# Patient Record
Sex: FEMALE | Race: WHITE | NOT HISPANIC OR LATINO | Employment: FULL TIME | URBAN - METROPOLITAN AREA
[De-identification: names, ages, dates, MRNs, and addresses within clinical notes are randomized per-mention and may not be internally consistent; named-entity substitution may affect disease eponyms.]

---

## 2017-01-12 ENCOUNTER — OFFICE VISIT (OUTPATIENT)
Dept: INTERNAL MEDICINE | Facility: CLINIC | Age: 52
End: 2017-01-12
Payer: COMMERCIAL

## 2017-01-12 VITALS
SYSTOLIC BLOOD PRESSURE: 120 MMHG | DIASTOLIC BLOOD PRESSURE: 96 MMHG | HEIGHT: 68 IN | RESPIRATION RATE: 18 BRPM | TEMPERATURE: 98 F | BODY MASS INDEX: 35.52 KG/M2 | OXYGEN SATURATION: 96 % | HEART RATE: 95 BPM | WEIGHT: 234.38 LBS

## 2017-01-12 DIAGNOSIS — E03.9 HYPOTHYROIDISM, UNSPECIFIED TYPE: ICD-10-CM

## 2017-01-12 DIAGNOSIS — I10 ESSENTIAL HYPERTENSION: ICD-10-CM

## 2017-01-12 DIAGNOSIS — F41.9 ANXIETY: ICD-10-CM

## 2017-01-12 DIAGNOSIS — D35.00 ADRENAL ADENOMA, UNSPECIFIED LATERALITY: ICD-10-CM

## 2017-01-12 DIAGNOSIS — G25.0 BENIGN ESSENTIAL TREMOR: ICD-10-CM

## 2017-01-12 DIAGNOSIS — G43.909 MIGRAINE WITHOUT STATUS MIGRAINOSUS, NOT INTRACTABLE, UNSPECIFIED MIGRAINE TYPE: ICD-10-CM

## 2017-01-12 DIAGNOSIS — N63.0 BREAST MASS: ICD-10-CM

## 2017-01-12 PROCEDURE — 3074F SYST BP LT 130 MM HG: CPT | Mod: S$GLB,,, | Performed by: INTERNAL MEDICINE

## 2017-01-12 PROCEDURE — 99999 PR PBB SHADOW E&M-EST. PATIENT-LVL IV: CPT | Mod: PBBFAC,,, | Performed by: INTERNAL MEDICINE

## 2017-01-12 PROCEDURE — 99215 OFFICE O/P EST HI 40 MIN: CPT | Mod: S$GLB,,, | Performed by: INTERNAL MEDICINE

## 2017-01-12 PROCEDURE — 3080F DIAST BP >= 90 MM HG: CPT | Mod: S$GLB,,, | Performed by: INTERNAL MEDICINE

## 2017-01-12 PROCEDURE — 1159F MED LIST DOCD IN RCRD: CPT | Mod: S$GLB,,, | Performed by: INTERNAL MEDICINE

## 2017-01-12 RX ORDER — DEXAMETHASONE 1 MG/1
1 TABLET ORAL ONCE
Qty: 1 TABLET | Refills: 0 | Status: SHIPPED | OUTPATIENT
Start: 2017-01-12 | End: 2017-01-12

## 2017-01-12 RX ORDER — ENALAPRIL MALEATE 20 MG/1
20 TABLET ORAL DAILY
Qty: 90 TABLET | Refills: 3 | Status: SHIPPED | OUTPATIENT
Start: 2017-01-12 | End: 2018-01-08 | Stop reason: SDUPTHER

## 2017-01-12 RX ORDER — HYDROCHLOROTHIAZIDE 50 MG/1
50 TABLET ORAL DAILY
Qty: 90 TABLET | Refills: 3 | Status: CANCELLED | OUTPATIENT
Start: 2017-01-12

## 2017-01-12 RX ORDER — SUMATRIPTAN SUCCINATE 100 MG/1
100 TABLET ORAL
Qty: 10 TABLET | Refills: 3 | Status: CANCELLED | OUTPATIENT
Start: 2017-01-12 | End: 2017-02-11

## 2017-01-12 RX ORDER — ENALAPRIL MALEATE 20 MG/1
20 TABLET ORAL DAILY
Qty: 90 TABLET | Refills: 3 | Status: CANCELLED | OUTPATIENT
Start: 2017-01-12

## 2017-01-12 RX ORDER — BUPROPION HYDROCHLORIDE 100 MG/1
100 TABLET ORAL 2 TIMES DAILY
Qty: 180 TABLET | Refills: 3 | Status: SHIPPED | OUTPATIENT
Start: 2017-01-12 | End: 2018-01-29 | Stop reason: SDUPTHER

## 2017-01-12 RX ORDER — PROMETHAZINE HYDROCHLORIDE 50 MG/1
50 SUPPOSITORY RECTAL EVERY 6 HOURS PRN
Qty: 12 SUPPOSITORY | Refills: 1 | Status: SHIPPED | OUTPATIENT
Start: 2017-01-12 | End: 2018-01-08 | Stop reason: SDUPTHER

## 2017-01-12 RX ORDER — BUTALBITAL, ACETAMINOPHEN AND CAFFEINE 50; 325; 40 MG/1; MG/1; MG/1
1 TABLET ORAL EVERY 4 HOURS PRN
Qty: 90 TABLET | Refills: 1 | Status: SHIPPED | OUTPATIENT
Start: 2017-01-12 | End: 2018-01-08 | Stop reason: SDUPTHER

## 2017-01-12 RX ORDER — LEVOTHYROXINE SODIUM 75 UG/1
75 TABLET ORAL DAILY
Qty: 90 TABLET | Refills: 3 | Status: SHIPPED | OUTPATIENT
Start: 2017-01-12 | End: 2017-01-31 | Stop reason: DRUGHIGH

## 2017-01-12 RX ORDER — PROPRANOLOL HYDROCHLORIDE 40 MG/1
40 TABLET ORAL 3 TIMES DAILY
Qty: 180 TABLET | Refills: 3 | Status: SHIPPED | OUTPATIENT
Start: 2017-01-12 | End: 2017-04-20 | Stop reason: SDUPTHER

## 2017-01-12 NOTE — PATIENT INSTRUCTIONS
TODAY:  - labs  - Endocrinology consult  - will call you about Neuro appt  - propranolol started  - HCTZ discontinued    NEXT:  - buproprion

## 2017-01-12 NOTE — ASSESSMENT & PLAN NOTE
Biopsy negative in 3/2011, last mammo in 8/2016 category 2 benign  · mammo due in 8/2017  · monitor

## 2017-01-12 NOTE — ASSESSMENT & PLAN NOTE
Levo 75mcg, no labs in system for 1 year  · Recheck thyroid studies  · Continue current medications

## 2017-01-12 NOTE — ASSESSMENT & PLAN NOTE
Patient with generalized anxiety, taking buproprion for unknown years  · Will address changing to other therapy with fewer side-effects of anxiety  · Start propranolol 40mg TID

## 2017-01-12 NOTE — ASSESSMENT & PLAN NOTE
Controlled with phenergran and fioricet abortive therapies, patient wanting preventive therapy, did not tolerate imitrex, topiramate   · Start propranolol 40mg TID for prophylaxis  · Referral for second opinion to Dr Smith  · Request earliest appointment in [place] or [facility]  · Refilled abortive therapy scripts

## 2017-01-12 NOTE — PROGRESS NOTES
"Primary Care Provider Appointment    Subjective:      Patient ID: Maria G Cameron is a 51 y.o. female.    Chief Complaint: Annual Exam; Medication Refill ( Pt also has questions about medications); and Migraine (prevenative care )  Dr Tinoco is PCP, patient here for annual physical. Moved here from [place] 1 1/2 years ago, has gained weight since living in [facility]. Patient continues to have migraines that she reports are triggered by "hormones", change of diet, sleep patterns, environmental change.     Patient has taken promethazine suppositories and butal/acaet/CF for migraines. She uses 24 and 180 tablets respectively. She is interested in pursuing a preventative therapy for migraines, but did not tolerate topiramate because she experienced muscle pain and anxiety. The imitrex made her short of breath.     She has history of adrenal adenoma with annual DHEA and aldosterone testing, which have been stable for 15 years. Her last MRI was performed in [place] with unknown results. She does not remember the facility name.    She has a breast mass in 2011, with negative biopsy. No complaints of breast tenderness or enlargement. She attends annual mammograms with no concerning findings.      Social History  Components    Tobacco (+) 5 pack years    Alcohol (-)     Ililcit drugs (-)    Sex (?) not asked    Employment (+) [employer]         Past Surgical History   Procedure Laterality Date    Breast core biopsy  3/7/11     fibrocystic changes    Colonoscopy N/A 4/15/2016     Procedure: COLONOSCOPY;  Surgeon: Coleman Moss MD;  Location: [address]);  Service: Endoscopy;  Laterality: N/A;       Past Medical History   Diagnosis Date    Adrenal adenoma     Allergic rhinitis     Allergy      Allergic to trees, weeds, mold and standardized mite    Anxiety     Eustachian tube dysfunction     Fibrocystic changes of left breast      12 o'clock L breast mass bx 3/7/11    Hypertension     Hypothyroidism     Migraine " "headache     Recurrent otitis media        Review of Systems   Constitutional: Positive for activity change, appetite change and unexpected weight change. Negative for fatigue.   HENT: Negative for ear discharge.    Eyes: Negative for photophobia and discharge.   Cardiovascular: Negative for chest pain, palpitations and leg swelling.   Gastrointestinal: Negative for constipation and diarrhea.   Endocrine: Negative for polydipsia and polyphagia.   Genitourinary: Negative for frequency and menstrual problem.   Musculoskeletal: Positive for neck pain and neck stiffness. Negative for back pain and gait problem.   Skin: Negative for color change.   Neurological: Positive for tremors and headaches. Negative for light-headedness.   Psychiatric/Behavioral: Negative for confusion.       Objective:     Visit Vitals    BP (!) 120/96 (BP Location: Right arm, Patient Position: Sitting, BP Method: Manual)    Pulse 95    Temp 98.1 °F (36.7 °C)    Resp 18    Ht 5' 8" (1.727 m)    Wt 106.3 kg (234 lb 5.6 oz)    SpO2 96%    BMI 35.63 kg/m2       Physical Exam   Constitutional: She is oriented to person, place, and time. She appears well-developed and well-nourished.   obese   HENT:   Head: Normocephalic and atraumatic.   Eyes: Pupils are equal, round, and reactive to light. Right eye exhibits no discharge. Left eye exhibits no discharge.   Neck: Normal range of motion.   Cardiovascular:   tachycardia   Pulmonary/Chest: Effort normal and breath sounds normal. No respiratory distress. She has no wheezes.   Abdominal: Soft. She exhibits no distension. There is no tenderness.   Musculoskeletal: Normal range of motion. She exhibits no edema or tenderness.   Lymphadenopathy:     She has no cervical adenopathy.   Neurological: She is alert and oriented to person, place, and time. No cranial nerve deficit.   hyperreflexive LE- 3/4 patellar   Skin: Skin is warm. No erythema.   Mild mottled appearance of UE  telangiestasias on chest "   Psychiatric: She has a normal mood and affect. Her behavior is normal.   Anxious demeanor   Vitals reviewed.      No results found for: WBC, HGB, HCT, PLT, CHOL, TRIG, HDL, LDLDIRECT, ALT, AST, NA, K, CL, CREATININE, BUN, CO2, TSH, PSA, INR, GLUF, HGBA1C, MICROALBUR      Assessment:   51 y.o. female with multiple co-morbid illnesses here to continue work-up of chronic issues notably migraines, anxiety, HTN.     Plan:     Problem List Items Addressed This Visit        Neuro    Anxiety     Patient with generalized anxiety, taking buproprion for unknown years  · Will address changing to other therapy with fewer side-effects of anxiety  · Start propranolol 40mg TID         Relevant Medications    buPROPion (WELLBUTRIN) 100 MG tablet    propranolol (INDERAL) 40 MG tablet    Benign essential tremor     Lifetime essential tremor, familial  · Start propranolol 40mg TID         Relevant Medications    propranolol (INDERAL) 40 MG tablet       Cardiac    Hypertension     BP controlled on ACE-HCTZ, patient wanting to start BB for migraine prevention  · Discontinue HCTZ  · Continue ACE 20mg  · Start propranolol 40mg TID  · Increase as tolerated         Relevant Medications    propranolol (INDERAL) 40 MG tablet    enalapril (VASOTEC) 20 MG tablet    Other Relevant Orders    Comprehensive metabolic panel    Hemoglobin A1c    CBC auto differential    Lipid panel    T4, free    TSH    Migraine headache     Controlled with phenergran and fioricet abortive therapies, patient wanting preventive therapy, did not tolerate imitrex, topiramate   · Start propranolol 40mg TID for prophylaxis  · Referral for second opinion to Dr Smith  · Request earliest appointment in [place] or [facility]  · Refilled abortive therapy scripts         Relevant Medications    butalbital-acetaminophen-caffeine -40 mg (FIORICET, ESGIC) -40 mg per tablet    promethazine (PHENERGAN) 50 MG suppository    propranolol (INDERAL) 40 MG tablet       Hem/Onc     Adrenal adenoma     Patient with adrenal adenoma diagnosed 15 Y ago, with MRI. Last labs 2016. Chronic complaints of headache, weight chages, anxiety.   · Endocrinology referral  · Check low-dose cortisol suppression test  · check DHEA-S, aldosterone/renin ratio         Relevant Medications    dexamethasone (DECADRON) 1 MG Tab    Other Relevant Orders    ALDOSTERONE    RENIN    DHEA-SULFATE    CORTISOL, 8AM    Breast mass     Biopsy negative in 3/2011, last mammo in 8/2016 category 2 benign  · mammo due in 8/2017  · monitor            Endocrine    Hypothyroidism     Levo 75mcg, no labs in system for 1 year  · Recheck thyroid studies  · Continue current medications         Relevant Medications    levothyroxine (SYNTHROID) 75 MCG tablet    propranolol (INDERAL) 40 MG tablet    Other Relevant Orders    Hepatitis C antibody          Health Maintenance       Date Due Completion Date    Hepatitis C Screening 1965 ---TODAY    Lipid Panel 1965 ---TODAY    TETANUS VACCINE 9/18/1981 ---    Pap Smear 5/24/2016 5/24/2013 (Done)    Override on 5/24/2013: Done    Influenza Vaccine 8/1/2016 11/5/2015 (Done)    Override on 11/5/2015: Done    Mammogram 8/4/2017 8/4/2016    Override on 12/8/2011: Done    Override on 12/8/2011: Done    Colonoscopy 4/15/2026 4/15/2016    Pneumococcal PPSV23 (Medium Risk) (2) 9/18/2028 1/1/2013          Return in about 2 weeks (around 1/26/2017) for review of labs, established patient follow-up.. One hour spent with this patient today, half of that in counseling.    Pearl Ellis MD/MPH  Internal Medicine  [facility]  [phone]

## 2017-01-12 NOTE — ASSESSMENT & PLAN NOTE
Patient with adrenal adenoma diagnosed 15 Y ago, with MRI. Last labs 2016. Chronic complaints of headache, weight chages, anxiety.   · Endocrinology referral  · Check low-dose cortisol suppression test  · check DHEA-S, aldosterone/renin ratio

## 2017-01-12 NOTE — ASSESSMENT & PLAN NOTE
BP controlled on ACE-HCTZ, patient wanting to start BB for migraine prevention  · Discontinue HCTZ  · Continue ACE 20mg  · Start propranolol 40mg TID  · Increase as tolerated

## 2017-01-12 NOTE — MR AVS SNAPSHOT
[facility] - Internal Medicine  [address]  [facility] [address]  Phone: [phone]  Fax: [phone]                  Maria G Cameron   2017 8:00 AM   Office Visit    Description:  Female : 1965   Provider:  Pearl Ellis MD   Department:  [facility] - [facility]           Reason for Visit     Annual Exam     Medication Refill     Migraine           Diagnoses this Visit        Comments    Anxiety         Migraine without status migrainosus, not intractable, unspecified migraine type         Essential hypertension         Adrenal adenoma, unspecified laterality         Hypothyroidism, unspecified type         Benign essential tremor                To Do List           Future Appointments        Provider Department Dept Phone    2017 8:00 AM LAB, APPOINTMENT [place] [facility] [phone]    2017 8:00 AM Pearl Ellis MD [facility] Internal Medicine [phone]      Goals (5 Years of Data)     None      Follow-Up and Disposition     Return in about 2 weeks (around 2017) for review of labs, established patient follow-up.       These Medications        Disp Refills Start End    buPROPion (WELLBUTRIN) 100 MG tablet 180 tablet 3 2017     Take 1 tablet (100 mg total) by mouth 2 (two) times daily. - Oral    Pharmacy: [facility] [address] [place] [address] [facility] Ph #: [phone]       Notes to Pharmacy: Bupropion 100mg tab Mylan. No substitution.    butalbital-acetaminophen-caffeine -40 mg (FIORICET, ESGIC) -40 mg per tablet 90 tablet 1 2017     Take 1 tablet by mouth every 4 (four) hours as needed for Pain. - Oral    Pharmacy: [facility] [address] [place] [address] [address] Ph #: [phone]       levothyroxine (SYNTHROID) 75 MCG tablet 90 tablet 3 2017     Take 1 tablet (75 mcg total) by mouth once daily. - Oral    Pharmacy: [facility] [address] [place] [address] [address]  [facility] #: [phone]       promethazine (PHENERGAN) 50 MG suppository 12 suppository 1 1/12/2017     Place 1 suppository (50 mg total) rectally every 6 (six) hours as needed for Nausea. - Rectal    Pharmacy: [address] Ph #: [phone]       propranolol (INDERAL) 40 MG tablet 180 tablet 3 1/12/2017 1/12/2018    Take 1 tablet (40 mg total) by mouth 3 (three) times daily. Start taking 2 times daily, increase to 3 times daily as tolerated - Oral    Pharmacy: [address] Ph #: [phone]       enalapril (VASOTEC) 20 MG tablet 90 tablet 3 1/12/2017     Take 1 tablet (20 mg total) by mouth once daily. - Oral    Pharmacy: [address] Ph #: [phone]         [facility]     [facility] - 24/7 Assistance  Registered nurses in the [facility] provide clinical advisement, health education, appointment booking, and other advisory services.  Call for this free service at [phone].             Medications           Message regarding Medications     Verify the changes and/or additions to your medication regime listed below are the same as discussed with your clinician today.  If any of these changes or additions are incorrect, please notify your healthcare provider.        START taking these NEW medications        Refills    propranolol (INDERAL) 40 MG tablet 3    Sig: Take 1 tablet (40 mg total) by mouth 3 (three) times daily. Start taking 2 times daily, increase to 3 times daily as tolerated    Class: Normal    Route: Oral      STOP taking these medications     hydrochlorothiazide (HYDRODIURIL) 50 MG tablet Take 1 tablet (50 mg total) by mouth once daily.    sumatriptan (IMITREX) 100 MG tablet Take 1 tablet (100 mg total) by mouth every 2 (two) hours as needed for Migraine.    topiramate (TROKENDI XR) 25 mg Cp24 Take 25 mg by mouth  "once daily.           Verify that the below list of medications is an accurate representation of the medications you are currently taking.  If none reported, the list may be blank. If incorrect, please contact your healthcare provider. Carry this list with you in case of emergency.           Current Medications     buPROPion (WELLBUTRIN) 100 MG tablet Take 1 tablet (100 mg total) by mouth 2 (two) times daily.    butalbital-acetaminophen-caffeine -40 mg (FIORICET, ESGIC) -40 mg per tablet Take 1 tablet by mouth every 4 (four) hours as needed for Pain.    enalapril (VASOTEC) 20 MG tablet Take 1 tablet (20 mg total) by mouth once daily.    levothyroxine (SYNTHROID) 75 MCG tablet Take 1 tablet (75 mcg total) by mouth once daily.    promethazine (PHENERGAN) 50 MG suppository Place 1 suppository (50 mg total) rectally every 6 (six) hours as needed for Nausea.    propranolol (INDERAL) 40 MG tablet Take 1 tablet (40 mg total) by mouth 3 (three) times daily. Start taking 2 times daily, increase to 3 times daily as tolerated           Clinical Reference Information           Vital Signs - Last Recorded  Most recent update: 1/12/2017  7:46 AM by Cydney Barbour MA    BP Pulse Temp Resp Ht Wt    (!) 120/96 (BP Location: Right arm, Patient Position: Sitting, BP Method: Manual) 95 98.1 °F (36.7 °C) 18 5' 8" (1.727 m) 106.3 kg (234 lb 5.6 oz)    SpO2 BMI             96% 35.63 kg/m2         Blood Pressure          Most Recent Value    BP  (!)  120/96      Allergies as of 1/12/2017     Codeine      Immunizations Administered on Date of Encounter - 1/12/2017     None      Orders Placed During Today's Visit     Future Labs/Procedures Expected by Expires    CBC auto differential  1/12/2017 3/13/2018    Comprehensive metabolic panel  1/12/2017 3/13/2018    Hemoglobin A1c  1/12/2017 3/13/2018    Lipid panel  1/12/2017 3/13/2018    T4, free  1/12/2017 3/13/2018    TSH  1/12/2017 3/13/2018      [facility] Sign-Up     " Activating your [facility] account is as easy as 1-2-3!     1) Visit [email or web address], select Sign Up Now, enter this activation code and your date of birth, then select Next.  [email or web address]  Expires: 2/26/2017  9:08 AM      2) Create a username and password to use when you visit [facility] in the future and select a security question in case you lose your password and select Next.    3) Enter your e-mail address and click Sign Up!    Additional Information  If you have questions, please e-mail [email or web address] or call [phone] to talk to our [facility] staff. Remember, [facility] is NOT to be used for urgent needs. For medical emergencies, dial 911.         Instructions    TODAY:  - labs  - Endocrinology consult  - will call you about Neuro appt  - propranolol started  - HCTZ discontinued    NEXT:  - buproprion       Smoking Cessation     If you would like to quit smoking:   You may be eligible for free services if you are a [place] resident and started smoking cigarettes before September 1, 1988.  Call the Smoking Cessation Trust (SCT) toll free at (340) 526-1762 or ([phone] 520-3656.   Call 7-236-QUIT-NOW if you do not meet the above criteria.

## 2017-01-13 ENCOUNTER — LAB VISIT (OUTPATIENT)
Dept: LAB | Facility: HOSPITAL | Age: 52
End: 2017-01-13
Attending: INTERNAL MEDICINE
Payer: COMMERCIAL

## 2017-01-13 DIAGNOSIS — E03.9 HYPOTHYROIDISM, UNSPECIFIED TYPE: ICD-10-CM

## 2017-01-13 DIAGNOSIS — I10 ESSENTIAL HYPERTENSION: ICD-10-CM

## 2017-01-13 DIAGNOSIS — D35.00 ADRENAL ADENOMA, UNSPECIFIED LATERALITY: ICD-10-CM

## 2017-01-13 LAB
ALBUMIN SERPL BCP-MCNC: 4 G/DL
ALP SERPL-CCNC: 56 U/L
ALT SERPL W/O P-5'-P-CCNC: 30 U/L
ANION GAP SERPL CALC-SCNC: 9 MMOL/L
AST SERPL-CCNC: 27 U/L
BASOPHILS # BLD AUTO: 0.03 K/UL
BASOPHILS NFR BLD: 0.6 %
BILIRUB SERPL-MCNC: 0.5 MG/DL
BUN SERPL-MCNC: 13 MG/DL
CALCIUM SERPL-MCNC: 9.4 MG/DL
CHLORIDE SERPL-SCNC: 101 MMOL/L
CHOLEST/HDLC SERPL: 4.3 {RATIO}
CO2 SERPL-SCNC: 29 MMOL/L
CORTIS SERPL-MCNC: 11.2 UG/DL
CREAT SERPL-MCNC: 0.9 MG/DL
DHEA-S SERPL-MCNC: 372.8 UG/DL
DIFFERENTIAL METHOD: ABNORMAL
EOSINOPHIL # BLD AUTO: 0.1 K/UL
EOSINOPHIL NFR BLD: 1.7 %
ERYTHROCYTE [DISTWIDTH] IN BLOOD BY AUTOMATED COUNT: 13.1 %
EST. GFR  (AFRICAN AMERICAN): >60 ML/MIN/1.73 M^2
EST. GFR  (NON AFRICAN AMERICAN): >60 ML/MIN/1.73 M^2
ESTIMATED AVG GLUCOSE: 117 MG/DL
GLUCOSE SERPL-MCNC: 109 MG/DL
HBA1C MFR BLD HPLC: 5.7 %
HCT VFR BLD AUTO: 43.1 %
HCV AB SERPL QL IA: NEGATIVE
HDL/CHOLESTEROL RATIO: 23.1 %
HDLC SERPL-MCNC: 264 MG/DL
HDLC SERPL-MCNC: 61 MG/DL
HGB BLD-MCNC: 14.6 G/DL
LDLC SERPL CALC-MCNC: 182.6 MG/DL
LYMPHOCYTES # BLD AUTO: 2.1 K/UL
LYMPHOCYTES NFR BLD: 43 %
MCH RBC QN AUTO: 32.2 PG
MCHC RBC AUTO-ENTMCNC: 33.9 %
MCV RBC AUTO: 95 FL
MONOCYTES # BLD AUTO: 0.4 K/UL
MONOCYTES NFR BLD: 8.3 %
NEUTROPHILS # BLD AUTO: 2.2 K/UL
NEUTROPHILS NFR BLD: 46.2 %
NONHDLC SERPL-MCNC: 203 MG/DL
PLATELET # BLD AUTO: 310 K/UL
PMV BLD AUTO: 9.3 FL
POTASSIUM SERPL-SCNC: 4.1 MMOL/L
PROT SERPL-MCNC: 7.4 G/DL
RBC # BLD AUTO: 4.54 M/UL
SODIUM SERPL-SCNC: 139 MMOL/L
T4 FREE SERPL-MCNC: 1 NG/DL
TRIGL SERPL-MCNC: 102 MG/DL
TSH SERPL DL<=0.005 MIU/L-ACNC: 5.19 UIU/ML
WBC # BLD AUTO: 4.81 K/UL

## 2017-01-13 PROCEDURE — 80053 COMPREHEN METABOLIC PANEL: CPT

## 2017-01-13 PROCEDURE — 82088 ASSAY OF ALDOSTERONE: CPT

## 2017-01-13 PROCEDURE — 86803 HEPATITIS C AB TEST: CPT

## 2017-01-13 PROCEDURE — 84244 ASSAY OF RENIN: CPT

## 2017-01-13 PROCEDURE — 83036 HEMOGLOBIN GLYCOSYLATED A1C: CPT

## 2017-01-13 PROCEDURE — 82533 TOTAL CORTISOL: CPT

## 2017-01-13 PROCEDURE — 82627 DEHYDROEPIANDROSTERONE: CPT

## 2017-01-13 PROCEDURE — 85025 COMPLETE CBC W/AUTO DIFF WBC: CPT

## 2017-01-13 PROCEDURE — 36415 COLL VENOUS BLD VENIPUNCTURE: CPT

## 2017-01-13 PROCEDURE — 84443 ASSAY THYROID STIM HORMONE: CPT

## 2017-01-13 PROCEDURE — 80061 LIPID PANEL: CPT

## 2017-01-13 PROCEDURE — 84439 ASSAY OF FREE THYROXINE: CPT

## 2017-01-16 LAB — ALDOST SERPL-MCNC: 14.8 NG/DL

## 2017-01-17 ENCOUNTER — LAB VISIT (OUTPATIENT)
Dept: LAB | Facility: HOSPITAL | Age: 52
End: 2017-01-17
Attending: INTERNAL MEDICINE
Payer: COMMERCIAL

## 2017-01-17 DIAGNOSIS — D49.7 ADRENAL TUMOR: ICD-10-CM

## 2017-01-17 LAB
CORTIS SERPL-MCNC: <1 UG/DL
RENIN PLAS-CCNC: 33 NG/ML/H

## 2017-01-17 PROCEDURE — 36415 COLL VENOUS BLD VENIPUNCTURE: CPT

## 2017-01-17 PROCEDURE — 82533 TOTAL CORTISOL: CPT

## 2017-01-26 ENCOUNTER — OFFICE VISIT (OUTPATIENT)
Dept: INTERNAL MEDICINE | Facility: CLINIC | Age: 52
End: 2017-01-26
Payer: COMMERCIAL

## 2017-01-26 VITALS
BODY MASS INDEX: 36.25 KG/M2 | SYSTOLIC BLOOD PRESSURE: 148 MMHG | HEART RATE: 71 BPM | DIASTOLIC BLOOD PRESSURE: 98 MMHG | HEIGHT: 68 IN | RESPIRATION RATE: 18 BRPM | WEIGHT: 239.19 LBS | OXYGEN SATURATION: 96 %

## 2017-01-26 DIAGNOSIS — D35.00 ADRENAL ADENOMA, UNSPECIFIED LATERALITY: Primary | ICD-10-CM

## 2017-01-26 DIAGNOSIS — G43.101 MIGRAINE WITH AURA AND WITH STATUS MIGRAINOSUS, NOT INTRACTABLE: ICD-10-CM

## 2017-01-26 DIAGNOSIS — R51.9 CHRONIC INTRACTABLE HEADACHE, UNSPECIFIED HEADACHE TYPE: ICD-10-CM

## 2017-01-26 DIAGNOSIS — I10 ESSENTIAL HYPERTENSION: ICD-10-CM

## 2017-01-26 DIAGNOSIS — E03.9 ACQUIRED HYPOTHYROIDISM: ICD-10-CM

## 2017-01-26 DIAGNOSIS — G25.0 BENIGN ESSENTIAL TREMOR: ICD-10-CM

## 2017-01-26 DIAGNOSIS — F41.9 ANXIETY: ICD-10-CM

## 2017-01-26 DIAGNOSIS — G89.29 CHRONIC INTRACTABLE HEADACHE, UNSPECIFIED HEADACHE TYPE: ICD-10-CM

## 2017-01-26 PROCEDURE — 99213 OFFICE O/P EST LOW 20 MIN: CPT | Mod: S$GLB,,, | Performed by: INTERNAL MEDICINE

## 2017-01-26 PROCEDURE — 99999 PR PBB SHADOW E&M-EST. PATIENT-LVL III: CPT | Mod: PBBFAC,,, | Performed by: INTERNAL MEDICINE

## 2017-01-26 RX ORDER — ACETAZOLAMIDE 500 MG/1
500 CAPSULE, EXTENDED RELEASE ORAL 2 TIMES DAILY
Qty: 60 CAPSULE | Refills: 11 | Status: SHIPPED | OUTPATIENT
Start: 2017-01-26 | End: 2017-07-06

## 2017-01-26 NOTE — ASSESSMENT & PLAN NOTE
Patient with generalized anxiety, taking buproprion for unknown years  · Self-titrated wellbutrin to 100mg  · In future will switch to SSRI  · Continue propranolol 40mg TID

## 2017-01-26 NOTE — MR AVS SNAPSHOT
[facility]  [address]  [facility] [address]  Phone: [phone]  Fax: [phone]                  Maria G Cameron   2017 8:00 AM   Office Visit    Description:  Female : 1965   Provider:  Pearl Ellis MD   Department:  [facility]           Reason for Visit     Follow-up     B/P Check     Results     Migraine           Diagnoses this Visit        Comments    Adrenal adenoma, unspecified laterality    -  Primary     Essential hypertension         Migraine with aura and with status migrainosus, not intractable         Acquired hypothyroidism         Anxiety         Benign essential tremor         Chronic intractable headache, unspecified headache type                To Do List           Future Appointments        Provider Department Dept Phone    2017 5:15 PM [facility] CT1 64- LIMIT [address] L[address] [facility] [phone]    2017 7:30 AM MD [facility] - Internal Medicine [phone]    2017 8:00 AM Jennifer Ledezma MD [facility] - Endo/Diab/Metab [phone]    3/23/2017 1:00 PM Matt Elizabeth MD [facility] - Neuro Stroke Center [phone]      Goals (5 Years of Data)     None      Follow-Up and Disposition     Return in about 1 week (around 2017) for established patient follow-up.       These Medications        Disp Refills Start End    acetaZOLAMIDE (DIAMOX) 500 mg CpSR 60 capsule 11 2017    Take 1 capsule (500 mg total) by mouth 2 (two) times daily. - Oral    [facility] [address] [place] [address] [address] Ph #: [phone]         [facility]     [facility] Care [facility] -  Assistance  Registered nurses in the [facility] provide clinical advisement, health education, appointment booking, and other advisory services.  Call for this free service at [phone].             Medications           Message regarding  "Medications     Verify the changes and/or additions to your medication regime listed below are the same as discussed with your clinician today.  If any of these changes or additions are incorrect, please notify your healthcare provider.        START taking these NEW medications        Refills    acetaZOLAMIDE (DIAMOX) 500 mg CpSR 11    Sig: Take 1 capsule (500 mg total) by mouth 2 (two) times daily.    Class: Normal    Route: Oral           Verify that the below list of medications is an accurate representation of the medications you are currently taking.  If none reported, the list may be blank. If incorrect, please contact your healthcare provider. Carry this list with you in case of emergency.           Current Medications     buPROPion (WELLBUTRIN) 100 MG tablet Take 1 tablet (100 mg total) by mouth 2 (two) times daily.    enalapril (VASOTEC) 20 MG tablet Take 1 tablet (20 mg total) by mouth once daily.    levothyroxine (SYNTHROID) 75 MCG tablet Take 1 tablet (75 mcg total) by mouth once daily.    promethazine (PHENERGAN) 50 MG suppository Place 1 suppository (50 mg total) rectally every 6 (six) hours as needed for Nausea.    propranolol (INDERAL) 40 MG tablet Take 1 tablet (40 mg total) by mouth 3 (three) times daily. Start taking 2 times daily, increase to 3 times daily as tolerated    acetaZOLAMIDE (DIAMOX) 500 mg CpSR Take 1 capsule (500 mg total) by mouth 2 (two) times daily.    butalbital-acetaminophen-caffeine -40 mg (FIORICET, ESGIC) -40 mg per tablet Take 1 tablet by mouth every 4 (four) hours as needed for Pain.           Clinical Reference Information           Vital Signs - Last Recorded  Most recent update: 1/26/2017  7:49 AM by Cydney Barbour MA    BP Pulse Resp Ht Wt SpO2    (!) 148/98 (BP Location: Right arm, Patient Position: Sitting, BP Method: Manual) 71 18 5' 8" (1.727 m) 108.5 kg (239 lb 3.2 oz) 96%    BMI                36.37 kg/m2          Blood Pressure          Most " Recent Value    BP  (!)  148/98      Allergies as of 1/26/2017     Codeine      Immunizations Administered on Date of Encounter - 1/26/2017     None      Orders Placed During Today's Visit     Future Labs/Procedures Expected by Expires    CT Abdomen Pelvis With Contrast  1/26/2017 1/26/2018    Polysomnogram (CPAP will be added if patient meets diagnostic criteria.)  As directed 1/26/2018      [facility] Sign-Up     Activating your [facility] account is as easy as 1-2-3!     1) Visit [email or web address], select Sign Up Now, enter this activation code and your date of birth, then select Next.  [email or web address]  Expires: 2/26/2017  9:08 AM      2) Create a username and password to use when you visit [facility] in the future and select a security question in case you lose your password and select Next.    3) Enter your e-mail address and click Sign Up!    Additional Information  If you have questions, please e-mail [email or web address] or call [phone] to talk to our [facility] staff. Remember, [facility] is NOT to be used for urgent needs. For medical emergencies, dial 911.         Instructions    TODAY:  - CT of abdomen  - Endocrinology appt  - start acetazolamide for pseudotumor cerebri  - continue wellbutrin 100mg  - continue propranolol 40mg three times a day  - sleep study ordered    NEXT:  - increase thyroid medication    FUTURE:  - switch to SSRI anti-anxiety med       Smoking Cessation     If you would like to quit smoking:   You may be eligible for free services if you are a [place] resident and started smoking cigarettes before September 1, 1988.  Call the Smoking Cessation Trust (SCT) toll free at (140) 103-3802 or (059) 224-0349.   Call 6-668-QUIT-NOW if you do not meet the above criteria.

## 2017-01-26 NOTE — PATIENT INSTRUCTIONS
TODAY:  - CT of abdomen  - Endocrinology appt  - start acetazolamide for pseudotumor cerebri  - continue wellbutrin 100mg  - continue propranolol 40mg three times a day  - sleep study ordered    NEXT:  - increase thyroid medication    FUTURE:  - switch to SSRI anti-anxiety med

## 2017-01-26 NOTE — ASSESSMENT & PLAN NOTE
Levo 75mcg, TSH elevated in 1/2017  · Needs increase in levo dosing  · Continue current medications until next appt  · BP meds changed today, concern for side-effects with multi med changes at one appt

## 2017-01-26 NOTE — ASSESSMENT & PLAN NOTE
Patient with adrenal adenoma diagnosed 15 Y ago, with MRI. Last labs 2016. Chronic complaints of headache, weight chages, anxiety.   · Endocrinology referral set-up today  · low-dose cortisol suppression test normal  · DHEA-S elevated  · Plasma aldosterone normal  · Renin activity normal  · CT abdomen/pelvis with contrast ordered

## 2017-01-26 NOTE — ASSESSMENT & PLAN NOTE
Differential includes pseudotumor cerebri, sleep apnea, migraine. Uncontrolled with phenergran and fioricet abortive therapies with propranolol preventive therapy. Did not tolerate imitrex, topiramate.   · Continue propranolol 40mg TID for migraine prophylaxis  · Referral for second opinion to Dr Elizabeth at [facility] (Neuro[facility])  · Continue abortive therapy scripts for migraine  · Start acetazolomide for possible pseudotumor cerebri  · Sleep study for sleep apnea eval (Columbia score 14)  · Advised weight loss  · Barak  at next appt

## 2017-01-26 NOTE — ASSESSMENT & PLAN NOTE
BP uncontrolled on ACE and propranolol (HCTZ discontinued at last appt)  · Start diuretic acetazolamide (also for psudotumor cerebri)  · 500mg BID  · Advised to start this weekend, and call office on monday  · Continue ACE 20mg  · Continue propranolol 40mg TID

## 2017-01-26 NOTE — ASSESSMENT & PLAN NOTE
Lifetime essential tremor, familial. Significant improvement with propranolol.  · Continue propranolol 40mg TID

## 2017-01-27 ENCOUNTER — HOSPITAL ENCOUNTER (OUTPATIENT)
Dept: RADIOLOGY | Facility: HOSPITAL | Age: 52
Discharge: HOME | End: 2017-01-27
Attending: INTERNAL MEDICINE
Payer: COMMERCIAL

## 2017-01-27 DIAGNOSIS — D35.00 ADRENAL ADENOMA, UNSPECIFIED LATERALITY: ICD-10-CM

## 2017-01-27 DIAGNOSIS — F41.9 ANXIETY: ICD-10-CM

## 2017-01-27 PROCEDURE — 25500020 PHARM REV CODE 255: Performed by: INTERNAL MEDICINE

## 2017-01-27 PROCEDURE — 74170 CT ABD WO CNTRST FLWD CNTRST: CPT | Mod: TC

## 2017-01-27 PROCEDURE — 74170 CT ABD WO CNTRST FLWD CNTRST: CPT | Mod: 26,,, | Performed by: RADIOLOGY

## 2017-01-27 RX ADMIN — IOHEXOL 100 ML: 350 INJECTION, SOLUTION INTRAVENOUS at 06:01

## 2017-01-30 ENCOUNTER — TELEPHONE (OUTPATIENT)
Dept: INTERNAL MEDICINE | Facility: CLINIC | Age: 52
End: 2017-01-30

## 2017-01-30 DIAGNOSIS — E03.9 HYPOTHYROIDISM, UNSPECIFIED TYPE: Primary | ICD-10-CM

## 2017-01-30 NOTE — TELEPHONE ENCOUNTER
Pt has been advised about results and stressed great understanding ,  Pt stated that the medication is working and she is feeling ok , medication can be increased at anytime . Please advise     Pt stated that she is happy and thanks Dr. Ellis she really appreciates all that you have done .

## 2017-01-31 RX ORDER — LEVOTHYROXINE SODIUM 88 UG/1
88 TABLET ORAL DAILY
Qty: 30 TABLET | Refills: 11 | Status: SHIPPED | OUTPATIENT
Start: 2017-01-31 | End: 2017-03-13 | Stop reason: DRUGHIGH

## 2017-01-31 NOTE — TELEPHONE ENCOUNTER
Please let her know that a higher dose thyroid medication (levothyroxine 88mcg) was sent to pharmacy.    Thanks,  KJ

## 2017-01-31 NOTE — TELEPHONE ENCOUNTER
Pt has been advised that medication has been called into local pharmacy , Pt stressed great understanding .    Janet    Thanks Dr. Ellis

## 2017-01-31 NOTE — TELEPHONE ENCOUNTER
Called Pt no answer, message has been left informing Pt to please contact us at our direct line , will try again .    VIJAY Barbour

## 2017-02-02 ENCOUNTER — OFFICE VISIT (OUTPATIENT)
Dept: ENDOCRINOLOGY | Facility: CLINIC | Age: 52
End: 2017-02-02
Payer: COMMERCIAL

## 2017-02-02 ENCOUNTER — OFFICE VISIT (OUTPATIENT)
Dept: INTERNAL MEDICINE | Facility: CLINIC | Age: 52
End: 2017-02-02
Payer: COMMERCIAL

## 2017-02-02 VITALS
OXYGEN SATURATION: 97 % | RESPIRATION RATE: 18 BRPM | SYSTOLIC BLOOD PRESSURE: 132 MMHG | HEART RATE: 75 BPM | BODY MASS INDEX: 36.02 KG/M2 | WEIGHT: 237.63 LBS | HEIGHT: 68 IN | DIASTOLIC BLOOD PRESSURE: 91 MMHG

## 2017-02-02 VITALS
WEIGHT: 236.13 LBS | BODY MASS INDEX: 35.79 KG/M2 | HEIGHT: 68 IN | HEART RATE: 66 BPM | DIASTOLIC BLOOD PRESSURE: 82 MMHG | SYSTOLIC BLOOD PRESSURE: 152 MMHG

## 2017-02-02 DIAGNOSIS — R68.89 ABNORMAL ENDOCRINE LABORATORY TEST FINDING: ICD-10-CM

## 2017-02-02 DIAGNOSIS — F41.9 ANXIETY: ICD-10-CM

## 2017-02-02 DIAGNOSIS — R51.9 CHRONIC INTRACTABLE HEADACHE, UNSPECIFIED HEADACHE TYPE: ICD-10-CM

## 2017-02-02 DIAGNOSIS — E03.9 ACQUIRED HYPOTHYROIDISM: Primary | ICD-10-CM

## 2017-02-02 DIAGNOSIS — D35.00 ADRENAL ADENOMA, UNSPECIFIED LATERALITY: ICD-10-CM

## 2017-02-02 DIAGNOSIS — I10 ESSENTIAL HYPERTENSION: ICD-10-CM

## 2017-02-02 DIAGNOSIS — G89.29 CHRONIC INTRACTABLE HEADACHE, UNSPECIFIED HEADACHE TYPE: ICD-10-CM

## 2017-02-02 DIAGNOSIS — E03.9 ACQUIRED HYPOTHYROIDISM: ICD-10-CM

## 2017-02-02 DIAGNOSIS — G25.0 BENIGN ESSENTIAL TREMOR: ICD-10-CM

## 2017-02-02 PROCEDURE — 99999 PR PBB SHADOW E&M-EST. PATIENT-LVL III: CPT | Mod: PBBFAC,,, | Performed by: INTERNAL MEDICINE

## 2017-02-02 PROCEDURE — 99214 OFFICE O/P EST MOD 30 MIN: CPT | Mod: S$GLB,,, | Performed by: INTERNAL MEDICINE

## 2017-02-02 PROCEDURE — 99203 OFFICE O/P NEW LOW 30 MIN: CPT | Mod: S$GLB,,, | Performed by: INTERNAL MEDICINE

## 2017-02-02 NOTE — ASSESSMENT & PLAN NOTE
Patient with generalized anxiety, taking buproprion for unknown years. Improved on low-dose wellbutrin  · Self-titrated wellbutrin to 100mg  · In future will switch to SSRI  · Continue propranolol 40mg TID

## 2017-02-02 NOTE — LETTER
February 2, 2017      Pearl Ellis MD  [address]  [facility] [address]           [facility]Di[facility]Metab  [address]  [facility] [address]  Phone: [phone]  Fax: [phone]          Patient: Maria G Cameron   MR Number: [id number]   YOB: 1965   Date of Visit: 2/2/2017       Dear Dr. Pearl Ellis:    Thank you for referring Maria G Cameron to me for evaluation. Attached you will find relevant portions of my assessment and plan of care.    If you have questions, please do not hesitate to call me. I look forward to following Maria G Cameron along with you.    Sincerely,    Susan Solano MD    Enclosure  CC:  No Recipients    If you would like to receive this communication electronically, please contact [email or web address] or [phone] to request more information on [employer] Link access.    For providers and/or their staff who would like to refer a patient to [facility], please contact us through our one-stop-shop provider referral line, [facility], at [phone].    If you feel you have received this communication in error or would no longer like to receive these types of communications, please e-mail [email or web address]

## 2017-02-02 NOTE — PROGRESS NOTES
"Subjective:     Patient ID: Massiel Muñoz is a 51 y.o. female.    Chief Complaint: Consult    HPI:   Ms. Muñoz is a 51 y.o. female who is here for a consult visit for evaluation hypothyroidism and a small adenoma. Fifteen years ago, was diagnosed with a small adrenal adenoma. Consultation with endocrinologist and surgeon at the time recommended monitoring only.    She was started on levothyroxine 75 mcg fifteen years ago without a dosage change, until recent labs were mildly abnormal.   Reports feeling fatigued prior to recent blood work. Has a hypertension that is well controlled on medications. Denies cold intolerance or constipation.   Currently taking in the morning with other medications.     Occasionally takes a MVI.     Menarche 13 years  Menopause 5 years ago  Menstrual cycles were regular during reproductive years and associated with migraines.   Terminal hair over upper lip that without any change in the past fifteen years. No acne, oily skin during adolescence.     Denies HRT. Denies hot flushing or vaginal dryness.     Mother and sister with thyroid disease.     Review of Systems   Constitutional: Negative for chills and fever.   HENT: Negative for congestion and sinus pressure.    Eyes: Negative for visual disturbance.   Respiratory: Negative for chest tightness and shortness of breath.    Cardiovascular: Negative for chest pain, palpitations and leg swelling.   Gastrointestinal: Negative for abdominal pain and vomiting.   Genitourinary: Negative for dysuria.   Musculoskeletal: Negative for arthralgias.   Skin: Negative for rash.   Neurological: Negative for weakness.   Hematological: Does not bruise/bleed easily.   Psychiatric/Behavioral: Negative for sleep disturbance.        Objective:     Physical Exam    Vitals:    02/02/17 0853   BP: (!) 152/82   Pulse: 66   Weight: 107.1 kg (236 lb 1.8 oz)   Height: 5' 8" (1.727 m)     Results for MASSIEL MUÑOZ (MRN [id number]) as of 2/2/2017 09:07   Ref. Range " 1/13/2017 07:45 1/17/2017 07:35   Aldosterone Latest Units: ng/dL 14.8    Cortisol -8 AM Latest Ref Range: 4.30 - 22.40 ug/dL 11.2 <1.0 (L)   Hemoglobin A1C Latest Ref Range: 4.5 - 6.2 % 5.7    Estimated Avg Glucose Latest Ref Range: 68 - 131 mg/dL 117    TSH Latest Ref Range: 0.400 - 4.000 uIU/mL 5.190 (H)    Free T4 Latest Ref Range: 0.71 - 1.51 ng/dL 1.00    DHEA-SO4 Latest Ref Range: 56.2 - 282.9 ug/dL 372.8 (H)      CT Scan of abdomen 1/2017:   10 minute delayed images were also obtained per adrenal mass protocol.   The adrenal glands are within normal limits.  No masses, nodules, or abnormal enhancement is identified.    Assessment/Plan:     1. Acquired hypothyroidism  - not taking correctly, however will space out LT4 from the remainder of pills  - has repeat TSH in two months with PCP, avoid iatrogenic thyrotoxicosis as this can increase bone loss and increase risk for arrhythmias    2. Abnormal endocrine laboratory test finding  - DHEAS is mildly abnormal  - No signs of virilization to suggest anything more than mild hyperandrogenism

## 2017-02-02 NOTE — MR AVS SNAPSHOT
[facility]  [address]  [facility] [address]  Phone: [phone]  Fax: [phone]                  Maria G Cameron   2017 7:30 AM   Office Visit    Description:  Female : 1965   Provider:  Pearl Ellis MD   Department:  [facility]           Reason for Visit     Follow-up     Results           Diagnoses this Visit        Comments    Chronic intractable headache, unspecified headache type         Anxiety         Benign essential tremor         Essential hypertension         Adrenal adenoma, unspecified laterality         Acquired hypothyroidism                To Do List           Future Appointments        Provider Department Dept Phone    2017 9:00 AM Susan Solano MD [facility] - Endo/Diab/Metab [phone]    3/13/2017 7:20 AM LAB, APPOINTMENT [place] [facility] [phone]    3/23/2017 1:00 PM Matt Elizabeth MD [facility] - Neuro Stroke Center [phone]    4/3/2017 7:30 AM Pearl Ellis MD [facility] - Internal Medicine [phone]      Goals (5 Years of Data)     None      Follow-Up and Disposition     Return in about 2 months (around 2017) for established patient follow-up.      [facility]     [facility] -  Assistance  Registered nurses in the [facility] provide clinical advisement, health education, appointment booking, and other advisory services.  Call for this free service at [phone].             Medications           Message regarding Medications     Verify the changes and/or additions to your medication regime listed below are the same as discussed with your clinician today.  If any of these changes or additions are incorrect, please notify your healthcare provider.             Verify that the below list of medications is an accurate representation of the medications you are currently taking.  If none reported, the list may be blank. If  "incorrect, please contact your healthcare provider. Carry this list with you in case of emergency.           Current Medications     acetaZOLAMIDE (DIAMOX) 500 mg CpSR Take 1 capsule (500 mg total) by mouth 2 (two) times daily.    buPROPion (WELLBUTRIN) 100 MG tablet Take 1 tablet (100 mg total) by mouth 2 (two) times daily.    butalbital-acetaminophen-caffeine -40 mg (FIORICET, ESGIC) -40 mg per tablet Take 1 tablet by mouth every 4 (four) hours as needed for Pain.    enalapril (VASOTEC) 20 MG tablet Take 1 tablet (20 mg total) by mouth once daily.    levothyroxine (SYNTHROID) 88 MCG tablet Take 1 tablet (88 mcg total) by mouth once daily.    promethazine (PHENERGAN) 50 MG suppository Place 1 suppository (50 mg total) rectally every 6 (six) hours as needed for Nausea.    propranolol (INDERAL) 40 MG tablet Take 1 tablet (40 mg total) by mouth 3 (three) times daily. Start taking 2 times daily, increase to 3 times daily as tolerated           Clinical Reference Information           Vital Signs - Last Recorded  Most recent update: 2/2/2017  7:38 AM by Cydney Barbour MA    BP Pulse Resp Ht Wt SpO2    (!) 132/91 (BP Location: Right arm, Patient Position: Sitting, BP Method: Manual) 75 18 5' 8" (1.727 m) 107.8 kg (237 lb 10.5 oz) 97%    BMI                36.14 kg/m2          Blood Pressure          Most Recent Value    BP  (!)  132/91      Allergies as of 2/2/2017     Codeine      Immunizations Administered on Date of Encounter - 2/2/2017     None      Orders Placed During Today's Visit     Future Labs/Procedures Expected by Expires    Comprehensive metabolic panel  2/2/2017 4/3/2018    T4, free  2/2/2017 4/3/2018    TSH  2/2/2017 4/3/2018      [facility] Sign-Up     Activating your [facility] account is as easy as 1-2-3!     1) Visit [email or web address], select Sign Up Now, enter this activation code and your date of birth, then select Next.  [email or web address]  Expires: 2/26/2017  9:08 AM      2) Create a " "username and password to use when you visit [facility] in the future and select a security question in case you lose your password and select Next.    3) Enter your e-mail address and click Sign Up!    Additional Information  If you have questions, please e-mail [email or web address] or call [phone] to talk to our [facility] staff. Remember, [facility] is NOT to be used for urgent needs. For medical emergencies, dial 911.         Instructions    What is idiopathic intracranial hypertension? -- Idiopathic intracranial hypertension is a condition that causes pressure inside the skull. It is also called "pseudotumor cerebri."    Idiopathic intracranial hypertension causes headaches and vision loss. These symptoms are similar to symptoms caused by a brain tumor.    What causes idiopathic intracranial hypertension? -- Doctors do not know the cause. But idiopathic intracranial hypertension is more common in women and obese people.    Certain medicines seem to make some people more likely to get idiopathic intracranial hypertension. These medicines include tetracycline, high doses of vitamin A, and growth hormone.    What are the symptoms of idiopathic intracranial hypertension? -- The symptoms include:    ?Bad headaches - Some people say the worst pain is right behind the eyes.  ?Short periods of vision loss - This can happen in 1 or both eyes. It usually lasts a few seconds and might happen once in a while or several times a day.  ?Dimming of vision  ?Trouble seeing things at the edge of your line of sight  ?Double vision  ?Seeing flashing lights  ?Noises inside your head - The noise might sound like rushing water or wind. It often pulses in time with your heartbeat and can come and go.  In rare cases, people with idiopathic intracranial hypertension lose their vision forever.    Will I need tests? -- Yes. Tests can include:    ?Eye exam - An eye doctor will use special tools to look for swelling at the back of your " "eye, near the optic nerve (figure 1). Most patients with idiopathic intracranial hypertension have swelling of the optic nerve. The optic nerve connects the eye to the brain.  ?Visual field test - This test checks how well you can see things that are at the edges of your line of sight. The test will be repeated from time to time to check your optic nerves.  ?MRI or CT scan - These are imaging tests that take pictures of the inside of your brain. Your doctor can use them to check if a tumor or other problem is causing your symptoms.  ?Lumbar puncture (sometimes called a "spinal tap") - During this procedure, a doctor puts a needle into your lower back to measure the fluid pressure inside your skull.  How is idiopathic intracranial hypertension treated? -- Treatments include:    ?Weight loss - If you are overweight, your doctor will recommend a diet or weight loss program. If you are very overweight and cannot lose weight by dieting, your doctor might recommend medicines or weight-loss surgery.  ?Medicines - Your doctor might prescribe medicines that help lower the amount of spinal fluid your body makes. Spinal fluid is the fluid that surrounds the brain and spinal cord. He or she might also recommend medicines used to prevent and treat headaches.  ?Surgery - Doctors only do surgery if losing weight and taking medicines don't help enough. The kinds of surgery include:  Shunting - In this surgery, a doctor puts a device called a "shunt" into a fluid-filled space inside your brain. The shunt is connected to a tube that is placed under your skin and that empties into your belly. The shunt helps drain the extra spinal fluid from your brain and can relieve the pressure.  Optical nerve sheath fenestration - In this surgery, a doctor cuts a tiny, window-like hole in the tissue that covers the optic nerve. This helps lower pressure on the nerve to help save your vision.       Smoking Cessation     If you would like to quit " smoking:   You may be eligible for free services if you are a [place] resident and started smoking cigarettes before September 1, 1988.  Call the [employer]oking [employer]essation [employer] ([employer]) toll free at (154) 668-6034 or (216) 437-4804.   Call 8-825-QUIT-NOW if you do not meet the above criteria.

## 2017-02-02 NOTE — ASSESSMENT & PLAN NOTE
Diagnosed 15 Y ago, on MRI (Endo in [place], Dr Bobo Xie [phone]). Chronic complaints of headache, weight ages, anxiety.   · Endocrinology today  · low-dose cortisol suppression test normal  · DHEA-S elevated  · Plasma aldosterone normal  · Renin activity normal  · CT abdomen/pelvis with contrast did not show adrenal adenoma

## 2017-02-02 NOTE — PROGRESS NOTES
"Primary Care Provider Appointment    Subjective:      Patient ID: Maria G Cameron is a 51 y.o. female.    Chief Complaint: Follow-up; Results (on labs and studys); and Headache  Patient states today "I feel a-okay", with significant reduction of headaches after the initiation of acetazolamide 500mg BID for past 2 weeks. No "bad" headaches for the past week.      Has been on wellbutrin for 5 years, with significant anxiety. Anxiety has improved with a decrease in dose of wellbutrin.    Her recent CT abdomen adrenal protocol did NOT show an adenoma, she follows-up with Endo today. She saw Dr Bobo Xie in [place] for work-up (15 years ago), but could not locate medical records from that office.         Past Surgical History   Procedure Laterality Date    Breast core biopsy  3/7/11     fibrocystic changes    Colonoscopy N/A 4/15/2016     Procedure: COLONOSCOPY;  Surgeon: Coleman Moss MD;  Location: [facility] ([address] [place] [address]);  Service: Endoscopy;  Laterality: N/A;       Past Medical History   Diagnosis Date    Adrenal adenoma     Allergic rhinitis     Allergy      Allergic to trees, weeds, mold and standardized mite    Anxiety     Eustachian tube dysfunction     Fibrocystic changes of left breast      12 o'clock L breast mass bx 3/7/11    Hypertension     Hypothyroidism     Migraine headache     Recurrent otitis media        Review of Systems   Constitutional: Positive for activity change.   HENT:        Eye swelling improved   Cardiovascular: Negative for leg swelling.   Neurological: Negative for tremors, weakness and light-headedness.   Psychiatric/Behavioral: Positive for sleep disturbance. The patient is not nervous/anxious.        Objective:     Visit Vitals    BP (!) 132/91 (BP Location: Right arm, Patient Position: Sitting, BP Method: Manual)    Pulse 75    Resp 18    Ht 5' 8" (1.727 m)    Wt 107.8 kg (237 lb 10.5 oz)    SpO2 97%    BMI 36.14 kg/m2       Physical Exam   Constitutional: She is oriented to " person, place, and time. She appears well-developed and well-nourished.   obese   HENT:   Head: Normocephalic and atraumatic.   Musculoskeletal: Normal range of motion. She exhibits no edema.   Neurological: She is alert and oriented to person, place, and time. Coordination normal.   NO tremor bilaterally UE   Skin: Skin is warm and dry.   Psychiatric: She has a normal mood and affect. Her behavior is normal. Judgment and thought content normal.   Some crying during exam   Vitals reviewed.      Lab Results   Component Value Date    WBC 4.81 01/13/2017    HGB 14.6 01/13/2017    HCT 43.1 01/13/2017     01/13/2017    CHOL 264 (H) 01/13/2017    TRIG 102 01/13/2017    HDL 61 01/13/2017    ALT 30 01/13/2017    AST 27 01/13/2017     01/13/2017    K 4.1 01/13/2017     01/13/2017    CREATININE 0.9 01/13/2017    BUN 13 01/13/2017    CO2 29 01/13/2017    TSH 5.190 (H) 01/13/2017    HGBA1C 5.7 01/13/2017         Assessment:   51 y.o. female with multiple co-morbid illnesses here to continue work-up of chronic issues notably adrenal adenoma, chronic headaches, hypothyroidism.     Plan:     Problem List Items Addressed This Visit        Neuro    Anxiety     Patient with generalized anxiety, taking buproprion for unknown years. Improved on low-dose wellbutrin  · Self-titrated wellbutrin to 100mg  · In future will switch to SSRI  · Continue propranolol 40mg TID         Chronic intractable headache     Controlled with phenergran and fioricet abortive therapies, patient wanting preventive therapy, did not tolerate imitrex, topiramate   · Continue propranolol 40mg TID for prophylaxis  · Referral for second opinion to Dr Ma  · Cotnilanie abortive therapy scripts  · Patient with significant response to acetazolamide  · Continue at 500mg BID         Relevant Orders    Comprehensive metabolic panel    Benign essential tremor     Lifetime essential tremor, familial. Significant improvement with  propranolol.  · Continue propranolol 40mg TID            Cardiac    Hypertension     BP controlled on ACE and propranolol  · Continue propranolol 40mg TID, ACE 20mg            Hem/Onc    Adrenal adenoma     Diagnosed 15 Y ago, on MRI (Endo in [place], Dr Bobo Xie [phone]). Chronic complaints of headache, weight ages, anxiety.   · Endocrinology today  · low-dose cortisol suppression test normal  · DHEA-S elevated  · Plasma aldosterone normal  · Renin activity normal  · CT abdomen/pelvis with contrast did not show adrenal adenoma            Endocrine    Hypothyroidism     Levo increased to 88mcg in 1/2017 when TSH elevated   · Check TSH, free T4 in 6 weeks         Relevant Orders    Comprehensive metabolic panel    TSH    T4, free          Health Maintenance       Date Due Completion Date    TETANUS VACCINE 1/2/2012 1/2/2002    Mammogram 1/12/2018 1/12/2017 (Not Clinical)    Override on 1/12/2017: Not Clinically Appropriate (no longer recommended every 10 years)    Override on 12/8/2011: Done    Override on 12/8/2011: Done    Lipid Panel 1/13/2018 1/13/2017    Pap Smear 1/12/2020 1/12/2017 (Not Clinical)    Override on 1/12/2017: Not Clinically Appropriate (last 3 PAPs normal)    Override on 5/24/2013: Done    Colonoscopy 4/15/2026 4/15/2016    Pneumococcal PPSV23 (Medium Risk) (2) 9/18/2028 1/1/2013          Return in about 2 months (around 4/2/2017) for established patient follow-up. . One hour spent with this patient today, half of that in counseling.    Pearl Ellis MD/MPH  Internal Medicine  [facility]  [phone]

## 2017-02-02 NOTE — ASSESSMENT & PLAN NOTE
Controlled with phenergran and fioricet abortive therapies, patient wanting preventive therapy, did not tolerate imitrex, topiramate   · Continue propranolol 40mg TID for prophylaxis  · Referral for second opinion to Dr Ma  · Cotninue abortive therapy scripts  · Patient with significant response to acetazolamide  · Continue at 500mg BID

## 2017-02-02 NOTE — PATIENT INSTRUCTIONS
"What is idiopathic intracranial hypertension? -- Idiopathic intracranial hypertension is a condition that causes pressure inside the skull. It is also called "pseudotumor cerebri."    Idiopathic intracranial hypertension causes headaches and vision loss. These symptoms are similar to symptoms caused by a brain tumor.    What causes idiopathic intracranial hypertension? -- Doctors do not know the cause. But idiopathic intracranial hypertension is more common in women and obese people.    Certain medicines seem to make some people more likely to get idiopathic intracranial hypertension. These medicines include tetracycline, high doses of vitamin A, and growth hormone.    What are the symptoms of idiopathic intracranial hypertension? -- The symptoms include:    ?Bad headaches - Some people say the worst pain is right behind the eyes.  ?Short periods of vision loss - This can happen in 1 or both eyes. It usually lasts a few seconds and might happen once in a while or several times a day.  ?Dimming of vision  ?Trouble seeing things at the edge of your line of sight  ?Double vision  ?Seeing flashing lights  ?Noises inside your head - The noise might sound like rushing water or wind. It often pulses in time with your heartbeat and can come and go.  In rare cases, people with idiopathic intracranial hypertension lose their vision forever.    Will I need tests? -- Yes. Tests can include:    ?Eye exam - An eye doctor will use special tools to look for swelling at the back of your eye, near the optic nerve (figure 1). Most patients with idiopathic intracranial hypertension have swelling of the optic nerve. The optic nerve connects the eye to the brain.  ?Visual field test - This test checks how well you can see things that are at the edges of your line of sight. The test will be repeated from time to time to check your optic nerves.  ?MRI or CT scan - These are imaging tests that take pictures of the inside of your brain. Your " "doctor can use them to check if a tumor or other problem is causing your symptoms.  ?Lumbar puncture (sometimes called a "spinal tap") - During this procedure, a doctor puts a needle into your lower back to measure the fluid pressure inside your skull.  How is idiopathic intracranial hypertension treated? -- Treatments include:    ?Weight loss - If you are overweight, your doctor will recommend a diet or weight loss program. If you are very overweight and cannot lose weight by dieting, your doctor might recommend medicines or weight-loss surgery.  ?Medicines - Your doctor might prescribe medicines that help lower the amount of spinal fluid your body makes. Spinal fluid is the fluid that surrounds the brain and spinal cord. He or she might also recommend medicines used to prevent and treat headaches.  ?Surgery - Doctors only do surgery if losing weight and taking medicines don't help enough. The kinds of surgery include:  Shunting - In this surgery, a doctor puts a device called a "shunt" into a fluid-filled space inside your brain. The shunt is connected to a tube that is placed under your skin and that empties into your belly. The shunt helps drain the extra spinal fluid from your brain and can relieve the pressure.  Optical nerve sheath fenestration - In this surgery, a doctor cuts a tiny, window-like hole in the tissue that covers the optic nerve. This helps lower pressure on the nerve to help save your vision.  "

## 2017-02-10 ENCOUNTER — TELEPHONE (OUTPATIENT)
Dept: SLEEP MEDICINE | Facility: OTHER | Age: 52
End: 2017-02-10

## 2017-03-13 ENCOUNTER — TELEPHONE (OUTPATIENT)
Dept: INTERNAL MEDICINE | Facility: CLINIC | Age: 52
End: 2017-03-13

## 2017-03-13 ENCOUNTER — LAB VISIT (OUTPATIENT)
Dept: LAB | Facility: HOSPITAL | Age: 52
End: 2017-03-13
Attending: INTERNAL MEDICINE
Payer: COMMERCIAL

## 2017-03-13 DIAGNOSIS — E03.9 HYPOTHYROIDISM, UNSPECIFIED TYPE: Primary | ICD-10-CM

## 2017-03-13 DIAGNOSIS — G89.29 CHRONIC INTRACTABLE HEADACHE, UNSPECIFIED HEADACHE TYPE: ICD-10-CM

## 2017-03-13 DIAGNOSIS — E03.9 ACQUIRED HYPOTHYROIDISM: ICD-10-CM

## 2017-03-13 DIAGNOSIS — R51.9 CHRONIC INTRACTABLE HEADACHE, UNSPECIFIED HEADACHE TYPE: ICD-10-CM

## 2017-03-13 LAB
ALBUMIN SERPL BCP-MCNC: 3.7 G/DL
ALP SERPL-CCNC: 62 U/L
ALT SERPL W/O P-5'-P-CCNC: 17 U/L
ANION GAP SERPL CALC-SCNC: 7 MMOL/L
AST SERPL-CCNC: 15 U/L
BILIRUB SERPL-MCNC: 0.3 MG/DL
BUN SERPL-MCNC: 19 MG/DL
CALCIUM SERPL-MCNC: 9.2 MG/DL
CHLORIDE SERPL-SCNC: 111 MMOL/L
CO2 SERPL-SCNC: 25 MMOL/L
CREAT SERPL-MCNC: 1 MG/DL
EST. GFR  (AFRICAN AMERICAN): >60 ML/MIN/1.73 M^2
EST. GFR  (NON AFRICAN AMERICAN): >60 ML/MIN/1.73 M^2
GLUCOSE SERPL-MCNC: 96 MG/DL
POTASSIUM SERPL-SCNC: 3.6 MMOL/L
PROT SERPL-MCNC: 7.1 G/DL
SODIUM SERPL-SCNC: 143 MMOL/L
T4 FREE SERPL-MCNC: 1.04 NG/DL
TSH SERPL DL<=0.005 MIU/L-ACNC: 4.31 UIU/ML

## 2017-03-13 PROCEDURE — 84443 ASSAY THYROID STIM HORMONE: CPT

## 2017-03-13 PROCEDURE — 36415 COLL VENOUS BLD VENIPUNCTURE: CPT

## 2017-03-13 PROCEDURE — 80053 COMPREHEN METABOLIC PANEL: CPT

## 2017-03-13 PROCEDURE — 84439 ASSAY OF FREE THYROXINE: CPT

## 2017-03-13 RX ORDER — LEVOTHYROXINE SODIUM 100 UG/1
100 TABLET ORAL DAILY
Qty: 90 TABLET | Refills: 0 | Status: SHIPPED | OUTPATIENT
Start: 2017-03-13 | End: 2017-05-25 | Stop reason: DRUGHIGH

## 2017-03-14 NOTE — TELEPHONE ENCOUNTER
Pt has been advised about her thyroid medication being increased also Pt has been informed that she should take the medication on a empty stomach with a glass of water 30 mins  Before she eats or eat any food or medication and PT stressed great understanding. And has been advised that in 2 months labs will be drawn .      Thanks Dr. Ellis

## 2017-03-14 NOTE — TELEPHONE ENCOUNTER
Please advise patient that her labs indicate that her thyroid medication should be increased. I sent the new prescription of levothyroxine 100mcg to her pharmacy.    Please confirm with her that it should be taken on an empty stomach with a glass of water 30 minutes beofre any other food or medications.    We will check labs again in about 2 months.    Thanks,  KEIRA

## 2017-03-16 ENCOUNTER — TELEPHONE (OUTPATIENT)
Dept: SLEEP MEDICINE | Facility: OTHER | Age: 52
End: 2017-03-16

## 2017-03-23 ENCOUNTER — OFFICE VISIT (OUTPATIENT)
Dept: NEUROLOGY | Facility: CLINIC | Age: 52
End: 2017-03-23
Payer: COMMERCIAL

## 2017-03-23 VITALS
SYSTOLIC BLOOD PRESSURE: 175 MMHG | WEIGHT: 233 LBS | DIASTOLIC BLOOD PRESSURE: 95 MMHG | BODY MASS INDEX: 35.31 KG/M2 | HEIGHT: 68 IN | HEART RATE: 60 BPM

## 2017-03-23 DIAGNOSIS — G25.0 FAMILIAL TREMOR: ICD-10-CM

## 2017-03-23 DIAGNOSIS — F41.9 ANXIETY: ICD-10-CM

## 2017-03-23 DIAGNOSIS — I10 ESSENTIAL HYPERTENSION: ICD-10-CM

## 2017-03-23 DIAGNOSIS — G43.009 MIGRAINE WITHOUT AURA AND WITHOUT STATUS MIGRAINOSUS, NOT INTRACTABLE: Primary | ICD-10-CM

## 2017-03-23 PROCEDURE — 99215 OFFICE O/P EST HI 40 MIN: CPT | Mod: S$GLB,,, | Performed by: PSYCHIATRY & NEUROLOGY

## 2017-03-23 PROCEDURE — 99999 PR PBB SHADOW E&M-EST. PATIENT-LVL III: CPT | Mod: PBBFAC,,, | Performed by: PSYCHIATRY & NEUROLOGY

## 2017-03-23 NOTE — PROGRESS NOTES
Outpatient Neurology Consultation    Requesting physician: Dr. Ellis    Impression:  1. Episodic migraine without aura  2. Familial tremor  3. Tobacco abuse  4. HTN: uncontrolled  5. Anxiety    Plan:  1. Increase propranolol to 80mg tid (for migraine, BP, tremor, anxiety)  2. Call in 1-2 weeks for progress on tolerability of higher dosage of propranolol; will prescribe higher strength, if tolerating  3. Continue NSAIDs or Fioricet prn  4. Another call in 4-6 weeks re progress if tolerating  5. Consider GONB  6. Discussed smoking cessation, exercise and weight loss  7. F/U with Dr. Ellis for BP  8. RTC 3 months      CC:  Migraine    HPI:  52 y/o WF with a long-standing history of episodic migraine since 13.   Pain is typically L frontal/merced-orbital or bi-occipital.  +pounding, +phonophobia, + nausea.  Duration up to 3 days.   Triggers include BP elevations, MSG, change in sleep.  + daytime fatigue. No snoring nor nocturnal choking that she knows of. No neck pain.  No aura.     Freq: 7/30 and 3/30    Prophylactics   Effective: Diamox   Ineffective/not tolerated:  topiramate (SOB), propranolol 40mg tid    Abortives   Effective: Fioricet, naproxen, ibuprofen, Tylenol, phenergan supp   Ineffective/not tolerated: Imitrex (chest tightness)   Never: tramadol, Botox, GONB    Past Medical History:   Diagnosis Date    Adrenal adenoma     Allergic rhinitis     Allergy     Allergic to trees, weeds, mold and standardized mite    Anxiety     Eustachian tube dysfunction     Fibrocystic changes of left breast     12 o'clock L breast mass bx 3/7/11    Hypertension     Hypothyroidism     Migraine headache     Recurrent otitis media       Past Surgical History:   Procedure Laterality Date    breast core biopsy  3/7/11    fibrocystic changes    COLONOSCOPY N/A 4/15/2016    Procedure: COLONOSCOPY;  Surgeon: Coleman Moss MD;  Location: [address];  Service: Endoscopy;  Laterality: N/A;      Outpatient  Prescriptions Marked as Taking for the 3/23/17 encounter (Office Visit) with Matt Elizabeth MD   Medication Sig Dispense Refill    acetaZOLAMIDE (DIAMOX) 500 mg CpSR Take 1 capsule (500 mg total) by mouth 2 (two) times daily. 60 capsule 11    buPROPion (WELLBUTRIN) 100 MG tablet Take 1 tablet (100 mg total) by mouth 2 (two) times daily. 180 tablet 3    butalbital-acetaminophen-caffeine -40 mg (FIORICET, ESGIC) -40 mg per tablet Take 1 tablet by mouth every 4 (four) hours as needed for Pain. 90 tablet 1    enalapril (VASOTEC) 20 MG tablet Take 1 tablet (20 mg total) by mouth once daily. 90 tablet 3    levothyroxine (SYNTHROID) 100 MCG tablet Take 1 tablet (100 mcg total) by mouth once daily. 90 tablet 0    promethazine (PHENERGAN) 50 MG suppository Place 1 suppository (50 mg total) rectally every 6 (six) hours as needed for Nausea. 12 suppository 1    propranolol (INDERAL) 40 MG tablet Take 1 tablet (40 mg total) by mouth 3 (three) times daily. Start taking 2 times daily, increase to 3 times daily as tolerated 180 tablet 3      Review of patient's allergies indicates:   Allergen Reactions    Imitrex [sumatriptan] Shortness Of Breath    Codeine Nausea And Vomiting      Family History   Problem Relation Age of Onset    Heart disease Mother 79    Thyroid disease Mother     Hemochromatosis Father     Heart disease Maternal Grandmother     Lupus Sister     Thyroid disease Sister       Social History     Social History    Marital status: Single     Spouse name: N/A    Number of children: N/A    Years of education: N/A     Occupational History          clinical      Social History Main Topics    Smoking status: Current Every Day Smoker     Packs/day: 0.25     Years: 20.00     Types: Cigarettes    Smokeless tobacco: Not on file    Alcohol use No    Drug use: No    Sexual activity: Not Currently      Comment: single with no children     Other Topics Concern    Not  "on file     Social History Narrative    Lives alone and no assistance with ADLs.     Single with no children     Review Of Systems:  General: Negative for fever   HENT: Negative for tinnitus, nose bleeds, neck stiffness   Cardiac Negative for palpitations   Vascular: Negative for easy bruising   Pulmonary: Negative for cough   Gastrointestinal: Negative for constipation   Urinary: Negative for incomplete bladder emptying   Musculoskeletal: Negative for muscle aches   Neurological: As above. Otherwise negative for abnormal movements   Psychiatric:  + anxiety       BP (!) 175/95 (BP Location: Left arm, Patient Position: Sitting, BP Method: Automatic)  Pulse 60  Ht 5' 8" (1.727 m)  Wt 105.7 kg (233 lb)  BMI 35.43 kg/m2   Overweight female   Neck: nt  Extremities: no edema    Mental status:   Awake, alert and appropriately oriented   Normal recent and remote memory   Normal attention and concentration   Normal speech and language   Normal fund of knowledge   No extinction  Cranial nerves:   Normal funduscopic - discs sharp   PERRLA   EOMF without nystagmus   VFF   Normal facial sensation   Normal facial movements   Intact hearing bilaterally   Palate elevates symmetrically   Normal SCM and trapezius strength   Tongue midline  Motor:   No pronator drift   Normal FF movements bilaterally   Normal muscle tone, bulk and power   Mild postural tremor BUEs  Sensory   Intact to LT,  temperature, position  DTRs   2+ and symmetric   Plantar responses are flexor bilaterally  Coordination   Intact to FNF, RAH, and HTS  Gait   Normal base and gait   Normal toe, heel and tandem   No Romberg    Data Reviewed:  Dr. Stiles's note  Dr. Ellis's notes    Matt Elizabeth MD    "

## 2017-03-23 NOTE — LETTER
March 23, 2017      Pearl Ellis MD  [address]  [place] [address]           [facility]  [address]  [facility] [address]  Phone: [phone]          Patient: Maria G Cameron   MR Number: [id number]   YOB: 1965   Date of Visit: 3/23/2017       Dear Dr. Pearl Ellis:    Thank you for referring Maria G Cameron to me for evaluation. Attached you will find relevant portions of my assessment and plan of care.    If you have questions, please do not hesitate to call me. I look forward to following Maria G Cameron along with you.    Sincerely,    Matt Elizabeth MD    Enclosure  CC:  No Recipients    If you would like to receive this communication electronically, please contact [email or web address] or [phone] to request more information on [facility] Link access.    For providers and/or their staff who would like to refer a patient to [facility], please contact us through our one-stop-shop provider referral line, [facility], at [phone].    If you feel you have received this communication in error or would no longer like to receive these types of communications, please e-mail [email or web address]

## 2017-03-27 ENCOUNTER — TELEPHONE (OUTPATIENT)
Dept: SLEEP MEDICINE | Facility: OTHER | Age: 52
End: 2017-03-27

## 2017-04-03 ENCOUNTER — OFFICE VISIT (OUTPATIENT)
Dept: INTERNAL MEDICINE | Facility: CLINIC | Age: 52
End: 2017-04-03
Payer: COMMERCIAL

## 2017-04-03 VITALS
HEIGHT: 68 IN | SYSTOLIC BLOOD PRESSURE: 138 MMHG | OXYGEN SATURATION: 97 % | WEIGHT: 236 LBS | DIASTOLIC BLOOD PRESSURE: 84 MMHG | BODY MASS INDEX: 35.77 KG/M2 | HEART RATE: 63 BPM

## 2017-04-03 DIAGNOSIS — R51.9 CHRONIC INTRACTABLE HEADACHE, UNSPECIFIED HEADACHE TYPE: ICD-10-CM

## 2017-04-03 DIAGNOSIS — E03.9 ACQUIRED HYPOTHYROIDISM: ICD-10-CM

## 2017-04-03 DIAGNOSIS — F41.9 ANXIETY: ICD-10-CM

## 2017-04-03 DIAGNOSIS — G89.29 CHRONIC INTRACTABLE HEADACHE, UNSPECIFIED HEADACHE TYPE: ICD-10-CM

## 2017-04-03 DIAGNOSIS — I10 ESSENTIAL HYPERTENSION: ICD-10-CM

## 2017-04-03 DIAGNOSIS — G25.0 BENIGN ESSENTIAL TREMOR: ICD-10-CM

## 2017-04-03 PROCEDURE — 99215 OFFICE O/P EST HI 40 MIN: CPT | Mod: S$GLB,,, | Performed by: INTERNAL MEDICINE

## 2017-04-03 PROCEDURE — 99999 PR PBB SHADOW E&M-EST. PATIENT-LVL III: CPT | Mod: PBBFAC,,, | Performed by: INTERNAL MEDICINE

## 2017-04-03 NOTE — ASSESSMENT & PLAN NOTE
BP previously uncontrolled on ACE and propranolol, with acetazolamide.  · Continue propranolol 80mg BID  · ACE 20mg  · Acetazolamide 500mg BID

## 2017-04-03 NOTE — PROGRESS NOTES
"Primary Care Provider Appointment    Subjective:      Patient ID: Maria G Cameron is a 51 y.o. female.    Chief Complaint: Headache and Weight Loss  Patient with chronic headaches. Last headache was this weekend, but "not bad, it didn't progress." Her propranolol was increased to 80mg TID by Neuro (patient titrated up to 80mg BID) with significant improvement. She is continuing the acetazolamide 500mg BID with no side-effects, although mild weekly breakthrough headaches were occurring before the propranolol increase. She has not needed fioricet and promethazine on the new regimen, they are needed 1X monthly. She did not attend sleep study.     Levothyroxine was increased on 3/13/17 when TSH was elevated. She has more energy. Her hair is no longer falling out. Sleeping is improved. Due for next check 4/24 week.    Tremor is improved with propranolol increase. She is tolerating the buproprion 100mg BID while smoking 3 cig/d but is pre-contemplative about quitting.    She is interested in a diet plan. A friend is doing HCG supplement 500-calorie restriction X 8 weeks with 20# weight loss. Patient willing to try a sustainable diet change        Past Surgical History:   Procedure Laterality Date    breast core biopsy  3/7/11    fibrocystic changes    COLONOSCOPY N/A 4/15/2016    Procedure: COLONOSCOPY;  Surgeon: Coleman Moss MD;  Location: [address];  Service: Endoscopy;  Laterality: N/A;       Past Medical History:   Diagnosis Date    Adrenal adenoma     Allergic rhinitis     Allergy     Allergic to trees, weeds, mold and standardized mite    Anxiety     Eustachian tube dysfunction     Fibrocystic changes of left breast     12 o'clock L breast mass bx 3/7/11    Hypertension     Hypothyroidism     Migraine headache     Recurrent otitis media        Review of Systems   Constitutional: Positive for activity change. Negative for appetite change, fatigue and unexpected weight change.   Respiratory: " "Negative for cough and choking.    Cardiovascular: Negative for chest pain and leg swelling.   Genitourinary: Negative for menstrual problem and pelvic pain.   Musculoskeletal: Negative for back pain, gait problem and joint swelling.   Neurological: Positive for tremors. Negative for syncope and weakness.   Hematological: Does not bruise/bleed easily.   Psychiatric/Behavioral: Negative for agitation, behavioral problems, confusion, decreased concentration and dysphoric mood.       Objective:   /84 (BP Location: Right arm, Patient Position: Sitting, BP Method: Manual)  Pulse 63  Ht 5' 8" (1.727 m)  Wt 107 kg (236 lb)  SpO2 97%  BMI 35.88 kg/m2    Physical Exam   Constitutional: She is oriented to person, place, and time. She appears well-developed and well-nourished.   obese   HENT:   Head: Normocephalic and atraumatic.   Musculoskeletal: Normal range of motion. She exhibits no edema.   Neurological: She is alert and oriented to person, place, and time. Coordination normal.   Mild intermittent resting tremor bilaterally UE   Skin: Skin is warm and dry.   Psychiatric: She has a normal mood and affect. Her behavior is normal. Judgment and thought content normal.   NO crying during exam   Vitals reviewed.      Lab Results   Component Value Date    WBC 4.81 01/13/2017    HGB 14.6 01/13/2017    HCT 43.1 01/13/2017     01/13/2017    CHOL 264 (H) 01/13/2017    TRIG 102 01/13/2017    HDL 61 01/13/2017    ALT 17 03/13/2017    AST 15 03/13/2017     03/13/2017    K 3.6 03/13/2017     (H) 03/13/2017    CREATININE 1.0 03/13/2017    BUN 19 03/13/2017    CO2 25 03/13/2017    TSH 4.307 (H) 03/13/2017    HGBA1C 5.7 01/13/2017         Assessment:   51 y.o. female with multiple co-morbid illnesses here to continue work-up of chronic issues notably hypothyroidism, headaches, adrenal hormone abnormalities.     Plan:     Problem List Items Addressed This Visit        Other    Hypertension     BP previously " uncontrolled on ACE and propranolol, with acetazolamide.  · Continue propranolol 80mg BID  · ACE 20mg  · Acetazolamide 500mg BID         Relevant Orders    TSH    T4, free    Hypothyroidism     Levo increased 100mcg on 3/13/17, TSH elevated 4.3. Symptoms of hypO improved  · Continue levo 100mcg  · Monitor clinically         Relevant Orders    TSH    T4, free    Anxiety     Patient with generalized anxiety, taking buproprion for unknown years. Improved on low-dose wellbutrin  · Self-titrated wellbutrin to 100mg BID  · Continue propranolol 80mg BID         Relevant Orders    TSH    T4, free    Chronic intractable headache     Controlled with phenergran and fioricet abortive therapies, patient wanting preventive therapy, did not tolerate imitrex, topiramate   · Continue propranolol 80mg BID for prophylaxis  · Continue acetazolamide 500mg BID  · Wean off once weight loss goals acheived   · Cotninue abortive therapy scripts of phenergan and fioricet  · Start IER diet (fasting/low-katelynn diet 2 days/week, normal diet 5 days/wk)  · Health  consult         Benign essential tremor     Lifetime essential tremor, familial. Significant improvement with propranolol.  · Neuro increased propranolol to 80mg BID               Health Maintenance       Date Due Completion Date    TETANUS VACCINE 1/2/2012 1/2/2002    Mammogram 8/4/2017 8/4/2016    Override on 12/8/2011: Done    Override on 12/8/2011: Done    Lipid Panel 1/13/2018 1/13/2017    Pap Smear 1/12/2020 1/12/2017 (Not Clinical)    Override on 1/12/2017: Not Clinically Appropriate (last 3 PAPs normal)    Override on 5/24/2013: Done    Colonoscopy 4/15/2026 4/15/2016    Pneumococcal PPSV23 (Medium Risk) (2) 9/18/2028 1/1/2013          Return in about 3 months (around 7/3/2017) for established patient follow-up. . One hour spent with this patient today, half of that in counseling.    Pearl Ellis MD/MPH  Internal Medicine  [facility]  [facility]  [phone]

## 2017-04-03 NOTE — MR AVS SNAPSHOT
[facility]  [address]  [facility] [address]  Phone: [phone]  Fax: [phone]                  Maria G Cameron   4/3/2017 7:30 AM   Office Visit    Description:  Female : 1965   Provider:  Pearl Ellis MD   Department:  [facility] - Internal Medicine           Reason for Visit     Headache     Weight Loss           Diagnoses this Visit        Comments    Chronic intractable headache, unspecified headache type         Benign essential tremor         Acquired hypothyroidism         Essential hypertension         Anxiety                To Do List           Future Appointments        Provider Department Dept Phone    2017 3:00 PM Matt Elizabeth MD [facility] [phone]      Goals (5 Years of Data)     None      Follow-Up and Disposition     Return in about 3 months (around 7/3/2017) for established patient follow-up.      [facility]     [facility] On Call Nurse Care Line -  Assistance  Unless otherwise directed by your provider, please contact [facility], our nurse care line that is available for  assistance.     Registered nurses in the [facility] On Call Center provide: appointment scheduling, clinical advisement, health education, and other advisory services.  Call: [phone] (toll free)               Medications           Message regarding Medications     Verify the changes and/or additions to your medication regime listed below are the same as discussed with your clinician today.  If any of these changes or additions are incorrect, please notify your healthcare provider.             Verify that the below list of medications is an accurate representation of the medications you are currently taking.  If none reported, the list may be blank. If incorrect, please contact your healthcare provider. Carry this list with you in case of emergency.           Current Medications     acetaZOLAMIDE (DIAMOX) 500 mg CpSR  "Take 1 capsule (500 mg total) by mouth 2 (two) times daily.    buPROPion (WELLBUTRIN) 100 MG tablet Take 1 tablet (100 mg total) by mouth 2 (two) times daily.    butalbital-acetaminophen-caffeine -40 mg (FIORICET, ESGIC) -40 mg per tablet Take 1 tablet by mouth every 4 (four) hours as needed for Pain.    enalapril (VASOTEC) 20 MG tablet Take 1 tablet (20 mg total) by mouth once daily.    levothyroxine (SYNTHROID) 100 MCG tablet Take 1 tablet (100 mcg total) by mouth once daily.    promethazine (PHENERGAN) 50 MG suppository Place 1 suppository (50 mg total) rectally every 6 (six) hours as needed for Nausea.    propranolol (INDERAL) 40 MG tablet Take 1 tablet (40 mg total) by mouth 3 (three) times daily. Start taking 2 times daily, increase to 3 times daily as tolerated           Clinical Reference Information           Your Vitals Were     BP Pulse Height Weight SpO2 BMI    138/84 (BP Location: Right arm, Patient Position: Sitting, BP Method: Manual) 63 5' 8" (1.727 m) 107 kg (236 lb) 97% 35.88 kg/m2      Blood Pressure          Most Recent Value    BP  138/84      Allergies as of 4/3/2017     Imitrex [Sumatriptan]    Codeine      Immunizations Administered on Date of Encounter - 4/3/2017     None      Orders Placed During Today's Visit     Future Labs/Procedures Expected by Expires    T4, free  4/3/2017 6/2/2018    TSH  4/3/2017 6/2/2018      Instructions    TODAY:  - increase propranolol to 80mg three times daily  - start intermittent energy restriction diet   + low calorie 2 days per week (500 calories)   + health diet 5 days per week  - blood pressure cuff through [employer]  - labs week of 4/24    NEXT:  - blood pressure check       Smoking Cessation     If you would like to quit smoking:   You may be eligible for free services if you are a [place] resident and started smoking cigarettes before September 1, 1988.  Call the Smoking Cessation Trust (SCT) toll free at (101) 352-3003 or (187) " 334-7475.   Call 1-800-QUIT-NOW if you do not meet the above criteria.   Contact us via email: [email or web address]   View our website for more information: [email or web address]        Language Assistance Services     ATTENTION: Language assistance services are available, free of charge. Please call [phone].      ATENCIÓN: Si habla español, tiene a hook disposición servicios gratuitos de asistencia lingüística. Llame al [phone].     CHÚ Ý: N?u b?n nói Ti?ng Vi?t, có các d?ch v? h? tr? ngôn ng? mi?n phí dành cho b?n. G?i s? [phone].         [facility] complies with applicable Federal civil rights laws and does not discriminate on the basis of race, color, national origin, age, disability, or sex.

## 2017-04-03 NOTE — ASSESSMENT & PLAN NOTE
Lifetime essential tremor, familial. Significant improvement with propranolol.  · Neuro increased propranolol to 80mg BID

## 2017-04-03 NOTE — ASSESSMENT & PLAN NOTE
Controlled with phenergran and fioricet abortive therapies, patient wanting preventive therapy, did not tolerate imitrex, topiramate   · Continue propranolol 80mg BID for prophylaxis  · Continue acetazolamide 500mg BID  · Wean off once weight loss goals acheived   · Cotninue abortive therapy scripts of phenergan and fioricet  · Start IER diet (fasting/low-katelynn diet 2 days/week, normal diet 5 days/wk)  · Health  consult

## 2017-04-03 NOTE — ASSESSMENT & PLAN NOTE
Patient with generalized anxiety, taking buproprion for unknown years. Improved on low-dose wellbutrin  · Self-titrated wellbutrin to 100mg BID  · Continue propranolol 80mg BID

## 2017-04-03 NOTE — PATIENT INSTRUCTIONS
TODAY:  - increase propranolol to 80mg three times daily  - start intermittent energy restriction diet   + low calorie 2 days per week (500 calories)   + health diet 5 days per week  - blood pressure cuff through [employer]  - labs week of 4/24    NEXT:  - blood pressure check

## 2017-04-03 NOTE — ASSESSMENT & PLAN NOTE
Levo increased 100mcg on 3/13/17, TSH elevated 4.3. Symptoms of hypO improved  · Continue levo 100mcg  · Monitor clinically

## 2017-04-19 DIAGNOSIS — E03.9 HYPOTHYROIDISM, UNSPECIFIED TYPE: ICD-10-CM

## 2017-04-19 DIAGNOSIS — G25.0 BENIGN ESSENTIAL TREMOR: ICD-10-CM

## 2017-04-19 DIAGNOSIS — F41.9 ANXIETY: ICD-10-CM

## 2017-04-19 DIAGNOSIS — I10 ESSENTIAL HYPERTENSION: ICD-10-CM

## 2017-04-19 DIAGNOSIS — G43.909 MIGRAINE WITHOUT STATUS MIGRAINOSUS, NOT INTRACTABLE, UNSPECIFIED MIGRAINE TYPE: ICD-10-CM

## 2017-04-19 RX ORDER — PROPRANOLOL HYDROCHLORIDE 40 MG/1
40 TABLET ORAL 3 TIMES DAILY
Qty: 180 TABLET | Refills: 3 | Status: CANCELLED | OUTPATIENT
Start: 2017-04-19 | End: 2018-04-19

## 2017-04-19 NOTE — TELEPHONE ENCOUNTER
----- Message from Elodia Barbour sent at 4/19/2017  2:41 PM CDT -----  Contact: Self  Pt would like a call back in regards to a refill on her rx propranolol (INDERAL) 40 MG tablet pt would like a new rx for two tabs three times a day.     Pt can be contacted at [phone]

## 2017-04-20 DIAGNOSIS — G25.0 BENIGN ESSENTIAL TREMOR: ICD-10-CM

## 2017-04-20 DIAGNOSIS — E03.9 HYPOTHYROIDISM, UNSPECIFIED TYPE: ICD-10-CM

## 2017-04-20 DIAGNOSIS — I10 ESSENTIAL HYPERTENSION: ICD-10-CM

## 2017-04-20 DIAGNOSIS — G43.909 MIGRAINE WITHOUT STATUS MIGRAINOSUS, NOT INTRACTABLE, UNSPECIFIED MIGRAINE TYPE: ICD-10-CM

## 2017-04-20 DIAGNOSIS — F41.9 ANXIETY: ICD-10-CM

## 2017-04-21 RX ORDER — PROPRANOLOL HYDROCHLORIDE 40 MG/1
TABLET ORAL
Qty: 180 TABLET | Refills: 3 | Status: SHIPPED | OUTPATIENT
Start: 2017-04-21 | End: 2017-04-25 | Stop reason: DRUGHIGH

## 2017-04-21 NOTE — TELEPHONE ENCOUNTER
spoke with pt and pt stated she's not having any side effects and yes the medication is working. Pt states kalpesh discussed this at her last appt. Pt stated she should be taking the medication 2 tabs 3 times daily and would like you to send a new rx to [facility] over at main [facility].

## 2017-04-25 ENCOUNTER — TELEPHONE (OUTPATIENT)
Dept: INTERNAL MEDICINE | Facility: CLINIC | Age: 52
End: 2017-04-25

## 2017-04-25 DIAGNOSIS — E03.9 HYPOTHYROIDISM, UNSPECIFIED TYPE: ICD-10-CM

## 2017-04-25 DIAGNOSIS — G25.0 BENIGN ESSENTIAL TREMOR: ICD-10-CM

## 2017-04-25 DIAGNOSIS — G43.909 MIGRAINE WITHOUT STATUS MIGRAINOSUS, NOT INTRACTABLE, UNSPECIFIED MIGRAINE TYPE: ICD-10-CM

## 2017-04-25 DIAGNOSIS — I10 ESSENTIAL HYPERTENSION: ICD-10-CM

## 2017-04-25 DIAGNOSIS — F41.9 ANXIETY: ICD-10-CM

## 2017-04-25 RX ORDER — PROPRANOLOL HYDROCHLORIDE 80 MG/1
80 TABLET ORAL 3 TIMES DAILY
Qty: 270 TABLET | Refills: 3 | Status: SHIPPED | OUTPATIENT
Start: 2017-04-25 | End: 2018-01-29 | Stop reason: DRUGHIGH

## 2017-04-25 NOTE — TELEPHONE ENCOUNTER
Please let patient know that i sent the 80mg tablet 3 times daily to her pharmacy at [facility] the [facility]. She no longer has to take 2 tablets since the dose is higher.    Thanks,  KEIRA

## 2017-04-26 NOTE — TELEPHONE ENCOUNTER
Pt has been advised that medication has been called into pharmacy and Pt stressed great understanding .    Thanks Dr. Ellis

## 2017-05-09 ENCOUNTER — TELEPHONE (OUTPATIENT)
Dept: INTERNAL MEDICINE | Facility: CLINIC | Age: 52
End: 2017-05-09

## 2017-05-23 ENCOUNTER — LAB VISIT (OUTPATIENT)
Dept: LAB | Facility: HOSPITAL | Age: 52
End: 2017-05-23
Attending: INTERNAL MEDICINE
Payer: COMMERCIAL

## 2017-05-23 DIAGNOSIS — E03.9 ACQUIRED HYPOTHYROIDISM: ICD-10-CM

## 2017-05-23 DIAGNOSIS — F41.9 ANXIETY: ICD-10-CM

## 2017-05-23 DIAGNOSIS — I10 ESSENTIAL HYPERTENSION: ICD-10-CM

## 2017-05-23 LAB
T4 FREE SERPL-MCNC: 1.01 NG/DL
TSH SERPL DL<=0.005 MIU/L-ACNC: 4.14 UIU/ML

## 2017-05-23 PROCEDURE — 36415 COLL VENOUS BLD VENIPUNCTURE: CPT

## 2017-05-23 PROCEDURE — 84443 ASSAY THYROID STIM HORMONE: CPT

## 2017-05-23 PROCEDURE — 84439 ASSAY OF FREE THYROXINE: CPT

## 2017-05-24 ENCOUNTER — TELEPHONE (OUTPATIENT)
Dept: INTERNAL MEDICINE | Facility: CLINIC | Age: 52
End: 2017-05-24

## 2017-05-24 NOTE — TELEPHONE ENCOUNTER
Please let Ms Cameron know that her thyroid studies show that she continues to be under-treated on her current dose of thyroid medication.     Has she been taking the 100mcg since 3/13/17? Please confirm with her that she is taking it on an empty stomach, with a full glass of water, 30 min before food or other medications. Also, is she taking any fiber supplements or estrogen of any type?    We can consider increasing to 112mcg, pending answers to those questions. I discussed the dose change with Dr Solano (endocrinology).     Thanks,  KJ

## 2017-05-25 RX ORDER — LEVOTHYROXINE SODIUM 112 UG/1
112 TABLET ORAL DAILY
Qty: 90 TABLET | Refills: 3 | Status: SHIPPED | OUTPATIENT
Start: 2017-05-25 | End: 2017-10-06 | Stop reason: DRUGHIGH

## 2017-05-25 NOTE — TELEPHONE ENCOUNTER
Pt has been advised about thyroid medication being sent to her pharmacy .    Pt has also been advised about us mailing out a headache form to track her migraines .      Thanks Dr. Ellis

## 2017-05-25 NOTE — TELEPHONE ENCOUNTER
Pt has been taking the 100 mcg since 3/13/17 , Pt stated that she has been taking the medication on a empty stomach , with a full glass of water , 30 mins before eating any food or taking other medications , Pt has also denied taking any fiber supplements or estrogen of any type.      *Pt stated that in May she had really bad migraines , Pt is unsure if its hormones , or what .    Please advise

## 2017-05-25 NOTE — TELEPHONE ENCOUNTER
The higher dose levothyroxine was sent to her pharmacy.     Please advise her to keep a headache diary, here is the link to a nice one:  [email or web address]    She could pick it up from you, or (maybe) we can send through office mail? That would be helpful in figuring things out.    If she sets up her [employer] portal, we can send her links like that directly.    Thanks,  KJ

## 2017-07-06 ENCOUNTER — OFFICE VISIT (OUTPATIENT)
Dept: INTERNAL MEDICINE | Facility: CLINIC | Age: 52
End: 2017-07-06
Payer: COMMERCIAL

## 2017-07-06 ENCOUNTER — TELEPHONE (OUTPATIENT)
Dept: INTERNAL MEDICINE | Facility: CLINIC | Age: 52
End: 2017-07-06

## 2017-07-06 VITALS
BODY MASS INDEX: 35.65 KG/M2 | HEIGHT: 68 IN | DIASTOLIC BLOOD PRESSURE: 96 MMHG | WEIGHT: 235.25 LBS | HEART RATE: 74 BPM | SYSTOLIC BLOOD PRESSURE: 130 MMHG | OXYGEN SATURATION: 97 %

## 2017-07-06 DIAGNOSIS — G89.29 CHRONIC INTRACTABLE HEADACHE, UNSPECIFIED HEADACHE TYPE: ICD-10-CM

## 2017-07-06 DIAGNOSIS — F41.9 ANXIETY: ICD-10-CM

## 2017-07-06 DIAGNOSIS — N63.0 BREAST MASS: ICD-10-CM

## 2017-07-06 DIAGNOSIS — E66.01 MORBID OBESITY DUE TO EXCESS CALORIES: ICD-10-CM

## 2017-07-06 DIAGNOSIS — E03.9 ACQUIRED HYPOTHYROIDISM: ICD-10-CM

## 2017-07-06 DIAGNOSIS — R51.9 CHRONIC INTRACTABLE HEADACHE, UNSPECIFIED HEADACHE TYPE: ICD-10-CM

## 2017-07-06 DIAGNOSIS — Z72.0 TOBACCO ABUSE: ICD-10-CM

## 2017-07-06 DIAGNOSIS — E78.2 MIXED HYPERLIPIDEMIA: ICD-10-CM

## 2017-07-06 DIAGNOSIS — I10 ESSENTIAL HYPERTENSION: ICD-10-CM

## 2017-07-06 PROCEDURE — 99999 PR PBB SHADOW E&M-EST. PATIENT-LVL III: CPT | Mod: PBBFAC,,, | Performed by: INTERNAL MEDICINE

## 2017-07-06 PROCEDURE — 99215 OFFICE O/P EST HI 40 MIN: CPT | Mod: S$GLB,,, | Performed by: INTERNAL MEDICINE

## 2017-07-06 RX ORDER — AMLODIPINE BESYLATE 5 MG/1
5 TABLET ORAL DAILY
Qty: 90 TABLET | Refills: 3 | Status: SHIPPED | OUTPATIENT
Start: 2017-07-06 | End: 2017-10-06 | Stop reason: DRUGHIGH

## 2017-07-06 RX ORDER — NAPROXEN SODIUM 220 MG/1
81 TABLET, FILM COATED ORAL DAILY
Refills: 0 | COMMUNITY
Start: 2017-07-06 | End: 2019-12-16

## 2017-07-06 NOTE — ASSESSMENT & PLAN NOTE
Biopsy negative in 3/2011, last mammo in 8/2016 category 2 benign  · mammo due in 8/2017  · Order today  · monitor

## 2017-07-06 NOTE — ASSESSMENT & PLAN NOTE
BMI >35, HTN, HLD  · Advised the 5:2 IED diet   · Energy restriction 2 days per week  · Health  consult

## 2017-07-06 NOTE — TELEPHONE ENCOUNTER
Please inform Ms Cameron that Dr Elizabeth is in agreement that acetazolamide can be discontiued. He will exam her eyes at her next appointment to see if he can appreciate any swelling caused by elevated brain fluids.    Also, he plans to keep her propranolol dose the same, so I will add amlodipine as another blood pressure agent. Could you set her up with a nurse BP appointment in 1-2 weeks after she starts the amlodipine?    Here are changes:  - stop the acetazolamide  - start amlodipine 5mg (sent to [facility] pharmacy in [place])  - get eyes examined by Dr Elizabeth on 7/13    Thanks,  KEIRA

## 2017-07-06 NOTE — ASSESSMENT & PLAN NOTE
Levo increased 100mcg on 3/13/17, TSH elevated 4.1 in 5/2017. Symptoms of hypO improved  · Continue levo 100mcg  · Check levels today

## 2017-07-06 NOTE — PATIENT INSTRUCTIONS
TODAY:  - labs tomorrow  - mammogram  - health  consult  - 5:2 intermittent energy restriction diet  - cut back on smoking

## 2017-07-06 NOTE — PROGRESS NOTES
Primary Care Provider Appointment    Subjective:      Patient ID: Maria G Cameron is a 51 y.o. female with chronic headaches, HTN, hypothyroidism    Chief Complaint: Follow-up (Pt is here for a follow up appt.) and Headache  Patient continues to complain of monthly headaches with small weekly headaches. All controlled with abortive therapy (fiorecet with phenergan). She is currently taking propranol 80mg TID and acetazolamide 500mg BID.     She is taking buproprion 100mg BID with improvement in mood and anxiety. Her thyroid medication was increased at last appointment. She reports less fatigue and reduced hair loss.    She compliant with BP regimen of ACE and BB. Her home BPs are elevated. She continues to smoke 3 cig/d. Has had a 10# weight gain in last year.       Past Surgical History:   Procedure Laterality Date    breast core biopsy  3/7/11    fibrocystic changes    COLONOSCOPY N/A 4/15/2016    Procedure: COLONOSCOPY;  Surgeon: Coleman Moss MD;  Location: [facility] ([address] [place] [address]);  Service: Endoscopy;  Laterality: N/A;       Past Medical History:   Diagnosis Date    Adrenal adenoma     Allergic rhinitis     Allergy     Allergic to trees, weeds, mold and standardized mite    Anxiety     Eustachian tube dysfunction     Fibrocystic changes of left breast     12 o'clock L breast mass bx 3/7/11    Hypertension     Hypothyroidism     Migraine headache     Recurrent otitis media        Review of Systems   Constitutional: Negative for activity change, appetite change, fatigue and unexpected weight change.   Respiratory: Negative for cough and choking.    Cardiovascular: Negative for chest pain and leg swelling.   Genitourinary: Negative for menstrual problem and pelvic pain.   Musculoskeletal: Negative for back pain, gait problem and joint swelling.   Neurological: Positive for headaches. Negative for tremors, syncope and weakness.        Mild resting tremor   Hematological: Does not bruise/bleed easily.  "  Psychiatric/Behavioral: Negative for agitation, behavioral problems, confusion, decreased concentration and dysphoric mood.       Objective:   BP (!) 130/96 (BP Location: Right arm, Patient Position: Sitting, BP Method: Manual)   Pulse 74   Ht 5' 8" (1.727 m)   Wt 106.7 kg (235 lb 3.7 oz)   SpO2 97%   BMI 35.77 kg/m²     Physical Exam   Constitutional: She is oriented to person, place, and time. She appears well-developed and well-nourished.   obese   HENT:   Head: Normocephalic and atraumatic.   Musculoskeletal: Normal range of motion. She exhibits no edema.   Neurological: She is alert and oriented to person, place, and time. Coordination normal.   Mild intermittent resting tremor bilaterally UE   Skin: Skin is warm and dry.   Psychiatric: She has a normal mood and affect. Her behavior is normal. Judgment and thought content normal.   NO crying during exam   Vitals reviewed.      Lab Results   Component Value Date    WBC 4.81 01/13/2017    HGB 14.6 01/13/2017    HCT 43.1 01/13/2017     01/13/2017    CHOL 264 (H) 01/13/2017    TRIG 102 01/13/2017    HDL 61 01/13/2017    ALT 17 03/13/2017    AST 15 03/13/2017     03/13/2017    K 3.6 03/13/2017     (H) 03/13/2017    CREATININE 1.0 03/13/2017    BUN 19 03/13/2017    CO2 25 03/13/2017    TSH 4.137 (H) 05/23/2017    HGBA1C 5.7 01/13/2017         Assessment:   51 y.o. female with multiple co-morbid illnesses here to continue work-up of chronic issues notably hypothyroidism, headaches, breast mass.    Plan:     Problem List Items Addressed This Visit        Neuro    Anxiety     Patient with generalized anxiety, taking buproprion for unknown years. Improved on low-dose wellbutrin  · Self-titrated wellbutrin to 100mg BID  · Continue propranolol 80mg BID         Chronic intractable headache     Controlled with phenergran and fioricet abortive therapies, patient wanting preventive therapy, did not tolerate imitrex, topiramate   · Continue propranolol " 80mg BID for prophylaxis  · Continue acetazolamide 500mg BID  · Wean off once weight loss goals acheived   · Cotninue abortive therapy scripts of phenergan and fioricet  · Start IER diet (fasting/low-katelynn diet 2 days/week, normal diet 5 days/wk)  · Health  consult         Relevant Orders    Basic metabolic panel       Cardiac    Hypertension     BP previously uncontrolled on ACE and propranolol, with acetazolamide.  · Continue propranolol 80mg BID  · Message Dr Mcfarland (Neuro) about possible increase propranolol  · ACE 20mg  · Acetazolamide 500mg BID  · Message Dr Mcfarland (Neuro) about possible discontinuation if not improving headaches         Relevant Medications    aspirin 81 MG Chew    Other Relevant Orders    Basic metabolic panel       Hem/Onc    Breast mass     Biopsy negative in 3/2011, last mammo in 8/2016 category 2 benign  · mammo due in 8/2017  · Order today  · monitor         Relevant Orders    Mammo Digital Screening Bilateral With CAD       Endocrine    Hypothyroidism     Levo increased 100mcg on 3/13/17, TSH elevated 4.1 in 5/2017. Symptoms of hypO improved  · Continue levo 100mcg  · Check levels today         Relevant Orders    TSH       Fluids/Electrolytes/Nutrition/GI    Mixed hyperlipidemia     ASCVD risk 10-year 7.2%, 50% lifetime. Mod-intensity statin recommended.  · Patient prefers lifestyle changes  · Start ASA  · Start IER diet (fasting/low-katelynn diet 2 days/week, normal diet 5 days/wk)  · Health  consult         Relevant Medications    aspirin 81 MG Chew    Morbid obesity due to excess calories     BMI >35, HTN, HLD  · Advised the 5:2 IED diet   · Energy restriction 2 days per week  · Health  consult            Other    Tobacco abuse     3 cig/d, 5 pack year history. Contemplative for quitting  · Advise to quit at each appointment           Other Visit Diagnoses    None.         Health Maintenance       Date Due Completion Date    Pap Smear with HPV Cotest 09/18/1984 ---     TETANUS VACCINE 01/02/2012 1/2/2002    Mammogram 08/04/2017 8/4/2016    Override on 12/8/2011: Done    Override on 12/8/2011: Done    Influenza Vaccine 08/01/2017 10/11/2016    Override on 11/5/2015: Done    Lipid Panel 01/13/2018 1/13/2017    Colonoscopy 04/15/2026 4/15/2016    Pneumococcal PPSV23 (Medium Risk) (2) 09/18/2028 1/1/2013          Return in about 3 months (around 10/6/2017). . One hour spent with this patient today, half of that in counseling.    Pearl Ellis MD/MPH  Internal Medicine  [facility]  [phone]

## 2017-07-06 NOTE — ASSESSMENT & PLAN NOTE
BP previously uncontrolled on ACE and propranolol, with acetazolamide.  · Continue propranolol 80mg BID  · Message Dr Mcfarland (Neuro) about possible increase propranolol  · ACE 20mg  · Acetazolamide 500mg BID  · Message Dr Mcfarland (Neuro) about possible discontinuation if not improving headaches

## 2017-07-07 NOTE — TELEPHONE ENCOUNTER
Pt has been advised about Dr. Ellis & Dr. Elizabeth discussion and about medication changes/adjustment , patient has also been scheduled for a nurse visit on 07/017/2017 first thing in the am , Pt verbalized great understanding .

## 2017-07-11 ENCOUNTER — TELEPHONE (OUTPATIENT)
Dept: INTERNAL MEDICINE | Facility: CLINIC | Age: 52
End: 2017-07-11

## 2017-07-11 NOTE — TELEPHONE ENCOUNTER
..Patient referred by Dr. Ellis for Health coaching (weight loss, lifestyle changes).  Called patient and gave an explanation about Health Coaching program and invited participation.   Patient states she would like to call back tomorrow.   Gave direct contact number to call [phone].   Isabell Farrell RN  Health

## 2017-07-11 NOTE — TELEPHONE ENCOUNTER
----- Message from Pearl Ellis MD sent at 7/6/2017  8:27 AM CDT -----  Dear Ms Farrell,  I am PCP for Ms Cameron. She is interested in weight loss with lifestyle changes. She has morbid obesity, HTN, HLD. Is very self-motivated. I advised the 5:2 intermittent energy restriction diet due to her busy work schedule.    Thank you,  KJ    Pearl Ellis MD/MPH  Internal Medicine  [facility]  [phone]

## 2017-07-13 ENCOUNTER — OFFICE VISIT (OUTPATIENT)
Dept: NEUROLOGY | Facility: CLINIC | Age: 52
End: 2017-07-13
Payer: COMMERCIAL

## 2017-07-13 VITALS
WEIGHT: 239 LBS | DIASTOLIC BLOOD PRESSURE: 93 MMHG | HEART RATE: 75 BPM | SYSTOLIC BLOOD PRESSURE: 151 MMHG | BODY MASS INDEX: 36.22 KG/M2 | HEIGHT: 68 IN

## 2017-07-13 DIAGNOSIS — Z72.0 TOBACCO ABUSE: ICD-10-CM

## 2017-07-13 DIAGNOSIS — G25.0 BENIGN ESSENTIAL TREMOR: ICD-10-CM

## 2017-07-13 DIAGNOSIS — I10 ESSENTIAL HYPERTENSION: Primary | ICD-10-CM

## 2017-07-13 DIAGNOSIS — E66.01 MORBID OBESITY DUE TO EXCESS CALORIES: ICD-10-CM

## 2017-07-13 DIAGNOSIS — G43.001 MIGRAINE WITHOUT AURA AND WITH STATUS MIGRAINOSUS, NOT INTRACTABLE: ICD-10-CM

## 2017-07-13 PROCEDURE — 96372 THER/PROPH/DIAG INJ SC/IM: CPT | Mod: 59,S$GLB,, | Performed by: PSYCHIATRY & NEUROLOGY

## 2017-07-13 PROCEDURE — 99999 PR PBB SHADOW E&M-EST. PATIENT-LVL III: CPT | Mod: PBBFAC,,, | Performed by: PSYCHIATRY & NEUROLOGY

## 2017-07-13 PROCEDURE — 64405 NJX AA&/STRD GR OCPL NRV: CPT | Mod: 50,S$GLB,, | Performed by: PSYCHIATRY & NEUROLOGY

## 2017-07-13 PROCEDURE — 99214 OFFICE O/P EST MOD 30 MIN: CPT | Mod: 25,S$GLB,, | Performed by: PSYCHIATRY & NEUROLOGY

## 2017-07-13 RX ORDER — LIDOCAINE HYDROCHLORIDE 20 MG/ML
4 INJECTION, SOLUTION INFILTRATION; PERINEURAL
Status: COMPLETED | OUTPATIENT
Start: 2017-07-13 | End: 2017-07-14

## 2017-07-13 RX ORDER — METHYLPREDNISOLONE ACETATE 40 MG/ML
40 INJECTION, SUSPENSION INTRA-ARTICULAR; INTRALESIONAL; INTRAMUSCULAR; SOFT TISSUE
Status: COMPLETED | OUTPATIENT
Start: 2017-07-13 | End: 2017-07-14

## 2017-07-13 NOTE — PROGRESS NOTES
Neurology Clinic Follow-Up Note    Impression:  1. Frequent episodic migraine without aura  2. Familial tremor: mild improvement on higher dosage of propranolol  3. Tobacco abuse  4. HTN: uncontrolled  5. Anxiety    Plan:  1. Continue propranolol 80mg tid (for migraine, BP, tremor, anxiety)  2. Continue amlodipine  3. Continue NSAIDs or Fioricet prn  4. B GONB today  5. Discussed smoking cessation again, exercise and weight loss  6. F/U with Dr. Ellis for BP management, as planned  7. RTC 3 months or sooner, prn        F/U for above.  She has had a HA x 5d.  HA intensity better but no significant change in frequency.  Diamox recently D/C'd.  Tremor present but improved on higher dosage of propranolol  Still smoking; 3/day    Freq:  7/30 and 1/30 (prior 7/30 and 3/30)    Prophylactics   Effective: Diamox, propranolol 80mg tid (partially)   Ineffective/not tolerated:  topiramate (SOB), propranolol 40mg tid    Abortives   Effective: Fioricet, naproxen, ibuprofen, Tylenol, phenergan supp   Ineffective/not tolerated: Imitrex (chest tightness)   Never: tramadol, Botox, GONB    52 y/o WF with a long-standing history of episodic migraine since 13.   Pain is typically L frontal/merced-orbital or bi-occipital.  +pounding, +phonophobia, + nausea.  Duration up to 3 days.   Triggers include BP elevations, MSG, change in sleep.  + daytime fatigue. No snoring nor nocturnal choking that she knows of. No neck pain.  No aura.     Problem List Items Addressed This Visit        1 - High    Migraine without aura and with status migrainosus, not intractable       2     Benign essential tremor    Overview     Lifetime essential tremor, familial            3     Hypertension - Primary    Overview     BP previously uncontrolled on ACE and propranolol, with acetazolamide.         Morbid obesity due to excess calories    Overview     BMI >35, HTN, HLD         Tobacco abuse    Overview     3 cig/d, 5 pack year history           Other Visit  "Diagnoses    None.           Outpatient Prescriptions Marked as Taking for the 7/13/17 encounter (Office Visit) with Matt Elizabeth MD   Medication Sig Dispense Refill    amlodipine (NORVASC) 5 MG tablet Take 1 tablet (5 mg total) by mouth once daily. 90 tablet 3    aspirin 81 MG Chew Take 1 tablet (81 mg total) by mouth once daily.  0    buPROPion (WELLBUTRIN) 100 MG tablet Take 1 tablet (100 mg total) by mouth 2 (two) times daily. 180 tablet 3    butalbital-acetaminophen-caffeine -40 mg (FIORICET, ESGIC) -40 mg per tablet Take 1 tablet by mouth every 4 (four) hours as needed for Pain. 90 tablet 1    enalapril (VASOTEC) 20 MG tablet Take 1 tablet (20 mg total) by mouth once daily. 90 tablet 3    levothyroxine (SYNTHROID) 112 MCG tablet Take 1 tablet (112 mcg total) by mouth once daily. 90 tablet 3    promethazine (PHENERGAN) 50 MG suppository Place 1 suppository (50 mg total) rectally every 6 (six) hours as needed for Nausea. 12 suppository 1    propranolol (INDERAL) 80 MG tablet Take 1 tablet (80 mg total) by mouth 3 (three) times daily. 270 tablet 3     Current Facility-Administered Medications for the 7/13/17 encounter (Office Visit) with Matt Elizabeth MD   Medication Dose Route Frequency Provider Last Rate Last Dose    lidocaine HCL 20 mg/ml (2%) injection 4 mL  4 mL Other 1 time in Clinic/HOD Matt Elizabeth MD        methylPREDNISolone acetate injection 40 mg  40 mg Intramuscular 1 time in Clinic/HOD Matt Elizabeth MD        methylPREDNISolone acetate injection 40 mg  40 mg Intramuscular 1 time in Clinic/HOD Matt Elizabeth MD           BP (!) 151/93 (BP Location: Left arm, Patient Position: Sitting, BP Method: Automatic)   Pulse 75   Ht 5' 8" (1.727 m)   Wt 108.4 kg (238 lb 15.7 oz)   BMI 36.34 kg/m²   Overweight.  Awake, alert and oriented.  Language is normal.  PERRL, optic disc margins are sharp, EOMF without nystagmus, VFF, facial movements intact.  " No UE drift.  No extinction to double simultaneous tactile stimulation.  Muscle power is normal.  Gait is steady.  Postural tremor including head    Matt Elizabeth MD

## 2017-07-13 NOTE — PROGRESS NOTES
Procedure Note - Bilateral Greater Occipital Nerve Block    Indication: Chronic headache    Description:  The risks and benefits of bilateral Greater Occipital Nerve Block (GONB) were discussed and written informed consent was obtained.  Following a time out, 2cc xylocaine 2% and 1cc Depo-Medrol 40mg/mL were injected over each greater occipital nerve without complication.    Matt Elizabeth MD

## 2017-07-14 RX ADMIN — LIDOCAINE HYDROCHLORIDE 4 ML: 20 INJECTION, SOLUTION INFILTRATION; PERINEURAL at 10:07

## 2017-07-14 RX ADMIN — METHYLPREDNISOLONE ACETATE 40 MG: 40 INJECTION, SUSPENSION INTRA-ARTICULAR; INTRALESIONAL; INTRAMUSCULAR; SOFT TISSUE at 10:07

## 2017-10-05 ENCOUNTER — LAB VISIT (OUTPATIENT)
Dept: LAB | Facility: HOSPITAL | Age: 52
End: 2017-10-05
Attending: INTERNAL MEDICINE
Payer: COMMERCIAL

## 2017-10-05 DIAGNOSIS — G89.29 CHRONIC INTRACTABLE HEADACHE, UNSPECIFIED HEADACHE TYPE: ICD-10-CM

## 2017-10-05 DIAGNOSIS — R51.9 CHRONIC INTRACTABLE HEADACHE, UNSPECIFIED HEADACHE TYPE: ICD-10-CM

## 2017-10-05 DIAGNOSIS — I10 ESSENTIAL HYPERTENSION: ICD-10-CM

## 2017-10-05 DIAGNOSIS — E03.9 ACQUIRED HYPOTHYROIDISM: ICD-10-CM

## 2017-10-05 LAB
ANION GAP SERPL CALC-SCNC: 7 MMOL/L
BUN SERPL-MCNC: 15 MG/DL
CALCIUM SERPL-MCNC: 9.2 MG/DL
CHLORIDE SERPL-SCNC: 105 MMOL/L
CO2 SERPL-SCNC: 30 MMOL/L
CREAT SERPL-MCNC: 0.9 MG/DL
EST. GFR  (AFRICAN AMERICAN): >60 ML/MIN/1.73 M^2
EST. GFR  (NON AFRICAN AMERICAN): >60 ML/MIN/1.73 M^2
GLUCOSE SERPL-MCNC: 105 MG/DL
POTASSIUM SERPL-SCNC: 4.4 MMOL/L
SODIUM SERPL-SCNC: 142 MMOL/L
T4 FREE SERPL-MCNC: 1.3 NG/DL
TSH SERPL DL<=0.005 MIU/L-ACNC: 4.02 UIU/ML

## 2017-10-05 PROCEDURE — 84439 ASSAY OF FREE THYROXINE: CPT

## 2017-10-05 PROCEDURE — 36415 COLL VENOUS BLD VENIPUNCTURE: CPT

## 2017-10-05 PROCEDURE — 80048 BASIC METABOLIC PNL TOTAL CA: CPT

## 2017-10-05 PROCEDURE — 84443 ASSAY THYROID STIM HORMONE: CPT

## 2017-10-06 ENCOUNTER — OFFICE VISIT (OUTPATIENT)
Dept: INTERNAL MEDICINE | Facility: CLINIC | Age: 52
End: 2017-10-06
Payer: COMMERCIAL

## 2017-10-06 ENCOUNTER — IMMUNIZATION (OUTPATIENT)
Dept: INTERNAL MEDICINE | Facility: CLINIC | Age: 52
End: 2017-10-06
Payer: COMMERCIAL

## 2017-10-06 VITALS — DIASTOLIC BLOOD PRESSURE: 90 MMHG | BODY MASS INDEX: 36 KG/M2 | SYSTOLIC BLOOD PRESSURE: 150 MMHG | WEIGHT: 236.75 LBS

## 2017-10-06 DIAGNOSIS — N63.0 BREAST MASS: ICD-10-CM

## 2017-10-06 DIAGNOSIS — R51.9 CHRONIC INTRACTABLE HEADACHE, UNSPECIFIED HEADACHE TYPE: ICD-10-CM

## 2017-10-06 DIAGNOSIS — I10 ESSENTIAL HYPERTENSION: ICD-10-CM

## 2017-10-06 DIAGNOSIS — E66.01 MORBID OBESITY DUE TO EXCESS CALORIES: ICD-10-CM

## 2017-10-06 DIAGNOSIS — Z72.0 TOBACCO ABUSE: ICD-10-CM

## 2017-10-06 DIAGNOSIS — E78.2 MIXED HYPERLIPIDEMIA: ICD-10-CM

## 2017-10-06 DIAGNOSIS — E03.9 ACQUIRED HYPOTHYROIDISM: ICD-10-CM

## 2017-10-06 DIAGNOSIS — G25.0 BENIGN ESSENTIAL TREMOR: ICD-10-CM

## 2017-10-06 DIAGNOSIS — G89.29 CHRONIC INTRACTABLE HEADACHE, UNSPECIFIED HEADACHE TYPE: ICD-10-CM

## 2017-10-06 PROCEDURE — 90686 IIV4 VACC NO PRSV 0.5 ML IM: CPT | Mod: S$GLB,,, | Performed by: INTERNAL MEDICINE

## 2017-10-06 PROCEDURE — 99215 OFFICE O/P EST HI 40 MIN: CPT | Mod: 25,S$GLB,, | Performed by: INTERNAL MEDICINE

## 2017-10-06 PROCEDURE — 99999 PR PBB SHADOW E&M-EST. PATIENT-LVL III: CPT | Mod: PBBFAC,,, | Performed by: INTERNAL MEDICINE

## 2017-10-06 PROCEDURE — 90471 IMMUNIZATION ADMIN: CPT | Mod: S$GLB,,, | Performed by: INTERNAL MEDICINE

## 2017-10-06 RX ORDER — AMLODIPINE BESYLATE 10 MG/1
10 TABLET ORAL DAILY
Qty: 90 TABLET | Refills: 3 | Status: SHIPPED | OUTPATIENT
Start: 2017-10-06 | End: 2017-10-17 | Stop reason: DRUGHIGH

## 2017-10-06 RX ORDER — LEVOTHYROXINE SODIUM 125 UG/1
125 TABLET ORAL DAILY
Qty: 90 TABLET | Refills: 3 | Status: SHIPPED | OUTPATIENT
Start: 2017-10-06 | End: 2017-10-17 | Stop reason: SDUPTHER

## 2017-10-06 NOTE — ASSESSMENT & PLAN NOTE
"Controlled with phenergran and fioricet abortive therapies, patient wanting preventive therapy, did not tolerate imitrex, topiramate   · Continue propranolol 80mg TID for prophylaxis  · Restart acetazolamide 500mg BID at next appt  · Cotninue abortive therapy scripts of phenergan and fioricet  · Health  consult  · Advised "me time" daily  "

## 2017-10-06 NOTE — ASSESSMENT & PLAN NOTE
Levo increased 100mcg on 3/13/17, 7/2017. TSH elevated 4. Symptoms of hypO improved  · Increased levo 125mcg  · Check levels at next appt

## 2017-10-06 NOTE — ASSESSMENT & PLAN NOTE
BP previously uncontrolled on ACE and propranolol, with acetazolamide.  · Continue propranolol 80mg TID  · ACE 20mg  · Acetazolamide 500mg BID  · Restart at next appt  · Increase amlodipine 10mg  · Continue ASA

## 2017-10-06 NOTE — ASSESSMENT & PLAN NOTE
ASCVD risk 10-year 7.2%, 50% lifetime. Mod-intensity statin recommended.  · Patient prefers lifestyle changes  · Start ASA  · Start IER diet (fasting/low-katelynn diet 2 days/week, normal diet 5 days/wk)  · Health  consult

## 2017-10-06 NOTE — PROGRESS NOTES
Primary Care Provider Appointment    Subjective:      Patient ID: Maria G Cameron is a 52 y.o. female with migraines, anxiety, hypothyroid    Chief Complaint: Hypertension and Migraine    She was seen by Neuro (Dr Elizabeth) on 7/13/17, she complained of a headache for 5 days at that appointment. Her eye exam showed sharp optic disc margins.She was given a bilateral GONB (methylprednisolone/lidocaine) with improvement in pain. Her acetazolamide was discontinued at her last PCP appointment, she notes that headaches have not worsened since then. Her headaches improved significantly since Neuro increased her propranolol 80mg TID.     She continues to smoke, but has cut back to one cig/day. She has quit before and knows that she quit, but is not ready to give it up completely. She did not see the health , but wants to follow-up with her. [place]    She is due for flu vaccine and mammo.    BP remains uncontrolled. She is on CCB, BB, ACE. She was previously on acetazolamide, but was discontinued at last appt.       Past Surgical History:   Procedure Laterality Date    breast core biopsy  3/7/11    fibrocystic changes    COLONOSCOPY N/A 4/15/2016    Procedure: COLONOSCOPY;  Surgeon: Coleman Moss MD;  Location: [facility] ([address] [place] [address]);  Service: Endoscopy;  Laterality: N/A;       Past Medical History:   Diagnosis Date    Adrenal adenoma     Allergic rhinitis     Allergy     Allergic to trees, weeds, mold and standardized mite    Anxiety     Eustachian tube dysfunction     Fibrocystic changes of left breast     12 o'clock L breast mass bx 3/7/11    Hypertension     Hypothyroidism     Migraine headache     Recurrent otitis media        Review of Systems   Constitutional: Negative for activity change, appetite change, fatigue and unexpected weight change.   Respiratory: Negative for cough and choking.    Cardiovascular: Negative for chest pain and leg swelling.   Genitourinary:  Negative for menstrual problem and pelvic pain.   Musculoskeletal: Negative for back pain, gait problem and joint swelling.   Neurological: Positive for headaches. Negative for tremors, syncope and weakness.        Mild resting tremor   Hematological: Does not bruise/bleed easily.   Psychiatric/Behavioral: Negative for agitation, behavioral problems, confusion, decreased concentration and dysphoric mood.       Objective:   BP (!) 150/90   Wt 107.4 kg (236 lb 12.4 oz)   BMI 36.00 kg/m²     Physical Exam   Constitutional: She is oriented to person, place, and time. She appears well-developed and well-nourished.   obese   HENT:   Head: Normocephalic and atraumatic.   Musculoskeletal: Normal range of motion. She exhibits no edema.   Neurological: She is alert and oriented to person, place, and time. Coordination normal.   Mild intermittent resting tremor bilaterally UE   Skin: Skin is warm and dry.   Psychiatric: She has a normal mood and affect. Her behavior is normal. Judgment and thought content normal.   NO crying during exam   Vitals reviewed.      Lab Results   Component Value Date    WBC 4.81 01/13/2017    HGB 14.6 01/13/2017    HCT 43.1 01/13/2017     01/13/2017    CHOL 264 (H) 01/13/2017    TRIG 102 01/13/2017    HDL 61 01/13/2017    ALT 17 03/13/2017    AST 15 03/13/2017     10/05/2017    K 4.4 10/05/2017     10/05/2017    CREATININE 0.9 10/05/2017    BUN 15 10/05/2017    CO2 30 (H) 10/05/2017    TSH 4.021 (H) 10/05/2017    HGBA1C 5.7 01/13/2017         Assessment:   52 y.o. female with multiple co-morbid illnesses here to continue work-up of chronic issues notably migraines, anxiety, hypothyroid.     Plan:     Problem List Items Addressed This Visit        Neuro    Chronic intractable headache     Controlled with phenergran and fioricet abortive therapies, patient wanting preventive therapy, did not tolerate imitrex, topiramate   · Continue propranolol 80mg TID for prophylaxis  · Restart  "acetazolamide 500mg BID at next appt  · Cotninue abortive therapy scripts of phenergan and fioricet  · Health  consult  · Advised "me time" daily         Benign essential tremor     Lifetime essential tremor, familial. Significant improvement with propranolol.  · Neuro increased propranolol to 80mg TID            Cardiac/Vascular    Hypertension     BP previously uncontrolled on ACE and propranolol, with acetazolamide.  · Continue propranolol 80mg TID  · ACE 20mg  · Acetazolamide 500mg BID  · Restart at next appt  · Increase amlodipine 10mg  · Continue ASA         Relevant Medications    amlodipine (NORVASC) 10 MG tablet    Other Relevant Orders    Basic metabolic panel    Mixed hyperlipidemia     ASCVD risk 10-year 7.2%, 50% lifetime. Mod-intensity statin recommended.  · Patient prefers lifestyle changes  · Start ASA  · Start IER diet (fasting/low-katelynn diet 2 days/week, normal diet 5 days/wk)  · Health  consult         Relevant Orders    Lipid panel       Renal/    Breast mass     Biopsy negative in 3/2011, last mammo in 8/2016 category 2 benign  · mammo due in 8/2017  · Order today  · monitor         Relevant Orders    Mammo Digital Screening Bilateral With CAD       Endocrine    Hypothyroidism     Levo increased 100mcg on 3/13/17, 7/2017. TSH elevated 4. Symptoms of hypO improved  · Increased levo 125mcg  · Check levels at next appt         Relevant Medications    levothyroxine (SYNTHROID) 125 MCG tablet    Other Relevant Orders    TSH    T4, free    Morbid obesity due to excess calories     BMI >35, HTN, HLD  · Advised the 5:2 IED diet   · Energy restriction 2 days per week  · Health  consult            Other    Tobacco abuse     3 cig/d decreased to 1cig/d, 5 pack year history  · Advise to quit at each appointment         Relevant Orders    Vitamin D      Other Visit Diagnoses    None.         Health Maintenance       Date Due Completion Date    TETANUS VACCINE 01/02/2012 1/2/2002    Influenza " Vaccine 08/01/2017 10/11/2016- TODAY    Override on 11/5/2015: Done    Mammogram 08/04/2017 8/4/2016- TODAY    Override on 12/8/2011: Done    Override on 12/8/2011: Done    Lipid Panel 01/13/2018 1/13/2017    Pap Smear with HPV Cotest 01/12/2020 1/12/2017 (Not Clinical)    Override on 1/12/2017: Not Clinically Appropriate (last 3 PAPs normal)    Override on 5/24/2013: Done    Colonoscopy 04/15/2026 4/15/2016    Pneumococcal PPSV23 (Medium Risk) (2) 09/18/2028 1/1/2013          Return in about 3 months (around 1/6/2018). . One hour spent with this patient today, half of that in counseling.    Pearl Ellis MD/MPH  Internal Medicine  [facility]  [phone]

## 2017-10-06 NOTE — PATIENT INSTRUCTIONS
"TODAY:  - flu vaccine  - mammogram  - increase amlodipine to 10mg (take 2 of the 5mg tablets, script for 10mg sent to [facility])  - increase levothyroxine to 125mcg daily (take on empty stomach, 30 min before food/meds)  - health  (Isabell Farrell [phone])  - "me time" daily- gratitude list, stretches, mindfulness meditation (calm john), walking outside  - labs one day/week before next appt  - nurse's blood pressure check in 1 week    NEXT:  - restart acetazolamide 500mg twice daily  "

## 2017-10-06 NOTE — ASSESSMENT & PLAN NOTE
Lifetime essential tremor, familial. Significant improvement with propranolol.  · Neuro increased propranolol to 80mg TID

## 2017-10-13 ENCOUNTER — CLINICAL SUPPORT (OUTPATIENT)
Dept: INTERNAL MEDICINE | Facility: CLINIC | Age: 52
End: 2017-10-13
Payer: COMMERCIAL

## 2017-10-13 VITALS — DIASTOLIC BLOOD PRESSURE: 92 MMHG | SYSTOLIC BLOOD PRESSURE: 136 MMHG | HEART RATE: 70 BPM

## 2017-10-13 PROCEDURE — 99999 PR PBB SHADOW E&M-EST. PATIENT-LVL I: CPT | Mod: PBBFAC,,,

## 2017-10-13 NOTE — PROGRESS NOTES
"7:03 am-pt here to get B/P check. Told to come "anytime today". B/P 136/92. P. 70. Pt. States that blood pressure has improved since recent increase on her b/p medications. Denies problems with increase. She will continue on current medication as ordered by Dr Ellis. Asked to be called if any questions or changes ([phone]).   "

## 2017-10-17 ENCOUNTER — OFFICE VISIT (OUTPATIENT)
Dept: INTERNAL MEDICINE | Facility: CLINIC | Age: 52
End: 2017-10-17
Payer: COMMERCIAL

## 2017-10-17 VITALS
DIASTOLIC BLOOD PRESSURE: 78 MMHG | OXYGEN SATURATION: 95 % | SYSTOLIC BLOOD PRESSURE: 120 MMHG | BODY MASS INDEX: 35.92 KG/M2 | HEIGHT: 68 IN | HEART RATE: 78 BPM | WEIGHT: 237 LBS

## 2017-10-17 DIAGNOSIS — I10 ESSENTIAL HYPERTENSION: ICD-10-CM

## 2017-10-17 DIAGNOSIS — E03.9 ACQUIRED HYPOTHYROIDISM: ICD-10-CM

## 2017-10-17 DIAGNOSIS — R51.9 CHRONIC INTRACTABLE HEADACHE, UNSPECIFIED HEADACHE TYPE: ICD-10-CM

## 2017-10-17 DIAGNOSIS — G89.29 CHRONIC INTRACTABLE HEADACHE, UNSPECIFIED HEADACHE TYPE: ICD-10-CM

## 2017-10-17 PROCEDURE — 99215 OFFICE O/P EST HI 40 MIN: CPT | Mod: S$GLB,,, | Performed by: INTERNAL MEDICINE

## 2017-10-17 PROCEDURE — 99999 PR PBB SHADOW E&M-EST. PATIENT-LVL III: CPT | Mod: PBBFAC,,, | Performed by: INTERNAL MEDICINE

## 2017-10-17 RX ORDER — HYDROCHLOROTHIAZIDE 12.5 MG/1
12.5 TABLET ORAL DAILY
Qty: 90 TABLET | Refills: 3 | Status: SHIPPED | OUTPATIENT
Start: 2017-10-17 | End: 2018-09-24 | Stop reason: DRUGHIGH

## 2017-10-17 RX ORDER — LEVOTHYROXINE SODIUM 125 UG/1
125 TABLET ORAL DAILY
Qty: 90 TABLET | Refills: 3 | Status: SHIPPED | OUTPATIENT
Start: 2017-10-17 | End: 2018-10-09 | Stop reason: SDUPTHER

## 2017-10-17 RX ORDER — AMLODIPINE BESYLATE 5 MG/1
5 TABLET ORAL DAILY
Qty: 90 TABLET | Refills: 3 | Status: SHIPPED | OUTPATIENT
Start: 2017-10-17 | End: 2018-01-08 | Stop reason: DRUGHIGH

## 2017-10-17 NOTE — ASSESSMENT & PLAN NOTE
"Controlled with phenergran and fioricet abortive therapies, patient wanting preventive therapy, did not tolerate imitrex, topiramate   · Continue propranolol 80mg TID for prophylaxis  · Will NOT restart acetazolamide   · HCTZ 12.5mg started for BP control  · Cotninue abortive therapy scripts of phenergan and fioricet  · Health  consult  · Advised "me time" daily  "

## 2017-10-17 NOTE — ASSESSMENT & PLAN NOTE
Levo increased on 3/13/17, 7/2017. TSH elevated 4. Symptoms of hypO improved  · Increased levo 125mcg on 10/6  · Patient requesting script to be sent again  · Check levels at next appt in 1/2017

## 2017-10-17 NOTE — PROGRESS NOTES
Primary Care Provider Appointment    Subjective:      Patient ID: Maria G Cameron is a 52 y.o. female with HLD, obesity, hypothyroidism, acute ankle swelling    Chief Complaint: Leg Swelling and Hypertension    Patient with acute complaint of bilateral ankle swelling for past 5 days. The swelling is worse at the end of the day, improved with leg elevation. She did have a high-salt snack Sat night. Previously had an exacerbation in 9/2017, when she was walking extensively daily. In the evenings her ankles were swollen. She was on amlodipine 5mg at that time. Her acetazolamide was discontinued in 7/2017, amlodipine was doubled at 10/6/17 appt.    Her headaches have improved with propranolol, she has not noticed an increase in headaches with the discontinuation of acetazolamide.    She is due for mammogram, but wants to schedule this for 1/2018 when she has a day off. Her thyroid meds were increased to 125mcg of synthroid at last appt.    Past Surgical History:   Procedure Laterality Date    breast core biopsy  3/7/11    fibrocystic changes    COLONOSCOPY N/A 4/15/2016    Procedure: COLONOSCOPY;  Surgeon: Coelman Moss MD;  Location: [address]);  Service: Endoscopy;  Laterality: N/A;       Past Medical History:   Diagnosis Date    Adrenal adenoma     Allergic rhinitis     Allergy     Allergic to trees, weeds, mold and standardized mite    Anxiety     Eustachian tube dysfunction     Fibrocystic changes of left breast     12 o'clock L breast mass bx 3/7/11    Hypertension     Hypothyroidism     Migraine headache     Recurrent otitis media        Review of Systems   Constitutional: Negative for activity change, appetite change, fatigue and unexpected weight change.   Respiratory: Negative for cough and choking.    Cardiovascular: Positive for leg swelling. Negative for chest pain.   Genitourinary: Negative for menstrual problem and pelvic pain.   Musculoskeletal: Negative for back pain, gait problem  "and joint swelling.   Neurological: Positive for headaches. Negative for tremors, syncope and weakness.        Mild resting tremor   Hematological: Does not bruise/bleed easily.   Psychiatric/Behavioral: Negative for agitation, behavioral problems, confusion, decreased concentration and dysphoric mood.       Objective:   /78 (BP Location: Right arm, Patient Position: Sitting, BP Method: Medium (Manual))   Pulse 78   Ht 5' 8" (1.727 m)   Wt 107.5 kg (236 lb 15.9 oz)   SpO2 95%   BMI 36.03 kg/m²     Physical Exam   Constitutional: She is oriented to person, place, and time. She appears well-developed and well-nourished.   obese   HENT:   Head: Normocephalic and atraumatic.   Musculoskeletal: Normal range of motion. She exhibits edema.   Bilateral trace pitting edema  Prominent varicosities in the feet bilaterally   Neurological: She is alert and oriented to person, place, and time. Coordination normal.   Mild intermittent resting tremor bilaterally UE   Skin: Skin is warm and dry.   Psychiatric: She has a normal mood and affect. Her behavior is normal. Judgment and thought content normal.   NO crying during exam   Vitals reviewed.      Lab Results   Component Value Date    WBC 4.81 01/13/2017    HGB 14.6 01/13/2017    HCT 43.1 01/13/2017     01/13/2017    CHOL 264 (H) 01/13/2017    TRIG 102 01/13/2017    HDL 61 01/13/2017    ALT 17 03/13/2017    AST 15 03/13/2017     10/05/2017    K 4.4 10/05/2017     10/05/2017    CREATININE 0.9 10/05/2017    BUN 15 10/05/2017    CO2 30 (H) 10/05/2017    TSH 4.021 (H) 10/05/2017    HGBA1C 5.7 01/13/2017         Assessment:   52 y.o. female with multiple co-morbid illnesses here to continue work-up of chronic issues notably HLD, obesity, hypothyroidism, acute ankle swelling.     Plan:     Problem List Items Addressed This Visit        Neuro    Chronic intractable headache     Controlled with phenergran and fioricet abortive therapies, patient wanting " "preventive therapy, did not tolerate imitrex, topiramate   · Continue propranolol 80mg TID for prophylaxis  · Will NOT restart acetazolamide   · HCTZ 12.5mg started for BP control  · Cotninue abortive therapy scripts of phenergan and fioricet  · Health  consult  · Advised "me time" daily            Cardiac/Vascular    Hypertension     BP previously uncontrolled on ACE and propranolol, with acetazolamide.  · Continue propranolol 80mg TID  · ACE 20mg  · Start HCTZ 12.5mg  · Double dose in 3-5 days  · Acetazolamide 500mg BID previously discontinued  · Decrease amlodipine to 5mg  · Continue ASA         Relevant Medications    amlodipine (NORVASC) 5 MG tablet    hydrochlorothiazide (HYDRODIURIL) 12.5 MG Tab       Endocrine    Hypothyroidism     Levo increased on 3/13/17, 7/2017. TSH elevated 4. Symptoms of hypO improved  · Increased levo 125mcg on 10/6  · Patient requesting script to be sent again  · Check levels at next appt in 1/2017         Relevant Medications    levothyroxine (SYNTHROID) 125 MCG tablet      Other Visit Diagnoses    None.         Health Maintenance       Date Due Completion Date    TETANUS VACCINE 01/02/2012 1/2/2002    Mammogram 08/04/2017 8/4/2016- NEXT    Override on 12/8/2011: Done    Override on 12/8/2011: Done    Lipid Panel 01/13/2018 1/13/2017    Pap Smear with HPV Cotest 01/12/2020 1/12/2017 (Not Clinical)    Override on 1/12/2017: Not Clinically Appropriate (last 3 PAPs normal)    Override on 5/24/2013: Done    Colonoscopy 04/15/2026 4/15/2016    Pneumococcal PPSV23 (Medium Risk) (2) 09/18/2028 1/1/2013          Return if symptoms worsen or fail to improve. . One hour spent with this patient today, half of that in counseling.    Pearl Ellis MD/MPH  Internal Medicine  [facility]  [phone]  "

## 2017-10-17 NOTE — ASSESSMENT & PLAN NOTE
BP previously uncontrolled on ACE and propranolol, with acetazolamide.  · Continue propranolol 80mg TID  · ACE 20mg  · Start HCTZ 12.5mg  · Double dose in 3-5 days  · Acetazolamide 500mg BID previously discontinued  · Decrease amlodipine to 5mg  · Continue ASA

## 2017-10-17 NOTE — PATIENT INSTRUCTIONS
"TODAY:  - increase levothyroxine to 125mcg  - decrease amlodipine to 5mg  - start HCTZ 12.5mg   - will NOT restart acetazolamide  - "me time" daily- positive affirmations, walking outside for 10 min daily  "

## 2017-12-27 ENCOUNTER — LAB VISIT (OUTPATIENT)
Dept: LAB | Facility: HOSPITAL | Age: 52
End: 2017-12-27
Attending: INTERNAL MEDICINE
Payer: COMMERCIAL

## 2017-12-27 DIAGNOSIS — I10 ESSENTIAL HYPERTENSION: ICD-10-CM

## 2017-12-27 DIAGNOSIS — E03.9 ACQUIRED HYPOTHYROIDISM: ICD-10-CM

## 2017-12-27 DIAGNOSIS — Z72.0 TOBACCO ABUSE: ICD-10-CM

## 2017-12-27 DIAGNOSIS — E78.2 MIXED HYPERLIPIDEMIA: ICD-10-CM

## 2017-12-27 LAB
25(OH)D3+25(OH)D2 SERPL-MCNC: 23 NG/ML
ANION GAP SERPL CALC-SCNC: 5 MMOL/L
BUN SERPL-MCNC: 17 MG/DL
CALCIUM SERPL-MCNC: 10 MG/DL
CHLORIDE SERPL-SCNC: 104 MMOL/L
CHOLEST SERPL-MCNC: 255 MG/DL
CHOLEST/HDLC SERPL: 4.3 {RATIO}
CO2 SERPL-SCNC: 31 MMOL/L
CREAT SERPL-MCNC: 0.9 MG/DL
EST. GFR  (AFRICAN AMERICAN): >60 ML/MIN/1.73 M^2
EST. GFR  (NON AFRICAN AMERICAN): >60 ML/MIN/1.73 M^2
GLUCOSE SERPL-MCNC: 99 MG/DL
HDLC SERPL-MCNC: 59 MG/DL
HDLC SERPL: 23.1 %
LDLC SERPL CALC-MCNC: 167.4 MG/DL
NONHDLC SERPL-MCNC: 196 MG/DL
POTASSIUM SERPL-SCNC: 4.6 MMOL/L
SODIUM SERPL-SCNC: 140 MMOL/L
T4 FREE SERPL-MCNC: 1.38 NG/DL
TRIGL SERPL-MCNC: 143 MG/DL
TSH SERPL DL<=0.005 MIU/L-ACNC: 3.12 UIU/ML

## 2017-12-27 PROCEDURE — 84443 ASSAY THYROID STIM HORMONE: CPT

## 2017-12-27 PROCEDURE — 80061 LIPID PANEL: CPT

## 2017-12-27 PROCEDURE — 80048 BASIC METABOLIC PNL TOTAL CA: CPT

## 2017-12-27 PROCEDURE — 82306 VITAMIN D 25 HYDROXY: CPT

## 2017-12-27 PROCEDURE — 36415 COLL VENOUS BLD VENIPUNCTURE: CPT

## 2017-12-27 PROCEDURE — 84439 ASSAY OF FREE THYROXINE: CPT

## 2018-01-08 ENCOUNTER — OFFICE VISIT (OUTPATIENT)
Dept: INTERNAL MEDICINE | Facility: CLINIC | Age: 53
End: 2018-01-08
Payer: COMMERCIAL

## 2018-01-08 VITALS
SYSTOLIC BLOOD PRESSURE: 124 MMHG | BODY MASS INDEX: 36.19 KG/M2 | HEART RATE: 70 BPM | WEIGHT: 238.75 LBS | DIASTOLIC BLOOD PRESSURE: 82 MMHG | HEIGHT: 68 IN | OXYGEN SATURATION: 96 %

## 2018-01-08 DIAGNOSIS — F41.9 ANXIETY: ICD-10-CM

## 2018-01-08 DIAGNOSIS — I10 ESSENTIAL HYPERTENSION: ICD-10-CM

## 2018-01-08 DIAGNOSIS — E03.9 ACQUIRED HYPOTHYROIDISM: ICD-10-CM

## 2018-01-08 DIAGNOSIS — E78.2 MIXED HYPERLIPIDEMIA: ICD-10-CM

## 2018-01-08 DIAGNOSIS — G89.29 CHRONIC INTRACTABLE HEADACHE, UNSPECIFIED HEADACHE TYPE: ICD-10-CM

## 2018-01-08 DIAGNOSIS — Z72.0 TOBACCO ABUSE: ICD-10-CM

## 2018-01-08 DIAGNOSIS — E55.9 VITAMIN D DEFICIENCY: ICD-10-CM

## 2018-01-08 DIAGNOSIS — R51.9 CHRONIC INTRACTABLE HEADACHE, UNSPECIFIED HEADACHE TYPE: ICD-10-CM

## 2018-01-08 DIAGNOSIS — G43.909 MIGRAINE WITHOUT STATUS MIGRAINOSUS, NOT INTRACTABLE, UNSPECIFIED MIGRAINE TYPE: ICD-10-CM

## 2018-01-08 PROCEDURE — 99215 OFFICE O/P EST HI 40 MIN: CPT | Mod: S$GLB,,, | Performed by: INTERNAL MEDICINE

## 2018-01-08 PROCEDURE — 99999 PR PBB SHADOW E&M-EST. PATIENT-LVL III: CPT | Mod: PBBFAC,,, | Performed by: INTERNAL MEDICINE

## 2018-01-08 RX ORDER — ENALAPRIL MALEATE 20 MG/1
20 TABLET ORAL DAILY
Qty: 90 TABLET | Refills: 3 | Status: SHIPPED | OUTPATIENT
Start: 2018-01-08 | End: 2019-01-30 | Stop reason: SDUPTHER

## 2018-01-08 RX ORDER — BUTALBITAL, ACETAMINOPHEN AND CAFFEINE 50; 325; 40 MG/1; MG/1; MG/1
1 TABLET ORAL EVERY 4 HOURS PRN
Qty: 90 TABLET | Refills: 1 | Status: SHIPPED | OUTPATIENT
Start: 2018-01-08 | End: 2019-01-30 | Stop reason: SDUPTHER

## 2018-01-08 RX ORDER — VERAPAMIL HYDROCHLORIDE 40 MG/1
40 TABLET ORAL 3 TIMES DAILY
Qty: 90 TABLET | Refills: 11 | Status: SHIPPED | OUTPATIENT
Start: 2018-01-08 | End: 2018-01-29 | Stop reason: DRUGHIGH

## 2018-01-08 RX ORDER — PROMETHAZINE HYDROCHLORIDE 50 MG/1
50 SUPPOSITORY RECTAL EVERY 6 HOURS PRN
Qty: 12 SUPPOSITORY | Refills: 1 | Status: SHIPPED | OUTPATIENT
Start: 2018-01-08 | End: 2019-01-30 | Stop reason: SDUPTHER

## 2018-01-08 RX ORDER — ACETAMINOPHEN 500 MG
5000 TABLET ORAL DAILY
Qty: 90 TABLET | Refills: 3 | Status: SHIPPED | OUTPATIENT
Start: 2018-01-08 | End: 2018-01-08 | Stop reason: SDUPTHER

## 2018-01-08 RX ORDER — ACETAMINOPHEN 500 MG
5000 TABLET ORAL DAILY
Qty: 90 TABLET | Refills: 3 | Status: SHIPPED | OUTPATIENT
Start: 2018-01-08 | End: 2019-12-16 | Stop reason: SDUPTHER

## 2018-01-08 NOTE — ASSESSMENT & PLAN NOTE
BP previously uncontrolled on ACE and propranolol, with acetazolamide.  · Continue propranolol 80mg TID  · ACE 20mg  · Start HCTZ 12.5mg  · Double dose in 3-5 days  · Acetazolamide 500mg BID previously discontinued  · Transition from amlodipine to verapamil 40mg TID  · Continue ASA

## 2018-01-08 NOTE — ASSESSMENT & PLAN NOTE
"Differential includes pseudotumor cerebri, sleep apnea, migraine.  Previously controlled on propranolol 80mg TID. Prior was uncontrolled with phenergran and fioricet abortive therapies, did not tolerate imitrex, topiramate. Acetazolamide 500mg BID stopped previously.  · Continue propranolol 80mg TID for prophylaxis  · Will NOT restart acetazolamide   · HCTZ 12.5mg started for BP control  · Transition from amlodipine 5mg to verapamil 40mg TID  · Next appt transition from buproprion to amitriptyline  · Cotninue abortive therapy scripts of phenergan and fioricet  · Health  consult  · Advised "me time" daily  "

## 2018-01-08 NOTE — ASSESSMENT & PLAN NOTE
Levo increased on 3/13/17, 7/2017. TSH elevated 4. Symptoms of hypO improved  · Increased levo 125mcg on 10/6  · Check levels at next appt in 1/2017

## 2018-01-08 NOTE — PATIENT INSTRUCTIONS
TODAY:  - stop amlodipine  - start verapamil 40mg three times daily  - meditation 15 min daily in the morning   + CALM john  - mammo, neuro appt on same day   + PCP to message Neuro about fioricet  - follow-up with Dr Ellis in 2-4 weeks  - refill enalapril, promethazine and fioricet    NEXT:  - changing buproprion to either amitriptyline or venlafaxine  - speak with Neuro about other options

## 2018-01-08 NOTE — ASSESSMENT & PLAN NOTE
Patient with generalized anxiety, taking buproprion for unknown years. Improved on low-dose wellbutrin  · Self-titrated wellbutrin to 100mg BID  · Will transition to amitriptyline or velnafaxine at next appt  · Continue propranolol 80mg BID  · Discuss with Neuro increasing this dose

## 2018-01-13 ENCOUNTER — HOSPITAL ENCOUNTER (OUTPATIENT)
Dept: RADIOLOGY | Facility: HOSPITAL | Age: 53
Discharge: HOME | End: 2018-01-13
Attending: INTERNAL MEDICINE
Payer: COMMERCIAL

## 2018-01-13 DIAGNOSIS — Z12.31 VISIT FOR SCREENING MAMMOGRAM: ICD-10-CM

## 2018-01-13 DIAGNOSIS — N63.0 BREAST MASS: ICD-10-CM

## 2018-01-13 PROCEDURE — 77063 BREAST TOMOSYNTHESIS BI: CPT | Mod: 26,,, | Performed by: RADIOLOGY

## 2018-01-13 PROCEDURE — 77067 SCR MAMMO BI INCL CAD: CPT | Mod: 26,,, | Performed by: RADIOLOGY

## 2018-01-13 PROCEDURE — 77067 SCR MAMMO BI INCL CAD: CPT | Mod: TC

## 2018-01-23 ENCOUNTER — TELEPHONE (OUTPATIENT)
Dept: INTERNAL MEDICINE | Facility: CLINIC | Age: 53
End: 2018-01-23

## 2018-01-29 ENCOUNTER — OFFICE VISIT (OUTPATIENT)
Dept: INTERNAL MEDICINE | Facility: CLINIC | Age: 53
End: 2018-01-29
Payer: COMMERCIAL

## 2018-01-29 VITALS — WEIGHT: 240 LBS | DIASTOLIC BLOOD PRESSURE: 91 MMHG | SYSTOLIC BLOOD PRESSURE: 162 MMHG | BODY MASS INDEX: 36.49 KG/M2

## 2018-01-29 DIAGNOSIS — F41.9 ANXIETY: ICD-10-CM

## 2018-01-29 DIAGNOSIS — N63.0 BREAST MASS: ICD-10-CM

## 2018-01-29 DIAGNOSIS — Z72.0 TOBACCO ABUSE: ICD-10-CM

## 2018-01-29 DIAGNOSIS — E03.9 ACQUIRED HYPOTHYROIDISM: ICD-10-CM

## 2018-01-29 DIAGNOSIS — I10 ESSENTIAL HYPERTENSION: ICD-10-CM

## 2018-01-29 DIAGNOSIS — G89.29 CHRONIC INTRACTABLE HEADACHE, UNSPECIFIED HEADACHE TYPE: ICD-10-CM

## 2018-01-29 DIAGNOSIS — R51.9 CHRONIC INTRACTABLE HEADACHE, UNSPECIFIED HEADACHE TYPE: ICD-10-CM

## 2018-01-29 PROCEDURE — 99999 PR PBB SHADOW E&M-EST. PATIENT-LVL II: CPT | Mod: PBBFAC,,, | Performed by: INTERNAL MEDICINE

## 2018-01-29 PROCEDURE — 99215 OFFICE O/P EST HI 40 MIN: CPT | Mod: S$GLB,,, | Performed by: INTERNAL MEDICINE

## 2018-01-29 RX ORDER — PROPRANOLOL HYDROCHLORIDE 120 MG/1
240 CAPSULE, EXTENDED RELEASE ORAL DAILY
Qty: 180 CAPSULE | Refills: 3 | Status: SHIPPED | OUTPATIENT
Start: 2018-01-29 | End: 2019-01-30

## 2018-01-29 RX ORDER — BUPROPION HYDROCHLORIDE 100 MG/1
100 TABLET ORAL 2 TIMES DAILY
Qty: 180 TABLET | Refills: 3 | Status: SHIPPED | OUTPATIENT
Start: 2018-01-29 | End: 2019-01-30 | Stop reason: SDUPTHER

## 2018-01-29 RX ORDER — VERAPAMIL HYDROCHLORIDE 80 MG/1
80 TABLET ORAL 3 TIMES DAILY
Qty: 90 TABLET | Refills: 11 | Status: SHIPPED | OUTPATIENT
Start: 2018-01-29 | End: 2019-01-30 | Stop reason: DRUGHIGH

## 2018-01-29 NOTE — Clinical Note
Dear Dr Elizabeth, Ms Cameron's headaches are still uncontrolled. I switched her CCB to verapamil and am increasing the dose today.  Do you see any usefulness in discontinuing the fioricet, if patient agrees?  Thanks, Tuan Ellis MD/MPH Internal Medicine [facility] [phone]

## 2018-01-29 NOTE — ASSESSMENT & PLAN NOTE
"Differential includes pseudotumor cerebri, sleep apnea, migraine.  Previously controlled on propranolol 80mg TID. Prior was uncontrolled with phenergran and fioricet abortive therapies, did not tolerate imitrex, topiramate. Acetazolamide 500mg BID stopped previously.  · Continue propranolol 80mg TID for prophylaxis  · Will NOT restart acetazolamide   · HCTZ 12.5mg started for BP control  · Transition from amlodipine 5mg to verapamil   · Increase to verapamil 80mg TID  · Continue buproprion   · Cotninue abortive therapy scripts of phenergan and fioricet  · Health  consult  · Advised "me time" daily  "

## 2018-01-29 NOTE — PATIENT INSTRUCTIONS
TODAY:  - change propranolol to extended release- take 2 capsule of 120mg    + sent to [facility]  - increase verapamil to 80mg three times daily   + sent to [facility]  - continue buproprion   + consider increasing in the future  - consider discontinuing fioricet   + PCP will message Dr Elizabeth

## 2018-01-29 NOTE — ASSESSMENT & PLAN NOTE
Biopsy negative in 3/2011, last mammo in 8/2016 category 2 benign. Mammo normal in 1/2018  · monitor

## 2018-01-29 NOTE — PROGRESS NOTES
"Primary Care Provider Appointment    Subjective:      Patient ID: Maria G Cameron is a 52 y.o. female with HTN, migraines, obesity, MDD.    Chief Complaint: Hypertension and Headache    At last appt she stopped amlodipine, and verapamil 40mg TID. Also taking ACE 20mg, HCTZ 12.5mg. She is on max dose propranolol 80mg TID. Her BP in clinic was 162/91. She states she is nervous. Her home readings are 132/96.    Patient states that she does not want to stop her buproprion because "it works."    She is still having headaches, with "a bad one this weekend." Her next appt with Dr Zweilfler in 5/2018. She is on max dose propranolol 80mg TID for migraines.    Past Surgical History:   Procedure Laterality Date    BREAST BIOPSY Left 2010    breast core biopsy  3/7/11    fibrocystic changes    COLONOSCOPY N/A 4/15/2016    Procedure: COLONOSCOPY;  Surgeon: Coleman Moss MD;  Location: [address]);  Service: Endoscopy;  Laterality: N/A;       Past Medical History:   Diagnosis Date    Adrenal adenoma     Allergic rhinitis     Allergy     Allergic to trees, weeds, mold and standardized mite    Anxiety     Eustachian tube dysfunction     Fibrocystic changes of left breast     12 o'clock L breast mass bx 3/7/11    Hypertension     Hypothyroidism     Migraine headache     Recurrent otitis media        Review of Systems   Constitutional: Negative for activity change, appetite change, fatigue and unexpected weight change.   Respiratory: Negative for cough and choking.    Cardiovascular: Positive for leg swelling. Negative for chest pain.   Genitourinary: Negative for menstrual problem and pelvic pain.   Musculoskeletal: Negative for back pain, gait problem and joint swelling.   Neurological: Positive for headaches. Negative for tremors, syncope and weakness.        Mild resting tremor   Hematological: Does not bruise/bleed easily.   Psychiatric/Behavioral: Negative for agitation, behavioral problems, confusion, " decreased concentration and dysphoric mood.       Objective:   BP (!) 162/91   Wt 108.9 kg (240 lb)   BMI 36.49 kg/m²     Physical Exam   Constitutional: She is oriented to person, place, and time. She appears well-developed and well-nourished.   obese   HENT:   Head: Normocephalic and atraumatic.   Musculoskeletal: Normal range of motion. She exhibits no edema.   Neurological: She is alert and oriented to person, place, and time. Coordination normal.   Mild intermittent resting tremor bilaterally UE   Skin: Skin is warm and dry.   Psychiatric: She has a normal mood and affect. Her behavior is normal. Judgment and thought content normal.   NO crying during exam   Vitals reviewed.      Lab Results   Component Value Date    WBC 4.81 01/13/2017    HGB 14.6 01/13/2017    HCT 43.1 01/13/2017     01/13/2017    CHOL 255 (H) 12/27/2017    TRIG 143 12/27/2017    HDL 59 12/27/2017    ALT 17 03/13/2017    AST 15 03/13/2017     12/27/2017    K 4.6 12/27/2017     12/27/2017    CREATININE 0.9 12/27/2017    BUN 17 12/27/2017    CO2 31 (H) 12/27/2017    TSH 3.115 12/27/2017    HGBA1C 5.7 01/13/2017         Assessment:   52 y.o. female with multiple co-morbid illnesses here to continue work-up of chronic issues notably  HTN, migraines, obesity, MDD.    Plan:     Problem List Items Addressed This Visit        Neuro    Chronic intractable headache     Differential includes pseudotumor cerebri, sleep apnea, migraine.  Previously controlled on propranolol 80mg TID. Prior was uncontrolled with phenergran and fioricet abortive therapies, did not tolerate imitrex, topiramate. Acetazolamide 500mg BID stopped previously.  · Continue propranolol 80mg TID for prophylaxis  · Will NOT restart acetazolamide   · HCTZ 12.5mg started for BP control  · Transition from amlodipine 5mg to verapamil   · Increase to verapamil 80mg TID  · Continue buproprion   · Cotninue abortive therapy scripts of phenergan and fioricet  · Health  " consult  · Advised "me time" daily         Relevant Medications    propranolol (INDERAL LA) 120 MG 24 hr capsule    verapamil (CALAN) 80 MG tablet       Psychiatric    Anxiety     Patient with generalized anxiety, taking buproprion for unknown years. Improved on low-dose wellbutrin  · Self-titrated wellbutrin to 100mg BID  · Consider increasing to 300mg once BP/migraine med changes are complete  · Continue propranolol 80mg TID  · Change to extended release formulation         Relevant Medications    propranolol (INDERAL LA) 120 MG 24 hr capsule    buPROPion (WELLBUTRIN) 100 MG tablet       Cardiac/Vascular    Hypertension     BP previously uncontrolled on ACE and propranolol  · Continue propranolol 80mg TID  · ACE 20mg  · Continue HCTZ 12.5mg  · Double dose in future  · Acetazolamide 500mg BID previously discontinued  · Transition from amlodipine to verapamil 40mg TID  · Increase verapamil to 80mg TID  · Continue ASA            Renal/    Breast mass     Biopsy negative in 3/2011, last mammo in 8/2016 category 2 benign. Mammo normal in 1/2018  · monitor            Endocrine    Hypothyroidism     Levo increased on 3/13/17, 7/2017. TSH elevated 4. Symptoms of hypO improved  · Increased levo 125mcg on 10/6  · Levels normal in 1/2017            Other    Tobacco abuse     3 cig/d decreased to 1cig/d, 5 pack year history  · Advise to quit at each appointment               Health Maintenance       Date Due Completion Date    TETANUS VACCINE 01/02/2012 1/2/2002    Lipid Panel 12/27/2018 12/27/2017    Mammogram 01/13/2019 1/13/2018    Override on 12/8/2011: Done    Override on 12/8/2011: Done    Pap Smear with HPV Cotest 01/12/2020 1/12/2017 (Not Clinical)    Override on 1/12/2017: Not Clinically Appropriate (last 3 PAPs normal)    Override on 5/24/2013: Done    Colonoscopy 04/15/2026 4/15/2016    Pneumococcal PPSV23 (Medium Risk) (2) 09/18/2028 1/1/2013          Follow-up in about 2 weeks (around 2/12/2018). . One " hour spent with this patient today, half of that in counseling.    Pearl Ellis MD/MPH  Internal Medicine  [facility]  [phone]

## 2018-01-29 NOTE — ASSESSMENT & PLAN NOTE
BP previously uncontrolled on ACE and propranolol  · Continue propranolol 80mg TID  · ACE 20mg  · Continue HCTZ 12.5mg  · Double dose in future  · Acetazolamide 500mg BID previously discontinued  · Transition from amlodipine to verapamil 40mg TID  · Increase verapamil to 80mg TID  · Continue ASA

## 2018-01-29 NOTE — ASSESSMENT & PLAN NOTE
Patient with generalized anxiety, taking buproprion for unknown years. Improved on low-dose wellbutrin  · Self-titrated wellbutrin to 100mg BID  · Consider increasing to 300mg once BP/migraine med changes are complete  · Continue propranolol 80mg TID  · Change to extended release formulation

## 2018-01-29 NOTE — ASSESSMENT & PLAN NOTE
Levo increased on 3/13/17, 7/2017. TSH elevated 4. Symptoms of hypO improved  · Increased levo 125mcg on 10/6  · Levels normal in 1/2017

## 2018-01-30 ENCOUNTER — TELEPHONE (OUTPATIENT)
Dept: INTERNAL MEDICINE | Facility: CLINIC | Age: 53
End: 2018-01-30

## 2018-01-30 NOTE — TELEPHONE ENCOUNTER
Please let patient know that Dr Elizabeth agreed that fioricet should be discontinued, or at least limited to 3 times weekly.    He also suggested the greater occipital nerve block if she found relief from that previously and is willing to try it again.    Thanks,  KJ

## 2018-01-31 ENCOUNTER — TELEPHONE (OUTPATIENT)
Dept: INTERNAL MEDICINE | Facility: CLINIC | Age: 53
End: 2018-01-31

## 2018-01-31 NOTE — TELEPHONE ENCOUNTER
----- Message from Jenny Greene sent at 1/31/2018  2:04 PM CST -----  Contact: Patient [phone]  Patient stated that they missed a call from you.    Please call and advise.    Thank You

## 2018-01-31 NOTE — TELEPHONE ENCOUNTER
----- Message from Grace Chen sent at 1/31/2018 10:23 AM CST -----  Contact: self/[phone]  Pt called in regards to a missed call from the office        Please advise

## 2018-01-31 NOTE — TELEPHONE ENCOUNTER
Spoke with pt and she understood to discontinue the Fioricet and or 3 time weekly. Pt also stated that she does not want to try the injections again pt stated she tried it once it didn't work

## 2018-02-01 NOTE — TELEPHONE ENCOUNTER
Pt advised, she states that she doesn't want to get the nerve block. She states that she will speak with neurology about this going forward.

## 2018-02-25 NOTE — PROGRESS NOTES
Continue Allopurinol   Primary Care Provider Appointment    Subjective:      Patient ID: Maria G Cameron is a 52 y.o. female with HTN, migraines, hypothyroidism, tremors    Chief Complaint: No chief complaint on file.    Patient with slight weight gain of 1.5# since 3 mos.     Her migraines are well-controlled. She has been on propranolol TID for past year, continues to have headaches but not as severe as previously. She wants to get off of propranolol. She is open to transitioning from amlodipine to verapamil as a CCB.     She has never been on a tricylcic antidepressant. She is currently on buproprion 100mg for depression and smoking cessation treatment. She continues to smoke 3 cig/day.     Her thyroid hormones were normal today with increase in levo to 125mcg. Will continue this dose.    Her Vit D was low, she has low calcium-Vit D intake and does not spend adequate time outside.    Her cholesterol was elevated on last lab draw, LDL > 150.     Past Surgical History:   Procedure Laterality Date    breast core biopsy  3/7/11    fibrocystic changes    COLONOSCOPY N/A 4/15/2016    Procedure: COLONOSCOPY;  Surgeon: Coleman Moss MD;  Location: [address]);  Service: Endoscopy;  Laterality: N/A;       Past Medical History:   Diagnosis Date    Adrenal adenoma     Allergic rhinitis     Allergy     Allergic to trees, weeds, mold and standardized mite    Anxiety     Eustachian tube dysfunction     Fibrocystic changes of left breast     12 o'clock L breast mass bx 3/7/11    Hypertension     Hypothyroidism     Migraine headache     Recurrent otitis media        Review of Systems   Constitutional: Negative for activity change, appetite change, fatigue and unexpected weight change.   Respiratory: Negative for cough and choking.    Cardiovascular: Positive for leg swelling. Negative for chest pain.   Genitourinary: Negative for menstrual problem and pelvic pain.   Musculoskeletal: Negative for back pain, gait problem and joint  swelling.   Neurological: Positive for headaches. Negative for tremors, syncope and weakness.        Mild resting tremor   Hematological: Does not bruise/bleed easily.   Psychiatric/Behavioral: Negative for agitation, behavioral problems, confusion, decreased concentration and dysphoric mood.       Objective:   Wt 108.3 kg (238 lb 12.1 oz)   BMI 36.30 kg/m²     Physical Exam   Constitutional: She is oriented to person, place, and time. She appears well-developed and well-nourished.   obese   HENT:   Head: Normocephalic and atraumatic.   Musculoskeletal: Normal range of motion. She exhibits no edema.   Neurological: She is alert and oriented to person, place, and time. Coordination normal.   Mild intermittent resting tremor bilaterally UE   Skin: Skin is warm and dry.   Psychiatric: She has a normal mood and affect. Her behavior is normal. Judgment and thought content normal.   NO crying during exam   Vitals reviewed.      Lab Results   Component Value Date    WBC 4.81 01/13/2017    HGB 14.6 01/13/2017    HCT 43.1 01/13/2017     01/13/2017    CHOL 255 (H) 12/27/2017    TRIG 143 12/27/2017    HDL 59 12/27/2017    ALT 17 03/13/2017    AST 15 03/13/2017     12/27/2017    K 4.6 12/27/2017     12/27/2017    CREATININE 0.9 12/27/2017    BUN 17 12/27/2017    CO2 31 (H) 12/27/2017    TSH 3.115 12/27/2017    HGBA1C 5.7 01/13/2017         Assessment:   52 y.o. female with multiple co-morbid illnesses here to continue work-up of chronic issues notably HTN, migraines, hypothyroidism, tremors    Plan:     Problem List Items Addressed This Visit        Neuro    Chronic intractable headache     Differential includes pseudotumor cerebri, sleep apnea, migraine.  Previously controlled on propranolol 80mg TID. Prior was uncontrolled with phenergran and fioricet abortive therapies, did not tolerate imitrex, topiramate. Acetazolamide 500mg BID stopped previously.  · Continue propranolol 80mg TID for  "prophylaxis  · Will NOT restart acetazolamide   · HCTZ 12.5mg started for BP control  · Transition from amlodipine 5mg to verapamil 40mg TID  · Next appt transition from buproprion to amitriptyline  · Cotninue abortive therapy scripts of phenergan and fioricet  · Health  consult  · Advised "me time" daily         Relevant Medications    verapamil (CALAN) 40 MG Tab    butalbital-acetaminophen-caffeine -40 mg (FIORICET, ESGIC) -40 mg per tablet    promethazine (PHENERGAN) 50 MG suppository       Psychiatric    Anxiety     Patient with generalized anxiety, taking buproprion for unknown years. Improved on low-dose wellbutrin  · Self-titrated wellbutrin to 100mg BID  · Will transition to amitriptyline or velnafaxine at next appt  · Continue propranolol 80mg BID  · Discuss with Neuro increasing this dose            Cardiac/Vascular    Hypertension     BP previously uncontrolled on ACE and propranolol, with acetazolamide.  · Continue propranolol 80mg TID  · ACE 20mg  · Start HCTZ 12.5mg  · Double dose in 3-5 days  · Acetazolamide 500mg BID previously discontinued  · Transition from amlodipine to verapamil 40mg TID  · Continue ASA         Relevant Medications    verapamil (CALAN) 40 MG Tab    enalapril (VASOTEC) 20 MG tablet    Mixed hyperlipidemia     ASCVD risk 10-year 7.2%, 50% lifetime. Mod-intensity statin recommended.  · Patient prefers lifestyle changes  · Start ASA  · Start IER diet (fasting/low-katelynn diet 2 days/week, normal diet 5 days/wk)  · Health  consult            Endocrine    Hypothyroidism     Levo increased on 3/13/17, 7/2017. TSH elevated 4. Symptoms of hypO improved  · Increased levo 125mcg on 10/6  · Check levels at next appt in 1/2017         Vitamin D deficiency     Level 23 in 12/2017  · Start Vit D3 5000U daily OTC         Relevant Medications    cholecalciferol, vitamin D3, (VITAMIN D3) 5,000 unit Tab       Other    Tobacco abuse     3 cig/d decreased to 1cig/d, 5 pack year " history  · Advise to quit at each appointment           Other Visit Diagnoses     Migraine without status migrainosus, not intractable, unspecified migraine type        Relevant Medications    butalbital-acetaminophen-caffeine -40 mg (FIORICET, ESGIC) -40 mg per tablet    promethazine (PHENERGAN) 50 MG suppository          Health Maintenance       Date Due Completion Date    TETANUS VACCINE 01/02/2012 1/2/2002    Mammogram 08/04/2017 8/4/2016- TODAY    Override on 12/8/2011: Done    Override on 12/8/2011: Done    Lipid Panel 12/27/2018 12/27/2017    Pap Smear with HPV Cotest 01/12/2020 1/12/2017 (Not Clinical)    Override on 1/12/2017: Not Clinically Appropriate (last 3 PAPs normal)    Override on 5/24/2013: Done    Colonoscopy 04/15/2026 4/15/2016    Pneumococcal PPSV23 (Medium Risk) (2) 09/18/2028 1/1/2013          Return in about 2 weeks (around 1/22/2018). . One hour spent with this patient today, half of that in counseling.    Pearl Ellis MD/MPH  Internal Medicine  [facility]  [phone]

## 2018-04-10 ENCOUNTER — OFFICE VISIT (OUTPATIENT)
Dept: ENDOCRINOLOGY | Facility: CLINIC | Age: 53
End: 2018-04-10
Payer: COMMERCIAL

## 2018-04-10 VITALS
HEIGHT: 68 IN | BODY MASS INDEX: 36.02 KG/M2 | SYSTOLIC BLOOD PRESSURE: 116 MMHG | DIASTOLIC BLOOD PRESSURE: 84 MMHG | HEART RATE: 80 BPM | WEIGHT: 237.63 LBS

## 2018-04-10 DIAGNOSIS — E03.9 ACQUIRED HYPOTHYROIDISM: Primary | ICD-10-CM

## 2018-04-10 DIAGNOSIS — E28.8 HYPERANDROGENISM: ICD-10-CM

## 2018-04-10 DIAGNOSIS — E66.01 MORBID OBESITY DUE TO EXCESS CALORIES: ICD-10-CM

## 2018-04-10 DIAGNOSIS — E55.9 VITAMIN D DEFICIENCY: ICD-10-CM

## 2018-04-10 PROCEDURE — 3074F SYST BP LT 130 MM HG: CPT | Mod: CPTII,S$GLB,, | Performed by: INTERNAL MEDICINE

## 2018-04-10 PROCEDURE — 3079F DIAST BP 80-89 MM HG: CPT | Mod: CPTII,S$GLB,, | Performed by: INTERNAL MEDICINE

## 2018-04-10 PROCEDURE — 99214 OFFICE O/P EST MOD 30 MIN: CPT | Mod: S$GLB,,, | Performed by: INTERNAL MEDICINE

## 2018-04-10 PROCEDURE — 99999 PR PBB SHADOW E&M-EST. PATIENT-LVL III: CPT | Mod: PBBFAC,,, | Performed by: INTERNAL MEDICINE

## 2018-04-10 NOTE — PROGRESS NOTES
Subjective:      Chief Complaint: Hyperandrogenism and Hypothyroidism    HPI: Maria G Cameron is a 52 y.o. female who is here for a follow-up evaluation for Hyperandrogenism and Hypothyroidism    With regards to her hypothyroidism:    Currentmedication:  Generic levothyroxine 125 mcg    Takes thyroid medication properly without food first thing in the morning. Dose has been increased several times in the past year, and she wants to make sure the dose is correct.    Current symptoms:   weight gain - gained ~20 pounds in past 2 years. She admits she has not been exercising nearly as much as she did previously.  Hair loss - has had progressive thinning of her hair in male hairloss pattern, starting about a year ago. She said the hairloss has decreased, but she's had trouble growing back hair in those areas.    Denies:  Constipation   Brittle nails  Mental fog     With regards to hyperandrogenism:  She reports hirsutism of the moustache and chin area. She plucks or waxes daily.  Besides the hairloss, she has no other symptoms of excess androgens, including no deepening of the voice, clitoromegaly, worsening acne, increased muscle mass, or android fat distribution.  Has hirsutism prior to menopause (age 47), but reported normal menstrual cycles and no diagnosis of PCOS. Has never attempted pregnancy.    DHEA-S was mildly elevated in the past. She also reports having an elevated testosterone, but I don't see it in our system.    With regards to obesity:  Tries to limit fatty foods in diet. Drinking diet soft-drinks. Admits her diet could improve.  Previously walked several times per week when she lived on the [facility]. She moved and has not been exercising much anymore. Still walks on weekends, but not during the week.    Reviewed past medical, family, social history and updated as appropriate.    Review of Systems   Respiratory: Negative for shortness of breath.    Cardiovascular: Negative for chest pain.    Gastrointestinal: Negative for abdominal pain, constipation and diarrhea.   Endocrine: Negative for cold intolerance and heat intolerance.   Skin:        hairloss     Objective:     Vitals:    04/10/18 0852   BP: 116/84   Pulse: 80       Physical Exam   Constitutional: She is oriented to person, place, and time. She appears well-developed and well-nourished.   HENT:   Head: Normocephalic and atraumatic.   Eyes: Conjunctivae are normal. Right eye exhibits no discharge. Left eye exhibits no discharge.   Neck: No tracheal deviation present. No thyromegaly present.   Cardiovascular: Normal rate.    No murmur heard.  Pulmonary/Chest: Effort normal and breath sounds normal.   Abdominal: Soft. There is no tenderness.   Musculoskeletal: She exhibits no edema.   No digital clubbing or extremity cyanosis   Neurological: She is alert and oriented to person, place, and time. Coordination normal.   Voice is normal in pitch.   Skin: Skin is warm and dry.   No facial hirsutism noted currently (patient waxed this AM).   Psychiatric: She has a normal mood and affect. Her behavior is normal.   Nursing note and vitals reviewed.      Wt Readings from Last 10 Encounters:   04/10/18 0852 107.8 kg (237 lb 9.6 oz)   01/29/18 0812 108.9 kg (240 lb)   01/08/18 0751 108.3 kg (238 lb 12.1 oz)   10/17/17 0806 107.5 kg (236 lb 15.9 oz)   10/06/17 0758 107.4 kg (236 lb 12.4 oz)   07/13/17 1458 108.4 kg (238 lb 15.7 oz)   07/06/17 0732 106.7 kg (235 lb 3.7 oz)   04/03/17 0850 107 kg (236 lb)   03/23/17 1300 105.7 kg (233 lb)   02/02/17 0853 107.1 kg (236 lb 1.8 oz)       Lab Results   Component Value Date    HGBA1C 5.7 01/13/2017     Lab Results   Component Value Date    CHOL 255 (H) 12/27/2017    HDL 59 12/27/2017    LDLCALC 167.4 (H) 12/27/2017    TRIG 143 12/27/2017    CHOLHDL 23.1 12/27/2017     Lab Results   Component Value Date     12/27/2017    K 4.6 12/27/2017     12/27/2017    CO2 31 (H) 12/27/2017    GLU 99 12/27/2017     BUN 17 12/27/2017    CREATININE 0.9 12/27/2017    CALCIUM 10.0 12/27/2017    PROT 7.1 03/13/2017    ALBUMIN 3.7 03/13/2017    BILITOT 0.3 03/13/2017    ALKPHOS 62 03/13/2017    AST 15 03/13/2017    ALT 17 03/13/2017    ANIONGAP 5 (L) 12/27/2017    ESTGFRAFRICA >60.0 12/27/2017    EGFRNONAA >60.0 12/27/2017    TSH 3.115 12/27/2017        Assessment/Plan:     Hypothyroidism  Continue current dose of levothyroxine.    Recheck TSH tomorrow AM.    Avoid exogenous hyperthyroidism as this can accelerate bone loss and increase risk of CV complications.      Hyperandrogenism  Recheck DHEA-S and testosterone.    Mild androgen excess noted previously without worsening symptoms of androgen excess. Low suspicion for any serious pathology.    If DHEA-S is mildly elevated, will consider dex suppression test.     Morbid obesity due to excess calories  Discussed diet and exercise.  Could benefit from increasing her physical activity.    Screen for diabetes with A1c.    Vitamin D deficiency  On vitamin D replacement.      RTC in 1 year    I discussed the case with Dr. Palacios and she is in agreement with the plan.    I, Libby Palacios MD,  have personally taken the history and examined the patient and agree with the resident's note as stated above.

## 2018-04-10 NOTE — ASSESSMENT & PLAN NOTE
Discussed diet and exercise.  Could benefit from increasing her physical activity.    Screen for diabetes with A1c.

## 2018-04-10 NOTE — ASSESSMENT & PLAN NOTE
Continue current dose of levothyroxine.    Recheck TSH tomorrow AM.    Avoid exogenous hyperthyroidism as this can accelerate bone loss and increase risk of CV complications.

## 2018-04-10 NOTE — ASSESSMENT & PLAN NOTE
Recheck DHEA-S and testosterone.    Mild androgen excess noted previously without worsening symptoms of androgen excess. Low suspicion for any serious pathology.    If DHEA-S is mildly elevated, will consider dex suppression test.

## 2018-04-12 ENCOUNTER — LAB VISIT (OUTPATIENT)
Dept: LAB | Facility: HOSPITAL | Age: 53
End: 2018-04-12
Payer: COMMERCIAL

## 2018-04-12 DIAGNOSIS — E03.9 ACQUIRED HYPOTHYROIDISM: ICD-10-CM

## 2018-04-12 DIAGNOSIS — E28.8 HYPERANDROGENISM: ICD-10-CM

## 2018-04-12 DIAGNOSIS — E66.01 MORBID OBESITY DUE TO EXCESS CALORIES: ICD-10-CM

## 2018-04-12 LAB
DHEA-S SERPL-MCNC: 263.5 UG/DL
ESTIMATED AVG GLUCOSE: 111 MG/DL
HBA1C MFR BLD HPLC: 5.5 %
T4 FREE SERPL-MCNC: 1.38 NG/DL
TESTOST SERPL-MCNC: 48 NG/DL
TSH SERPL DL<=0.005 MIU/L-ACNC: 4.82 UIU/ML

## 2018-04-12 PROCEDURE — 84403 ASSAY OF TOTAL TESTOSTERONE: CPT

## 2018-04-12 PROCEDURE — 84439 ASSAY OF FREE THYROXINE: CPT

## 2018-04-12 PROCEDURE — 36415 COLL VENOUS BLD VENIPUNCTURE: CPT

## 2018-04-12 PROCEDURE — 82627 DEHYDROEPIANDROSTERONE: CPT

## 2018-04-12 PROCEDURE — 84443 ASSAY THYROID STIM HORMONE: CPT

## 2018-04-12 PROCEDURE — 83036 HEMOGLOBIN GLYCOSYLATED A1C: CPT

## 2018-04-13 ENCOUNTER — TELEPHONE (OUTPATIENT)
Dept: ENDOCRINOLOGY | Facility: HOSPITAL | Age: 53
End: 2018-04-13

## 2018-04-13 DIAGNOSIS — E03.9 ACQUIRED HYPOTHYROIDISM: Primary | ICD-10-CM

## 2018-04-13 NOTE — TELEPHONE ENCOUNTER
I spoke with her regarding her lab results.    Androgens are now in the normal range. We will not plan on following those unless she develops any new symptoms suggestive of hyperandrogenism.    TSH was just above the upper end of normal, and FT4 is in the upper end of normal. I will not adjust the dose now but will repeat a TSH in 3 months to reassess.    She voiced understanding and agreed with the plan.

## 2018-06-05 ENCOUNTER — OFFICE VISIT (OUTPATIENT)
Dept: NEUROLOGY | Facility: CLINIC | Age: 53
End: 2018-06-05
Payer: COMMERCIAL

## 2018-06-05 VITALS
WEIGHT: 238.56 LBS | BODY MASS INDEX: 36.16 KG/M2 | DIASTOLIC BLOOD PRESSURE: 93 MMHG | SYSTOLIC BLOOD PRESSURE: 142 MMHG | HEART RATE: 68 BPM | HEIGHT: 68 IN

## 2018-06-05 DIAGNOSIS — G25.0 BENIGN ESSENTIAL TREMOR: ICD-10-CM

## 2018-06-05 DIAGNOSIS — E66.01 MORBID OBESITY DUE TO EXCESS CALORIES: ICD-10-CM

## 2018-06-05 DIAGNOSIS — Z72.0 TOBACCO ABUSE: ICD-10-CM

## 2018-06-05 DIAGNOSIS — G43.001 MIGRAINE WITHOUT AURA AND WITH STATUS MIGRAINOSUS, NOT INTRACTABLE: Primary | ICD-10-CM

## 2018-06-05 DIAGNOSIS — R06.83 SNORING: ICD-10-CM

## 2018-06-05 DIAGNOSIS — I10 ESSENTIAL HYPERTENSION: ICD-10-CM

## 2018-06-05 PROCEDURE — 3080F DIAST BP >= 90 MM HG: CPT | Mod: CPTII,S$GLB,, | Performed by: PSYCHIATRY & NEUROLOGY

## 2018-06-05 PROCEDURE — 3077F SYST BP >= 140 MM HG: CPT | Mod: CPTII,S$GLB,, | Performed by: PSYCHIATRY & NEUROLOGY

## 2018-06-05 PROCEDURE — 64405 NJX AA&/STRD GR OCPL NRV: CPT | Mod: 50,S$GLB,, | Performed by: PSYCHIATRY & NEUROLOGY

## 2018-06-05 PROCEDURE — 99999 PR PBB SHADOW E&M-EST. PATIENT-LVL III: CPT | Mod: PBBFAC,,, | Performed by: PSYCHIATRY & NEUROLOGY

## 2018-06-05 PROCEDURE — 99214 OFFICE O/P EST MOD 30 MIN: CPT | Mod: 25,S$GLB,, | Performed by: PSYCHIATRY & NEUROLOGY

## 2018-06-05 PROCEDURE — 3008F BODY MASS INDEX DOCD: CPT | Mod: CPTII,S$GLB,, | Performed by: PSYCHIATRY & NEUROLOGY

## 2018-06-05 RX ORDER — METHYLPREDNISOLONE ACETATE 40 MG/ML
40 INJECTION, SUSPENSION INTRA-ARTICULAR; INTRALESIONAL; INTRAMUSCULAR; SOFT TISSUE
Status: COMPLETED | OUTPATIENT
Start: 2018-06-05 | End: 2018-06-05

## 2018-06-05 RX ORDER — LIDOCAINE HYDROCHLORIDE 20 MG/ML
4 INJECTION, SOLUTION INFILTRATION; PERINEURAL
Status: COMPLETED | OUTPATIENT
Start: 2018-06-05 | End: 2018-06-05

## 2018-06-05 RX ADMIN — METHYLPREDNISOLONE ACETATE 40 MG: 40 INJECTION, SUSPENSION INTRA-ARTICULAR; INTRALESIONAL; INTRAMUSCULAR; SOFT TISSUE at 02:06

## 2018-06-05 RX ADMIN — LIDOCAINE HYDROCHLORIDE 4 ML: 20 INJECTION, SOLUTION INFILTRATION; PERINEURAL at 02:06

## 2018-06-05 NOTE — PROGRESS NOTES
Neurology Clinic Follow-Up Note    Impression:  1. Frequent episodic migraine without aura  2. Familial tremor: mild improvement on higher dosage of propranolol  3. Tobacco abuse  4. HTN: uncontrolled  5. Anxiety  6. Snoring, r/o RHEA  7. Obesity     Plan:  1. Continue propranolol 80mg tid (for migraine, BP, tremor, anxiety)  2. B GONB today  3. Continue NSAIDs or Fioricet prn; discussed limiting to < 3 days/week  4. Discussed smoking cessation again, exercise and weight loss  5. F/U with Dr. Ellis for BP management, as planned  6. Sleep eval to r/o RHEA  7. RTC 3 months or sooner, prn       Problem List Items Addressed This Visit        1 - High    Migraine without aura and with status migrainosus, not intractable - Primary       2     Benign essential tremor    Overview     Lifetime essential tremor, familial            3     Tobacco abuse    Overview     3 cig/d decreased to 1cig/d, 5 pack year history         Morbid obesity due to excess calories    Overview     BMI >35, HTN, HLD         Hypertension    Overview     BP previously uncontrolled on ACE and propranolol           Other Visit Diagnoses     Snoring        Relevant Orders    Ambulatory consult to Sleep Disorders          Patient returns for follow-up of  above.  She was having approximately 7 HA days/ month.  A trial of propranolol holiday was associated with severe HA and exacerbation of tremor.  She has restarted with improvement in both.  She doesn't recall if GONB was helpful.  She endorses snoring/nocturnal choking and daytime somnolence.  Still smoking    Freq:  7/30 and 3/30 (7/30 and 1/30 (prior 7/30 and 3/30)     Prophylactics              Effective: Diamox, propranolol 80mg tid (partially), verapamil (takes for BP)              Ineffective/not tolerated:  topiramate (SOB), propranolol 40mg tid   Unsure: GONB     Abortives              Effective: Fioricet, naproxen, ibuprofen, Tylenol, phenergan supp              Ineffective/not tolerated:  "Imitrex (chest tightness)              Never: tramadol, Botox   Unsure: GONB      Outpatient Prescriptions Marked as Taking for the 6/5/18 encounter (Office Visit) with Matt Elizabeth MD   Medication Sig Dispense Refill    aspirin 81 MG Chew Take 1 tablet (81 mg total) by mouth once daily.  0    buPROPion (WELLBUTRIN) 100 MG tablet Take 1 tablet (100 mg total) by mouth 2 (two) times daily. 180 tablet 3    butalbital-acetaminophen-caffeine -40 mg (FIORICET, ESGIC) -40 mg per tablet Take 1 tablet by mouth every 4 (four) hours as needed for Pain. 90 tablet 1    cholecalciferol, vitamin D3, (VITAMIN D3) 5,000 unit Tab Take 5,000 Units by mouth once daily. 90 tablet 3    enalapril (VASOTEC) 20 MG tablet Take 1 tablet (20 mg total) by mouth once daily. 90 tablet 3    hydrochlorothiazide (HYDRODIURIL) 12.5 MG Tab Take 1 tablet (12.5 mg total) by mouth once daily. 90 tablet 3    levothyroxine (SYNTHROID) 125 MCG tablet Take 1 tablet (125 mcg total) by mouth once daily. 90 tablet 3    promethazine (PHENERGAN) 50 MG suppository Place 1 suppository (50 mg total) rectally every 6 (six) hours as needed for Nausea. 12 suppository 1    propranolol (INDERAL LA) 120 MG 24 hr capsule Take 2 capsules (240 mg total) by mouth once daily. 180 capsule 3    verapamil (CALAN) 80 MG tablet Take 1 tablet (80 mg total) by mouth 3 (three) times daily. 90 tablet 11       BP (!) 142/93   Pulse 68   Ht 5' 8" (1.727 m)   Wt 108.2 kg (238 lb 8.6 oz)   BMI 36.27 kg/m²   Overweight.  Awake, alert and oriented.  Language is normal.  PERRL, optic disc margins are sharp, EOMF without nystagmus, VFF, facial movements intact.  No UE drift.  No extinction to double simultaneous tactile stimulation.  Muscle power is normal.  Mild postural tremor, L > R. Gait is steady.    Matt Elizabeth MD  "

## 2018-06-05 NOTE — PROGRESS NOTES
Procedure Note - Bilateral Greater Occipital Nerve Block    Indication: Frequent episodic migraine    Description:  The risks and benefits of bilateral Greater Occipital Nerve Block (GONB) were discussed and written informed consent was obtained.  Following a TO, 2cc lidocaine 1% and 1cc Depo-Medrol 40mg/mL were injected over each greater occipital nerve without complication.    Matt Elizabeth MD

## 2018-06-05 NOTE — LETTER
June 5, 2018      Pearl Ellis MD  [address]  [place] [address]           [facility]  [address]  [facility] [address]  Phone: [phone]          Patient: Maria G Cameron   MR Number: [id number]   YOB: 1965   Date of Visit: 6/5/2018       Dear Dr. Pearl Ellis:    Thank you for referring Maria G Cameron to me for evaluation. Attached you will find relevant portions of my assessment and plan of care.    If you have questions, please do not hesitate to call me. I look forward to following Maria G Cameron along with you.    Sincerely,    Matt Elizabeth MD    Enclosure  CC:  No Recipients    If you would like to receive this communication electronically, please contact [email or web address] or [phone] to request more information on [employer] access.    For providers and/or their staff who would like to refer a patient to [facility], please contact us through our one-stop-shop provider referral line, [facility], at [phone].    If you feel you have received this communication in error or would no longer like to receive these types of communications, please e-mail [email or web address]

## 2018-06-06 ENCOUNTER — TELEPHONE (OUTPATIENT)
Dept: ORTHOPEDICS | Facility: CLINIC | Age: 53
End: 2018-06-06

## 2018-06-06 ENCOUNTER — HOSPITAL ENCOUNTER (OUTPATIENT)
Dept: RADIOLOGY | Facility: HOSPITAL | Age: 53
Discharge: HOME | End: 2018-06-06
Attending: NURSE PRACTITIONER
Payer: COMMERCIAL

## 2018-06-06 ENCOUNTER — OFFICE VISIT (OUTPATIENT)
Dept: INTERNAL MEDICINE | Facility: CLINIC | Age: 53
End: 2018-06-06
Payer: COMMERCIAL

## 2018-06-06 VITALS
SYSTOLIC BLOOD PRESSURE: 132 MMHG | OXYGEN SATURATION: 97 % | HEIGHT: 68 IN | TEMPERATURE: 98 F | HEART RATE: 68 BPM | BODY MASS INDEX: 27.67 KG/M2 | WEIGHT: 182.56 LBS | DIASTOLIC BLOOD PRESSURE: 84 MMHG

## 2018-06-06 DIAGNOSIS — M25.571 ACUTE RIGHT ANKLE PAIN: Primary | ICD-10-CM

## 2018-06-06 DIAGNOSIS — M79.671 RIGHT FOOT PAIN: ICD-10-CM

## 2018-06-06 DIAGNOSIS — M25.571 ACUTE RIGHT ANKLE PAIN: ICD-10-CM

## 2018-06-06 PROCEDURE — 73630 X-RAY EXAM OF FOOT: CPT | Mod: 26,RT,, | Performed by: RADIOLOGY

## 2018-06-06 PROCEDURE — 73610 X-RAY EXAM OF ANKLE: CPT | Mod: TC,RT

## 2018-06-06 PROCEDURE — 3075F SYST BP GE 130 - 139MM HG: CPT | Mod: CPTII,S$GLB,, | Performed by: NURSE PRACTITIONER

## 2018-06-06 PROCEDURE — 73610 X-RAY EXAM OF ANKLE: CPT | Mod: 26,RT,, | Performed by: RADIOLOGY

## 2018-06-06 PROCEDURE — 99213 OFFICE O/P EST LOW 20 MIN: CPT | Mod: S$GLB,,, | Performed by: NURSE PRACTITIONER

## 2018-06-06 PROCEDURE — 73630 X-RAY EXAM OF FOOT: CPT | Mod: TC,RT

## 2018-06-06 PROCEDURE — 3008F BODY MASS INDEX DOCD: CPT | Mod: CPTII,S$GLB,, | Performed by: NURSE PRACTITIONER

## 2018-06-06 PROCEDURE — 99999 PR PBB SHADOW E&M-EST. PATIENT-LVL IV: CPT | Mod: PBBFAC,,, | Performed by: NURSE PRACTITIONER

## 2018-06-06 PROCEDURE — 3079F DIAST BP 80-89 MM HG: CPT | Mod: CPTII,S$GLB,, | Performed by: NURSE PRACTITIONER

## 2018-06-06 NOTE — PROGRESS NOTES
"Subjective:       Patient ID: Maria G Cameron is a 52 y.o. female.    Chief Complaint: Ankle Injury    HPI:  53 yo female that presents to clinic today with complaint of right ankle pain.    States that she was walking at work today when she "tripped on the curb and rolled her right ankle."  States that she has problems putting weight on right ankle and foot.  States pain is located on both sides of her right ankle and on the top of her right foot.    States that she has noticed swelling on both sides of the ankle and her foot.  Denies any numbness or tingling in the foot.    Review of Systems   Constitutional: Positive for activity change. Negative for appetite change, fatigue and fever.   Respiratory: Negative for apnea, cough, shortness of breath and wheezing.    Cardiovascular: Negative for chest pain, palpitations and leg swelling.   Musculoskeletal: Positive for arthralgias (right foot and ankle) and gait problem.   Skin: Negative for color change, rash and wound.   Neurological: Negative for dizziness, light-headedness, numbness and headaches.   Psychiatric/Behavioral: Negative for behavioral problems.       Objective:      Physical Exam   Constitutional: She appears well-developed and well-nourished. She appears distressed.   Musculoskeletal:        Right ankle: She exhibits decreased range of motion and swelling. She exhibits no deformity and normal pulse. Tenderness. Lateral malleolus and medial malleolus tenderness found.        Feet:    Patient has decreased range of motion in right foot.  There is moderate pain with plantar and dorsiflexion.  There is mild pain with inversion and eversion.  Patient is point tender over areas indicated in picture.       Assessment:       1. Acute right ankle pain    2. Right foot pain        Plan:     1.  Acute right ankle pain  -Will obtain xrays of right ankle  to evaluate for fracture or dislocation.  -Encouraged to rest, ice and elevate ankle to help with " swelling.  -Recommended use of advil or ibuprofen to help with swelling and pain.  -Patient has appointment with orthopedics tomorrow.  Encouraged to keep appointment.    2.  Right foot pain  -Will obtain xrays of right foot to evaluate for fracture or dislocation.  -Encouraged to rest, ice and elevate foot to help with swelling.  -Recommended use of advil or ibuproffen to help with swelling and pain.  -Patient has appointment with orthopedics tomorrow.  Encouraged to keep appointment.

## 2018-06-06 NOTE — TELEPHONE ENCOUNTER
----- Message from Gwen Shermanry sent at 6/6/2018 11:49 AM CDT -----  Contact: Self/ [phone] EXT [phone]  The patient had a fall walking to lunch today and she would like to come in today. The patient is having pain in right left ankle from the fall. The patient will be a new patient and I did look for a appt for today but the next appt is for 6/18/18. Also the patient does work at [employer].

## 2018-06-06 NOTE — TELEPHONE ENCOUNTER
Ortho Telephone Triage Message 1300  Patient C/O: R ankle pain s/p rolling R foot and falling today while walking to lunch. Pt states painful to bear weight on R foot. Pt requests Ortho appt. Pt is an [employer] employee and present on campus.  Triage Advice: Advised first available Ortho appt is tomorrow and scheduled with AMIRA Grant PA-C at 10:00am. Advised that pt consider scheduling appt today with [facility] for exam/xray which will be available to Orthopedics via EMR.   Resolution: Pt states understanding and will do so.

## 2018-06-07 ENCOUNTER — OFFICE VISIT (OUTPATIENT)
Dept: ORTHOPEDICS | Facility: CLINIC | Age: 53
End: 2018-06-07
Payer: COMMERCIAL

## 2018-06-07 VITALS
WEIGHT: 235 LBS | DIASTOLIC BLOOD PRESSURE: 94 MMHG | BODY MASS INDEX: 35.61 KG/M2 | SYSTOLIC BLOOD PRESSURE: 157 MMHG | HEART RATE: 72 BPM | HEIGHT: 68 IN

## 2018-06-07 DIAGNOSIS — S93.491A SPRAIN OF ANTERIOR TALOFIBULAR LIGAMENT OF RIGHT ANKLE, INITIAL ENCOUNTER: ICD-10-CM

## 2018-06-07 DIAGNOSIS — S82.64XA CLOSED NONDISPLACED FRACTURE OF LATERAL MALLEOLUS OF RIGHT FIBULA, INITIAL ENCOUNTER: Primary | ICD-10-CM

## 2018-06-07 PROCEDURE — 3077F SYST BP >= 140 MM HG: CPT | Mod: CPTII,S$GLB,, | Performed by: PHYSICIAN ASSISTANT

## 2018-06-07 PROCEDURE — 3080F DIAST BP >= 90 MM HG: CPT | Mod: CPTII,S$GLB,, | Performed by: PHYSICIAN ASSISTANT

## 2018-06-07 PROCEDURE — 99203 OFFICE O/P NEW LOW 30 MIN: CPT | Mod: S$GLB,,, | Performed by: PHYSICIAN ASSISTANT

## 2018-06-07 PROCEDURE — 99999 PR PBB SHADOW E&M-EST. PATIENT-LVL III: CPT | Mod: PBBFAC,,, | Performed by: PHYSICIAN ASSISTANT

## 2018-06-07 RX ORDER — MELOXICAM 15 MG/1
15 TABLET ORAL DAILY
Qty: 30 TABLET | Refills: 1 | Status: SHIPPED | OUTPATIENT
Start: 2018-06-07 | End: 2018-07-07

## 2018-06-07 NOTE — PROGRESS NOTES
SUBJECTIVE:     Chief Complaint & History of Present Illness:  Maria G Cameron is a  New  patient 52 y.o. female who is seen here today with a complaint of    Chief Complaint   Patient presents with    Right Foot - Pain    Right Ankle - Pain    .  Here today for evaluation treatment of sudden onset right ankle pain following an inversion injury yesterday she missed stepped off of a curve suffered immediate pain and tenderness in the lateral aspect of her right ankle in the dorsal aspect of the foot near the base of the toes.  She has been able to stand and bear weight since the time of injury but does have significant pain with weightbearing and any lateral pressures to the ankle  On a scale of 1-10, with 10 being worst pain imaginable, he rates this pain as 4 on good days and 8 on bad days.  she describes the pain as tender and sore.    Review of patient's allergies indicates:   Allergen Reactions    Imitrex [sumatriptan] Shortness Of Breath    Codeine Nausea And Vomiting         Current Outpatient Prescriptions   Medication Sig Dispense Refill    aspirin 81 MG Chew Take 1 tablet (81 mg total) by mouth once daily.  0    buPROPion (WELLBUTRIN) 100 MG tablet Take 1 tablet (100 mg total) by mouth 2 (two) times daily. 180 tablet 3    butalbital-acetaminophen-caffeine -40 mg (FIORICET, ESGIC) -40 mg per tablet Take 1 tablet by mouth every 4 (four) hours as needed for Pain. 90 tablet 1    cholecalciferol, vitamin D3, (VITAMIN D3) 5,000 unit Tab Take 5,000 Units by mouth once daily. 90 tablet 3    enalapril (VASOTEC) 20 MG tablet Take 1 tablet (20 mg total) by mouth once daily. 90 tablet 3    hydrochlorothiazide (HYDRODIURIL) 12.5 MG Tab Take 1 tablet (12.5 mg total) by mouth once daily. 90 tablet 3    levothyroxine (SYNTHROID) 125 MCG tablet Take 1 tablet (125 mcg total) by mouth once daily. 90 tablet 3    promethazine (PHENERGAN) 50 MG suppository Place 1 suppository (50 mg total) rectally  "every 6 (six) hours as needed for Nausea. 12 suppository 1    propranolol (INDERAL LA) 120 MG 24 hr capsule Take 2 capsules (240 mg total) by mouth once daily. 180 capsule 3    verapamil (CALAN) 80 MG tablet Take 1 tablet (80 mg total) by mouth 3 (three) times daily. 90 tablet 11     No current facility-administered medications for this visit.        Past Medical History:   Diagnosis Date    Adrenal adenoma     Allergic rhinitis     Allergy     Allergic to trees, weeds, mold and standardized mite    Anxiety     Eustachian tube dysfunction     Fibrocystic changes of left breast     12 o'clock L breast mass bx 3/7/11    Hypertension     Hypothyroidism     Migraine headache     Recurrent otitis media        Past Surgical History:   Procedure Laterality Date    BREAST BIOPSY Left 2010    breast core biopsy  3/7/11    fibrocystic changes    COLONOSCOPY N/A 4/15/2016    Procedure: COLONOSCOPY;  Surgeon: Coleman Moss MD;  Location: [address];  Service: Endoscopy;  Laterality: N/A;       Vital Signs (Most Recent)  Vitals:    06/07/18 0958   BP: (!) 157/94   Pulse: 72       Review of Systems:  ROS:  Constitutional: no fever or chills  Eyes: no visual changes  ENT: no nasal congestion or sore throat  Respiratory: no cough or shortness of breath  Cardiovascular: no chest pain or palpitations  Gastrointestinal: no nausea or vomiting, tolerating diet  Genitourinary: no hematuria or dysuria  Integument/Breast: no rash or pruritis  Hematologic/Lymphatic: no easy bruising or lymphadenopathy  Musculoskeletal: no arthralgias or myalgias  Neurological: no seizures or tremors, Positive for chronic migraines  Behavioral/Psych: no auditory or visual hallucinations, Positive for anxiety  Endocrine: no heat or cold intolerance, Positive for adrenal adenoma, vitamin D deficiency, hypothyroidism      OBJECTIVE:     PHYSICAL EXAM:  Height: 5' 8" (172.7 cm) Weight: 106.6 kg (235 lb 0.2 oz), General Appearance: Well " nourished, well developed, in no acute distress.  Neurological: Mood & affect are normal.  right  Foot/Ankle    Skin: normal  Swelling: mild  Warmth: no warmth  Tenderness: moderate and severe  ROM: 90 degrees dorsiflexion, 180 degrees plantarflexion, 50 degrees inversion and 35 degrees eversion  Strength: normal, 5 on 5 tibialis anterior strength with pain, 5 of 5 EDL strength, 5 of 5 peroneus longus and 5 of 5 tibialis posterior  Gait: antalgic  Stability: anterior drawer: negative, exterior rotation test: negative, Lachman: negative and Cotton test: negative    tenderness between the 3rd and 4th and 3rd and 4th metatarsal head          RADIOGRAPHS:  X-rays from previous visit reviewed by me today in straight small avulsion fracture at the distal tip of the fibula mortise is well-maintained no other evidence of fracture dislocation or bony abnormalities    ASSESSMENT/PLAN:     Plan:  Sprain of the tibialis anterior, avulsion fracture the distal fibula  Meloxicam 15 mg daily with food ×2 weeks  Fracture boot for support and protection  Ice rest and elevation  Follow-up in 2 weeks with new x-rays sooner symptoms dictate

## 2018-06-22 ENCOUNTER — HOSPITAL ENCOUNTER (OUTPATIENT)
Dept: RADIOLOGY | Facility: HOSPITAL | Age: 53
Discharge: HOME | End: 2018-06-22
Attending: PHYSICIAN ASSISTANT
Payer: COMMERCIAL

## 2018-06-22 ENCOUNTER — OFFICE VISIT (OUTPATIENT)
Dept: ORTHOPEDICS | Facility: CLINIC | Age: 53
End: 2018-06-22
Payer: COMMERCIAL

## 2018-06-22 VITALS — SYSTOLIC BLOOD PRESSURE: 145 MMHG | HEIGHT: 68 IN | HEART RATE: 66 BPM | DIASTOLIC BLOOD PRESSURE: 88 MMHG

## 2018-06-22 DIAGNOSIS — M79.671 RIGHT FOOT PAIN: ICD-10-CM

## 2018-06-22 DIAGNOSIS — M25.571 RIGHT ANKLE PAIN, UNSPECIFIED CHRONICITY: Primary | ICD-10-CM

## 2018-06-22 DIAGNOSIS — S82.64XD CLOSED NONDISPLACED FRACTURE OF LATERAL MALLEOLUS OF RIGHT FIBULA WITH ROUTINE HEALING, SUBSEQUENT ENCOUNTER: ICD-10-CM

## 2018-06-22 DIAGNOSIS — M25.571 RIGHT ANKLE PAIN, UNSPECIFIED CHRONICITY: ICD-10-CM

## 2018-06-22 PROCEDURE — 73630 X-RAY EXAM OF FOOT: CPT | Mod: TC,RT

## 2018-06-22 PROCEDURE — 73610 X-RAY EXAM OF ANKLE: CPT | Mod: 26,RT,, | Performed by: RADIOLOGY

## 2018-06-22 PROCEDURE — 3079F DIAST BP 80-89 MM HG: CPT | Mod: CPTII,S$GLB,, | Performed by: PHYSICIAN ASSISTANT

## 2018-06-22 PROCEDURE — 99213 OFFICE O/P EST LOW 20 MIN: CPT | Mod: S$GLB,,, | Performed by: PHYSICIAN ASSISTANT

## 2018-06-22 PROCEDURE — 73630 X-RAY EXAM OF FOOT: CPT | Mod: 26,RT,, | Performed by: RADIOLOGY

## 2018-06-22 PROCEDURE — 99999 PR PBB SHADOW E&M-EST. PATIENT-LVL III: CPT | Mod: PBBFAC,,, | Performed by: PHYSICIAN ASSISTANT

## 2018-06-22 PROCEDURE — 73610 X-RAY EXAM OF ANKLE: CPT | Mod: TC,RT

## 2018-06-22 PROCEDURE — 3077F SYST BP >= 140 MM HG: CPT | Mod: CPTII,S$GLB,, | Performed by: PHYSICIAN ASSISTANT

## 2018-06-22 NOTE — PROGRESS NOTES
SUBJECTIVE:     Chief Complaint & History of Present Illness:  Maria G Cameron is a  Established  patient 52 y.o. female who is seen here today with a complaint of    Chief Complaint   Patient presents with    Right Foot - Pain    Right Ankle - Pain    .  She's here today for continuing care for her right distal fibular fracture last seen and treated by me for this condition O6/07/2018.  He's tolerated the boot well her pain has a subsided substantially as has the swelling  On a scale of 1-10, with 10 being worst pain imaginable, he rates this pain as 1 on good days and 3 on bad days.  she describes the pain as tender and sore.    Review of patient's allergies indicates:   Allergen Reactions    Imitrex [sumatriptan] Shortness Of Breath    Codeine Nausea And Vomiting         Current Outpatient Prescriptions   Medication Sig Dispense Refill    aspirin 81 MG Chew Take 1 tablet (81 mg total) by mouth once daily.  0    buPROPion (WELLBUTRIN) 100 MG tablet Take 1 tablet (100 mg total) by mouth 2 (two) times daily. 180 tablet 3    butalbital-acetaminophen-caffeine -40 mg (FIORICET, ESGIC) -40 mg per tablet Take 1 tablet by mouth every 4 (four) hours as needed for Pain. 90 tablet 1    cholecalciferol, vitamin D3, (VITAMIN D3) 5,000 unit Tab Take 5,000 Units by mouth once daily. 90 tablet 3    enalapril (VASOTEC) 20 MG tablet Take 1 tablet (20 mg total) by mouth once daily. 90 tablet 3    hydrochlorothiazide (HYDRODIURIL) 12.5 MG Tab Take 1 tablet (12.5 mg total) by mouth once daily. 90 tablet 3    levothyroxine (SYNTHROID) 125 MCG tablet Take 1 tablet (125 mcg total) by mouth once daily. 90 tablet 3    meloxicam (MOBIC) 15 MG tablet Take 1 tablet (15 mg total) by mouth once daily. 30 tablet 1    promethazine (PHENERGAN) 50 MG suppository Place 1 suppository (50 mg total) rectally every 6 (six) hours as needed for Nausea. 12 suppository 1    propranolol (INDERAL LA) 120 MG 24 hr capsule Take 2  "capsules (240 mg total) by mouth once daily. 180 capsule 3    verapamil (CALAN) 80 MG tablet Take 1 tablet (80 mg total) by mouth 3 (three) times daily. 90 tablet 11     No current facility-administered medications for this visit.        Past Medical History:   Diagnosis Date    Adrenal adenoma     Allergic rhinitis     Allergy     Allergic to trees, weeds, mold and standardized mite    Anxiety     Eustachian tube dysfunction     Fibrocystic changes of left breast     12 o'clock L breast mass bx 3/7/11    Hypertension     Hypothyroidism     Migraine headache     Recurrent otitis media        Past Surgical History:   Procedure Laterality Date    BREAST BIOPSY Left 2010    breast core biopsy  3/7/11    fibrocystic changes    COLONOSCOPY N/A 4/15/2016    Procedure: COLONOSCOPY;  Surgeon: Coleman Moss MD;  Location: [facility] ([address] [place] [address]);  Service: Endoscopy;  Laterality: N/A;       Vital Signs (Most Recent)  Vitals:    06/22/18 0924   BP: (!) 145/88   Pulse: 66       Review of Systems:  ROS:  Constitutional: no fever or chills  Eyes: no visual changes  ENT: no nasal congestion or sore throat  Respiratory: no cough or shortness of breath  Cardiovascular: no chest pain or palpitations  Gastrointestinal: no nausea or vomiting, tolerating diet  Genitourinary: no hematuria or dysuria  Integument/Breast: no rash or pruritis  Hematologic/Lymphatic: no easy bruising or lymphadenopathy  Musculoskeletal: no arthralgias or myalgias  Neurological: no seizures or tremors, Positive for chronic migraines  Behavioral/Psych: no auditory or visual hallucinations, Positive for anxiety  Endocrine: no heat or cold intolerance, Positive for adrenal adenoma, vitamin D deficiency, hypothyroidism        OBJECTIVE:     PHYSICAL EXAM:  Height: 5' 8" (172.7 cm)  , General Appearance: Well nourished, well developed, in no acute distress.  Neurological: Mood & affect are normal.  right  Foot/Ankle     Skin: normal  Swelling: " mild  Warmth: no warmth  Tenderness: mild  ROM: 90 degrees dorsiflexion, 180 degrees plantarflexion, 50 degrees inversion and 40 degrees eversion  Strength: normal, 5 on 5 tibialis anterior strength with pain, 5 of 5 EDL strength, 5 of 5 peroneus longus and 5 of 5 tibialis posterior  Gait: antalgic  Stability: anterior drawer: negative, exterior rotation test: negative, Lachman: negative and Cotton test: negative     tenderness between the 3rd and 4th and 3rd and 4th metatarsal head          RADIOGRAPHS:  X-rays taken today films viewed by me demonstrate fracture remains in good alignment unchanged from previous x-rays no signs of callus formation can yet be seen    ASSESSMENT/PLAN:     Plan:  Patient continue in fracture boot for additional 2 weeks weightbearing as tolerated continue with ice and elevation.  Follow-up in 2 weeks with new x-rays sooner symptoms dictate

## 2018-07-09 ENCOUNTER — HOSPITAL ENCOUNTER (OUTPATIENT)
Dept: RADIOLOGY | Facility: HOSPITAL | Age: 53
Discharge: HOME | End: 2018-07-09
Attending: PHYSICIAN ASSISTANT
Payer: COMMERCIAL

## 2018-07-09 ENCOUNTER — OFFICE VISIT (OUTPATIENT)
Dept: ORTHOPEDICS | Facility: CLINIC | Age: 53
End: 2018-07-09
Payer: COMMERCIAL

## 2018-07-09 VITALS
DIASTOLIC BLOOD PRESSURE: 94 MMHG | HEART RATE: 63 BPM | HEIGHT: 68 IN | SYSTOLIC BLOOD PRESSURE: 150 MMHG | BODY MASS INDEX: 35.99 KG/M2 | WEIGHT: 237.44 LBS

## 2018-07-09 DIAGNOSIS — M25.571 ACUTE RIGHT ANKLE PAIN: Primary | ICD-10-CM

## 2018-07-09 DIAGNOSIS — M25.571 ACUTE RIGHT ANKLE PAIN: ICD-10-CM

## 2018-07-09 DIAGNOSIS — S82.64XD CLOSED NONDISPLACED FRACTURE OF LATERAL MALLEOLUS OF RIGHT FIBULA WITH ROUTINE HEALING, SUBSEQUENT ENCOUNTER: ICD-10-CM

## 2018-07-09 PROCEDURE — 3077F SYST BP >= 140 MM HG: CPT | Mod: CPTII,S$GLB,, | Performed by: PHYSICIAN ASSISTANT

## 2018-07-09 PROCEDURE — 99999 PR PBB SHADOW E&M-EST. PATIENT-LVL III: CPT | Mod: PBBFAC,,, | Performed by: PHYSICIAN ASSISTANT

## 2018-07-09 PROCEDURE — 73610 X-RAY EXAM OF ANKLE: CPT | Mod: 26,RT,, | Performed by: RADIOLOGY

## 2018-07-09 PROCEDURE — 3008F BODY MASS INDEX DOCD: CPT | Mod: CPTII,S$GLB,, | Performed by: PHYSICIAN ASSISTANT

## 2018-07-09 PROCEDURE — 99213 OFFICE O/P EST LOW 20 MIN: CPT | Mod: S$GLB,,, | Performed by: PHYSICIAN ASSISTANT

## 2018-07-09 PROCEDURE — 73610 X-RAY EXAM OF ANKLE: CPT | Mod: TC,RT

## 2018-07-09 PROCEDURE — 3080F DIAST BP >= 90 MM HG: CPT | Mod: CPTII,S$GLB,, | Performed by: PHYSICIAN ASSISTANT

## 2018-07-09 NOTE — PROGRESS NOTES
SUBJECTIVE:     Chief Complaint & History of Present Illness:  Maria G Cameron is a  Established  patient 52 y.o. female who is seen here today with a complaint of    Chief Complaint   Patient presents with    Right Ankle - Pain    .  She is patient well known to me was last seen treated clinic for this condition O6/22/2018.  She's made very good progress since her last visit and pain has essentially returned to baseline she discontinue any bracing and support has returned to most of her normal daily activities  On a scale of 1-10, with 10 being worst pain imaginable, he rates this pain as 0 on good days and 1 on bad days.  she describes the pain as sore.    Review of patient's allergies indicates:   Allergen Reactions    Imitrex [sumatriptan] Shortness Of Breath    Codeine Nausea And Vomiting         Current Outpatient Prescriptions   Medication Sig Dispense Refill    buPROPion (WELLBUTRIN) 100 MG tablet Take 1 tablet (100 mg total) by mouth 2 (two) times daily. 180 tablet 3    butalbital-acetaminophen-caffeine -40 mg (FIORICET, ESGIC) -40 mg per tablet Take 1 tablet by mouth every 4 (four) hours as needed for Pain. 90 tablet 1    cholecalciferol, vitamin D3, (VITAMIN D3) 5,000 unit Tab Take 5,000 Units by mouth once daily. 90 tablet 3    enalapril (VASOTEC) 20 MG tablet Take 1 tablet (20 mg total) by mouth once daily. 90 tablet 3    hydrochlorothiazide (HYDRODIURIL) 12.5 MG Tab Take 1 tablet (12.5 mg total) by mouth once daily. 90 tablet 3    levothyroxine (SYNTHROID) 125 MCG tablet Take 1 tablet (125 mcg total) by mouth once daily. 90 tablet 3    promethazine (PHENERGAN) 50 MG suppository Place 1 suppository (50 mg total) rectally every 6 (six) hours as needed for Nausea. 12 suppository 1    propranolol (INDERAL LA) 120 MG 24 hr capsule Take 2 capsules (240 mg total) by mouth once daily. 180 capsule 3    verapamil (CALAN) 80 MG tablet Take 1 tablet (80 mg total) by mouth 3 (three) times  "daily. 90 tablet 11    aspirin 81 MG Chew Take 1 tablet (81 mg total) by mouth once daily.  0     No current facility-administered medications for this visit.        Past Medical History:   Diagnosis Date    Adrenal adenoma     Allergic rhinitis     Allergy     Allergic to trees, weeds, mold and standardized mite    Anxiety     Eustachian tube dysfunction     Fibrocystic changes of left breast     12 o'clock L breast mass bx 3/7/11    Hypertension     Hypothyroidism     Migraine headache     Recurrent otitis media        Past Surgical History:   Procedure Laterality Date    BREAST BIOPSY Left 2010    breast core biopsy  3/7/11    fibrocystic changes    COLONOSCOPY N/A 4/15/2016    Procedure: COLONOSCOPY;  Surgeon: Coleman Moss MD;  Location: [facility] ([address] [place] [address]);  Service: Endoscopy;  Laterality: N/A;       Vital Signs (Most Recent)  Vitals:    07/09/18 1016   BP: (!) 150/94   Pulse: 63       Review of Systems:  ROS:  Constitutional: no fever or chills  Eyes: no visual changes  ENT: no nasal congestion or sore throat  Respiratory: no cough or shortness of breath  Cardiovascular: no chest pain or palpitations  Gastrointestinal: no nausea or vomiting, tolerating diet  Genitourinary: no hematuria or dysuria  Integument/Breast: no rash or pruritis  Hematologic/Lymphatic: no easy bruising or lymphadenopathy  Musculoskeletal: no arthralgias or myalgias  Neurological: no seizures or tremors, Positive for chronic migraines  Behavioral/Psych: no auditory or visual hallucinations, Positive for anxiety  Endocrine: no heat or cold intolerance, Positive for adrenal adenoma, vitamin D deficiency, hypothyroidism        OBJECTIVE:     PHYSICAL EXAM:  Height: 5' 8" (172.7 cm) Weight: 107.7 kg (237 lb 7 oz), General Appearance: Well nourished, well developed, in no acute distress.  Neurological: Mood & affect are normal.  right  Foot/Ankle    Skin: normal  Swelling: none  Warmth: no warmth  Tenderness: none  ROM: 90 " degrees dorsiflexion, 180 degrees plantarflexion, 45 degrees inversion and 40 degrees eversion  Strength: normal, 5 on 5 tibialis anterior strength, 5 of 5 EHL strength and 5 of 5 peroneus longus  Gait: normal  Stability: anterior drawer: negative, exterior rotation test: negative, Lachman: negative and Cotton test: negative    normal exam, no swelling, tenderness, instability; ligaments intact, full range of motion of all ankle/foot joints          RADIOGRAPHS:  X-rays taken today films viewed by me demonstrate hip fracture remains in good alignment unchanged from previous x-rays    ASSESSMENT/PLAN:     Plan:  We will discontinue the patient from immediate care today she may return to her normal activities without restriction follow-up.

## 2018-07-13 ENCOUNTER — TELEPHONE (OUTPATIENT)
Dept: ENDOCRINOLOGY | Facility: HOSPITAL | Age: 53
End: 2018-07-13

## 2018-07-13 ENCOUNTER — TELEPHONE (OUTPATIENT)
Dept: ENDOCRINOLOGY | Facility: CLINIC | Age: 53
End: 2018-07-13

## 2018-07-13 ENCOUNTER — LAB VISIT (OUTPATIENT)
Dept: LAB | Facility: HOSPITAL | Age: 53
End: 2018-07-13
Payer: COMMERCIAL

## 2018-07-13 DIAGNOSIS — E03.9 ACQUIRED HYPOTHYROIDISM: ICD-10-CM

## 2018-07-13 LAB — TSH SERPL DL<=0.005 MIU/L-ACNC: 1.63 UIU/ML

## 2018-07-13 PROCEDURE — 36415 COLL VENOUS BLD VENIPUNCTURE: CPT

## 2018-07-13 PROCEDURE — 84443 ASSAY THYROID STIM HORMONE: CPT

## 2018-07-13 NOTE — TELEPHONE ENCOUNTER
Called and spoke with patient.  Given specific message as per dr. Wilder.  Patient verbalizes understanding.

## 2018-07-13 NOTE — TELEPHONE ENCOUNTER
Please let the patient know her TSH looks perfect, so we don't need to adjust the thyroid medication. We will recheck at follow-up in 1 year. Thanks!

## 2018-09-06 ENCOUNTER — OFFICE VISIT (OUTPATIENT)
Dept: NEUROLOGY | Facility: CLINIC | Age: 53
End: 2018-09-06
Payer: COMMERCIAL

## 2018-09-06 VITALS
BODY MASS INDEX: 35.92 KG/M2 | DIASTOLIC BLOOD PRESSURE: 100 MMHG | SYSTOLIC BLOOD PRESSURE: 141 MMHG | HEART RATE: 72 BPM | HEIGHT: 68 IN | WEIGHT: 237 LBS

## 2018-09-06 DIAGNOSIS — G25.0 BENIGN ESSENTIAL TREMOR: ICD-10-CM

## 2018-09-06 DIAGNOSIS — Z72.0 TOBACCO ABUSE: ICD-10-CM

## 2018-09-06 DIAGNOSIS — G43.001 MIGRAINE WITHOUT AURA AND WITH STATUS MIGRAINOSUS, NOT INTRACTABLE: Primary | ICD-10-CM

## 2018-09-06 PROCEDURE — 3080F DIAST BP >= 90 MM HG: CPT | Mod: CPTII,S$GLB,, | Performed by: PSYCHIATRY & NEUROLOGY

## 2018-09-06 PROCEDURE — 3008F BODY MASS INDEX DOCD: CPT | Mod: CPTII,S$GLB,, | Performed by: PSYCHIATRY & NEUROLOGY

## 2018-09-06 PROCEDURE — 3077F SYST BP >= 140 MM HG: CPT | Mod: CPTII,S$GLB,, | Performed by: PSYCHIATRY & NEUROLOGY

## 2018-09-06 PROCEDURE — 99214 OFFICE O/P EST MOD 30 MIN: CPT | Mod: S$GLB,,, | Performed by: PSYCHIATRY & NEUROLOGY

## 2018-09-06 PROCEDURE — 99999 PR PBB SHADOW E&M-EST. PATIENT-LVL III: CPT | Mod: PBBFAC,,, | Performed by: PSYCHIATRY & NEUROLOGY

## 2018-09-06 NOTE — PROGRESS NOTES
Neurology Clinic Follow-Up Note    Impression:  1. Frequent episodic migraine without aura, intensity responsive to GONB  2. Familial tremor: stable  3. Tobacco abuse  4. HTN: still uncontrolled  5. Anxiety  6. Prob RHEA, refuses sleep study  7. Obesity     Plan:  1. Continue propranolol 80mg tid (for migraine, BP, tremor, anxiety)  2. B GONB prn  3. Continue NSAIDs or Fioricet prn; discussed limiting to < 3 days/week  4. Smoking cessation again, exercise and weight loss  5. F/U with Dr. Ellis for BP management  6. Declines sleep evaluation  7. RTC 6 months or sooner, prn        Problem List Items Addressed This Visit        1 - High    Migraine without aura and with status migrainosus, not intractable - Primary       2     Benign essential tremor    Overview     Lifetime essential tremor, familial            3     Tobacco abuse    Overview     3 cig/d decreased to 1cig/d, 5 pack year history               Patient returns for follow-up of above.   B GONB 6/5/18 was helpful, particularly with HA intensity. She did not see sleep.  No change in tremor. Still smoking    Freq:  6/30 and 1/30      Prophylactics              Effective: Diamox, propranolol 80mg tid (partially), verapamil (takes for BP), B GONB              Ineffective/not tolerated:  topiramate (SOB), propranolol 40mg tid       Abortives              Effective: Fioricet, naproxen, ibuprofen, Tylenol, phenergan supp, B GONB              Ineffective/not tolerated: Imitrex (chest tightness)              Never: tramadol, Botox    Outpatient Medications Marked as Taking for the 9/6/18 encounter (Office Visit) with Matt Elizabeth MD   Medication Sig Dispense Refill    aspirin 81 MG Chew Take 1 tablet (81 mg total) by mouth once daily.  0    buPROPion (WELLBUTRIN) 100 MG tablet Take 1 tablet (100 mg total) by mouth 2 (two) times daily. 180 tablet 3    butalbital-acetaminophen-caffeine -40 mg (FIORICET, ESGIC) -40 mg per tablet Take 1  "tablet by mouth every 4 (four) hours as needed for Pain. 90 tablet 1    cholecalciferol, vitamin D3, (VITAMIN D3) 5,000 unit Tab Take 5,000 Units by mouth once daily. 90 tablet 3    enalapril (VASOTEC) 20 MG tablet Take 1 tablet (20 mg total) by mouth once daily. 90 tablet 3    hydrochlorothiazide (HYDRODIURIL) 12.5 MG Tab Take 1 tablet (12.5 mg total) by mouth once daily. 90 tablet 3    levothyroxine (SYNTHROID) 125 MCG tablet Take 1 tablet (125 mcg total) by mouth once daily. 90 tablet 3    promethazine (PHENERGAN) 50 MG suppository Place 1 suppository (50 mg total) rectally every 6 (six) hours as needed for Nausea. 12 suppository 1    propranolol (INDERAL LA) 120 MG 24 hr capsule Take 2 capsules (240 mg total) by mouth once daily. 180 capsule 3    verapamil (CALAN) 80 MG tablet Take 1 tablet (80 mg total) by mouth 3 (three) times daily. 90 tablet 11       BP (!) 141/100   Pulse 72   Ht 5' 8" (1.727 m)   Wt 107.5 kg (237 lb)   BMI 36.04 kg/m²   Overweigh.  Awake, alert and oriented.  Language is normal.  PERRL, optic disc margins are sharp, EOMF without nystagmus, VFF, facial movements intact. Mild postural tremor. Gait is steady.    Matt Elizabeth MD  "

## 2018-09-06 NOTE — LETTER
September 6, 2018      Pearl Ellis MD  [address]  [place] [address]           [facility]  [address]  [facility] [address]  Phone: [phone]          Patient: Maria G Cameron   MR Number: [id number]   YOB: 1965   Date of Visit: 9/6/2018       Dear Dr. Pearl Ellis:    Thank you for referring Maria G Cameron to me for evaluation. Attached you will find relevant portions of my assessment and plan of care.    If you have questions, please do not hesitate to call me. I look forward to following Maria G Cameron along with you.    Sincerely,    Matt Elizabeth MD    Enclosure  CC:  No Recipients    If you would like to receive this communication electronically, please contact [email or web address] or [phone] to request more information on [employer] access.    For providers and/or their staff who would like to refer a patient to [facility], please contact us through our one-stop-shop provider referral line, [facility], at [phone].    If you feel you have received this communication in error or would no longer like to receive these types of communications, please e-mail [email or web address]

## 2018-09-11 ENCOUNTER — TELEPHONE (OUTPATIENT)
Dept: NEUROLOGY | Facility: CLINIC | Age: 53
End: 2018-09-11

## 2018-09-11 NOTE — TELEPHONE ENCOUNTER
----- Message from Rosa Elena Carballo RN sent at 9/11/2018  4:51 PM CDT -----      ----- Message -----  From: Marshall Conrad  Sent: 9/11/2018   8:42 AM  To: Glenn Falk    Same Day Appointment Request    Was an appointment with another provider offered?  No  Reason for FST appt.: botox injection for headache. Pt said Dr. Elizabeth asked her to call when she needs to get the botox injection and he would fit her in.  Communication Preference: [phone]  Additional Information:

## 2018-09-13 ENCOUNTER — TELEPHONE (OUTPATIENT)
Dept: NEUROLOGY | Facility: CLINIC | Age: 53
End: 2018-09-13

## 2018-09-13 NOTE — TELEPHONE ENCOUNTER
Spoke with Mrs. Irenexiomara-    She requesting a Nerve block anytime tomorrow, I explained that I would need to get with Dr. Elizabeth to see if he's available tomorrow.

## 2018-09-13 NOTE — TELEPHONE ENCOUNTER
----- Message from Silverio Yang sent at 9/13/2018  1:47 PM CDT -----  Contact: Patient @ [phone]  Patient is calling a f/u appt, pt would like to come in tomorrow for a Botox injection, pls call

## 2018-09-14 ENCOUNTER — TELEPHONE (OUTPATIENT)
Dept: PRIMARY CARE CLINIC | Facility: CLINIC | Age: 53
End: 2018-09-14

## 2018-09-14 ENCOUNTER — OFFICE VISIT (OUTPATIENT)
Dept: NEUROLOGY | Facility: CLINIC | Age: 53
End: 2018-09-14
Payer: COMMERCIAL

## 2018-09-14 ENCOUNTER — NURSE TRIAGE (OUTPATIENT)
Dept: ADMINISTRATIVE | Facility: CLINIC | Age: 53
End: 2018-09-14

## 2018-09-14 VITALS
HEART RATE: 71 BPM | WEIGHT: 240.31 LBS | DIASTOLIC BLOOD PRESSURE: 117 MMHG | SYSTOLIC BLOOD PRESSURE: 184 MMHG | BODY MASS INDEX: 36.42 KG/M2 | HEIGHT: 68 IN

## 2018-09-14 DIAGNOSIS — Z72.0 TOBACCO ABUSE: ICD-10-CM

## 2018-09-14 DIAGNOSIS — I10 ESSENTIAL HYPERTENSION: ICD-10-CM

## 2018-09-14 DIAGNOSIS — G43.001 MIGRAINE WITHOUT AURA AND WITH STATUS MIGRAINOSUS, NOT INTRACTABLE: Primary | ICD-10-CM

## 2018-09-14 DIAGNOSIS — G25.0 BENIGN ESSENTIAL TREMOR: ICD-10-CM

## 2018-09-14 PROCEDURE — 3080F DIAST BP >= 90 MM HG: CPT | Mod: CPTII,S$GLB,, | Performed by: PSYCHIATRY & NEUROLOGY

## 2018-09-14 PROCEDURE — 99999 PR PBB SHADOW E&M-EST. PATIENT-LVL III: CPT | Mod: PBBFAC,,, | Performed by: PSYCHIATRY & NEUROLOGY

## 2018-09-14 PROCEDURE — 3077F SYST BP >= 140 MM HG: CPT | Mod: CPTII,S$GLB,, | Performed by: PSYCHIATRY & NEUROLOGY

## 2018-09-14 PROCEDURE — 99214 OFFICE O/P EST MOD 30 MIN: CPT | Mod: S$GLB,,, | Performed by: PSYCHIATRY & NEUROLOGY

## 2018-09-14 PROCEDURE — 3008F BODY MASS INDEX DOCD: CPT | Mod: CPTII,S$GLB,, | Performed by: PSYCHIATRY & NEUROLOGY

## 2018-09-14 RX ORDER — METHYLPREDNISOLONE ACETATE 40 MG/ML
40 INJECTION, SUSPENSION INTRA-ARTICULAR; INTRALESIONAL; INTRAMUSCULAR; SOFT TISSUE
Status: COMPLETED | OUTPATIENT
Start: 2018-09-14 | End: 2018-09-17

## 2018-09-14 RX ORDER — LIDOCAINE HYDROCHLORIDE 20 MG/ML
4 INJECTION, SOLUTION INFILTRATION; PERINEURAL
Status: COMPLETED | OUTPATIENT
Start: 2018-09-14 | End: 2018-09-17

## 2018-09-14 NOTE — TELEPHONE ENCOUNTER
184/ 114 seen at neuro today   migraine since Sunday. rec OV as pt denies sx. Pt states that she is on site at [facility]. Pt transferred via  to PCP office. Call back with questions      Reason for Disposition   BP > 180/110    Protocols used: ST HIGH BLOOD PRESSURE-A-OH

## 2018-09-14 NOTE — PROGRESS NOTES
Neurology Clinic Follow-Up Note    Impression:  1. Frequent episodic migraine without aura, intensity responsive to GONB  2. Familial tremor: stable  3. Tobacco abuse  4. HTN: still uncontrolled  5. Anxiety  6. Prob RHEA, refuses sleep study  7. Obesity     Plan:  1. B GONB today  2. Continue propranolol 80mg tid (for migraine, BP, tremor, anxiety)  3. Continue NSAIDs or Fioricet prn; discussed limiting to < 3 days/week  4. Smoking cessation again, exercise and weight loss  5. I instructed her to call Dr. Ellis's office today regarding BP management  6. Still declines sleep evaluation  7. RTC 6 months or sooner, prn          Problem List Items Addressed This Visit        1 - High    Migraine without aura and with status migrainosus, not intractable - Primary       2     Benign essential tremor    Overview     Lifetime essential tremor, familial            3     Tobacco abuse    Overview     3 cig/d decreased to 1cig/d, 5 pack year history         Hypertension    Overview     BP previously uncontrolled on ACE and propranolol             Patient returns for follow-up of above and for a repeat GONB.   HA frequency has been increasing. No change in tremor. Still smoking    Freq:  15/30      Prophylactics              Effective: Diamox, propranolol 80mg tid (partially), verapamil (takes for BP), B GONB              Ineffective/not tolerated:  topiramate (SOB), propranolol 40mg tid       Abortives              Effective: Fioricet, naproxen, ibuprofen, Tylenol, phenergan supp, B GONB              Ineffective/not tolerated: Imitrex (chest tightness)              Never: tramadol, Botox      Outpatient Medications Marked as Taking for the 9/14/18 encounter (Office Visit) with Matt Elizabeth MD   Medication Sig Dispense Refill    aspirin 81 MG Chew Take 1 tablet (81 mg total) by mouth once daily.  0    buPROPion (WELLBUTRIN) 100 MG tablet Take 1 tablet (100 mg total) by mouth 2 (two) times daily. 180 tablet 3     "butalbital-acetaminophen-caffeine -40 mg (FIORICET, ESGIC) -40 mg per tablet Take 1 tablet by mouth every 4 (four) hours as needed for Pain. 90 tablet 1    cholecalciferol, vitamin D3, (VITAMIN D3) 5,000 unit Tab Take 5,000 Units by mouth once daily. 90 tablet 3    enalapril (VASOTEC) 20 MG tablet Take 1 tablet (20 mg total) by mouth once daily. 90 tablet 3    hydrochlorothiazide (HYDRODIURIL) 12.5 MG Tab Take 1 tablet (12.5 mg total) by mouth once daily. 90 tablet 3    levothyroxine (SYNTHROID) 125 MCG tablet Take 1 tablet (125 mcg total) by mouth once daily. 90 tablet 3    promethazine (PHENERGAN) 50 MG suppository Place 1 suppository (50 mg total) rectally every 6 (six) hours as needed for Nausea. 12 suppository 1    propranolol (INDERAL LA) 120 MG 24 hr capsule Take 2 capsules (240 mg total) by mouth once daily. 180 capsule 3    verapamil (CALAN) 80 MG tablet Take 1 tablet (80 mg total) by mouth 3 (three) times daily. 90 tablet 11     Current Facility-Administered Medications for the 9/14/18 encounter (Office Visit) with Matt Elizabeth MD   Medication Dose Route Frequency Provider Last Rate Last Dose    lidocaine HCL 20 mg/ml (2%) injection 4 mL  4 mL Other 1 time in Clinic/HOD Matt Elizabeth MD        methylPREDNISolone acetate injection 40 mg  40 mg Peripheral nerve block 1 time in Clinic/HOD Matt Elizabeth MD        methylPREDNISolone acetate injection 40 mg  40 mg Peripheral nerve block 1 time in Clinic/HOD Matt Elizabeth MD           BP (!) 184/117   Pulse 71   Ht 5' 8" (1.727 m)   Wt 109 kg (240 lb 4.8 oz)   BMI 36.54 kg/m²    Overweight.  Awake, alert and oriented.  Language is normal.  PERRL, optic disc margins are sharp, EOMF without nystagmus, VFF, facial movements intact.  No UE drift.  No extinction to double simultaneous tactile stimulation.  Muscle power is normal.  Gait is steady. Mild post tremor    Matt Elizabeth MD  "

## 2018-09-14 NOTE — PROGRESS NOTES
Procedure Note - Bilateral Greater Occipital Nerve Block    Indication: Frequent episodic migraine    Description:  The risks and benefits of bilateral Greater Occipital Nerve Block (GONB) were discussed and written informed consent was obtained (6/5/18).  Following a TO, 2cc lidocaine 1% and 1cc Depo-Medrol 40mg/mL were injected over each greater occipital nerve without complication.    Matt Elizabeth MD

## 2018-09-14 NOTE — TELEPHONE ENCOUNTER
Patient called nurse on call regarding HTN at Neuro appt, she was connected to my spectralink.    Patient with acute migraine and HTN on presentation to Neuro today. She underwent nerve block then was advised by Dr Elizabeth to call PCP to address BP. I advised her to recheck her pressure in about 4 hours (after the migraine has resolved). If it remains elevated she can take an additional dose of HCTZ.    Tomorrow she can again double her HCTZ if her pressure remains elevated. She should call me and leave a message through the operators if her pressure remains high through the weekend. I will call her back at that time.    Otherwise we will talk on Monday by phone to reassess and make other medication changes if needed.    Thanks,  KJ    RAND Medrano  Staff 3 minutes ago (3:09 PM)      Pt transferred with /114. thank you  (Routing comment)         [facility] 5 minutes ago (3:07 PM)      Apoorva Wood RN 7 minutes ago (3:05 PM)         184/ 114 seen at neuro today   migraine since Sunday. rec OV as pt denies sx. Pt states that she is on site at [facility]. Pt transferred via  to PCP office. Call back with questions       Reason for Disposition   BP > 180/110    Protocols used: ST HIGH BLOOD PRESSURE-A-OH

## 2018-09-17 ENCOUNTER — TELEPHONE (OUTPATIENT)
Dept: PRIMARY CARE CLINIC | Facility: CLINIC | Age: 53
End: 2018-09-17

## 2018-09-17 PROCEDURE — 64405 NJX AA&/STRD GR OCPL NRV: CPT | Mod: 50,S$GLB,, | Performed by: PSYCHIATRY & NEUROLOGY

## 2018-09-17 RX ADMIN — METHYLPREDNISOLONE ACETATE 40 MG: 40 INJECTION, SUSPENSION INTRA-ARTICULAR; INTRALESIONAL; INTRAMUSCULAR; SOFT TISSUE at 08:09

## 2018-09-17 RX ADMIN — LIDOCAINE HYDROCHLORIDE 4 ML: 20 INJECTION, SOLUTION INFILTRATION; PERINEURAL at 08:09

## 2018-09-17 NOTE — TELEPHONE ENCOUNTER
If you speak with her again, please advise her to continue the double dose of HCTZ (total of 25mg), and she can double her vasotec too (total of 40mg).    She should continue checking her pressure while on vacation.    Thanks,  KEIRA

## 2018-09-17 NOTE — TELEPHONE ENCOUNTER
These BP readings are well-controlled. She should continue her current regimen of BP meds (what she's taken all weekend).    Please get her current BP regimen from her.    Thanks,  KJ

## 2018-09-17 NOTE — TELEPHONE ENCOUNTER
----- Message from Sharita Hoyos MA sent at 9/17/2018 12:37 PM CDT -----  Contact: Patient [phone]  Pt called to give you BP readings   ----- Message -----  From: Jenny Greene  Sent: 9/17/2018  12:32 PM  To: Caleb Patel Staff    Called in the following BP readings:     Friday 09/14/2018 2:54pm 192/100 5:20pm 166/103  Saturday 09/15/2018 5:20am 144/98 8am 138/91 10am 147/98 4pm 141/85   Monday 09/17/2018 7:30am 145/91 1pm 132/85    Thank You

## 2018-09-20 ENCOUNTER — TELEPHONE (OUTPATIENT)
Dept: PRIMARY CARE CLINIC | Facility: CLINIC | Age: 53
End: 2018-09-20

## 2018-09-20 NOTE — TELEPHONE ENCOUNTER
----- Message from Sharita Hoyos MA sent at 9/20/2018  8:10 AM CDT -----  Contact: self/[phone]  [facility]   ----- Message -----  From: Grace Chen  Sent: 9/20/2018   7:53 AM  To: Caleb Patel Staff    Pt called in regards to giving the dr mehdi blood pressure reading for sept 17 morning 145/91 after noon 132/85 on sept 18 morning 161/103 after noon 157/104 sept 19 morning 161/111/ sept 20 morning 164/109/ pt said she is still on vacation so if she does not answerer just leave a message.    Please advise

## 2018-09-20 NOTE — TELEPHONE ENCOUNTER
Please tell her to increase HCTZ to 2 tablets. How many is she currently taking?    Thanks,  KEIRA

## 2018-09-24 DIAGNOSIS — I10 ESSENTIAL HYPERTENSION: ICD-10-CM

## 2018-09-24 RX ORDER — HYDROCHLOROTHIAZIDE 12.5 MG/1
TABLET ORAL
Qty: 90 TABLET | Refills: 3 | OUTPATIENT
Start: 2018-09-24

## 2018-09-24 RX ORDER — HYDROCHLOROTHIAZIDE 25 MG/1
25 TABLET ORAL DAILY
Qty: 90 TABLET | Refills: 3 | Status: SHIPPED | OUTPATIENT
Start: 2018-09-24 | End: 2019-09-13 | Stop reason: SDUPTHER

## 2018-09-26 DIAGNOSIS — I10 ESSENTIAL HYPERTENSION: ICD-10-CM

## 2018-09-26 RX ORDER — HYDROCHLOROTHIAZIDE 12.5 MG/1
TABLET ORAL
Qty: 90 TABLET | Refills: 3 | OUTPATIENT
Start: 2018-09-26

## 2018-10-09 DIAGNOSIS — E03.9 ACQUIRED HYPOTHYROIDISM: ICD-10-CM

## 2018-10-09 RX ORDER — LEVOTHYROXINE SODIUM 125 UG/1
TABLET ORAL
Qty: 90 TABLET | Refills: 3 | Status: SHIPPED | OUTPATIENT
Start: 2018-10-09 | End: 2019-09-13 | Stop reason: SDUPTHER

## 2019-01-01 NOTE — ASSESSMENT & PLAN NOTE
ASCVD risk 10-year 7.2%, 50% lifetime. Mod-intensity statin recommended.  · Patient prefers lifestyle changes  · Start ASA  · Start IER diet (fasting/low-katelynn diet 2 days/week, normal diet 5 days/wk)  · Health  consult   Social Work referral/Lactation Consult

## 2019-01-24 ENCOUNTER — TELEPHONE (OUTPATIENT)
Dept: INTERNAL MEDICINE | Facility: CLINIC | Age: 54
End: 2019-01-24

## 2019-01-24 NOTE — TELEPHONE ENCOUNTER
----- Message from Jessie Wright sent at 1/24/2019  7:15 AM CST -----  Contact: self  Pt is calling in regards to scheduling an annual appt. Books aren't currently open to schedule this appt. Pt would like a call back to do so.    She can be reached at [phone].    Thank you

## 2019-01-30 ENCOUNTER — OFFICE VISIT (OUTPATIENT)
Dept: PRIMARY CARE CLINIC | Facility: CLINIC | Age: 54
End: 2019-01-30
Payer: COMMERCIAL

## 2019-01-30 VITALS
HEART RATE: 71 BPM | DIASTOLIC BLOOD PRESSURE: 100 MMHG | BODY MASS INDEX: 36.62 KG/M2 | HEIGHT: 68 IN | OXYGEN SATURATION: 97 % | SYSTOLIC BLOOD PRESSURE: 140 MMHG | WEIGHT: 241.63 LBS

## 2019-01-30 DIAGNOSIS — Z72.0 TOBACCO ABUSE: ICD-10-CM

## 2019-01-30 DIAGNOSIS — E66.01 MORBID OBESITY DUE TO EXCESS CALORIES: ICD-10-CM

## 2019-01-30 DIAGNOSIS — E78.2 MIXED HYPERLIPIDEMIA: ICD-10-CM

## 2019-01-30 DIAGNOSIS — R51.9 CHRONIC INTRACTABLE HEADACHE, UNSPECIFIED HEADACHE TYPE: ICD-10-CM

## 2019-01-30 DIAGNOSIS — I10 ESSENTIAL HYPERTENSION: ICD-10-CM

## 2019-01-30 DIAGNOSIS — Z12.39 BREAST CANCER SCREENING: ICD-10-CM

## 2019-01-30 DIAGNOSIS — G43.909 MIGRAINE WITHOUT STATUS MIGRAINOSUS, NOT INTRACTABLE, UNSPECIFIED MIGRAINE TYPE: ICD-10-CM

## 2019-01-30 DIAGNOSIS — N63.0 BREAST MASS: ICD-10-CM

## 2019-01-30 DIAGNOSIS — E03.9 ACQUIRED HYPOTHYROIDISM: ICD-10-CM

## 2019-01-30 DIAGNOSIS — G89.29 CHRONIC INTRACTABLE HEADACHE, UNSPECIFIED HEADACHE TYPE: ICD-10-CM

## 2019-01-30 DIAGNOSIS — F41.9 ANXIETY: ICD-10-CM

## 2019-01-30 PROCEDURE — 3080F DIAST BP >= 90 MM HG: CPT | Mod: CPTII,S$GLB,, | Performed by: INTERNAL MEDICINE

## 2019-01-30 PROCEDURE — 3077F PR MOST RECENT SYSTOLIC BLOOD PRESSURE >= 140 MM HG: ICD-10-PCS | Mod: CPTII,S$GLB,, | Performed by: INTERNAL MEDICINE

## 2019-01-30 PROCEDURE — 3077F SYST BP >= 140 MM HG: CPT | Mod: CPTII,S$GLB,, | Performed by: INTERNAL MEDICINE

## 2019-01-30 PROCEDURE — 3008F PR BODY MASS INDEX (BMI) DOCUMENTED: ICD-10-PCS | Mod: CPTII,S$GLB,, | Performed by: INTERNAL MEDICINE

## 2019-01-30 PROCEDURE — 3080F PR MOST RECENT DIASTOLIC BLOOD PRESSURE >= 90 MM HG: ICD-10-PCS | Mod: CPTII,S$GLB,, | Performed by: INTERNAL MEDICINE

## 2019-01-30 PROCEDURE — 3008F BODY MASS INDEX DOCD: CPT | Mod: CPTII,S$GLB,, | Performed by: INTERNAL MEDICINE

## 2019-01-30 PROCEDURE — 99215 OFFICE O/P EST HI 40 MIN: CPT | Mod: S$GLB,,, | Performed by: INTERNAL MEDICINE

## 2019-01-30 PROCEDURE — 99215 PR OFFICE/OUTPT VISIT, EST, LEVL V, 40-54 MIN: ICD-10-PCS | Mod: S$GLB,,, | Performed by: INTERNAL MEDICINE

## 2019-01-30 RX ORDER — ENALAPRIL MALEATE 20 MG/1
20 TABLET ORAL DAILY
Qty: 90 TABLET | Refills: 3 | Status: SHIPPED | OUTPATIENT
Start: 2019-01-30 | End: 2019-12-16 | Stop reason: SDUPTHER

## 2019-01-30 RX ORDER — BUTALBITAL, ACETAMINOPHEN AND CAFFEINE 50; 325; 40 MG/1; MG/1; MG/1
1 TABLET ORAL EVERY 4 HOURS PRN
Qty: 90 TABLET | Refills: 1 | Status: SHIPPED | OUTPATIENT
Start: 2019-01-30 | End: 2019-12-16 | Stop reason: SDUPTHER

## 2019-01-30 RX ORDER — BUPROPION HYDROCHLORIDE 100 MG/1
100 TABLET ORAL 2 TIMES DAILY
Qty: 180 TABLET | Refills: 3 | Status: SHIPPED | OUTPATIENT
Start: 2019-01-30 | End: 2019-12-16 | Stop reason: SDUPTHER

## 2019-01-30 RX ORDER — PROMETHAZINE HYDROCHLORIDE 50 MG/1
50 SUPPOSITORY RECTAL EVERY 6 HOURS PRN
Qty: 12 SUPPOSITORY | Refills: 1 | Status: SHIPPED | OUTPATIENT
Start: 2019-01-30 | End: 2019-12-16 | Stop reason: SDUPTHER

## 2019-01-30 RX ORDER — VERAPAMIL HYDROCHLORIDE 120 MG/1
120 TABLET, FILM COATED ORAL 3 TIMES DAILY
Qty: 90 TABLET | Refills: 11 | Status: SHIPPED | OUTPATIENT
Start: 2019-01-30 | End: 2019-06-04

## 2019-01-30 NOTE — PATIENT INSTRUCTIONS
TODAY:  - labs Friday (fasting)  - mammogram ordered    + you will need to schedule  - meds refilled per pharmacy preference  - Cardiology referral

## 2019-01-30 NOTE — MEDICAL/APP STUDENT
"Subjective:       Patient ID: Maria G Cameron is a 53 y.o. female.    Chief Complaint: Hypertension; Medication Refill; and Follow-up    Patient would like referral to cardiology for uncontrolled HTN, all of her medications refilled, and referal for mammography.     Patient would like to stop propranolol because it does not help with migraines, blood pressure, or anxiety.. Used to take 2 a day and now takes 1. Takes all of her medications as directed. Stopped nerve blocks. They have been ineffective last couple of times. Nerve block blocked pain but not the other symptoms (patient felt like her head was swelling, nausea, sensitive to light). Fioricet takes intensity down to 5 from a 10.     Still smokes 1 cigarette per day.     Patient experiences occasional chest pain. Pain described as "jabbing." Pain present over left side of patient's chest. Has been present for 6 to 12 months. 3-4 intensity. Patient does not think that the pain is related to her mood or anxiety. Patient is concerned about chest pain.           Review of Systems   Constitutional: Negative for appetite change, chills, diaphoresis, fatigue and fever.   Cardiovascular: Positive for chest pain. Negative for palpitations and leg swelling.   Gastrointestinal: Negative for abdominal pain, constipation and diarrhea.   Musculoskeletal: Negative for back pain.   Skin: Negative for color change.   Neurological: Negative for dizziness, speech difficulty, weakness and numbness.   Psychiatric/Behavioral: Negative.        Objective:      Physical Exam   Constitutional: She is oriented to person, place, and time. She appears well-developed and well-nourished.   Cardiovascular: Normal rate, regular rhythm, normal heart sounds and intact distal pulses. Exam reveals no gallop and no friction rub.   No murmur heard.  Pulmonary/Chest: Effort normal and breath sounds normal.   Abdominal: Soft. Bowel sounds are normal.   Neurological: She is alert and oriented to " person, place, and time.   Skin: Skin is warm and dry. Capillary refill takes less than 2 seconds.   Psychiatric: She has a normal mood and affect. Her behavior is normal.       Assessment:       53 y.o. F with a PMHx of migraine, anxiety, HTN, HLD, and breast mass presents to clinic for annual f/u.   Plan:       Migraines  -discontinue propranolol   -continue fioricet PRN    Anxiety  -continue wellbutrin    HTN  -/100  -continue monitoring BP at home and keep BP log to bring in  -continue enalapril; patient on maximum dose of enalapril  -discontinue beta blocker and add calcium channel blocker    Hypothyroidism  -continue synthroid     Tobacco user  -patient currently smokes 1 cigarette/day; patient encouraged to quit smoking     Hypovitaminosis D  -continue vit D3

## 2019-01-30 NOTE — ASSESSMENT & PLAN NOTE
ASCVD risk 10-year 7.2%, 50% lifetime. Mod-intensity statin recommended.  · Patient prefers lifestyle changes  · Continue ASA  · Start Weight Watchers

## 2019-01-30 NOTE — PROGRESS NOTES
"Primary Care Provider Appointment    Subjective:      Patient ID: Maria G Cameron is a 53 y.o. female with HLD, HTN, migraine, hypothyroidism    Chief Complaint: Hypertension; Medication Refill; and Follow-up    Patient HTN today. She has cut back on propranolol because it "isn't helping with my headaches or blood pressure." She is now taking it once daily. Her HCTZ was increased in 9/2018 to 25mg. She is also taking verapamil 80mg TID, and enalapril 20mg daily.     She also states that the nerve blocks "didn't work" with Neuro.  These interventions make the migraines "more bearable but it doesn't eliminate them" so she continues to have them.    She cancelled her Derm appt because the sore resolved on her arm. Has not recurred.    She complains of chest pain in 30 min intervals at rest and with exertion.     Past Surgical History:   Procedure Laterality Date    BREAST BIOPSY Left 2010    breast core biopsy  3/7/11    fibrocystic changes    COLONOSCOPY N/A 4/15/2016    Performed by Coleman Moss MD at [facility] ([address] [place] [address])       Past Medical History:   Diagnosis Date    Adrenal adenoma     Allergic rhinitis     Allergy     Allergic to trees, weeds, mold and standardized mite    Anxiety     Eustachian tube dysfunction     Fibrocystic changes of left breast     12 o'clock L breast mass bx 3/7/11    Hypertension     Hypothyroidism     Migraine headache     Recurrent otitis media        Review of Systems   Constitutional: Negative for activity change, appetite change, fatigue and unexpected weight change.   Respiratory: Negative for cough and choking.    Cardiovascular: Positive for leg swelling. Negative for chest pain.   Genitourinary: Negative for menstrual problem and pelvic pain.   Musculoskeletal: Negative for back pain, gait problem and joint swelling.   Neurological: Positive for headaches. Negative for tremors, syncope and weakness.        Mild resting tremor   Hematological: Does not bruise/bleed " "easily.   Psychiatric/Behavioral: Negative for agitation, behavioral problems, confusion, decreased concentration and dysphoric mood.       Objective:   BP (!) 140/100 (BP Location: Left arm, Patient Position: Sitting, BP Method: Large (Manual))   Pulse 71   Ht 5' 8" (1.727 m)   Wt 109.6 kg (241 lb 10 oz)   SpO2 97%   BMI 36.74 kg/m²     Physical Exam   Constitutional: She is oriented to person, place, and time. She appears well-developed and well-nourished.   obese   HENT:   Head: Normocephalic and atraumatic.   Musculoskeletal: Normal range of motion. She exhibits no edema.   Neurological: She is alert and oriented to person, place, and time. Coordination normal.   Mild intermittent resting tremor bilaterally UE   Skin: Skin is warm and dry.   Psychiatric: She has a normal mood and affect. Her behavior is normal. Judgment and thought content normal.   NO crying during exam   Vitals reviewed.      Lab Results   Component Value Date    WBC 4.81 01/13/2017    HGB 14.6 01/13/2017    HCT 43.1 01/13/2017     01/13/2017    CHOL 255 (H) 12/27/2017    TRIG 143 12/27/2017    HDL 59 12/27/2017    ALT 17 03/13/2017    AST 15 03/13/2017     12/27/2017    K 4.6 12/27/2017     12/27/2017    CREATININE 0.9 12/27/2017    BUN 17 12/27/2017    CO2 31 (H) 12/27/2017    TSH 1.630 07/13/2018    HGBA1C 5.5 04/12/2018         Assessment:   53 y.o. female with multiple co-morbid illnesses here to continue work-up of chronic issues notably with HLD, HTN, migraine, hypothyroidism     Plan:     Problem List Items Addressed This Visit        Neuro    Chronic intractable headache    Relevant Medications    butalbital-acetaminophen-caffeine -40 mg (FIORICET, ESGIC) -40 mg per tablet    promethazine (PHENERGAN) 50 MG suppository       Psychiatric    Anxiety    Relevant Medications    buPROPion (WELLBUTRIN) 100 MG tablet       Cardiac/Vascular    Hypertension     BP previously uncontrolled on ACE and " "propranolol  · Refer to Cardiology  CONTINUE  · ACE 20mg  · Increased HCTZ to 25mg in 9/2018  · ASA 81mg  INCREASE  · Increase verapamil to 120mg TID  DISCONTINUE TODAY  · Propranolol   · Patient reports that it isn't helping  TRIED BUT "DIDN'T WORK"  · propranolol (previously on 80mg TID)  · Acetazolamide 500mg BID  · To treat elevated intracranial pressure  · Amlodipine was transitioned to verapamil (due to prophylactic effect on migraines)         Relevant Medications    verapamil (CALAN) 120 MG tablet    enalapril (VASOTEC) 20 MG tablet    Other Relevant Orders    Comprehensive metabolic panel    Magnesium    Ambulatory consult to Cardiology    Mixed hyperlipidemia     ASCVD risk 10-year 7.2%, 50% lifetime. Mod-intensity statin recommended.  · Patient prefers lifestyle changes  · Continue ASA  · Start Weight Watchers         Relevant Orders    Comprehensive metabolic panel    Lipid panel    Ambulatory consult to Cardiology       Renal/    Breast mass     Biopsy negative in 3/2011, last mammo in 8/2016 category 2 benign. Mammo normal in 1/2018  · Monitor  · mammo annually         Relevant Orders    Mammo Digital Screening Bilat    Breast cancer screening     Abnormal mammo history, last normal in 1/2018  · mammogram         Relevant Orders    Mammo Digital Screening Bilat       Endocrine    Hypothyroidism     Levo increased on 3/13/17, 7/2017. TSH elevated 4. Symptoms of hypO improved  · Increased levo 125mcg on 10/17  · Levels normal in 7/2018         Relevant Orders    TSH    Morbid obesity due to excess calories     BMI >35, HTN, HLD  · Will enroll in Weight Watchers         Relevant Orders    Hemoglobin A1c       Other    Tobacco abuse     3 cig/d decreased to 1cig/d, 5 pack year history  · Advise to quit at each appointment           Other Visit Diagnoses     Migraine without status migrainosus, not intractable, unspecified migraine type        Relevant Medications    butalbital-acetaminophen-caffeine " -40 mg (FIORICET, ESGIC) -40 mg per tablet    promethazine (PHENERGAN) 50 MG suppository          Health Maintenance       Date Due Completion Date    TETANUS VACCINE 01/02/2012 1/2/2002    Influenza Vaccine 08/01/2018 10/6/2017- ALREADY DONE    Override on 11/5/2015: Done    Lipid Panel 12/27/2018 12/27/2017- TODAY    Mammogram 01/13/2019 1/13/2018- TODAY    Override on 12/8/2011: Done    Override on 12/8/2011: Done    Pap Smear with HPV Cotest 01/12/2020 1/12/2017 (ClinicallyNA)    Override on 1/12/2017: Not Clinically Appropriate (last 3 PAPs normal)    Override on 5/24/2013: Done    Colonoscopy 04/26/2026 4/26/2016          Follow-up in about 6 months (around 7/30/2019). One hour spent with this patient today, half of that in counseling.    Pearl Ellis MD/MPH  Internal Medicine  [facility]  [phone]

## 2019-01-30 NOTE — ASSESSMENT & PLAN NOTE
Levo increased on 3/13/17, 7/2017. TSH elevated 4. Symptoms of hypO improved  · Increased levo 125mcg on 10/17  · Levels normal in 7/2018

## 2019-01-30 NOTE — ASSESSMENT & PLAN NOTE
Biopsy negative in 3/2011, last mammo in 8/2016 category 2 benign. Mammo normal in 1/2018  · Monitor  · mammo annually

## 2019-01-30 NOTE — ASSESSMENT & PLAN NOTE
"BP previously uncontrolled on ACE and propranolol  · Refer to Cardiology  CONTINUE  · ACE 20mg  · Increased HCTZ to 25mg in 9/2018  · ASA 81mg  INCREASE  · Increase verapamil to 120mg TID  DISCONTINUE TODAY  · Propranolol   · Patient reports that it isn't helping  TRIED BUT "DIDN'T WORK"  · propranolol (previously on 80mg TID)  · Acetazolamide 500mg BID  · To treat elevated intracranial pressure  · Amlodipine was transitioned to verapamil (due to prophylactic effect on migraines)  "

## 2019-01-31 ENCOUNTER — OFFICE VISIT (OUTPATIENT)
Dept: CARDIOLOGY | Facility: CLINIC | Age: 54
End: 2019-01-31
Payer: COMMERCIAL

## 2019-01-31 VITALS
SYSTOLIC BLOOD PRESSURE: 124 MMHG | HEART RATE: 82 BPM | BODY MASS INDEX: 36.55 KG/M2 | HEIGHT: 68 IN | WEIGHT: 241.19 LBS | OXYGEN SATURATION: 96 % | DIASTOLIC BLOOD PRESSURE: 70 MMHG

## 2019-01-31 DIAGNOSIS — R07.89 CHEST DISCOMFORT: Primary | ICD-10-CM

## 2019-01-31 DIAGNOSIS — I10 ESSENTIAL HYPERTENSION: ICD-10-CM

## 2019-01-31 DIAGNOSIS — G47.33 OBSTRUCTIVE SLEEP APNEA: ICD-10-CM

## 2019-01-31 PROCEDURE — 99204 PR OFFICE/OUTPT VISIT, NEW, LEVL IV, 45-59 MIN: ICD-10-PCS | Mod: S$GLB,,, | Performed by: INTERNAL MEDICINE

## 2019-01-31 PROCEDURE — 3008F PR BODY MASS INDEX (BMI) DOCUMENTED: ICD-10-PCS | Mod: CPTII,S$GLB,, | Performed by: INTERNAL MEDICINE

## 2019-01-31 PROCEDURE — 99204 OFFICE O/P NEW MOD 45 MIN: CPT | Mod: S$GLB,,, | Performed by: INTERNAL MEDICINE

## 2019-01-31 PROCEDURE — 3074F PR MOST RECENT SYSTOLIC BLOOD PRESSURE < 130 MM HG: ICD-10-PCS | Mod: CPTII,S$GLB,, | Performed by: INTERNAL MEDICINE

## 2019-01-31 PROCEDURE — 3074F SYST BP LT 130 MM HG: CPT | Mod: CPTII,S$GLB,, | Performed by: INTERNAL MEDICINE

## 2019-01-31 PROCEDURE — 3078F DIAST BP <80 MM HG: CPT | Mod: CPTII,S$GLB,, | Performed by: INTERNAL MEDICINE

## 2019-01-31 PROCEDURE — 99999 PR PBB SHADOW E&M-EST. PATIENT-LVL IV: CPT | Mod: PBBFAC,,, | Performed by: INTERNAL MEDICINE

## 2019-01-31 PROCEDURE — 3008F BODY MASS INDEX DOCD: CPT | Mod: CPTII,S$GLB,, | Performed by: INTERNAL MEDICINE

## 2019-01-31 PROCEDURE — 99999 PR PBB SHADOW E&M-EST. PATIENT-LVL IV: ICD-10-PCS | Mod: PBBFAC,,, | Performed by: INTERNAL MEDICINE

## 2019-01-31 PROCEDURE — 3078F PR MOST RECENT DIASTOLIC BLOOD PRESSURE < 80 MM HG: ICD-10-PCS | Mod: CPTII,S$GLB,, | Performed by: INTERNAL MEDICINE

## 2019-01-31 RX ORDER — VERAPAMIL HYDROCHLORIDE 360 MG/1
360 CAPSULE, DELAYED RELEASE PELLETS ORAL DAILY
Qty: 30 CAPSULE | Refills: 11 | Status: SHIPPED | OUTPATIENT
Start: 2019-01-31 | End: 2019-12-16 | Stop reason: SDUPTHER

## 2019-01-31 NOTE — PROGRESS NOTES
Subjective:   Chief Complaint: Hypertension    Last Clinic Visit:  New patient    History of Present Illness: Maria G Cameron is a 53 y.o. lady with a family history of cardiovascular disease, hypertension, and morbid obesity who presents to discuss hypertension, and chest discomfort.  She reports that for the past 6 months she has had a substernal chest discomfort.  She reports the chest discomfort is typically always at rest, lasting for up to 30 min, and resolving spontaneously.  She denies any radiation of the chest discomfort.  Denies any palpitations, no shortness of breath, and no temporal relationship.  Chest discomfort happens all throughout the day.  She reports she is active walking every day, and denies any chest discomfort when she walks.  She does report that over the past several months she has had increasing frequency of reflux.  She has been taking Tums and has times at her office counter as well as her home, but does not take it daily acid medication.  She denies any exertional symptoms specifically at all, symptoms are not positional, and also not related to meals.    She also reports hypertension, has been ongoing issue over the past year, fluctuating on multiple medications    She also endorses intermittent snoring, has never been worked up for sleep apnea, does have some daytime fatigue      PMHx:  Hypertension   Chronic migraines  Morbid obesity  Gastroesophageal reflux disease    Review of Systems   Constitution: Negative.   HENT: Negative.    Eyes: Negative.    Cardiovascular: Positive for chest pain.   Respiratory: Positive for snoring.    Hematologic/Lymphatic: Negative.    Skin: Negative.    Musculoskeletal: Negative.    Gastrointestinal: Negative.    Genitourinary: Negative.        Medications:  Current Outpatient Medications on File Prior to Visit   Medication Sig    buPROPion (WELLBUTRIN) 100 MG tablet Take 1 tablet (100 mg total) by mouth 2 (two) times daily.     "butalbital-acetaminophen-caffeine -40 mg (FIORICET, ESGIC) -40 mg per tablet Take 1 tablet by mouth every 4 (four) hours as needed for Pain.    cholecalciferol, vitamin D3, (VITAMIN D3) 5,000 unit Tab Take 5,000 Units by mouth once daily.    enalapril (VASOTEC) 20 MG tablet Take 1 tablet (20 mg total) by mouth once daily.    hydroCHLOROthiazide (HYDRODIURIL) 25 MG tablet Take 1 tablet (25 mg total) by mouth once daily.    levothyroxine (SYNTHROID) 125 MCG tablet TAKE ONE TABLET BY MOUTH ONCE DAILY    promethazine (PHENERGAN) 50 MG suppository Place 1 suppository (50 mg total) rectally every 6 (six) hours as needed for Nausea.    verapamil (CALAN) 120 MG tablet Take 1 tablet (120 mg total) by mouth 3 (three) times daily.    aspirin 81 MG Chew Take 1 tablet (81 mg total) by mouth once daily.     No current facility-administered medications on file prior to visit.      Family History:  Maria G's family history includes Heart disease in her maternal grandmother; Heart disease (age of onset: 79) in her mother; Hemochromatosis in her father; Lupus in her sister; Thyroid disease in her mother and sister.    Social History:  Maria G reports that she has been smoking cigarettes.  She has a 5.00 pack-year smoking history. She uses smokeless tobacco. She reports that she does not drink alcohol or use drugs.    Objective:   /70   Pulse 82   Ht 5' 8" (1.727 m)   Wt 109.4 kg (241 lb 2.9 oz)   SpO2 96%   BMI 36.67 kg/m²     Physical Exam   Constitutional: She is oriented to person, place, and time and well-developed, well-nourished, and in no distress. No distress.   HENT:   Head: Normocephalic and atraumatic.   Mouth/Throat: No oropharyngeal exudate.   Eyes: EOM are normal. No scleral icterus.   Neck: No JVD present. No tracheal deviation present. No thyromegaly present.   Cardiovascular: Normal rate and regular rhythm. Exam reveals no gallop and no friction rub.   No murmur heard.  Pulmonary/Chest: " Effort normal and breath sounds normal. No respiratory distress. She has no wheezes. She has no rales. She exhibits no tenderness.   Abdominal: Soft. She exhibits no distension. There is no tenderness. There is no rebound and no guarding.   Musculoskeletal: Normal range of motion. She exhibits no edema.   Neurological: She is alert and oriented to person, place, and time.   Skin: Skin is warm and dry. She is not diaphoretic. No erythema.   Psychiatric: Affect normal.     Chest x-ray:  06/01/2016  Independent visualization of chest x-ray reveals normal cardiac silhouette, no evidence of pulmonary edema, and no evidence of aortic atherosclerosis    Lipids:  Recent Labs   Lab 12/27/17  0702   LDL CHOLESTEROL 167.4 H   HDL 59   CHOLESTEROL 255 H            Assessment:     1. Chest discomfort    2. Essential hypertension    3. Obstructive sleep apnea        Plan:   1. Chest discomfort  Chest discomfort sounds noncardiac in that it is at rest, not related to exertion, and she also endorses increasing frequency of GERD.  Offered her empiric trial of PPI or H2 antagonist, and she declined.  Instructed her to let us know if chest pain becomes exertional in nature, worsens, not responding to medication, or any other change in symptomatology    2. Essential hypertension  Blood pressure well controlled in our office today however she brings in log showing multiple elevated readings throughout the past year.  Will continue current medical therapy and adjust in the future as needed, also deferring to .  Offered her long-acting version of Verapamil, which make stabilize Bp somewhat.  From a secondary causes standpoint, will refer for sleep apnea    - verapamil (VERELAN) 360 MG C24P; Take 1 capsule (360 mg total) by mouth once daily.  Dispense: 30 capsule; Refill: 11    3. Obstructive sleep apnea  Stores at night, body habitus to support, and uncontrolled high blood pressure, will refer to Sleep apnea Clinic  -  Ambulatory consult to Sleep Disorders      Follow-up in about 1 year (around 1/31/2020).

## 2019-02-01 ENCOUNTER — LAB VISIT (OUTPATIENT)
Dept: LAB | Facility: HOSPITAL | Age: 54
End: 2019-02-01
Attending: INTERNAL MEDICINE
Payer: COMMERCIAL

## 2019-02-01 DIAGNOSIS — E78.2 MIXED HYPERLIPIDEMIA: ICD-10-CM

## 2019-02-01 DIAGNOSIS — E03.9 ACQUIRED HYPOTHYROIDISM: ICD-10-CM

## 2019-02-01 DIAGNOSIS — E66.01 MORBID OBESITY DUE TO EXCESS CALORIES: ICD-10-CM

## 2019-02-01 DIAGNOSIS — I10 ESSENTIAL HYPERTENSION: ICD-10-CM

## 2019-02-01 LAB
ALBUMIN SERPL BCP-MCNC: 3.8 G/DL
ALP SERPL-CCNC: 55 U/L
ALT SERPL W/O P-5'-P-CCNC: 21 U/L
ANION GAP SERPL CALC-SCNC: 6 MMOL/L
AST SERPL-CCNC: 22 U/L
BILIRUB SERPL-MCNC: 0.2 MG/DL
BUN SERPL-MCNC: 15 MG/DL
CALCIUM SERPL-MCNC: 9.5 MG/DL
CHLORIDE SERPL-SCNC: 106 MMOL/L
CHOLEST SERPL-MCNC: 202 MG/DL
CHOLEST/HDLC SERPL: 4 {RATIO}
CO2 SERPL-SCNC: 27 MMOL/L
CREAT SERPL-MCNC: 0.9 MG/DL
EST. GFR  (AFRICAN AMERICAN): >60 ML/MIN/1.73 M^2
EST. GFR  (NON AFRICAN AMERICAN): >60 ML/MIN/1.73 M^2
ESTIMATED AVG GLUCOSE: 111 MG/DL
GLUCOSE SERPL-MCNC: 106 MG/DL
HBA1C MFR BLD HPLC: 5.5 %
HDLC SERPL-MCNC: 51 MG/DL
HDLC SERPL: 25.2 %
LDLC SERPL CALC-MCNC: 130.8 MG/DL
MAGNESIUM SERPL-MCNC: 2 MG/DL
NONHDLC SERPL-MCNC: 151 MG/DL
POTASSIUM SERPL-SCNC: 3.9 MMOL/L
PROT SERPL-MCNC: 6.9 G/DL
SODIUM SERPL-SCNC: 139 MMOL/L
TRIGL SERPL-MCNC: 101 MG/DL
TSH SERPL DL<=0.005 MIU/L-ACNC: 1.07 UIU/ML

## 2019-02-01 PROCEDURE — 84443 ASSAY THYROID STIM HORMONE: CPT

## 2019-02-01 PROCEDURE — 83036 HEMOGLOBIN GLYCOSYLATED A1C: CPT

## 2019-02-01 PROCEDURE — 83735 ASSAY OF MAGNESIUM: CPT

## 2019-02-01 PROCEDURE — 80061 LIPID PANEL: CPT

## 2019-02-01 PROCEDURE — 80053 COMPREHEN METABOLIC PANEL: CPT

## 2019-02-01 PROCEDURE — 36415 COLL VENOUS BLD VENIPUNCTURE: CPT

## 2019-02-03 ENCOUNTER — TELEPHONE (OUTPATIENT)
Dept: INTERNAL MEDICINE | Facility: CLINIC | Age: 54
End: 2019-02-03

## 2019-02-04 NOTE — TELEPHONE ENCOUNTER
Please let patient know that all of her labs were normal, except for elevated cholesterol. We can continue discussing the need for a statin, if she wants to do that.    Is she planning to see the sleep medicine clinic, as advised by Dr Amezcua (her cardiologist)?     How are the headaches?    Thanks,  KJ

## 2019-02-04 NOTE — TELEPHONE ENCOUNTER
"Pt stated startign the statin it can be discuss at the next visit. Attempted to schedule appt, pt stated she will have to call back when she check her work schedule.    Stated she won't do the sleep study. Stated the headaches are the same.     Was trying to inform pt she would be due for a 2 week f/u on February 12th, stated "please let her know everything is fine" then she hung up phone.  "

## 2019-02-09 ENCOUNTER — HOSPITAL ENCOUNTER (OUTPATIENT)
Dept: RADIOLOGY | Facility: HOSPITAL | Age: 54
Discharge: HOME | End: 2019-02-09
Attending: INTERNAL MEDICINE
Payer: COMMERCIAL

## 2019-02-09 DIAGNOSIS — N63.0 BREAST MASS: ICD-10-CM

## 2019-02-09 DIAGNOSIS — Z12.39 BREAST CANCER SCREENING: ICD-10-CM

## 2019-02-09 PROCEDURE — 77067 MAMMO DIGITAL SCREENING BILAT WITH TOMOSYNTHESIS_CAD: ICD-10-PCS | Mod: 26,,, | Performed by: RADIOLOGY

## 2019-02-09 PROCEDURE — 77063 MAMMO DIGITAL SCREENING BILAT WITH TOMOSYNTHESIS_CAD: ICD-10-PCS | Mod: 26,,, | Performed by: RADIOLOGY

## 2019-02-09 PROCEDURE — 77063 BREAST TOMOSYNTHESIS BI: CPT | Mod: 26,,, | Performed by: RADIOLOGY

## 2019-02-09 PROCEDURE — 77067 SCR MAMMO BI INCL CAD: CPT | Mod: 26,,, | Performed by: RADIOLOGY

## 2019-02-09 PROCEDURE — 77067 SCR MAMMO BI INCL CAD: CPT | Mod: TC

## 2019-02-13 ENCOUNTER — TELEPHONE (OUTPATIENT)
Dept: INTERNAL MEDICINE | Facility: CLINIC | Age: 54
End: 2019-02-13

## 2019-02-13 NOTE — TELEPHONE ENCOUNTER
Dear Dr Paulino and staff,  Ms Rakesh is interested in the monoclonal antibody migraine treatment you presented to primary care yesterday.    Could you get her on your schedule? Otherwise I will attempt the telemedicine encounter that you presented.    Pearl Arnett MD/MPH  Internal Medicine  [facility]

## 2019-04-03 ENCOUNTER — TELEPHONE (OUTPATIENT)
Dept: OPHTHALMOLOGY | Facility: CLINIC | Age: 54
End: 2019-04-03

## 2019-09-11 ENCOUNTER — OFFICE VISIT (OUTPATIENT)
Dept: ORTHOPEDICS | Facility: CLINIC | Age: 54
End: 2019-09-11
Payer: COMMERCIAL

## 2019-09-11 ENCOUNTER — HOSPITAL ENCOUNTER (OUTPATIENT)
Dept: RADIOLOGY | Facility: HOSPITAL | Age: 54
Discharge: HOME | End: 2019-09-11
Attending: PHYSICIAN ASSISTANT
Payer: COMMERCIAL

## 2019-09-11 VITALS
HEART RATE: 93 BPM | DIASTOLIC BLOOD PRESSURE: 91 MMHG | HEIGHT: 68 IN | BODY MASS INDEX: 36.87 KG/M2 | SYSTOLIC BLOOD PRESSURE: 140 MMHG | WEIGHT: 243.25 LBS

## 2019-09-11 DIAGNOSIS — M25.572 ACUTE LEFT ANKLE PAIN: ICD-10-CM

## 2019-09-11 DIAGNOSIS — M72.2 PLANTAR FASCIITIS OF LEFT FOOT: ICD-10-CM

## 2019-09-11 DIAGNOSIS — M25.572 ACUTE LEFT ANKLE PAIN: Primary | ICD-10-CM

## 2019-09-11 PROCEDURE — 99214 PR OFFICE/OUTPT VISIT, EST, LEVL IV, 30-39 MIN: ICD-10-PCS | Mod: S$GLB,,, | Performed by: PHYSICIAN ASSISTANT

## 2019-09-11 PROCEDURE — 73610 X-RAY EXAM OF ANKLE: CPT | Mod: 26,LT,, | Performed by: RADIOLOGY

## 2019-09-11 PROCEDURE — 3080F PR MOST RECENT DIASTOLIC BLOOD PRESSURE >= 90 MM HG: ICD-10-PCS | Mod: CPTII,S$GLB,, | Performed by: PHYSICIAN ASSISTANT

## 2019-09-11 PROCEDURE — 3077F SYST BP >= 140 MM HG: CPT | Mod: CPTII,S$GLB,, | Performed by: PHYSICIAN ASSISTANT

## 2019-09-11 PROCEDURE — 3008F BODY MASS INDEX DOCD: CPT | Mod: CPTII,S$GLB,, | Performed by: PHYSICIAN ASSISTANT

## 2019-09-11 PROCEDURE — 73610 XR ANKLE COMPLETE 3 VIEW LEFT: ICD-10-PCS | Mod: 26,LT,, | Performed by: RADIOLOGY

## 2019-09-11 PROCEDURE — 3008F PR BODY MASS INDEX (BMI) DOCUMENTED: ICD-10-PCS | Mod: CPTII,S$GLB,, | Performed by: PHYSICIAN ASSISTANT

## 2019-09-11 PROCEDURE — 99999 PR PBB SHADOW E&M-EST. PATIENT-LVL IV: CPT | Mod: PBBFAC,,, | Performed by: PHYSICIAN ASSISTANT

## 2019-09-11 PROCEDURE — 99999 PR PBB SHADOW E&M-EST. PATIENT-LVL IV: ICD-10-PCS | Mod: PBBFAC,,, | Performed by: PHYSICIAN ASSISTANT

## 2019-09-11 PROCEDURE — 3077F PR MOST RECENT SYSTOLIC BLOOD PRESSURE >= 140 MM HG: ICD-10-PCS | Mod: CPTII,S$GLB,, | Performed by: PHYSICIAN ASSISTANT

## 2019-09-11 PROCEDURE — 73610 X-RAY EXAM OF ANKLE: CPT | Mod: TC,LT

## 2019-09-11 PROCEDURE — 99214 OFFICE O/P EST MOD 30 MIN: CPT | Mod: S$GLB,,, | Performed by: PHYSICIAN ASSISTANT

## 2019-09-11 PROCEDURE — 3080F DIAST BP >= 90 MM HG: CPT | Mod: CPTII,S$GLB,, | Performed by: PHYSICIAN ASSISTANT

## 2019-09-11 RX ORDER — MELOXICAM 15 MG/1
15 TABLET ORAL DAILY
Qty: 30 TABLET | Refills: 1 | Status: SHIPPED | OUTPATIENT
Start: 2019-09-11 | End: 2019-10-11

## 2019-09-11 NOTE — PROGRESS NOTES
SUBJECTIVE:     Chief Complaint & History of Present Illness:  Maria G Cameron is a  Established  patient 53 y.o. female who is seen here today with a complaint of    Chief Complaint   Patient presents with    Left Ankle - Pain    .  Patient is here today for evaluation treatment of sudden onset pain soreness in the plantar surface of her left foot over the past week.  She does not remember a specific trauma or injury to the foot but states that she was wearing poorly supportive shoes.  In spent an extended period standing and ambulating tenderness is isolated to the he will region at the distal heel pad near the arch.  It is exacerbated by standing and ambulation relieved with rest and elevation.  She did take 1 or 2 doses of over-the-counter NSAIDs with short-term relief  On a scale of 1-10, with 10 being worst pain imaginable, he rates this pain as 2 on good days and 5 on bad days.  she describes the pain as tender.    Review of patient's allergies indicates:   Allergen Reactions    Imitrex [sumatriptan] Shortness Of Breath    Codeine Nausea And Vomiting         Current Outpatient Medications   Medication Sig Dispense Refill    buPROPion (WELLBUTRIN) 100 MG tablet Take 1 tablet (100 mg total) by mouth 2 (two) times daily. 180 tablet 3    butalbital-acetaminophen-caffeine -40 mg (FIORICET, ESGIC) -40 mg per tablet Take 1 tablet by mouth every 4 (four) hours as needed for Pain. 90 tablet 1    cholecalciferol, vitamin D3, (VITAMIN D3) 5,000 unit Tab Take 5,000 Units by mouth once daily. 90 tablet 3    enalapril (VASOTEC) 20 MG tablet Take 1 tablet (20 mg total) by mouth once daily. 90 tablet 3    hydroCHLOROthiazide (HYDRODIURIL) 25 MG tablet Take 1 tablet (25 mg total) by mouth once daily. 90 tablet 3    levothyroxine (SYNTHROID) 125 MCG tablet TAKE ONE TABLET BY MOUTH ONCE DAILY 90 tablet 3    OPW TEST CLAIM - DO NOT FILL OPW test claim. Do not fill. 30 tablet 0    promethazine  (PHENERGAN) 50 MG suppository Place 1 suppository (50 mg total) rectally every 6 (six) hours as needed for Nausea. 12 suppository 1    verapamil (VERELAN) 360 MG C24P Take 1 capsule (360 mg total) by mouth once daily. 30 capsule 11    aspirin 81 MG Chew Take 1 tablet (81 mg total) by mouth once daily.  0    meloxicam (MOBIC) 15 MG tablet Take 1 tablet (15 mg total) by mouth once daily. 30 tablet 1     No current facility-administered medications for this visit.        Past Medical History:   Diagnosis Date    Adrenal adenoma     Allergic rhinitis     Allergy     Allergic to trees, weeds, mold and standardized mite    Anxiety     Eustachian tube dysfunction     Fibrocystic changes of left breast     12 o'clock L breast mass bx 3/7/11    Hypertension     Hypothyroidism     Migraine headache     Recurrent otitis media        Past Surgical History:   Procedure Laterality Date    BREAST BIOPSY Left 2010    breast core biopsy  3/7/11    fibrocystic changes    COLONOSCOPY N/A 4/15/2016    Performed by Coleman Moss MD at [facility] ([address] [place] [address])       Vital Signs (Most Recent)  Vitals:    09/11/19 1345   BP: (!) 140/91   Pulse: 93       Review of Systems:  ROS:  Constitutional: no fever or chills  Eyes: no visual changes  ENT: no nasal congestion or sore throat, Positive station tube dysfunction  Respiratory: no cough or shortness of breath  Cardiovascular: no chest pain or palpitations  Gastrointestinal: no nausea or vomiting, tolerating diet  Genitourinary: no hematuria or dysuria  Integument/Breast: no rash or pruritis  Hematologic/Lymphatic: no easy bruising or lymphadenopathy, Positive history of adrenal adenoma  Musculoskeletal: no arthralgias or myalgias  Neurological: no seizures or tremors, Positive for migraine, benign essential tremor  Behavioral/Psych: no auditory or visual hallucinations, Positive for anxiety  Endocrine: no heat or cold intolerance, Positive vitamin-D deficiency,  "hypothyroidism,      OBJECTIVE:     PHYSICAL EXAM:  Height: 5' 8" (172.7 cm) Weight: 110.3 kg (243 lb 4.4 oz), General Appearance: Well nourished, well developed, in no acute distress.  Neurological: Mood & affect are normal.  left  Foot/Ankle    Skin: normal  Swelling: minimal  Warmth: no warmth  Tenderness: moderate  ROM: 90 degrees dorsiflexion, 180 degrees plantarflexion, 45 degrees inversion and 40 degrees eversion  Strength: normal, 5 on 5 tibialis anterior strength, 5 of 5 EHL strength, 5 of 5 EDL strength, 5 of 5 FHL and 5 of 5 FDL  Gait: normal  Stability: anterior drawer: negative, exterior rotation test: negative, Lachman: negative and Cotton test: negative    point tenderness over the heel pad and point tenderness of the mid plantar fascia          RADIOGRAPHS:  X-rays taken today films reviewed by me demonstrate no evidence of fracture dislocation or about the foot joint spaces well maintained    ASSESSMENT/PLAN:       ICD-10-CM ICD-9-CM   1. Acute left ankle pain M25.572 719.47   2. Plantar fasciitis of left foot M72.2 728.71       Plan:  Plantar fasciitis  Patient like to begin with conservative treatment. She is instructed and ice massage to apply to the feet b.i.d.  Stretching exercises prior to rising  Meloxicam 15 mg q.d. with food times 10 days followed by p.r.n.  Follow-up in 2 weeks if symptoms not resolved consider injection therapy    "

## 2019-09-13 DIAGNOSIS — I10 ESSENTIAL HYPERTENSION: ICD-10-CM

## 2019-09-13 DIAGNOSIS — E03.9 ACQUIRED HYPOTHYROIDISM: ICD-10-CM

## 2019-09-17 RX ORDER — LEVOTHYROXINE SODIUM 125 UG/1
TABLET ORAL
Qty: 90 TABLET | Refills: 3 | Status: SHIPPED | OUTPATIENT
Start: 2019-09-17 | End: 2019-12-16 | Stop reason: SDUPTHER

## 2019-09-17 RX ORDER — HYDROCHLOROTHIAZIDE 25 MG/1
TABLET ORAL
Qty: 90 TABLET | Refills: 3 | Status: SHIPPED | OUTPATIENT
Start: 2019-09-17 | End: 2019-12-16 | Stop reason: SDUPTHER

## 2019-09-30 ENCOUNTER — TELEPHONE (OUTPATIENT)
Dept: PRIMARY CARE CLINIC | Facility: CLINIC | Age: 54
End: 2019-09-30

## 2019-10-02 ENCOUNTER — PATIENT OUTREACH (OUTPATIENT)
Dept: ADMINISTRATIVE | Facility: OTHER | Age: 54
End: 2019-10-02

## 2019-10-02 ENCOUNTER — OFFICE VISIT (OUTPATIENT)
Dept: ORTHOPEDICS | Facility: CLINIC | Age: 54
End: 2019-10-02
Payer: COMMERCIAL

## 2019-10-02 VITALS
SYSTOLIC BLOOD PRESSURE: 130 MMHG | DIASTOLIC BLOOD PRESSURE: 82 MMHG | WEIGHT: 242.5 LBS | HEART RATE: 91 BPM | BODY MASS INDEX: 36.75 KG/M2 | HEIGHT: 68 IN

## 2019-10-02 DIAGNOSIS — M72.2 PLANTAR FASCIITIS OF LEFT FOOT: Primary | ICD-10-CM

## 2019-10-02 PROCEDURE — 99999 PR PBB SHADOW E&M-EST. PATIENT-LVL III: ICD-10-PCS | Mod: PBBFAC,,, | Performed by: PHYSICIAN ASSISTANT

## 2019-10-02 PROCEDURE — 99213 OFFICE O/P EST LOW 20 MIN: CPT | Mod: 25,S$GLB,, | Performed by: PHYSICIAN ASSISTANT

## 2019-10-02 PROCEDURE — 20550 PR INJECT TENDON SHEATH/LIGAMENT: ICD-10-PCS | Mod: LT,S$GLB,, | Performed by: PHYSICIAN ASSISTANT

## 2019-10-02 PROCEDURE — 3008F BODY MASS INDEX DOCD: CPT | Mod: CPTII,S$GLB,, | Performed by: PHYSICIAN ASSISTANT

## 2019-10-02 PROCEDURE — 3075F PR MOST RECENT SYSTOLIC BLOOD PRESS GE 130-139MM HG: ICD-10-PCS | Mod: CPTII,S$GLB,, | Performed by: PHYSICIAN ASSISTANT

## 2019-10-02 PROCEDURE — 20550 NJX 1 TENDON SHEATH/LIGAMENT: CPT | Mod: LT,S$GLB,, | Performed by: PHYSICIAN ASSISTANT

## 2019-10-02 PROCEDURE — 99999 PR PBB SHADOW E&M-EST. PATIENT-LVL III: CPT | Mod: PBBFAC,,, | Performed by: PHYSICIAN ASSISTANT

## 2019-10-02 PROCEDURE — 3079F PR MOST RECENT DIASTOLIC BLOOD PRESSURE 80-89 MM HG: ICD-10-PCS | Mod: CPTII,S$GLB,, | Performed by: PHYSICIAN ASSISTANT

## 2019-10-02 PROCEDURE — 3075F SYST BP GE 130 - 139MM HG: CPT | Mod: CPTII,S$GLB,, | Performed by: PHYSICIAN ASSISTANT

## 2019-10-02 PROCEDURE — 99213 PR OFFICE/OUTPT VISIT, EST, LEVL III, 20-29 MIN: ICD-10-PCS | Mod: 25,S$GLB,, | Performed by: PHYSICIAN ASSISTANT

## 2019-10-02 PROCEDURE — 3008F PR BODY MASS INDEX (BMI) DOCUMENTED: ICD-10-PCS | Mod: CPTII,S$GLB,, | Performed by: PHYSICIAN ASSISTANT

## 2019-10-02 PROCEDURE — 3079F DIAST BP 80-89 MM HG: CPT | Mod: CPTII,S$GLB,, | Performed by: PHYSICIAN ASSISTANT

## 2019-10-02 RX ORDER — BETAMETHASONE SODIUM PHOSPHATE AND BETAMETHASONE ACETATE 3; 3 MG/ML; MG/ML
6 INJECTION, SUSPENSION INTRA-ARTICULAR; INTRALESIONAL; INTRAMUSCULAR; SOFT TISSUE
Status: COMPLETED | OUTPATIENT
Start: 2019-10-02 | End: 2019-10-02

## 2019-10-02 RX ADMIN — BETAMETHASONE SODIUM PHOSPHATE AND BETAMETHASONE ACETATE 6 MG: 3; 3 INJECTION, SUSPENSION INTRA-ARTICULAR; INTRALESIONAL; INTRAMUSCULAR; SOFT TISSUE at 11:10

## 2019-10-02 NOTE — PROGRESS NOTES
SUBJECTIVE:     Chief Complaint & History of Present Illness:  Maria G Cameron is a  Established  patient 54 y.o. female who is seen here today with a complaint of  left foot pain .  She is a patient well-known to me was last seen treated the clinic for this condition 09/11/2019.  At that time we had opted to try conservative treatment being meloxicam 15 mg q.d. with food.  She continues to struggle with pain soreness in the plantar surface of the left foot and would like to move forward with injection therapy today  On a scale of 1-10, with 10 being worst pain imaginable, he rates this pain as 5 on good days and 9 on bad days.  she describes the pain as tender.    Review of patient's allergies indicates:   Allergen Reactions    Imitrex [sumatriptan] Shortness Of Breath    Codeine Nausea And Vomiting         Current Outpatient Medications   Medication Sig Dispense Refill    buPROPion (WELLBUTRIN) 100 MG tablet Take 1 tablet (100 mg total) by mouth 2 (two) times daily. 180 tablet 3    butalbital-acetaminophen-caffeine -40 mg (FIORICET, ESGIC) -40 mg per tablet Take 1 tablet by mouth every 4 (four) hours as needed for Pain. 90 tablet 1    cholecalciferol, vitamin D3, (VITAMIN D3) 5,000 unit Tab Take 5,000 Units by mouth once daily. 90 tablet 3    enalapril (VASOTEC) 20 MG tablet Take 1 tablet (20 mg total) by mouth once daily. 90 tablet 3    hydroCHLOROthiazide (HYDRODIURIL) 25 MG tablet TAKE 1 TABLET BY MOUTH ONCE DAILY 90 tablet 3    levothyroxine (SYNTHROID) 125 MCG tablet TAKE 1 TABLET BY MOUTH ONCE DAILY 90 tablet 3    meloxicam (MOBIC) 15 MG tablet Take 1 tablet (15 mg total) by mouth once daily. 30 tablet 1    OPW TEST CLAIM - DO NOT FILL OPW test claim. Do not fill. 30 tablet 0    promethazine (PHENERGAN) 50 MG suppository Place 1 suppository (50 mg total) rectally every 6 (six) hours as needed for Nausea. 12 suppository 1    verapamil (VERELAN) 360 MG C24P Take 1 capsule (360 mg  total) by mouth once daily. 30 capsule 11    aspirin 81 MG Chew Take 1 tablet (81 mg total) by mouth once daily.  0     Current Facility-Administered Medications   Medication Dose Route Frequency Provider Last Rate Last Dose    betamethasone acetate-betamethasone sodium phosphate injection 6 mg  6 mg Intra-articular 1 time in Clinic/HOD Tim Grant PA-C           Past Medical History:   Diagnosis Date    Adrenal adenoma     Allergic rhinitis     Allergy     Allergic to trees, weeds, mold and standardized mite    Anxiety     Eustachian tube dysfunction     Fibrocystic changes of left breast     12 o'clock L breast mass bx 3/7/11    Hypertension     Hypothyroidism     Migraine headache     Recurrent otitis media        Past Surgical History:   Procedure Laterality Date    BREAST BIOPSY Left 2010    breast core biopsy  3/7/11    fibrocystic changes    COLONOSCOPY N/A 4/15/2016    Procedure: COLONOSCOPY;  Surgeon: Coleman Moss MD;  Location: [address]);  Service: Endoscopy;  Laterality: N/A;       Vital Signs (Most Recent)  Vitals:    10/02/19 1117   BP: 130/82   Pulse: 91       Review of Systems:  ROS:  Constitutional: no fever or chills  Eyes: no visual changes  ENT: no nasal congestion or sore throat, Positive station tube dysfunction  Respiratory: no cough or shortness of breath  Cardiovascular: no chest pain or palpitations  Gastrointestinal: no nausea or vomiting, tolerating diet  Genitourinary: no hematuria or dysuria  Integument/Breast: no rash or pruritis  Hematologic/Lymphatic: no easy bruising or lymphadenopathy, Positive history of adrenal adenoma  Musculoskeletal: no arthralgias or myalgias  Neurological: no seizures or tremors, Positive for migraine, benign essential tremor  Behavioral/Psych: no auditory or visual hallucinations, Positive for anxiety  Endocrine: no heat or cold intolerance, Positive vitamin-D deficiency, hypothyroidism,      OBJECTIVE:     PHYSICAL  "EXAM:  Height: 5' 8" (172.7 cm) Weight: 110 kg (242 lb 8.1 oz), General Appearance: Well nourished, well developed, in no acute distress.  Neurological: Mood & affect are normal.  left  Foot/Ankle     Skin: normal  Swelling: minimal  Warmth: no warmth  Tenderness: moderate  ROM: 90 degrees dorsiflexion, 180 degrees plantarflexion, 45 degrees inversion and 40 degrees eversion  Strength: normal, 5 on 5 tibialis anterior strength, 5 of 5 EHL strength, 5 of 5 EDL strength, 5 of 5 FHL and 5 of 5 FDL  Gait: normal  Stability: anterior drawer: negative, exterior rotation test: negative, Lachman: negative and Cotton test: negative     point tenderness over the heel pad and point tenderness of the mid plantar fascia          ASSESSMENT/PLAN:       ICD-10-CM ICD-9-CM   1. Plantar fasciitis of left foot M72.2 728.71       Plan:  Will proceed with therapeutic diagnostic cortisone injection of left plantar fascia    The injection site was identified and the skin was prepared with an ETOH solution. The insertion of the plantar fascia of the   left  foot was injected with 1 ml of Celestone and 0.5 ml Lidocaine under sterile technique. Maria G Cameron tolerated the procedure well, she was advised to rest the  foot  today, ice and support. she did receive immediate relief of the pain in and about her  foot  she was told this would be short lived and is secondary to the lidocaine. she may have an increase in her discomfort tonight followed by steady improvement over the next several days. It may take 1-3 weeks following the injection to get the full benefit of the medication.  I will see her back in 4-6 months. Sooner if she has any problems or concerns.    "

## 2019-10-08 ENCOUNTER — OFFICE VISIT (OUTPATIENT)
Dept: PSYCHIATRY | Facility: CLINIC | Age: 54
End: 2019-10-08
Payer: COMMERCIAL

## 2019-10-08 DIAGNOSIS — R69 DIAGNOSIS DEFERRED: Primary | ICD-10-CM

## 2019-10-08 PROCEDURE — 90791 PSYCH DIAGNOSTIC EVALUATION: CPT | Mod: S$GLB,,, | Performed by: SOCIAL WORKER

## 2019-10-08 PROCEDURE — 90791 PR PSYCHIATRIC DIAGNOSTIC EVALUATION: ICD-10-PCS | Mod: S$GLB,,, | Performed by: SOCIAL WORKER

## 2019-10-17 ENCOUNTER — OFFICE VISIT (OUTPATIENT)
Dept: PSYCHIATRY | Facility: CLINIC | Age: 54
End: 2019-10-17
Payer: COMMERCIAL

## 2019-10-17 DIAGNOSIS — R69 DIAGNOSIS DEFERRED: Primary | ICD-10-CM

## 2019-10-17 PROCEDURE — 90834 PR PSYCHOTHERAPY W/PATIENT, 45 MIN: ICD-10-PCS | Mod: S$GLB,,, | Performed by: SOCIAL WORKER

## 2019-10-17 PROCEDURE — 90834 PSYTX W PT 45 MINUTES: CPT | Mod: S$GLB,,, | Performed by: SOCIAL WORKER

## 2019-10-21 ENCOUNTER — TELEPHONE (OUTPATIENT)
Dept: INTERNAL MEDICINE | Facility: CLINIC | Age: 54
End: 2019-10-21

## 2019-10-21 NOTE — TELEPHONE ENCOUNTER
----- Message from Elena Nails sent at 10/21/2019  3:19 PM CDT -----  Contact: MASSIEL MUÑOZ [[id number]] [phone]    Patient wants to schedule her yearly physical on 11/21 in the afternoon.

## 2019-10-30 ENCOUNTER — TELEPHONE (OUTPATIENT)
Dept: INTERNAL MEDICINE | Facility: CLINIC | Age: 54
End: 2019-10-30

## 2019-10-30 NOTE — TELEPHONE ENCOUNTER
----- Message from Thomas Oliveros sent at 10/30/2019  1:05 PM CDT -----  Contact: PT   Pt would like a call back regarding scheduling appointment for annual no dates in [facility]       Pt can be reached at  [phone]

## 2019-11-19 ENCOUNTER — PATIENT OUTREACH (OUTPATIENT)
Dept: ADMINISTRATIVE | Facility: OTHER | Age: 54
End: 2019-11-19

## 2019-11-21 ENCOUNTER — OFFICE VISIT (OUTPATIENT)
Dept: DERMATOLOGY | Facility: CLINIC | Age: 54
End: 2019-11-21
Payer: COMMERCIAL

## 2019-11-21 ENCOUNTER — OFFICE VISIT (OUTPATIENT)
Dept: PODIATRY | Facility: CLINIC | Age: 54
End: 2019-11-21
Payer: COMMERCIAL

## 2019-11-21 VITALS
DIASTOLIC BLOOD PRESSURE: 94 MMHG | SYSTOLIC BLOOD PRESSURE: 151 MMHG | HEIGHT: 70 IN | BODY MASS INDEX: 34.65 KG/M2 | HEART RATE: 81 BPM | WEIGHT: 242 LBS

## 2019-11-21 DIAGNOSIS — L91.8 SKIN TAG: Primary | ICD-10-CM

## 2019-11-21 DIAGNOSIS — D18.01 CHERRY ANGIOMA: ICD-10-CM

## 2019-11-21 DIAGNOSIS — L82.0 BENIGN LICHENOID KERATOSIS: ICD-10-CM

## 2019-11-21 DIAGNOSIS — R20.9 SKIN SENSATION DISTURBANCE: ICD-10-CM

## 2019-11-21 DIAGNOSIS — M72.2 PLANTAR FASCIITIS: Primary | ICD-10-CM

## 2019-11-21 PROCEDURE — 3080F PR MOST RECENT DIASTOLIC BLOOD PRESSURE >= 90 MM HG: ICD-10-PCS | Mod: CPTII,S$GLB,, | Performed by: PODIATRIST

## 2019-11-21 PROCEDURE — 3080F DIAST BP >= 90 MM HG: CPT | Mod: CPTII,S$GLB,, | Performed by: PODIATRIST

## 2019-11-21 PROCEDURE — 20550 NJX 1 TENDON SHEATH/LIGAMENT: CPT | Mod: LT,S$GLB,, | Performed by: PODIATRIST

## 2019-11-21 PROCEDURE — 3008F BODY MASS INDEX DOCD: CPT | Mod: CPTII,S$GLB,, | Performed by: PODIATRIST

## 2019-11-21 PROCEDURE — 99202 OFFICE O/P NEW SF 15 MIN: CPT | Mod: 25,S$GLB,, | Performed by: PHYSICIAN ASSISTANT

## 2019-11-21 PROCEDURE — 99999 PR PBB SHADOW E&M-EST. PATIENT-LVL III: ICD-10-PCS | Mod: PBBFAC,,, | Performed by: PODIATRIST

## 2019-11-21 PROCEDURE — 99999 PR PBB SHADOW E&M-EST. PATIENT-LVL II: CPT | Mod: PBBFAC,,, | Performed by: PHYSICIAN ASSISTANT

## 2019-11-21 PROCEDURE — 99202 PR OFFICE/OUTPT VISIT, NEW, LEVL II, 15-29 MIN: ICD-10-PCS | Mod: 25,S$GLB,, | Performed by: PHYSICIAN ASSISTANT

## 2019-11-21 PROCEDURE — 99999 PR PBB SHADOW E&M-EST. PATIENT-LVL II: ICD-10-PCS | Mod: PBBFAC,,, | Performed by: PHYSICIAN ASSISTANT

## 2019-11-21 PROCEDURE — 3077F SYST BP >= 140 MM HG: CPT | Mod: CPTII,S$GLB,, | Performed by: PODIATRIST

## 2019-11-21 PROCEDURE — 3077F PR MOST RECENT SYSTOLIC BLOOD PRESSURE >= 140 MM HG: ICD-10-PCS | Mod: CPTII,S$GLB,, | Performed by: PODIATRIST

## 2019-11-21 PROCEDURE — 99203 PR OFFICE/OUTPT VISIT, NEW, LEVL III, 30-44 MIN: ICD-10-PCS | Mod: 25,S$GLB,, | Performed by: PODIATRIST

## 2019-11-21 PROCEDURE — 11200 RMVL SKIN TAGS UP TO&INC 15: CPT | Mod: S$GLB,,, | Performed by: PHYSICIAN ASSISTANT

## 2019-11-21 PROCEDURE — 99999 PR PBB SHADOW E&M-EST. PATIENT-LVL III: CPT | Mod: PBBFAC,,, | Performed by: PODIATRIST

## 2019-11-21 PROCEDURE — 99203 OFFICE O/P NEW LOW 30 MIN: CPT | Mod: 25,S$GLB,, | Performed by: PODIATRIST

## 2019-11-21 PROCEDURE — 3077F SYST BP >= 140 MM HG: CPT | Mod: CPTII,S$GLB,, | Performed by: PHYSICIAN ASSISTANT

## 2019-11-21 PROCEDURE — 3080F PR MOST RECENT DIASTOLIC BLOOD PRESSURE >= 90 MM HG: ICD-10-PCS | Mod: CPTII,S$GLB,, | Performed by: PHYSICIAN ASSISTANT

## 2019-11-21 PROCEDURE — 20550 PR INJECT TENDON SHEATH/LIGAMENT: ICD-10-PCS | Mod: LT,S$GLB,, | Performed by: PODIATRIST

## 2019-11-21 PROCEDURE — 3077F PR MOST RECENT SYSTOLIC BLOOD PRESSURE >= 140 MM HG: ICD-10-PCS | Mod: CPTII,S$GLB,, | Performed by: PHYSICIAN ASSISTANT

## 2019-11-21 PROCEDURE — 3080F DIAST BP >= 90 MM HG: CPT | Mod: CPTII,S$GLB,, | Performed by: PHYSICIAN ASSISTANT

## 2019-11-21 PROCEDURE — 3008F PR BODY MASS INDEX (BMI) DOCUMENTED: ICD-10-PCS | Mod: CPTII,S$GLB,, | Performed by: PODIATRIST

## 2019-11-21 PROCEDURE — 11200 PR REMOVAL OF SKIN TAGS, UP TO 15: ICD-10-PCS | Mod: S$GLB,,, | Performed by: PHYSICIAN ASSISTANT

## 2019-11-21 RX ADMIN — DEXAMETHASONE SODIUM PHOSPHATE 4 MG: 4 INJECTION, SOLUTION INTRA-ARTICULAR; INTRALESIONAL; INTRAMUSCULAR; INTRAVENOUS; SOFT TISSUE at 01:11

## 2019-11-21 RX ADMIN — TRIAMCINOLONE ACETONIDE 40 MG: 40 INJECTION, SUSPENSION INTRA-ARTICULAR; INTRAMUSCULAR at 01:11

## 2019-11-21 NOTE — PROGRESS NOTES
Subjective:       Patient ID:  Maria G Cameron is a 54 y.o. female who presents for   Chief Complaint   Patient presents with    Skin Tags     under left arm, X10yrs, irritated by shaving, no tx     Dry Skin     left lower leg, X6mos, dry, no tx      Skin Tags  - Initial  Affected locations: left axilla  Duration: yrs.  Signs / symptoms: irritated  Exacerbated by: shaving, clothes rubbing.  Relieving factors/Treatments tried: nothing    Dry Skin  - Initial  Affected locations: left lower leg  Duration: 6 months  Signs / symptoms: dryness, redness and rough  Aggravated by: nothing  Relieving factors/Treatments tried: OTC moisturizer  Improvement on treatment: no relief    Pt also c/o red corazon on the left forearm x yrs. No symptoms.    No personal or fhx of mm or nmsc but reports significant sun exposure hx as a child living in [place].    Review of Systems   Skin: Positive for dry skin, daily sunscreen use and activity-related sunscreen use. Negative for itching, rash, sensitivity to antibiotic ointment, sensitivity to bandage adhesive, tendency to form keloidal scars and recent sunburn.   Hematologic/Lymphatic: Does not bruise/bleed easily.        Objective:    Physical Exam   Constitutional: She appears well-developed and well-nourished. No distress.   Neurological: She is alert and oriented to person, place, and time. She is not disoriented.   Psychiatric: She has a normal mood and affect.   Skin:   Areas Examined (abnormalities noted in diagram):   LUE Inspection Performed  LLE Inspection Performed                  Diagram Legend     Erythematous scaling macule/papule c/w actinic keratosis       Vascular papule c/w angioma      Pigmented verrucoid papule/plaque c/w seborrheic keratosis      Yellow umbilicated papule c/w sebaceous hyperplasia      Irregularly shaped tan macule c/w lentigo     1-2 mm smooth white papules consistent with Milia      Movable subcutaneous cyst with punctum c/w epidermal inclusion  cyst      Subcutaneous movable cyst c/w pilar cyst      Firm pink to brown papule c/w dermatofibroma      Pedunculated fleshy papule(s) c/w skin tag(s)      Evenly pigmented macule c/w junctional nevus     Mildly variegated pigmented, slightly irregular-bordered macule c/w mildly atypical nevus      Flesh colored to evenly pigmented papule c/w intradermal nevus       Pink pearly papule/plaque c/w basal cell carcinoma      Erythematous hyperkeratotic cursted plaque c/w SCC      Surgical scar with no sign of skin cancer recurrence      Open and closed comedones      Inflammatory papules and pustules      Verrucoid papule consistent consistent with wart     Erythematous eczematous patches and plaques     Dystrophic onycholytic nail with subungual debris c/w onychomycosis     Umbilicated papule    Erythematous-base heme-crusted tan verrucoid plaque consistent with inflamed seborrheic keratosis     Erythematous Silvery Scaling Plaque c/w Psoriasis     See annotation    Assessment / Plan:      Skin tag with Skin sensation disturbance  Verbal consent obtained. 1 lesion removed with scissor snip removal after anesthesia with 1% lidocaine with epinephrine. Hemostasis achieved with aluminum chloride and hyfrecation. No complications.    Cherry angioma  This is a benign vascular lesion. Reassurance given. No treatment required.     Benign lichenoid keratosis  Reassurance given to patient. No treatment is necessary.   RTC if any changes in size, shape, texture, or color develop.         Follow up for TBSE with physician (pt prefers to schedule online).

## 2019-11-21 NOTE — PATIENT INSTRUCTIONS

## 2019-11-27 ENCOUNTER — OFFICE VISIT (OUTPATIENT)
Dept: NEUROLOGY | Facility: CLINIC | Age: 54
End: 2019-11-27
Payer: COMMERCIAL

## 2019-11-27 DIAGNOSIS — R29.818 SUSPECTED SLEEP APNEA: ICD-10-CM

## 2019-11-27 DIAGNOSIS — E55.9 VITAMIN D DEFICIENCY: ICD-10-CM

## 2019-11-27 DIAGNOSIS — G43.001 MIGRAINE WITHOUT AURA AND WITH STATUS MIGRAINOSUS, NOT INTRACTABLE: Primary | ICD-10-CM

## 2019-11-27 PROCEDURE — 99214 PR OFFICE/OUTPT VISIT, EST, LEVL IV, 30-39 MIN: ICD-10-PCS | Mod: S$GLB,,, | Performed by: PHYSICIAN ASSISTANT

## 2019-11-27 PROCEDURE — 99999 PR PBB SHADOW E&M-EST. PATIENT-LVL II: CPT | Mod: PBBFAC,,, | Performed by: PHYSICIAN ASSISTANT

## 2019-11-27 PROCEDURE — 99214 OFFICE O/P EST MOD 30 MIN: CPT | Mod: S$GLB,,, | Performed by: PHYSICIAN ASSISTANT

## 2019-11-27 PROCEDURE — 99999 PR PBB SHADOW E&M-EST. PATIENT-LVL II: ICD-10-PCS | Mod: PBBFAC,,, | Performed by: PHYSICIAN ASSISTANT

## 2019-11-27 NOTE — PROGRESS NOTES
"New Patient     SUBJECTIVE:  Patient ID: Maria G Cameron   MRN: [id number]  Referred By: Aaareferral Self  Chief Complaint: Headache      History of Present Illness:   54 y.o. female with a PMHx of migraines, familial tremor, tobacco abuse, HTN, anxiety, probable RHEA, obesity, who presents to clinic alone for evaluation of headaches.   PMHx negative for TBI, Meningitis, Aneurysms, Kidney Stones, asthma, GI bleed, osteoporosis, CAD/MI, CVA/TIA, DM, infection, fever, cancer, pregnancy   Family Hx negative for aneurysms, brain tumors. Positive for mother and nieces w/ migraines    Was last seen by Dr. Rocha in 2018 for migraines, received bilateral GONB, continued propanolol 80mg TID, nsaids or fioricet prn. Also recommended smoking cessation, sleep eval for probable RHEA, and better bp management. Since that time, patient stopped taking the propranolol and reports her HA's have been occurring 5-14/30 days per month with 7/30 being debilitating. She presented today as a last minute addition onto my schedule. She states "I want a imitrex injection" due to having a 2 week HA and if we couldn't provide that today she would like to be re-imbursed for this visit and instead take a PTO day off from work today. I offered to assist her in placement on Dr. Rocha's schedule to continue mgmt of her HA's and also spoke with our nurse to see if we currently had any  imitrex injections. I was told that we do so I offered the patient a visit with me today but that we would discuss at the end of the visit what treatment plans would be most apporpriate for her vs waiting to see Dr. Rocha when we can schedule her, patient states she wishes to be seen by me today and then discuss most appropriate treatment options. After my visit, I discussed with patient that she is suffering from migraines and that her best treatment plan would include sleep medicine referral, conservative management, and preventative/rescue options. We then " "discussed at length different options to help break this cycle including imitrex injections, steroid taper, and nerve blocks. Patient states the nerve blocks help with the pain but do not completely resolve her other symptoms, she also states she recently had a steroid injection and completed a steroid taper due to a foot issue via orthopedics. She re-iterates "so what can you do now to break this cycle". She admits and apologizes for being "so rude" but that she was in pain. I stated that I understand but that treatments take time and that there were limited options to break a cycle, most of which we discussed today. I counseled on the different options including their risks and side effects. I also mentioned that the imitrex injection may not be the best treatment for her as she previously experienced SOB with imitrex and that she was already experiencing a significant amount of nausea and it is likely the imitrex injection will increase this. She states again so what can you do to break this. I went over the options and side effects again. Patient states the nerve blocks don't work but that she would like to receive that today. As I left the room to obtain the supplies, I spoke with a colleague and became aware that we can also offer toradol injections. As I was about to re-enter the room to offer this to the patient as well, she started to walk out and states "I changed my mind" and proceeded to leave. I asked if she was sure and that I was going to discuss another option with her to help her today and she stated she was sure and left the clinic.     Headache History:  Onset - 12yo  Previous Hx of HA - see above  Location/Radiation - unilateral occipital  Quality - "swollen", pressure, stabbing  Duration - 1 day - 14 days  Intensity (range) - 4-10/10  Frequency - 5-14/30 days per month with 7/30 being debilitating  Triggers - eat, lack of sleep, hormones  Aggravating Factors - light, smells  Alleviating Factors " - ice packs, dark room  Recent Changes - no  Prodrome/Aura - no  HA today? - 9/10  Status Migrainosus history - yes   Time of day of most headaches- anytime   Sleep - wakes w HA frequently, no snoring, wakes feeling refreshed, resolution of headache with sleep    Associated symptoms with the headache: n/v, photophobia, hyperosmia, worsens w/ exertion    Treatments Tried   Diamox  Propanolol  Verapamil  topamax  fioricet - helps  Naproxen  Ibuprofen  Tylenol  Phenergan - helps  imitrex  GONB    Social History  Alcohol - occasionally   Smoke - 2-3 cigerettes per day since teenager  Recreational Drug Use- denies    Current Medications:    aspirin 81 MG Chew, Take 1 tablet (81 mg total) by mouth once daily., Disp: , Rfl: 0    buPROPion (WELLBUTRIN) 100 MG tablet, Take 1 tablet (100 mg total) by mouth 2 (two) times daily., Disp: 180 tablet, Rfl: 3    butalbital-acetaminophen-caffeine -40 mg (FIORICET, ESGIC) -40 mg per tablet, Take 1 tablet by mouth every 4 (four) hours as needed for Pain., Disp: 90 tablet, Rfl: 1    cholecalciferol, vitamin D3, (VITAMIN D3) 5,000 unit Tab, Take 5,000 Units by mouth once daily., Disp: 90 tablet, Rfl: 3    enalapril (VASOTEC) 20 MG tablet, Take 1 tablet (20 mg total) by mouth once daily., Disp: 90 tablet, Rfl: 3    hydroCHLOROthiazide (HYDRODIURIL) 25 MG tablet, TAKE 1 TABLET BY MOUTH ONCE DAILY, Disp: 90 tablet, Rfl: 3    levothyroxine (SYNTHROID) 125 MCG tablet, TAKE 1 TABLET BY MOUTH ONCE DAILY, Disp: 90 tablet, Rfl: 3    OPW TEST CLAIM - DO NOT FILL, OPW test claim. Do not fill., Disp: 30 tablet, Rfl: 0    promethazine (PHENERGAN) 50 MG suppository, Place 1 suppository (50 mg total) rectally every 6 (six) hours as needed for Nausea., Disp: 12 suppository, Rfl: 1    verapamil (VERELAN) 360 MG C24P, Take 1 capsule (360 mg total) by mouth once daily., Disp: 30 capsule, Rfl: 11    Review of Systems - as per HPI, otherwise a balanced 10 systems review is  negative.    OBJECTIVE:  Vitals:  There were no vitals taken for this visit.    Physical Exam   Constitutional: she appears well-developed and well-nourished. she is well groomed. NAD  HENT:    Head: Normocephalic and atraumatic, oral and nasal mucosa intact.  Frontalis was TTP, temporalis was TTP   Eyes: Conjunctivae and EOM are normal. Pupils are equal, round, and reactive to light   Neck: Neck supple. Occiput TTP and trapezius NTTP, traps soft, + tinnel LGON and LLON, tightness of neck muscles   Musculoskeletal: Normal range of motion. No joint stiffness. No vertebral point tenderness.  Skin: Skin is warm and dry.  Psychiatric: Normal mood and affect.     Neuro exam:    Mental status:  The patient is alert and oriented to person, place and time.  Language is intact and fluent  Remote and recent memory are intact  Normal attention and concentration  Mood is stable    Cranial Nerves:  Extraocular movements are intact and without nystagmus.    Visual fields are full to confrontation testing.   Facial movement is symmetric.  Facial sensation is intact.    Hearing is intact to finger rub   Limited ROM of neck in all (6) directions without pain  Shoulder shrug symmetrical.    Coordination:     Finger to nose - normal and symmetric bilaterally     Motor:  Normal muscle bulk and symmetry. No fasciculations were noted.   Tremor apparent  Pronator drift not apparent.    strength was strong and symmetric  Finger extension strength was strong and symmetric  RUE:appropriate against gravity and medium force as tested 5/5  LUE: appropriate against gravity and medium force as tested 5/5  RLE:appropriate against gravity and medium force as tested 5/5              LLE: appropriate against gravity and medium force as tested 5/5                          Sensory:  RUE  intact light touch  LUE intact light touch  RLE intact light touch  LLE intact light touch    Gait:   Romberg - negative  Normal gait  Tandem, Heel, and Toe Walk -  able to perform without difficulty    Review of Data:   Notes from neuro and pcp reviewed   Labs:  No visits with results within 3 Month(s) from this visit.   Latest known visit with results is:   Lab Visit on 02/01/2019   Component Date Value Ref Range Status    TSH 02/01/2019 1.074  0.400 - 4.000 uIU/mL Final    Hemoglobin A1C 02/01/2019 5.5  4.0 - 5.6 % Final    Estimated Avg Glucose 02/01/2019 111  68 - 131 mg/dL Final    Sodium 02/01/2019 139  136 - 145 mmol/L Final    Potassium 02/01/2019 3.9  3.5 - 5.1 mmol/L Final    Chloride 02/01/2019 106  95 - 110 mmol/L Final    CO2 02/01/2019 27  23 - 29 mmol/L Final    Glucose 02/01/2019 106  70 - 110 mg/dL Final    BUN, Bld 02/01/2019 15  6 - 20 mg/dL Final    Creatinine 02/01/2019 0.9  0.5 - 1.4 mg/dL Final    Calcium 02/01/2019 9.5  8.7 - 10.5 mg/dL Final    Total Protein 02/01/2019 6.9  6.0 - 8.4 g/dL Final    Albumin 02/01/2019 3.8  3.5 - 5.2 g/dL Final    Total Bilirubin 02/01/2019 0.2  0.1 - 1.0 mg/dL Final    Alkaline Phosphatase 02/01/2019 55  55 - 135 U/L Final    AST 02/01/2019 22  10 - 40 U/L Final    ALT 02/01/2019 21  10 - 44 U/L Final    Anion Gap 02/01/2019 6* 8 - 16 mmol/L Final    eGFR if African American 02/01/2019 >60.0  >60 mL/min/1.73 m^2 Final    eGFR if non African American 02/01/2019 >60.0  >60 mL/min/1.73 m^2 Final    Magnesium 02/01/2019 2.0  1.6 - 2.6 mg/dL Final    Cholesterol 02/01/2019 202* 120 - 199 mg/dL Final    Triglycerides 02/01/2019 101  30 - 150 mg/dL Final    HDL 02/01/2019 51  40 - 75 mg/dL Final    LDL Cholesterol 02/01/2019 130.8  63.0 - 159.0 mg/dL Final    Hdl/Cholesterol Ratio 02/01/2019 25.2  20.0 - 50.0 % Final    Total Cholesterol/HDL Ratio 02/01/2019 4.0  2.0 - 5.0 Final    Non-HDL Cholesterol 02/01/2019 151  mg/dL Final     Imaging:  No results found for this or any previous visit.  Note: I have independently reviewed any/all imaging/labs/tests and agree with the report (s) as documented.   "Any discrepancies will be as noted/demarcated by free text.  YANY GOMEZ 11/27/2019    ASSESSMENT:  1. Migraine without aura and with status migrainosus, not intractable    2. Suspected sleep apnea    3. Vitamin D deficiency          PLAN:  - Discussed symptoms appear to be consistent with migraines, discussed treatment options and patient agreed with the following plan:  Attempted to discuss preventative/abortive treatment options, patient defers. States "i'll talk to my pcp about that".   - nausea - continue phenergan  - probable RHEA - counseled on importance of untreated RHEA, rec sleep med consult again, put in referral  - vit d def - continue supplementation, mgmt per pcp  - risks, benefits, and potential side effects of imitrex, nerve blocks, steroids, zofran, phenergan discussed   - alternative treatment options offered   - importance of healthy diet, regular exercise and sleep hygiene in the treatment of headaches    - RTC with me or Dr. Rocha    Orders Placed This Encounter    Ambulatory referral to Sleep Disorders       I have discussed realistic goals of care with patient at length as well as medication options, and need for lifestyle adjustment. I have explained that treatment will take time.    CC: MD Sonya Chen PA-C  [facility]  [phone]    Dr. Paulino was available during today's encounter.   "

## 2019-12-02 ENCOUNTER — PATIENT OUTREACH (OUTPATIENT)
Dept: ADMINISTRATIVE | Facility: HOSPITAL | Age: 54
End: 2019-12-02

## 2019-12-02 RX ORDER — DEXAMETHASONE SODIUM PHOSPHATE 4 MG/ML
4 INJECTION, SOLUTION INTRA-ARTICULAR; INTRALESIONAL; INTRAMUSCULAR; INTRAVENOUS; SOFT TISSUE ONCE
Status: COMPLETED | OUTPATIENT
Start: 2019-11-21 | End: 2019-11-21

## 2019-12-02 RX ORDER — TRIAMCINOLONE ACETONIDE 40 MG/ML
40 INJECTION, SUSPENSION INTRA-ARTICULAR; INTRAMUSCULAR ONCE
Status: COMPLETED | OUTPATIENT
Start: 2019-11-21 | End: 2019-11-21

## 2019-12-02 NOTE — PROGRESS NOTES
Subjective:      Patient ID: Maria G Cameron is a 54 y.o. female.    Chief Complaint: Foot Problem and Foot Pain    Maria G is a 54 y.o. female who presents to the clinic complaining of heel pain in the left foot, especially with the first step in the morning. The pain is described as Throbbing. The onset of the pain was sudden and has worsened over the past several days. Maria G rates the pain as 7/10. She denies a history of trauma. Prior treatments include none.    Review of Systems   Constitution: Negative for chills, fever and malaise/fatigue.   HENT: Negative for hearing loss.    Cardiovascular: Negative for claudication.   Respiratory: Negative for shortness of breath.    Skin: Negative for flushing and rash.   Musculoskeletal: Negative for joint pain and myalgias.   Neurological: Negative for loss of balance, numbness, paresthesias and sensory change.   Psychiatric/Behavioral: Negative for altered mental status.           Objective:      Physical Exam   Cardiovascular:   Pulses:       Dorsalis pedis pulses are 2+ on the right side, and 2+ on the left side.        Posterior tibial pulses are 2+ on the right side, and 2+ on the left side.   No edema noted b/L   Musculoskeletal:          Feet:   Right Foot:   Protective Sensation: 5 sites tested. 5 sites sensed.   Left Foot:   Protective Sensation: 5 sites tested. 5 sites sensed.   Neurological: She has normal strength.   Gross sensation intact b/L   Skin:   Normal skin tugor noted.   No open lesion noted b/L  Skin temp is warm to warm from proximal to distal b/L.  Webspaces clean, dry, and intact     Psychiatric: She has a normal mood and affect.       Non reducible equinus noted to left lower extremity  Pain upon palpation to the medial heel, left            Assessment:       Encounter Diagnosis   Name Primary?    Plantar fasciitis - Left Foot Yes         Plan:       Maria G was seen today for foot problem and foot pain.    Diagnoses and all orders for this  visit:    Plantar fasciitis - Left Foot    Other orders  -     dexamethasone injection 4 mg  -     triamcinolone acetonide injection 40 mg      I counseled the patient on her conditions, their implications and medical management.        .Etiologies of plantar fasciitis discussed with the pt. Pt advised to stretch the achilles tendon complex daily and especially before increased activity and sports. Pt advised on RICE and OTC NSAIDs for residual pain. Pt advised to purchase gel heel cups to be worn in shoes.    Patient instructed on adequate icing techniques. Patient should ice the affected area at least once per day x 10 minutes for 10 days . I advised the  patient that extra icing would also be beneficial to ensure adequate anti inflammatory effect       Injection consisting of 1:1:1 mixture of 1% lidocaine plain, dexamethasone, and kenalog 40 given into the leftheel, pt tolerated the injection well.   RTC in 2 weeks or sooner if any new pedal problems should arise or if condition worsens.

## 2019-12-13 ENCOUNTER — TELEPHONE (OUTPATIENT)
Dept: INTERNAL MEDICINE | Facility: CLINIC | Age: 54
End: 2019-12-13

## 2019-12-13 NOTE — TELEPHONE ENCOUNTER
----- Message from Bernarda Weaver sent at 12/13/2019  3:58 PM CST -----  Contact: [phone]  Patient is returning a phone call.  Who left a message for the patient:   Does patient know what this is regarding:    Comments: please advise, thanks

## 2019-12-16 ENCOUNTER — IMMUNIZATION (OUTPATIENT)
Dept: PHARMACY | Facility: CLINIC | Age: 54
End: 2019-12-16
Payer: COMMERCIAL

## 2019-12-16 ENCOUNTER — OFFICE VISIT (OUTPATIENT)
Dept: PRIMARY CARE CLINIC | Facility: CLINIC | Age: 54
End: 2019-12-16
Payer: COMMERCIAL

## 2019-12-16 ENCOUNTER — TELEPHONE (OUTPATIENT)
Dept: PRIMARY CARE CLINIC | Facility: CLINIC | Age: 54
End: 2019-12-16

## 2019-12-16 ENCOUNTER — TELEPHONE (OUTPATIENT)
Dept: INTERNAL MEDICINE | Facility: CLINIC | Age: 54
End: 2019-12-16

## 2019-12-16 ENCOUNTER — LAB VISIT (OUTPATIENT)
Dept: LAB | Facility: HOSPITAL | Age: 54
End: 2019-12-16
Attending: INTERNAL MEDICINE
Payer: COMMERCIAL

## 2019-12-16 VITALS
HEART RATE: 86 BPM | OXYGEN SATURATION: 96 % | HEIGHT: 68 IN | SYSTOLIC BLOOD PRESSURE: 122 MMHG | WEIGHT: 235.25 LBS | DIASTOLIC BLOOD PRESSURE: 80 MMHG | BODY MASS INDEX: 35.65 KG/M2

## 2019-12-16 DIAGNOSIS — E03.9 ACQUIRED HYPOTHYROIDISM: ICD-10-CM

## 2019-12-16 DIAGNOSIS — F43.23 ADJUSTMENT DISORDER WITH MIXED ANXIETY AND DEPRESSED MOOD: ICD-10-CM

## 2019-12-16 DIAGNOSIS — E55.9 VITAMIN D DEFICIENCY: ICD-10-CM

## 2019-12-16 DIAGNOSIS — I10 ESSENTIAL HYPERTENSION: ICD-10-CM

## 2019-12-16 DIAGNOSIS — R51.9 CHRONIC INTRACTABLE HEADACHE, UNSPECIFIED HEADACHE TYPE: ICD-10-CM

## 2019-12-16 DIAGNOSIS — E78.2 MIXED HYPERLIPIDEMIA: ICD-10-CM

## 2019-12-16 DIAGNOSIS — G43.001 MIGRAINE WITHOUT AURA AND WITH STATUS MIGRAINOSUS, NOT INTRACTABLE: ICD-10-CM

## 2019-12-16 DIAGNOSIS — Z12.39 BREAST CANCER SCREENING: ICD-10-CM

## 2019-12-16 DIAGNOSIS — J41.0 SMOKERS' COUGH: ICD-10-CM

## 2019-12-16 DIAGNOSIS — F41.9 ANXIETY: ICD-10-CM

## 2019-12-16 DIAGNOSIS — G43.909 MIGRAINE WITHOUT STATUS MIGRAINOSUS, NOT INTRACTABLE, UNSPECIFIED MIGRAINE TYPE: ICD-10-CM

## 2019-12-16 DIAGNOSIS — Z12.4 PAP SMEAR FOR CERVICAL CANCER SCREENING: ICD-10-CM

## 2019-12-16 DIAGNOSIS — G89.29 CHRONIC INTRACTABLE HEADACHE, UNSPECIFIED HEADACHE TYPE: ICD-10-CM

## 2019-12-16 LAB
25(OH)D3+25(OH)D2 SERPL-MCNC: 81 NG/ML (ref 30–96)
ALBUMIN SERPL BCP-MCNC: 4.3 G/DL (ref 3.5–5.2)
ALP SERPL-CCNC: 62 U/L (ref 55–135)
ALT SERPL W/O P-5'-P-CCNC: 21 U/L (ref 10–44)
ANION GAP SERPL CALC-SCNC: 10 MMOL/L (ref 8–16)
AST SERPL-CCNC: 20 U/L (ref 10–40)
BASOPHILS # BLD AUTO: 0.02 K/UL (ref 0–0.2)
BASOPHILS NFR BLD: 0.3 % (ref 0–1.9)
BILIRUB SERPL-MCNC: 0.5 MG/DL (ref 0.1–1)
BUN SERPL-MCNC: 13 MG/DL (ref 6–20)
CALCIUM SERPL-MCNC: 9.5 MG/DL (ref 8.7–10.5)
CHLORIDE SERPL-SCNC: 103 MMOL/L (ref 95–110)
CHOLEST SERPL-MCNC: 225 MG/DL (ref 120–199)
CHOLEST/HDLC SERPL: 2.9 {RATIO} (ref 2–5)
CO2 SERPL-SCNC: 26 MMOL/L (ref 23–29)
CREAT SERPL-MCNC: 1 MG/DL (ref 0.5–1.4)
DIFFERENTIAL METHOD: ABNORMAL
EOSINOPHIL # BLD AUTO: 0.1 K/UL (ref 0–0.5)
EOSINOPHIL NFR BLD: 1 % (ref 0–8)
ERYTHROCYTE [DISTWIDTH] IN BLOOD BY AUTOMATED COUNT: 13.4 % (ref 11.5–14.5)
EST. GFR  (AFRICAN AMERICAN): >60 ML/MIN/1.73 M^2
EST. GFR  (NON AFRICAN AMERICAN): >60 ML/MIN/1.73 M^2
ESTIMATED AVG GLUCOSE: 114 MG/DL (ref 68–131)
GLUCOSE SERPL-MCNC: 100 MG/DL (ref 70–110)
HBA1C MFR BLD HPLC: 5.6 % (ref 4–5.6)
HCT VFR BLD AUTO: 41.8 % (ref 37–48.5)
HDLC SERPL-MCNC: 77 MG/DL (ref 40–75)
HDLC SERPL: 34.2 % (ref 20–50)
HGB BLD-MCNC: 14.3 G/DL (ref 12–16)
LDLC SERPL CALC-MCNC: 127.4 MG/DL (ref 63–159)
LYMPHOCYTES # BLD AUTO: 1.7 K/UL (ref 1–4.8)
LYMPHOCYTES NFR BLD: 24.1 % (ref 18–48)
MCH RBC QN AUTO: 33.3 PG (ref 27–31)
MCHC RBC AUTO-ENTMCNC: 34.2 G/DL (ref 32–36)
MCV RBC AUTO: 97 FL (ref 82–98)
MONOCYTES # BLD AUTO: 0.6 K/UL (ref 0.3–1)
MONOCYTES NFR BLD: 8 % (ref 4–15)
NEUTROPHILS # BLD AUTO: 4.7 K/UL (ref 1.8–7.7)
NEUTROPHILS NFR BLD: 66.6 % (ref 38–73)
NONHDLC SERPL-MCNC: 148 MG/DL
PLATELET # BLD AUTO: 323 K/UL (ref 150–350)
PMV BLD AUTO: 9.2 FL (ref 9.2–12.9)
POTASSIUM SERPL-SCNC: 4.1 MMOL/L (ref 3.5–5.1)
PROT SERPL-MCNC: 7.5 G/DL (ref 6–8.4)
RBC # BLD AUTO: 4.29 M/UL (ref 4–5.4)
SODIUM SERPL-SCNC: 139 MMOL/L (ref 136–145)
TRIGL SERPL-MCNC: 103 MG/DL (ref 30–150)
TSH SERPL DL<=0.005 MIU/L-ACNC: 2.72 UIU/ML (ref 0.4–4)
WBC # BLD AUTO: 7.1 K/UL (ref 3.9–12.7)

## 2019-12-16 PROCEDURE — 85025 COMPLETE CBC W/AUTO DIFF WBC: CPT

## 2019-12-16 PROCEDURE — 3079F DIAST BP 80-89 MM HG: CPT | Mod: CPTII,S$GLB,, | Performed by: INTERNAL MEDICINE

## 2019-12-16 PROCEDURE — 3079F PR MOST RECENT DIASTOLIC BLOOD PRESSURE 80-89 MM HG: ICD-10-PCS | Mod: CPTII,S$GLB,, | Performed by: INTERNAL MEDICINE

## 2019-12-16 PROCEDURE — 99407 PR TOBACCO USE CESSATION INTENSIVE >10 MINUTES: ICD-10-PCS | Mod: S$GLB,,, | Performed by: INTERNAL MEDICINE

## 2019-12-16 PROCEDURE — 3074F SYST BP LT 130 MM HG: CPT | Mod: CPTII,S$GLB,, | Performed by: INTERNAL MEDICINE

## 2019-12-16 PROCEDURE — 83036 HEMOGLOBIN GLYCOSYLATED A1C: CPT

## 2019-12-16 PROCEDURE — 3008F PR BODY MASS INDEX (BMI) DOCUMENTED: ICD-10-PCS | Mod: CPTII,S$GLB,, | Performed by: INTERNAL MEDICINE

## 2019-12-16 PROCEDURE — 82306 VITAMIN D 25 HYDROXY: CPT

## 2019-12-16 PROCEDURE — 84443 ASSAY THYROID STIM HORMONE: CPT

## 2019-12-16 PROCEDURE — 99215 OFFICE O/P EST HI 40 MIN: CPT | Mod: 25,S$GLB,, | Performed by: INTERNAL MEDICINE

## 2019-12-16 PROCEDURE — 80061 LIPID PANEL: CPT

## 2019-12-16 PROCEDURE — 3008F BODY MASS INDEX DOCD: CPT | Mod: CPTII,S$GLB,, | Performed by: INTERNAL MEDICINE

## 2019-12-16 PROCEDURE — 36415 COLL VENOUS BLD VENIPUNCTURE: CPT

## 2019-12-16 PROCEDURE — 99215 PR OFFICE/OUTPT VISIT, EST, LEVL V, 40-54 MIN: ICD-10-PCS | Mod: 25,S$GLB,, | Performed by: INTERNAL MEDICINE

## 2019-12-16 PROCEDURE — 80053 COMPREHEN METABOLIC PANEL: CPT

## 2019-12-16 PROCEDURE — 3074F PR MOST RECENT SYSTOLIC BLOOD PRESSURE < 130 MM HG: ICD-10-PCS | Mod: CPTII,S$GLB,, | Performed by: INTERNAL MEDICINE

## 2019-12-16 PROCEDURE — 99407 BEHAV CHNG SMOKING > 10 MIN: CPT | Mod: S$GLB,,, | Performed by: INTERNAL MEDICINE

## 2019-12-16 RX ORDER — HYDROCHLOROTHIAZIDE 25 MG/1
25 TABLET ORAL DAILY
Qty: 90 TABLET | Refills: 3 | Status: SHIPPED | OUTPATIENT
Start: 2019-12-16 | End: 2020-12-16 | Stop reason: SDUPTHER

## 2019-12-16 RX ORDER — ACETAMINOPHEN 500 MG
5000 TABLET ORAL DAILY
Qty: 90 TABLET | Refills: 3 | Status: SHIPPED | OUTPATIENT
Start: 2019-12-16 | End: 2022-01-11 | Stop reason: SDUPTHER

## 2019-12-16 RX ORDER — ENALAPRIL MALEATE 20 MG/1
20 TABLET ORAL DAILY
Qty: 90 TABLET | Refills: 3 | Status: SHIPPED | OUTPATIENT
Start: 2019-12-16 | End: 2020-12-16 | Stop reason: SDUPTHER

## 2019-12-16 RX ORDER — LEVOTHYROXINE SODIUM 125 UG/1
125 TABLET ORAL DAILY
Qty: 90 TABLET | Refills: 3 | Status: SHIPPED | OUTPATIENT
Start: 2019-12-16 | End: 2020-12-18 | Stop reason: SDUPTHER

## 2019-12-16 RX ORDER — VERAPAMIL HYDROCHLORIDE 360 MG/1
360 CAPSULE, DELAYED RELEASE PELLETS ORAL DAILY
Qty: 30 CAPSULE | Refills: 11 | Status: SHIPPED | OUTPATIENT
Start: 2019-12-16 | End: 2020-12-16 | Stop reason: SDUPTHER

## 2019-12-16 RX ORDER — PROMETHAZINE HYDROCHLORIDE 50 MG/1
50 SUPPOSITORY RECTAL EVERY 6 HOURS PRN
Qty: 12 SUPPOSITORY | Refills: 1 | Status: SHIPPED | OUTPATIENT
Start: 2019-12-16 | End: 2020-12-16 | Stop reason: SDUPTHER

## 2019-12-16 RX ORDER — BUPROPION HYDROCHLORIDE 100 MG/1
100 TABLET ORAL 2 TIMES DAILY
Qty: 180 TABLET | Refills: 3 | Status: SHIPPED | OUTPATIENT
Start: 2019-12-16 | End: 2020-10-21 | Stop reason: SDUPTHER

## 2019-12-16 RX ORDER — BUTALBITAL, ACETAMINOPHEN AND CAFFEINE 50; 325; 40 MG/1; MG/1; MG/1
1 TABLET ORAL EVERY 4 HOURS PRN
Qty: 90 TABLET | Refills: 1 | Status: SHIPPED | OUTPATIENT
Start: 2019-12-16 | End: 2020-12-16 | Stop reason: SDUPTHER

## 2019-12-16 NOTE — TELEPHONE ENCOUNTER
----- Message from Pura Damico MA sent at 12/16/2019  1:24 PM CST -----  Contact: [phone]      ----- Message -----  From: Jesus Alberto Aquino  Sent: 12/16/2019   1:17 PM CST  To: [facility] Pat[facility] Staff    Type: Returning a call    Who left a message? Ju    When did the practice call? 12/16    Comments: please call and advise, Thanks

## 2019-12-16 NOTE — ASSESSMENT & PLAN NOTE
ASCVD risk 10-year 7.2%, 50% lifetime. Mod-intensity statin recommended.  · Patient prefers lifestyle changes  · stopped ASA due to NSAID use  · Start Weight Watchers

## 2019-12-16 NOTE — PATIENT INSTRUCTIONS
TODAY:  - message Dr Smith about starting injectables for migraines  - try to avoid fioricet  - sleep study in the home  - patient assistance involvement for verapamil  - shingrix #1  - labs today  - smoking cessation referral  - GYN appt with female provider  - mammogram in 2/2020  - migraine meds refilled to main campus pharmacy

## 2019-12-16 NOTE — Clinical Note
Dr Paulino,Can you assist with getting Ms Cameron on your schedule for injection of monoclonal Abs for migraine treatment? She continues to have migraines in spite of PRN pain/nause treatments, bilateral GONB, propanolol 80mg TID, nsaids. She does take fioricet.Thanks,Pearl Ellis MD/MPHInternal MedicineOchsner Center for [facility] Care and [phone] spectralink

## 2019-12-16 NOTE — ASSESSMENT & PLAN NOTE
"BP previously uncontrolled on ACE and propranolol  · Followed by Cardiology  CONTINUE  · ACE 20mg  · HCTZ to 25mg   · Verapamil 360mg  STOP  · ASA 81mg  TRIED BUT "DIDN'T WORK"  · propranolol (previously on 80mg TID)  · Acetazolamide 500mg BID  · To treat elevated intracranial pressure  · Amlodipine was transitioned to verapamil (due to prophylactic effect on migraines)  · Propranolol   · Patient reports that it isn't helping  "

## 2019-12-16 NOTE — ASSESSMENT & PLAN NOTE
Patient's sister recently  and mother diagnosed with terminal illness  · Advised to continue EAP  · Supportive listening

## 2019-12-16 NOTE — PROGRESS NOTES
"Primary Care Provider Appointment    Subjective:      Patient ID: Maria G Cameron is a 54 y.o. female with migraines, HTN, HLD    Chief Complaint: Follow-up; Headache; Insomnia; and Hypertension    Patient's mother and sister were diagnosed with terminal illnesses. Her sister has since passed. She has doing EAP now for support, was given "tools" to deal with this stress which is helping.    She has had headaches for the past 2 weeks associated with nausea. She was advised that she likely has REHA. Was not given abortive therapies. She has received nerve blocks. She has received bilateral GONB, continued propanolol 80mg TID, nsaids (ibuprofen) or fioricet prn    She was advised sleep study, but does not want to go to clinic for this.     Her thyroid studies were normal. Last Vit D was low.    The 10-year ASCVD risk score (Jessika GRAYSON Jr., et al., 2013) is: 5.3%    Values used to calculate the score:      Age: 54 years      Sex: Female      Is Non- : No      Diabetic: No      Tobacco smoker: Yes      Systolic Blood Pressure: 122 mmHg      Is BP treated: Yes      HDL Cholesterol: 77 mg/dL      Total Cholesterol: 225 mg/dL        Past Surgical History:   Procedure Laterality Date    BREAST BIOPSY Left 2010    breast core biopsy  3/7/11    fibrocystic changes    COLONOSCOPY N/A 4/15/2016    Procedure: COLONOSCOPY;  Surgeon: Coleman Moss MD;  Location: [address]);  Service: Endoscopy;  Laterality: N/A;       Past Medical History:   Diagnosis Date    Adrenal adenoma     Allergic rhinitis     Allergy     Allergic to trees, weeds, mold and standardized mite    Anxiety     Eustachian tube dysfunction     Fibrocystic changes of left breast     12 o'clock L breast mass bx 3/7/11    Hypertension     Hypothyroidism     Migraine headache     Recurrent otitis media        Social History     Socioeconomic History    Marital status: Single     Spouse name: Not on file    Number of " children: Not on file    Years of education: Not on file    Highest education level: Not on file   Occupational History    Occupation:      Comment: clinical    Social Needs    Financial resource strain: Not hard at all    Food insecurity:     Worry: Never true     Inability: Never true    Transportation needs:     Medical: No     Non-medical: No   Tobacco Use    Smoking status: Current Every Day Smoker     Packs/day: 0.25     Years: 20.00     Pack years: 5.00     Types: Cigarettes    Smokeless tobacco: Current User   Substance and Sexual Activity    Alcohol use: No     Alcohol/week: 0.0 standard drinks    Drug use: No    Sexual activity: Not Currently     Comment: single with no children   Lifestyle    Physical activity:     Days per week: Not on file     Minutes per session: Not on file    Stress: Not on file   Relationships    Social connections:     Talks on phone: Not on file     Gets together: Not on file     Attends Islam service: Not on file     Active member of club or organization: Not on file     Attends meetings of clubs or organizations: Not on file     Relationship status: Not on file   Other Topics Concern    Not on file   Social History Narrative    Lives alone and no assistance with ADLs.     Single with no children       Review of Systems   Constitutional: Negative for activity change, appetite change, fatigue and unexpected weight change.   Respiratory: Negative for cough and choking.    Cardiovascular: Positive for leg swelling. Negative for chest pain.   Genitourinary: Negative for menstrual problem and pelvic pain.   Musculoskeletal: Negative for back pain, gait problem and joint swelling.   Neurological: Positive for headaches. Negative for tremors, syncope and weakness.        Mild resting tremor   Hematological: Does not bruise/bleed easily.   Psychiatric/Behavioral: Negative for agitation, behavioral problems, confusion, decreased concentration and  "dysphoric mood.       Objective:   /80   Pulse 86   Ht 5' 8" (1.727 m)   Wt 106.7 kg (235 lb 3.7 oz)   SpO2 96%   BMI 35.77 kg/m²     Physical Exam   Constitutional: She is oriented to person, place, and time. She appears well-developed and well-nourished.   obese   HENT:   Head: Normocephalic and atraumatic.   Musculoskeletal: Normal range of motion. She exhibits no edema.   Neurological: She is alert and oriented to person, place, and time. Coordination normal.   Mild intermittent resting tremor bilaterally UE   Skin: Skin is warm and dry.   Psychiatric: She has a normal mood and affect. Her behavior is normal. Judgment and thought content normal.   NO crying during exam   Vitals reviewed.      Lab Results   Component Value Date    WBC 7.10 12/16/2019    HGB 14.3 12/16/2019    HCT 41.8 12/16/2019     12/16/2019    CHOL 225 (H) 12/16/2019    TRIG 103 12/16/2019    HDL 77 (H) 12/16/2019    ALT 21 12/16/2019    AST 20 12/16/2019     12/16/2019    K 4.1 12/16/2019     12/16/2019    CREATININE 1.0 12/16/2019    BUN 13 12/16/2019    CO2 26 12/16/2019    TSH 2.722 12/16/2019    HGBA1C 5.6 12/16/2019       RESULTS: Reviewed labs and images today    Assessment:   54 y.o. female with multiple co-morbid illnesses here to continue work-up of chronic issues notably with migraines, HTN, HLD     Plan:     Problem List Items Addressed This Visit        Neuro    Chronic intractable headache    Relevant Medications    promethazine (PHENERGAN) 50 MG suppository    butalbital-acetaminophen-caffeine -40 mg (FIORICET, ESGIC) -40 mg per tablet    Migraine without aura and with status migrainosus, not intractable     Followed by Dr Elizabeth, seen urgently recently  · Message sent to Dr Paulino         Relevant Orders    Comprehensive metabolic panel (Completed)    CBC auto differential (Completed)       Psychiatric    Adjustment disorder with mixed anxiety and depressed mood     Patient's sister " "recently  and mother diagnosed with terminal illness  · Advised to continue EAP  · Supportive listening            Pulmonary    Smokers' cough     3 cig/d, precontemplative  · Advised to quit         Relevant Orders    Ambulatory referral to Smoking Cessation Program       Cardiac/Vascular    Hypertension     BP previously uncontrolled on ACE and propranolol  · Followed by Cardiology  CONTINUE  · ACE 20mg  · HCTZ to 25mg   · Verapamil 360mg  STOP  · ASA 81mg  TRIED BUT "DIDN'T WORK"  · propranolol (previously on 80mg TID)  · Acetazolamide 500mg BID  · To treat elevated intracranial pressure  · Amlodipine was transitioned to verapamil (due to prophylactic effect on migraines)  · Propranolol   · Patient reports that it isn't helping         Relevant Orders    Comprehensive metabolic panel (Completed)    Hemoglobin A1c (Completed)    CBC auto differential (Completed)    Mixed hyperlipidemia     ASCVD risk 10-year 7.2%, 50% lifetime. Mod-intensity statin recommended.  · Patient prefers lifestyle changes  · stopped ASA due to NSAID use  · Start Weight Watchers         Relevant Orders    Comprehensive metabolic panel (Completed)    Lipid panel (Completed)    Hemoglobin A1c (Completed)    CBC auto differential (Completed)       Renal/    Breast cancer screening     Abnormal mammo history, last normal in 2019  · Mammogram in 2020         Relevant Orders    Mammo Digital Screening Bilat    Pap smear for cervical cancer screening     Last normal PAP in  in [place], had 3 consecutive normal PAPs  · Refer to GYN for PAP with HPV testing         Relevant Orders    Ambulatory consult to Gynecology       Endocrine    Hypothyroidism     Levo increased on 3/13/17, 2017. TSH elevated 4. Symptoms of hypO improved  · Increased levo 125mcg on 10/17  · Levels normal in 2018         Relevant Orders    TSH (Completed)    Vitamin D deficiency     Level 23 in 2017  · Continue Vit D3 5000U daily OTC  · Monitor level         " Relevant Orders    Vitamin D (Completed)      Other Visit Diagnoses     Migraine without status migrainosus, not intractable, unspecified migraine type        Relevant Medications    promethazine (PHENERGAN) 50 MG suppository    butalbital-acetaminophen-caffeine -40 mg (FIORICET, ESGIC) -40 mg per tablet          Health Maintenance       Date Due Completion Date    TETANUS VACCINE 01/02/2012 1/2/2002    Shingles Vaccine (1 of 2) 09/18/2013 ---TODAY    Pap Smear with HPV Cotest 01/12/2020 1/12/2017 (ClinicallyNA)- TODAY    Override on 1/12/2017: Not Clinically Appropriate (last 3 PAPs normal)    Override on 5/24/2013: Done    Mammogram 02/09/2020 2/9/2019- TODAY    Override on 12/8/2011: Done    Override on 12/8/2011: Done    Lipid Panel 02/01/2020 2/1/2019- TODAY    Colonoscopy 04/26/2026 4/26/2016          Follow up in about 1 year (around 12/16/2020). Total face-to-face time was 60 min, 50% of this was spent on counseling and coordination of care. >10min spent on smoking cessation counseling, referral placed.The following issues were discussed:  with migraines, HTN, HLD    Pearl Ellis MD/MPH  Internal Medicine  [facility]  [phone]

## 2019-12-16 NOTE — TELEPHONE ENCOUNTER
Please let patient know that her labs were normal except for her cholesterol. Given her young age and that her BP is well-controlled, she is still low risk for heart attack and stroke. We can continue monitoring her cholesterol levels    Her BP was controlled, so all BP meds were refilled. Her TSH (thyroid hormone) was normal, so levothyroxine was refilled at its current dose too. These meds were sent to [facility], but the verapamil was sent to [facility].    Please also perform the Hickman sleepiness scale and ask her to measure her neck circumference in inches. We need this info to order her CPAP.    Thanks,  KJ    Her risk score:  The 10-year ASCVD risk score (Jessikajared GRAYSON Jr., et al., 2013) is: 5.3%    Values used to calculate the score:      Age: 54 years      Sex: Female      Is Non- : No      Diabetic: No      Tobacco smoker: Yes      Systolic Blood Pressure: 122 mmHg      Is BP treated: Yes      HDL Cholesterol: 77 mg/dL      Total Cholesterol: 225 mg/dL

## 2019-12-17 ENCOUNTER — TELEPHONE (OUTPATIENT)
Dept: PRIMARY CARE CLINIC | Facility: CLINIC | Age: 54
End: 2019-12-17

## 2019-12-17 NOTE — TELEPHONE ENCOUNTER
Spoke to pt and she gave her neck size today, faxed form to [employer] [phone].

## 2019-12-18 ENCOUNTER — TELEPHONE (OUTPATIENT)
Dept: PRIMARY CARE CLINIC | Facility: CLINIC | Age: 54
End: 2019-12-18

## 2019-12-19 ENCOUNTER — TELEPHONE (OUTPATIENT)
Dept: NEUROLOGY | Facility: CLINIC | Age: 54
End: 2019-12-19

## 2019-12-19 NOTE — TELEPHONE ENCOUNTER
----- Message from Lyndsay Murray RN sent at 12/16/2019  5:44 PM CST -----      ----- Message -----  From: Pearl Ellis MD  Sent: 12/16/2019   7:55 AM CST  To: Jefferson Rodriguez Staff    Dr Paulino,  Can you assist with getting Ms Cameron on your schedule for injection of monoclonal Abs for migraine treatment? She continues to have migraines in spite of PRN pain/nause treatments, bilateral GONB, propanolol 80mg TID, nsaids. She does take fioricet.    Thanks,  Pearl Ellis MD/MPH  Internal Medicine  [facility]  [phone] [facility]

## 2019-12-23 ENCOUNTER — TELEPHONE (OUTPATIENT)
Dept: PRIMARY CARE CLINIC | Facility: CLINIC | Age: 54
End: 2019-12-23

## 2019-12-23 NOTE — TELEPHONE ENCOUNTER
----- Message from Reina Hirsch sent at 12/23/2019  1:35 PM CST -----  Hi  I will contact the pt with this referral  Thanks  Reina    ----- Message -----  From: Pearl Ellis MD  Sent: 12/16/2019   7:59 AM CST  To: Pharmacy Patient Assistance Team    Can you help Ms Cameron with the cost of her verapamil?    Thanks,  KJ

## 2019-12-24 ENCOUNTER — TELEPHONE (OUTPATIENT)
Dept: NEUROLOGY | Facility: CLINIC | Age: 54
End: 2019-12-24

## 2019-12-24 NOTE — TELEPHONE ENCOUNTER
----- Message from Lyndsay Murray RN sent at 12/16/2019  5:44 PM CST -----      ----- Message -----  From: Pearl Ellis MD  Sent: 12/16/2019   7:55 AM CST  To: Jefferson Rodriguez Staff    Dr Paulino,  Can you assist with getting Ms Cameron on your schedule for injection of monoclonal Abs for migraine treatment? She continues to have migraines in spite of PRN pain/nause treatments, bilateral GONB, propanolol 80mg TID, nsaids. She does take fioricet.    Thanks,  Pearl Ellis MD/MPH  Internal Medicine  [facility]  [phone] [facility]

## 2020-01-02 ENCOUNTER — HOSPITAL ENCOUNTER (EMERGENCY)
Facility: HOSPITAL | Age: 55
Discharge: HOME | End: 2020-01-02
Attending: EMERGENCY MEDICINE
Payer: OTHER MISCELLANEOUS

## 2020-01-02 ENCOUNTER — TELEPHONE (OUTPATIENT)
Dept: ORTHOPEDICS | Facility: CLINIC | Age: 55
End: 2020-01-02

## 2020-01-02 VITALS
TEMPERATURE: 98 F | DIASTOLIC BLOOD PRESSURE: 92 MMHG | BODY MASS INDEX: 36.37 KG/M2 | HEART RATE: 102 BPM | SYSTOLIC BLOOD PRESSURE: 171 MMHG | HEIGHT: 68 IN | OXYGEN SATURATION: 98 % | WEIGHT: 240 LBS | RESPIRATION RATE: 20 BRPM

## 2020-01-02 DIAGNOSIS — S82.62XA CLOSED FRACTURE OF DISTAL LATERAL MALLEOLUS OF LEFT FIBULA, INITIAL ENCOUNTER: Primary | ICD-10-CM

## 2020-01-02 DIAGNOSIS — M25.572 ACUTE LEFT ANKLE PAIN: ICD-10-CM

## 2020-01-02 PROCEDURE — 25000003 PHARM REV CODE 250: Performed by: PHYSICIAN ASSISTANT

## 2020-01-02 PROCEDURE — 99283 EMERGENCY DEPT VISIT LOW MDM: CPT | Mod: 25

## 2020-01-02 PROCEDURE — 99284 PR EMERGENCY DEPT VISIT,LEVEL IV: ICD-10-PCS | Mod: ,,, | Performed by: EMERGENCY MEDICINE

## 2020-01-02 PROCEDURE — 99284 EMERGENCY DEPT VISIT MOD MDM: CPT | Mod: ,,, | Performed by: EMERGENCY MEDICINE

## 2020-01-02 PROCEDURE — 29515 APPLICATION SHORT LEG SPLINT: CPT | Mod: LT

## 2020-01-02 RX ORDER — ACETAMINOPHEN 500 MG
1000 TABLET ORAL
Status: COMPLETED | OUTPATIENT
Start: 2020-01-02 | End: 2020-01-02

## 2020-01-02 RX ORDER — ONDANSETRON 8 MG/1
8 TABLET, ORALLY DISINTEGRATING ORAL
Status: COMPLETED | OUTPATIENT
Start: 2020-01-02 | End: 2020-01-02

## 2020-01-02 RX ORDER — HYDROCODONE BITARTRATE AND ACETAMINOPHEN 5; 325 MG/1; MG/1
1 TABLET ORAL EVERY 8 HOURS PRN
Qty: 6 TABLET | Refills: 0 | Status: SHIPPED | OUTPATIENT
Start: 2020-01-02 | End: 2020-12-16

## 2020-01-02 RX ORDER — ONDANSETRON HYDROCHLORIDE 8 MG/1
8 TABLET, FILM COATED ORAL EVERY 8 HOURS PRN
Qty: 8 TABLET | Refills: 0 | Status: SHIPPED | OUTPATIENT
Start: 2020-01-02 | End: 2020-12-16

## 2020-01-02 RX ORDER — IBUPROFEN 600 MG/1
600 TABLET ORAL
Status: COMPLETED | OUTPATIENT
Start: 2020-01-02 | End: 2020-01-02

## 2020-01-02 RX ADMIN — ACETAMINOPHEN 1000 MG: 500 TABLET ORAL at 09:01

## 2020-01-02 RX ADMIN — IBUPROFEN 600 MG: 600 TABLET, FILM COATED ORAL at 07:01

## 2020-01-02 RX ADMIN — ONDANSETRON 8 MG: 8 TABLET, ORALLY DISINTEGRATING ORAL at 09:01

## 2020-01-02 NOTE — ED NOTES
Ice pack applied in triage  
Ice pack applied to left ankle, left leg elevated at this time. Pt denies any further needs at this time. Will continue to monitor and update pt on plan of care.   
PA at bedside  
Patient arrived with Ice pack applied to left ankle.   
Pt tearful, pt states pain worsened and radiating to entire foot. PA and MD notified and with the patient.  
Slipped and fell in back of hospital by shuttle entrance.  Denies hitting head or LOC.  Left ankle pain; left ankle swelling noted.    
440

## 2020-01-02 NOTE — ED PROVIDER NOTES
Encounter Date: 1/2/2020       History     Chief Complaint   Patient presents with    Ankle Pain     slip/fall injury to left ankle     Patient is a 54-year-old female who presents the emergency department with complaints of left ankle pain and swelling after a trip and fall while coming to work this morning.  She reports inversion of the left ankle causing her to fall on her left side.  She denies any symptoms prior to the fall.  She denies head injury or loss consciousness.  She reports that she was able to bear weight immediately after the fall however it is very painful to do so.  She denies any issues with her ankle in the past.  She describes pain mostly along the posterior aspect of the lateral malleolus.  She denies any knee pain or pain along the 5th metatarsal.  Pain has been constant since onset.  She has not taken any medication for the pain. This is the extent of the patient's complaints at this time.        Review of patient's allergies indicates:   Allergen Reactions    Imitrex [sumatriptan] Shortness Of Breath    Codeine Nausea And Vomiting     Past Medical History:   Diagnosis Date    Adrenal adenoma     Allergic rhinitis     Allergy     Allergic to trees, weeds, mold and standardized mite    Anxiety     Eustachian tube dysfunction     Fibrocystic changes of left breast     12 o'clock L breast mass bx 3/7/11    Hypertension     Hypothyroidism     Migraine headache     Recurrent otitis media      Past Surgical History:   Procedure Laterality Date    BREAST BIOPSY Left 2010    breast core biopsy  3/7/11    fibrocystic changes    COLONOSCOPY N/A 4/15/2016    Procedure: COLONOSCOPY;  Surgeon: Coleman Moss MD;  Location: [facility] ([address] [place] [address]);  Service: Endoscopy;  Laterality: N/A;     Family History   Problem Relation Age of Onset    Heart disease Mother 79    Thyroid disease Mother     Hemochromatosis Father     Heart disease Maternal Grandmother     Lupus Sister     Thyroid  disease Sister      Social History     Tobacco Use    Smoking status: Current Every Day Smoker     Packs/day: 0.25     Years: 20.00     Pack years: 5.00     Types: Cigarettes    Smokeless tobacco: Current User   Substance Use Topics    Alcohol use: No     Alcohol/week: 0.0 standard drinks    Drug use: No     Review of Systems   Constitutional: Negative for chills and fever.   HENT: Negative for congestion.    Eyes: Negative for pain.   Respiratory: Negative for chest tightness and shortness of breath.    Cardiovascular: Negative for chest pain.   Gastrointestinal: Negative for abdominal pain.   Endocrine: Negative.    Genitourinary: Negative for dysuria.   Musculoskeletal: Positive for arthralgias and joint swelling.   Skin: Negative for wound.   Allergic/Immunologic: Negative.    Neurological: Negative for headaches.   Hematological: Negative.    Psychiatric/Behavioral: Negative.        Physical Exam     Initial Vitals [01/02/20 0711]   BP Pulse Resp Temp SpO2   (!) 171/92 102 20 97.7 °F (36.5 °C) 98 %      MAP       --         Physical Exam    Nursing note and vitals reviewed.  Constitutional: She appears well-developed and well-nourished. She is not diaphoretic. No distress.   HENT:   Head: Normocephalic and atraumatic.   Eyes: Conjunctivae and EOM are normal.   Neck: Normal range of motion. Neck supple.   Cardiovascular: Normal heart sounds.   Pulmonary/Chest: Breath sounds normal.   Abdominal: Soft. Bowel sounds are normal.   Musculoskeletal: Normal range of motion. She exhibits edema and tenderness.   Swelling noted to lateral malleolus.  Full range of motion of ankle.  Tenderness to palpation of lateral malleolus, greater on posterior aspect.  Negative tenderness to palpation of 5th metatarsal and proximal fibula.  Compartments are soft.  Neurovascularly intact.    Neurological: She is alert and oriented to person, place, and time. She has normal strength. No sensory deficit. GCS score is 15. GCS eye  subscore is 4. GCS verbal subscore is 5. GCS motor subscore is 6.   Skin: Skin is warm and dry. Capillary refill takes less than 2 seconds.   Psychiatric: She has a normal mood and affect. Her behavior is normal. Judgment and thought content normal.         ED Course   Procedures  Labs Reviewed - No data to display       Imaging Results          X-Ray Ankle Complete Left (Final result)  Result time 01/02/20 07:57:22    Final result by Tera Rosenthal MD (01/02/20 07:57:22)                 Impression:      Recent fracture of the tip of the left lateral malleolus, with a large amount of associated local soft tissue swelling noted.      Electronically signed by: Tera Rosenthal MD  Date:    01/02/2020  Time:    07:57             Narrative:    EXAMINATION:  XR ANKLE COMPLETE 3 VIEW LEFT    TECHNIQUE:  Three views of the left ankle were obtained, with AP, lateral, and oblique projections submitted.    COMPARISON:  Comparison is made to 09/11/2019.  Clinical information of trauma.    FINDINGS:  A large amount of soft tissue swelling about the lateral malleolus is observed, and there is a curvilinear separate ossific density in the soft tissues adjacent to the tip of the lateral malleolus.  This small separate ossific density was not present on 09/11/2019, and represents a recent fracture.  Visualized osseous structures are otherwise unremarkable, with no additional areas suspicious for recent fracture or other significant abnormality identified.  Tibiotalar joint space appears unremarkable.                                 Medical Decision Making:   History:   Old Medical Records: I decided to obtain old medical records.  Initial Assessment:   Urgent evaluation of a 54-year-old female who presents the emergency department left ankle pain after a trip and fall that occurred while she was on her way to work.  She reports inversion of the left ankle.  Patient is afebrile, hemodynamically stable, nontoxic appearing.  Will give  ibuprofen and order x-ray of left ankle.  Differential Diagnosis:   Differential diagnosis includes but is not limited to musculoskeletal pain, fracture, sprain, strain.  ED Management:  X-ray reveals fracture of the tip of the left lateral malleolus.  Will apply ace wrap, air splint, and give crutches.   Patient reports worsening of pain and swelling while in the emergency department. Will give tylenol and zofran.   Patient reports adverse reaction to codeine in the past. Unsure of which medication she took however she reports nausea and vomiting with this medication.  Will give patient a prescription for Norco and Zofran.  She is instructed to take Zofran prior to taken Norco if she feels as though her pain unbearable. All questions answered.  The patient was instructed to follow up with Orthopedics or to return to the emergency department for new or worsening symptoms. The treatment plan was discussed with the patient who demonstrated understanding and comfort with plan. The patient's history, physical exam, and plan of care was discussed with and agreed upon with my supervising physician.                                    Clinical Impression:     1. Closed fracture of distal lateral malleolus of left fibula, initial encounter    2. Acute left ankle pain            Disposition:   Disposition: Discharged  Condition: Stable                     Jennifer Zepeda PA-C  01/02/20 1608       Jennifer Zepeda PA-C  01/03/20 1210

## 2020-01-02 NOTE — TELEPHONE ENCOUNTER
----- Message from Brionna Mora LPN sent at 1/2/2020  9:28 AM CST -----      ----- Message -----  From: Diana Holden MA  Sent: 1/2/2020   9:12 AM CST  To: [facility] Ortho Triage    Good morning,    Maria G went to the ED this morning due to a fall and injured her left ankle. The ED told her to get in with Ortho on Monday for a left ankle fracture. If you could please review the images and contact Maria G at [phone] or you could contact me at my ext at [phone].    Thank you   Diana Mon

## 2020-01-02 NOTE — DISCHARGE INSTRUCTIONS
Please follow up with Orthopedics in one week. You have two prescriptions for pain medication and for medication to help with nausea. Please take zofran 30 minutes before taking Norco to help with nausea and vomiting.

## 2020-01-02 NOTE — TELEPHONE ENCOUNTER
Spoke with pt.    Advised that films have been reviewed by provider.  Pt scheduled with Evette Nelson PA-C in clinic on Monday 1/6/2020 at 845 am   Pt was given [place] in the ED.  Pt reports it makes her sick.  Pt reports she will take Motrin and Tylenol for pain control until seen Monday.   Pt will call our office should any further questions or concerns arise.

## 2020-01-03 ENCOUNTER — TELEPHONE (OUTPATIENT)
Dept: PRIMARY CARE CLINIC | Facility: CLINIC | Age: 55
End: 2020-01-03

## 2020-01-03 ENCOUNTER — PATIENT OUTREACH (OUTPATIENT)
Dept: ADMINISTRATIVE | Facility: OTHER | Age: 55
End: 2020-01-03

## 2020-01-03 NOTE — TELEPHONE ENCOUNTER
SAMUEL Zamora MD   Caller: Unspecified (Today, 10:19 AM)             No med boot is a vailable. Notified pt earlier.

## 2020-01-03 NOTE — TELEPHONE ENCOUNTER
Please check our clinic supplies for this too. Otherwise [facility] should have it.    Amanda- can you assist with finding this patient a medium sized cast boot by speaking with someone in [facility] and looking in their supply closet?    Thanks,  KJ

## 2020-01-03 NOTE — TELEPHONE ENCOUNTER
----- Message from Ju Anaya LPN sent at 1/3/2020  8:35 AM CST -----  Contact: Pt  Im going to [facility] to see if we have a med boot.    ----- Message -----  From: Danny Guardado  Sent: 1/3/2020   7:26 AM CST  To: Caleb Patel Staff    Pt called and said that she broke her ankle yesterday. They gave a boot and it's way too big.     Pt would you to call in a medium size boot cast.    Pt can be reached at [phone]

## 2020-01-06 ENCOUNTER — OFFICE VISIT (OUTPATIENT)
Dept: ORTHOPEDICS | Facility: CLINIC | Age: 55
End: 2020-01-06
Payer: OTHER MISCELLANEOUS

## 2020-01-06 VITALS
HEART RATE: 93 BPM | SYSTOLIC BLOOD PRESSURE: 126 MMHG | DIASTOLIC BLOOD PRESSURE: 89 MMHG | TEMPERATURE: 97 F | RESPIRATION RATE: 18 BRPM

## 2020-01-06 DIAGNOSIS — S93.409A SPRAIN OF ANKLE, UNSPECIFIED LATERALITY, UNSPECIFIED LIGAMENT, INITIAL ENCOUNTER: Primary | ICD-10-CM

## 2020-01-06 PROCEDURE — 99999 PR PBB SHADOW E&M-EST. PATIENT-LVL IV: CPT | Mod: PBBFAC,,, | Performed by: PHYSICIAN ASSISTANT

## 2020-01-06 PROCEDURE — 99214 PR OFFICE/OUTPT VISIT, EST, LEVL IV, 30-39 MIN: ICD-10-PCS | Mod: S$GLB,,, | Performed by: PHYSICIAN ASSISTANT

## 2020-01-06 PROCEDURE — 99214 OFFICE O/P EST MOD 30 MIN: CPT | Mod: S$GLB,,, | Performed by: PHYSICIAN ASSISTANT

## 2020-01-06 PROCEDURE — 99999 PR PBB SHADOW E&M-EST. PATIENT-LVL IV: ICD-10-PCS | Mod: PBBFAC,,, | Performed by: PHYSICIAN ASSISTANT

## 2020-01-07 ENCOUNTER — TELEPHONE (OUTPATIENT)
Dept: ORTHOPEDICS | Facility: CLINIC | Age: 55
End: 2020-01-07

## 2020-01-07 NOTE — PROGRESS NOTES
Subjective:      Patient ID: Maria G Cameron is a 54 y.o. female.    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  She was seen in the ED after the injury. RADS: Recent fracture of the tip of the left lateral malleolus. Patient has been treated in a short boot. She has been elevating and icing. She reports that over all her pain is controlled as long as she has it elevated. She has circumferential diffuse bruising. She denied numbness or tingling.      Review of Systems   Constitution: Negative for chills and fever.   Cardiovascular: Negative for chest pain.   Respiratory: Negative for cough and shortness of breath.    Skin: Negative for color change, dry skin, itching, nail changes, poor wound healing and rash.   Musculoskeletal:        Left ankle pain   Neurological: Negative for dizziness.   Psychiatric/Behavioral: Negative for altered mental status. The patient is not nervous/anxious.    All other systems reviewed and are negative.        Objective:      General    Constitutional: She is oriented to person, place, and time. She appears well-developed and well-nourished. No distress.   HENT:   Head: Atraumatic.   Eyes: Conjunctivae are normal.   Cardiovascular: Normal rate.    Pulmonary/Chest: Effort normal.   Neurological: She is alert and oriented to person, place, and time.   Psychiatric: She has a normal mood and affect. Her behavior is normal.         Left Ankle/Foot Exam     Inspection  Bruising: Ankle - present Foot - present    Range of Motion   The patient has normal left ankle ROM.     Other   Sensation: normal    Comments:  Diffuse TTP.           RADS: done 01/02/2020: Recent fracture of the tip of the left lateral malleolus, with a large amount of associated local soft tissue swelling noted.      Assessment:       Encounter Diagnosis   Name Primary?     Sprain of ankle, unspecified laterality, unspecified ligament, initial encounter Yes          Plan:       Discussed the plan with the patient. At this time he will continue a tall CAM boot and is weight bearing as tolerated with in limits of pain. range of motion as tolerated Order written for knee scoter and she was provided with a walker. She will elevate and ice to reduce swelling and pain. She is to return to clinic in 2 weeks at that time x-ray of her ankle is needed standing, consider referral to PT.

## 2020-01-07 NOTE — TELEPHONE ENCOUNTER
----- Message from Gurpreet Barbour sent at 1/7/2020  7:10 AM CST -----  Contact: Pt  Pt would like to be called back asap regarding requesting a letter for [place] and a letter for [facility] garage parking.    Pt can be reached at [phone].    Thanks

## 2020-01-21 ENCOUNTER — PATIENT OUTREACH (OUTPATIENT)
Dept: ADMINISTRATIVE | Facility: OTHER | Age: 55
End: 2020-01-21

## 2020-01-23 ENCOUNTER — OFFICE VISIT (OUTPATIENT)
Dept: ORTHOPEDICS | Facility: CLINIC | Age: 55
End: 2020-01-23
Payer: OTHER MISCELLANEOUS

## 2020-01-23 ENCOUNTER — HOSPITAL ENCOUNTER (OUTPATIENT)
Dept: RADIOLOGY | Facility: HOSPITAL | Age: 55
Discharge: HOME | End: 2020-01-23
Attending: PHYSICIAN ASSISTANT
Payer: COMMERCIAL

## 2020-01-23 DIAGNOSIS — S93.409A SPRAIN OF ANKLE, UNSPECIFIED LATERALITY, UNSPECIFIED LIGAMENT, INITIAL ENCOUNTER: ICD-10-CM

## 2020-01-23 DIAGNOSIS — S93.402D SPRAIN OF LEFT ANKLE, UNSPECIFIED LIGAMENT, SUBSEQUENT ENCOUNTER: Primary | ICD-10-CM

## 2020-01-23 PROCEDURE — 99213 PR OFFICE/OUTPT VISIT, EST, LEVL III, 20-29 MIN: ICD-10-PCS | Mod: S$GLB,,, | Performed by: PHYSICIAN ASSISTANT

## 2020-01-23 PROCEDURE — 99999 PR PBB SHADOW E&M-EST. PATIENT-LVL IV: ICD-10-PCS | Mod: PBBFAC,,, | Performed by: PHYSICIAN ASSISTANT

## 2020-01-23 PROCEDURE — 73610 XR ANKLE COMPLETE 3 VIEW LEFT: ICD-10-PCS | Mod: 26,LT,, | Performed by: RADIOLOGY

## 2020-01-23 PROCEDURE — 73610 X-RAY EXAM OF ANKLE: CPT | Mod: TC,LT

## 2020-01-23 PROCEDURE — 99999 PR PBB SHADOW E&M-EST. PATIENT-LVL IV: CPT | Mod: PBBFAC,,, | Performed by: PHYSICIAN ASSISTANT

## 2020-01-23 PROCEDURE — 73610 X-RAY EXAM OF ANKLE: CPT | Mod: 26,LT,, | Performed by: RADIOLOGY

## 2020-01-23 PROCEDURE — 99213 OFFICE O/P EST LOW 20 MIN: CPT | Mod: S$GLB,,, | Performed by: PHYSICIAN ASSISTANT

## 2020-01-23 NOTE — PROGRESS NOTES
Subjective:      Patient ID: Maria G Cameron is a 54 y.o. female.    Chief Complaint: Follow-up (left ankle)    Follow-up   Pertinent negatives include no chest pain, chills, coughing, fever or rash.       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  She was seen in the ED after the injury. RADS: Recent fracture of the tip of the left lateral malleolus. Patient has been treated in a  boot. She has been elevating and icing. She reports that over all her pain is controlled as long as she has it elevated.  She stated that it has decreased since initial onset.  She has circumferential diffuse bruising which is resolving. She denied numbness or tingling.      Review of Systems   Constitution: Negative for chills and fever.   Cardiovascular: Negative for chest pain.   Respiratory: Negative for cough and shortness of breath.    Skin: Negative for color change, dry skin, itching, nail changes, poor wound healing and rash.   Musculoskeletal:        Left ankle pain   Neurological: Negative for dizziness.   Psychiatric/Behavioral: Negative for altered mental status. The patient is not nervous/anxious.    All other systems reviewed and are negative.        Objective:      General    Constitutional: She is oriented to person, place, and time. She appears well-developed and well-nourished. No distress.   HENT:   Head: Atraumatic.   Eyes: Conjunctivae are normal.   Cardiovascular: Normal rate.    Pulmonary/Chest: Effort normal.   Neurological: She is alert and oriented to person, place, and time.   Psychiatric: She has a normal mood and affect. Her behavior is normal.         Left Ankle/Foot Exam     Inspection  Bruising: Ankle - present Foot - present    Range of Motion   The patient has normal left ankle ROM.     Other   Sensation: normal    Comments:  Diffuse TTP.           RADS: reviewed by myself:  Ankle mortise is preserved.  A small avulsion  fracture from the tip of the fibula is again seen.  It appears to be similar in position and alignment to previous exam.  Degree of soft tissue swelling has decreased.  No other significant findings.      Assessment:       Encounter Diagnosis   Name Primary?    Sprain of left ankle, unspecified ligament, subsequent encounter Yes          Plan:       Discussed the plan with the patient. At this time he will continue a tall CAM boot, but will begin to wean and is weight bearing as tolerated with in limits of pain. range of motion as tolerated  She will elevate and ice to reduce swelling and pain. Order placed for PT.  Patient is to make appointment herself.  She is to return to clinic in 6 weeks at that time x-ray of her ankle is needed standing,     She is ester to return to work, light duty

## 2020-02-05 ENCOUNTER — TELEPHONE (OUTPATIENT)
Dept: PRIMARY CARE CLINIC | Facility: CLINIC | Age: 55
End: 2020-02-05

## 2020-02-05 NOTE — TELEPHONE ENCOUNTER
Please see if patient is still interested in pursuing the home sleep study. [employer] was unable to reach her.    Thanks,  KJ

## 2020-02-10 ENCOUNTER — TELEPHONE (OUTPATIENT)
Dept: ORTHOPEDICS | Facility: CLINIC | Age: 55
End: 2020-02-10

## 2020-02-10 NOTE — TELEPHONE ENCOUNTER
Evette Nelson PA-C spoke with pt with instructions to re-apply the CAM boot, elevate, ice and rest the leg when swelling, pain and discoloration returns.

## 2020-02-10 NOTE — TELEPHONE ENCOUNTER
----- Message from Alana Denise sent at 2/10/2020 10:03 AM CST -----  Contact: pt   Please call pt at [phone]    Patient is having left ankle swelling, bruising and painful    Thank you

## 2020-02-11 NOTE — TELEPHONE ENCOUNTER
Please follow-up with this message about home sleep study which was sent last week:     Please see if patient is still interested in pursuing the home sleep study. [employer] was unable to reach her.     Thanks,  KJ

## 2020-02-27 ENCOUNTER — CLINICAL SUPPORT (OUTPATIENT)
Dept: REHABILITATION | Facility: HOSPITAL | Age: 55
End: 2020-02-27
Payer: COMMERCIAL

## 2020-02-27 DIAGNOSIS — M25.572 ACUTE LEFT ANKLE PAIN: ICD-10-CM

## 2020-02-27 DIAGNOSIS — M25.672 DECREASED RANGE OF MOTION OF LEFT ANKLE: ICD-10-CM

## 2020-02-27 DIAGNOSIS — R29.898 ANKLE WEAKNESS: ICD-10-CM

## 2020-02-27 PROBLEM — M25.673 DECREASED ROM OF ANKLE: Status: ACTIVE | Noted: 2020-02-27

## 2020-02-27 PROCEDURE — 97161 PT EVAL LOW COMPLEX 20 MIN: CPT

## 2020-02-27 NOTE — PLAN OF CARE
[facility]  Physical Therapy Initial Evaluation    Name: Maria G Cameron  Clinic Number: [id number]    Therapy Diagnosis:   Encounter Diagnoses   Name Primary?    Acute left ankle pain     Decreased range of motion of left ankle     Ankle weakness      Physician: Evette Neslon PA-C    Physician Orders: PT Eval and Treat   Medical Diagnosis from Referral:   S93.402D (ICD-10-CM) - Sprain of left ankle, unspecified ligament, subsequent encounter  Evaluation Date: 2/27/2020  Authorization Period Expiration: 1/5/2021  Plan of Care Expiration: 5/08/2020  Visit # / Visits authorized: 1/ 20     Time In: 406  Time Out: 445  Total Billable Time: 0 minutes    Precautions: Standard    Subjective   Date of onset: Jan 2nd  History of current condition - Maria G reports: walking into a building and twisted her ankle. Patient reports she went to the emergency department immediately after she fell. Patient reports receiving xrays and found out she had a fracture of her left ankle. Patient reports she was non weight bearing for 3 weeks and then started walking weight bearing as tolerated in a walking boot. Patient reports she has been walking without her boot for 2 weeks. Patient reports she has an appointment on March 5th to get more xrays for her ankle.      Medical History:   Past Medical History:   Diagnosis Date    Adrenal adenoma     Allergic rhinitis     Allergy     Allergic to trees, weeds, mold and standardized mite    Anxiety     Eustachian tube dysfunction     Fibrocystic changes of left breast     12 o'clock L breast mass bx 3/7/11    Hypertension     Hypothyroidism     Migraine headache     Recurrent otitis media        Surgical History:   Maria G Cameron  has a past surgical history that includes breast core biopsy (3/7/11); Colonoscopy (N/A, 4/15/2016); and Breast biopsy (Left, 2010).    Medications:   Maria G has a current medication list which includes the following  prescription(s): bupropion, butalbital-acetaminophen-caffeine -40 mg, cholecalciferol (vitamin d3), enalapril, hydrochlorothiazide, hydrocodone-acetaminophen, levothyroxine, ondansetron, promethazine, and verapamil.    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  Prior Level of Function: Independent   Current Level of Function: Independent     Pain:  Current 0/10, worst 3/10, best 0/10   - Worst pain is from walking  Location: left ankle   Description: Aching  Aggravating Factors: Walking  Easing Factors: ice    Pts goals: to be able to walk without pain    Objective          Ankle Right Right Left Left Left Pain/Dysfunction with Movement    AROM MMT AROM PROM MMT    Plantarflexion 75 5/5 50 NT NT    Dorsiflexion 5 5/5 -10 NT NT    Inversion 40 5/5 5 NT NT Increased pain on L   Eversion 40 5/5 32 NT NT Increased pain on L     Left PROM and MMT not tested as of today. Awaiting xray results on March 5th      Single leg balance   5 seconds on R  2 seconds on L      Calf Raises   - Needs UE support       CMS Impairment/Limitation/Restriction for FOTO ankle Survey    Therapist reviewed FOTO scores for Maria G Cameron on 2/27/2020.   FOTO documents entered into [employer] - see Media section.    Limitation Score: 46%  Category: Mobility             TREATMENT     For next visit, decide on treatment once xrays are completed.       Home Exercises and Patient Education Provided    Education provided:   - Purpose of PT     Assessment   Maria G is a 54 y.o. female referred to outpatient Physical Therapy with a medical diagnosis of sprain of L ankle. Pt presents with increased pain and decreased active range of motion. PT did not assess passive ROM or strength secondary to healing status of L  ankle. Patients strength and PROM will be fully assessed next visit once xrays are completed to show healing status of L ankle.     Pt prognosis is Good.   Pt will benefit from skilled outpatient Physical Therapy to address the deficits stated above and in the chart below, provide pt/family education, and to maximize pt's level of independence.     Plan of care discussed with patient: Yes  Pt's spiritual, cultural and educational needs considered and patient is agreeable to the plan of care and goals as stated below:     Anticipated Barriers for therapy: None    Medical Necessity is demonstrated by the following  History  Co-morbidities and personal factors that may impact the plan of care Co-morbidities:   anxiety and HTN    Personal Factors:   no deficits     moderate   Examination  Body Structures and Functions, activity limitations and participation restrictions that may impact the plan of care Body Regions:   ankle    Body Systems:    ROM  strength  balance    Participation Restrictions:   None    Activity limitations:   Learning and applying knowledge  no deficits    General Tasks and Commands  no deficits    Communication  no deficits    Mobility  no deficits    Self care  no deficits    Domestic Life  no deficits    Interactions/Relationships  no deficits    Life Areas  no deficits    Community and Social Life  no deficits         high   Clinical Presentation stable and uncomplicated low   Decision Making/ Complexity Score: low     Goals:  Short Term Goals (3 Weeks):  1. Pt will be compliant with HEP to supplement PT in decreasing pain with functional mobility.    Long Term Goals (6 Weeks):   1. Pt will improve FOTO score to </= 30% limited to decrease perceived limitation with maintaining/changing body position.   2. Pt will perform 15 body weight single leg calf raises to improve ankle strength  3. Pt will improve impaired L ankle MMTs to = R ankle to improve strength for functional tasks.  4. Pt will  report no pain during ankle ROM to promote functional mobility.  5. Pt will improve R ankle AROM by 10-15 degrees in all planes of motion      Plan   Plan of care Certification: 2/27/2020 to 5/08/2020.    Outpatient Physical Therapy 2 times weekly for 10 weeks to include the following interventions: Manual Therapy, Moist Heat/ Ice, Neuromuscular Re-ed, Patient Education, Self Care, Therapeutic Activites and Therapeutic Exercise.     Raymond Archuleta, PT         03/03/2020

## 2020-03-04 ENCOUNTER — PATIENT OUTREACH (OUTPATIENT)
Dept: ADMINISTRATIVE | Facility: OTHER | Age: 55
End: 2020-03-04

## 2020-03-05 ENCOUNTER — HOSPITAL ENCOUNTER (OUTPATIENT)
Dept: RADIOLOGY | Facility: HOSPITAL | Age: 55
Discharge: HOME | End: 2020-03-05
Attending: PHYSICIAN ASSISTANT
Payer: COMMERCIAL

## 2020-03-05 ENCOUNTER — OFFICE VISIT (OUTPATIENT)
Dept: ORTHOPEDICS | Facility: CLINIC | Age: 55
End: 2020-03-05
Payer: OTHER MISCELLANEOUS

## 2020-03-05 VITALS — WEIGHT: 240.06 LBS | BODY MASS INDEX: 36.38 KG/M2 | HEIGHT: 68 IN

## 2020-03-05 DIAGNOSIS — S93.402D SPRAIN OF LEFT ANKLE, UNSPECIFIED LIGAMENT, SUBSEQUENT ENCOUNTER: Primary | ICD-10-CM

## 2020-03-05 DIAGNOSIS — S93.402D SPRAIN OF LEFT ANKLE, UNSPECIFIED LIGAMENT, SUBSEQUENT ENCOUNTER: ICD-10-CM

## 2020-03-05 PROCEDURE — 73610 XR ANKLE COMPLETE 3 VIEW LEFT: ICD-10-PCS | Mod: 26,LT,, | Performed by: RADIOLOGY

## 2020-03-05 PROCEDURE — 73610 X-RAY EXAM OF ANKLE: CPT | Mod: TC,LT

## 2020-03-05 PROCEDURE — 99999 PR PBB SHADOW E&M-EST. PATIENT-LVL IV: ICD-10-PCS | Mod: PBBFAC,,, | Performed by: PHYSICIAN ASSISTANT

## 2020-03-05 PROCEDURE — 99213 PR OFFICE/OUTPT VISIT, EST, LEVL III, 20-29 MIN: ICD-10-PCS | Mod: S$GLB,,, | Performed by: PHYSICIAN ASSISTANT

## 2020-03-05 PROCEDURE — 73610 X-RAY EXAM OF ANKLE: CPT | Mod: 26,LT,, | Performed by: RADIOLOGY

## 2020-03-05 PROCEDURE — 99213 OFFICE O/P EST LOW 20 MIN: CPT | Mod: S$GLB,,, | Performed by: PHYSICIAN ASSISTANT

## 2020-03-05 PROCEDURE — 99999 PR PBB SHADOW E&M-EST. PATIENT-LVL IV: CPT | Mod: PBBFAC,,, | Performed by: PHYSICIAN ASSISTANT

## 2020-03-06 ENCOUNTER — TELEPHONE (OUTPATIENT)
Dept: ORTHOPEDICS | Facility: CLINIC | Age: 55
End: 2020-03-06

## 2020-03-06 NOTE — PROGRESS NOTES
Subjective:      Patient ID: Maria G Cameron is a 54 y.o. female.    Chief Complaint: Follow-up (left ankle)    Follow-up   Pertinent negatives include no chest pain, chills, coughing, fever or rash.       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  She was seen in the ED after the injury. RADS: Recent fracture of the tip of the left lateral malleolus. Patient has been treated nonoperatively. She is in a regular shoe.  She stated that if she wears flats she is ok, but if she wears a tennis which helps her planta rfasciits she has increased swelling.  She has been elevating and icing. She reports that over all her pain is controlled. She denied numbness or tingling.      Review of Systems   Constitution: Negative for chills and fever.   Cardiovascular: Negative for chest pain.   Respiratory: Negative for cough and shortness of breath.    Skin: Negative for color change, dry skin, itching, nail changes, poor wound healing and rash.   Musculoskeletal:        Left ankle pain   Neurological: Negative for dizziness.   Psychiatric/Behavioral: Negative for altered mental status. The patient is not nervous/anxious.    All other systems reviewed and are negative.        Objective:      General    Constitutional: She is oriented to person, place, and time. She appears well-developed and well-nourished. No distress.   HENT:   Head: Atraumatic.   Eyes: Conjunctivae are normal.   Cardiovascular: Normal rate.    Pulmonary/Chest: Effort normal.   Neurological: She is alert and oriented to person, place, and time.   Psychiatric: She has a normal mood and affect. Her behavior is normal.         Left Ankle/Foot Exam     Inspection  Bruising: Ankle - absent Foot - absent    Tenderness   The patient is tender to palpation of the ATF, lateral malleolus and medial malleolus.    Range of Motion   The patient has normal left ankle ROM.     Muscle Strength   The  patient has normal left ankle strength.    Other   Sensation: normal          RADS: reviewed by myself:  There is a small chip fracture of the distal fibula.  There is minimal displacement.  There is a joint effusion.  There is DJD and spurs on the calcaneus      Assessment:       Encounter Diagnosis   Name Primary?    Sprain of left ankle, unspecified ligament, subsequent encounter Yes          Plan:       Discussed the plan with the patient. At this time she will attend formal PT.  range of motion as tolerated; WBAT  She will use compression elevate and ice to reduce swelling and pain. Order placed for PT.  Patient is to make appointment herself.  She is to follow up with occupational medicine.      She is ester to return to work, light duty

## 2020-03-14 ENCOUNTER — HOSPITAL ENCOUNTER (OUTPATIENT)
Dept: RADIOLOGY | Facility: HOSPITAL | Age: 55
Discharge: HOME | End: 2020-03-14
Attending: INTERNAL MEDICINE
Payer: COMMERCIAL

## 2020-03-14 DIAGNOSIS — Z12.39 BREAST CANCER SCREENING: ICD-10-CM

## 2020-03-16 ENCOUNTER — CLINICAL SUPPORT (OUTPATIENT)
Dept: REHABILITATION | Facility: HOSPITAL | Age: 55
End: 2020-03-16
Payer: OTHER MISCELLANEOUS

## 2020-03-16 ENCOUNTER — TELEPHONE (OUTPATIENT)
Dept: PRIMARY CARE CLINIC | Facility: CLINIC | Age: 55
End: 2020-03-16

## 2020-03-16 DIAGNOSIS — R29.898 ANKLE WEAKNESS: ICD-10-CM

## 2020-03-16 DIAGNOSIS — M25.672 DECREASED RANGE OF MOTION OF LEFT ANKLE: ICD-10-CM

## 2020-03-16 DIAGNOSIS — M25.572 ACUTE LEFT ANKLE PAIN: ICD-10-CM

## 2020-03-16 PROCEDURE — 97140 MANUAL THERAPY 1/> REGIONS: CPT

## 2020-03-16 PROCEDURE — 97110 THERAPEUTIC EXERCISES: CPT

## 2020-03-16 NOTE — TELEPHONE ENCOUNTER
----- Message from RT Jorge sent at 3/14/2020  9:51 AM CDT -----  Regarding: Patient needs a diagnostic mammogram  Dr. Werner,    Ms. Cameron came in today for a screening mammogram. She had a complaint of focal LEFT breast pain and hard areas in the medial aspect of her breast around 9 o'clock and the lateral aspect of her breast around 3 o'clock. Patient was informed that an order from her provider is needed before she can be scheduled at [facility] for a BILATERAL Diagnostic mammogram and a LEFT breast ultrasound limited. Patient stated that she had not informed you of these findings and I told the patient to reach out to you on Monday.    Miranda Gutiérrez RT(R)

## 2020-03-16 NOTE — PROGRESS NOTES
Physical Therapy Daily Treatment Note     Name: Maria G Cameron  Clinic Number: [id number]    Therapy Diagnosis:   Encounter Diagnoses   Name Primary?    Acute left ankle pain     Decreased range of motion of left ankle     Ankle weakness      Physician: Evette Nelson PA-C    Visit Date: 3/16/2020    Physician Orders: PT Eval and Treat   Medical Diagnosis from Referral:   S93.402D (ICD-10-CM) - Sprain of left ankle, unspecified ligament, subsequent encounter  Evaluation Date: 2/27/2020  Authorization Period Expiration: 1/5/2021  Plan of Care Expiration: 5/08/2020  Visit # / Visits authorized: 2/ 20        Time In: 700  Time Out: 754  Total Billable Time: 54 minutes    Precautions: Standard    Subjective     Pt reports: her ankle has been feeling good. She stated she saw the MD who did xrays which were good.  She was compliant with home exercise program.  Response to previous treatment: The patient reported she felt good following the initial evaluation.  Functional change: reduced pain    Pain: 0/10  Location: left ankles     Objective     Maria G received therapeutic exercises to develop strength, endurance, ROM and flexibility for 46 minutes including:  Bike x5'  Ankle PF/DF and Inv/Ev AROM x30 ea  Ankle 4-way with RTB 3x10 L   Towel crunch 2x1'  Towel inv/ev 2x1'   Seated heel raise/toe raise 2x15 B      Maria G received the following manual therapy techniques: Myofacial release and Soft tissue Mobilization were applied to the: L ankle for 8 minutes, including: L ankle edema reduction, plantar fascia stm/mfr, L lateral ankle stm/mfr.    Maria G received cold pack for 10 minutes to L ankle.      Home Exercises Provided and Patient Education Provided     Education provided:   - The patient was educated to perform ankle pumps and ROM while seated at her desk at work to assist with edema management.    Written Home Exercises Provided: Patient instructed to cont prior HEP.  Exercises were reviewed and Maria G was able  to demonstrate them prior to the end of the session.  Maria G demonstrated good  understanding of the education provided.     See EMR under Patient Instructions for exercises provided prior visit.    Assessment     The patient reported some slight tenderness along the L mid plantar fascia with moderate restriction noted. She was able to perform the advanced exercises well today with some slight discomfort with heel raises and toe raises.   Maria G is progressing well towards her goals.   Pt prognosis is Good.     Pt will continue to benefit from skilled outpatient physical therapy to address the deficits listed in the problem list box on initial evaluation, provide pt/family education and to maximize pt's level of independence in the home and community environment.     Pt's spiritual, cultural and educational needs considered and pt agreeable to plan of care and goals.     Anticipated barriers to physical therapy: none at this time      Goals:   Short Term Goals (3 Weeks):  1. Pt will be compliant with HEP to supplement PT in decreasing pain with functional mobility.     Long Term Goals (6 Weeks):   1. Pt will improve FOTO score to </= 30% limited to decrease perceived limitation with maintaining/changing body position.   2. Pt will perform 15 body weight single leg calf raises to improve ankle strength  3. Pt will improve impaired L ankle MMTs to = R ankle to improve strength for functional tasks.  4. Pt will report no pain during ankle ROM to promote functional mobility.  5. Pt will improve R ankle AROM by 10-15 degrees in all planes of motion         Plan   Progress POC as tolerated by the patient with focus on dynamic ankle strength and stability.     Kelsie Sierra, PT

## 2020-03-20 ENCOUNTER — TELEPHONE (OUTPATIENT)
Dept: PRIMARY CARE CLINIC | Facility: CLINIC | Age: 55
End: 2020-03-20

## 2020-03-20 DIAGNOSIS — N63.0 BREAST MASS IN FEMALE: Primary | ICD-10-CM

## 2020-03-20 NOTE — TELEPHONE ENCOUNTER
Miranda- Patient has not reached out to me for these orders. I placed them as requested (bilateral diagnostic mammo, and L breast US). Please schedule at [facility].    Pura- please let patient know that I was informed of her abnormal mammo and order the diagnostic mammo that she needs. Someone will reach out to her.    Thanks,  KJ

## 2020-03-20 NOTE — TELEPHONE ENCOUNTER
----- Message from RT Jorge sent at 3/14/2020  9:51 AM CDT -----  Regarding: Patient needs a diagnostic mammogram  Dr. Werner,    Ms. Cameron came in today for a screening mammogram. She had a complaint of focal LEFT breast pain and hard areas in the medial aspect of her breast around 9 o'clock and the lateral aspect of her breast around 3 o'clock. Patient was informed that an order from her provider is needed before she can be scheduled at [facility] for a BILATERAL Diagnostic mammogram and a LEFT breast ultrasound limited. Patient stated that she had not informed you of these findings and I told the patient to reach out to you on Monday.    Miranda Gutiérrez RT(R)

## 2020-03-24 ENCOUNTER — CLINICAL SUPPORT (OUTPATIENT)
Dept: REHABILITATION | Facility: HOSPITAL | Age: 55
End: 2020-03-24
Payer: OTHER MISCELLANEOUS

## 2020-03-24 DIAGNOSIS — R29.898 ANKLE WEAKNESS: ICD-10-CM

## 2020-03-24 DIAGNOSIS — M25.672 DECREASED RANGE OF MOTION OF LEFT ANKLE: ICD-10-CM

## 2020-03-24 DIAGNOSIS — M25.572 ACUTE LEFT ANKLE PAIN: ICD-10-CM

## 2020-03-24 PROCEDURE — 97110 THERAPEUTIC EXERCISES: CPT

## 2020-03-24 NOTE — PROGRESS NOTES
"  Physical Therapy Daily Treatment Note     Name: Maria G Patel [facility]  Clinic Number: [id number]    Therapy Diagnosis:   Encounter Diagnoses   Name Primary?    Acute left ankle pain     Decreased range of motion of left ankle     Ankle weakness      Physician: Evette eNlson PA-C    Visit Date: 3/24/2020    Physician Orders: PT Eval and Treat   Medical Diagnosis from Referral:   S93.402D (ICD-10-CM) - Sprain of left ankle, unspecified ligament, subsequent encounter  Evaluation Date: 2/27/2020  Authorization Period Expiration: 1/5/2021  Plan of Care Expiration: 5/08/2020  Visit # / Visits authorized: 3/ 20        Time In: 2:50 pm  Time Out: 3:45 pm  Total Billable Time: 45 minutes    Precautions: Standard    Subjective     Pt reports: her ankle has been doing better; is a little tight and swollen at end of day  She was compliant with home exercise program.  Response to previous treatment: no adverse effects  Functional change: reduced pain    Pain: 0/10  Location: left ankles     Objective     Maria G received therapeutic exercises to develop strength, endurance, ROM and flexibility for 45 minutes including:    Bike x5'  Ankle PF/DF and Inv/Ev AROM x30 ea  Ankle 4-way with RTB 3x10 L   Towel crunch 2x1'  Towel inv/ev 2x1'   BAPS PF/DF/INV/EV 20x  BAPS CW/CCW 20x  Standing heel raises 2x10  SLS LLE 30" 3x  Step up and over L3 20x  Standing INV/EV 1/2 roll 20x  Standing PF/DF 1/2 roll 20x          Maria G received cold pack for 10 minutes to L ankle.      Home Exercises Provided and Patient Education Provided     Education provided:   - The patient was educated to perform ankle pumps and ROM while seated at her desk at work to assist with edema management.    Written Home Exercises Provided: Patient instructed to cont prior HEP.  Exercises were reviewed and Maria G was able to demonstrate them prior to the end of the session.  Maria G demonstrated good  understanding of the education provided.     See EMR under Patient " Instructions for exercises provided prior visit.    Assessment     Patient tolerated today's therapy session very well and was able to progress to more standing and dynamic exercises with min difficulty. Pt had most difficulty with step downs on L3 step. She required 1 HHA with this and reports min discomfort toward end of exercise.   Maria G is progressing well towards her goals.   Pt prognosis is Good.     Pt will continue to benefit from skilled outpatient physical therapy to address the deficits listed in the problem list box on initial evaluation, provide pt/family education and to maximize pt's level of independence in the home and community environment.     Pt's spiritual, cultural and educational needs considered and pt agreeable to plan of care and goals.     Anticipated barriers to physical therapy: none at this time      Goals:   Short Term Goals (3 Weeks):  1. Pt will be compliant with HEP to supplement PT in decreasing pain with functional mobility.     Long Term Goals (6 Weeks):   1. Pt will improve FOTO score to </= 30% limited to decrease perceived limitation with maintaining/changing body position.   2. Pt will perform 15 body weight single leg calf raises to improve ankle strength  3. Pt will improve impaired L ankle MMTs to = R ankle to improve strength for functional tasks.  4. Pt will report no pain during ankle ROM to promote functional mobility.  5. Pt will improve R ankle AROM by 10-15 degrees in all planes of motion         Plan   Progress POC as tolerated by the patient with focus on dynamic ankle strength and stability.     Maya Aquino, PT

## 2020-03-31 ENCOUNTER — DOCUMENTATION ONLY (OUTPATIENT)
Dept: REHABILITATION | Facility: HOSPITAL | Age: 55
End: 2020-03-31

## 2020-03-31 NOTE — PROGRESS NOTES
Called pt and discussed if pt wanted clinic or tele health PT services, but pt wanted to cancel all PT appointments. She has her HEP at home and will continue to perform.

## 2020-04-21 DIAGNOSIS — Z01.84 ANTIBODY RESPONSE EXAMINATION: ICD-10-CM

## 2020-05-08 ENCOUNTER — IMMUNIZATION (OUTPATIENT)
Dept: PHARMACY | Facility: CLINIC | Age: 55
End: 2020-05-08
Payer: COMMERCIAL

## 2020-05-21 DIAGNOSIS — Z01.84 ANTIBODY RESPONSE EXAMINATION: ICD-10-CM

## 2020-06-03 ENCOUNTER — HOSPITAL ENCOUNTER (OUTPATIENT)
Dept: RADIOLOGY | Facility: HOSPITAL | Age: 55
Discharge: HOME | End: 2020-06-03
Attending: INTERNAL MEDICINE
Payer: COMMERCIAL

## 2020-06-03 ENCOUNTER — TELEPHONE (OUTPATIENT)
Dept: PRIMARY CARE CLINIC | Facility: CLINIC | Age: 55
End: 2020-06-03

## 2020-06-03 PROCEDURE — 77067 SCR MAMMO BI INCL CAD: CPT | Mod: 26,,, | Performed by: RADIOLOGY

## 2020-06-03 PROCEDURE — 77067 SCR MAMMO BI INCL CAD: CPT | Mod: TC

## 2020-06-03 PROCEDURE — 77067 MAMMO DIGITAL SCREENING BILAT WITH TOMOSYNTHESIS_CAD: ICD-10-PCS | Mod: 26,,, | Performed by: RADIOLOGY

## 2020-06-03 PROCEDURE — 77063 BREAST TOMOSYNTHESIS BI: CPT | Mod: 26,,, | Performed by: RADIOLOGY

## 2020-06-03 PROCEDURE — 77063 MAMMO DIGITAL SCREENING BILAT WITH TOMOSYNTHESIS_CAD: ICD-10-PCS | Mod: 26,,, | Performed by: RADIOLOGY

## 2020-06-03 NOTE — TELEPHONE ENCOUNTER
----- Message from Pearl Ellis MD sent at 6/3/2020  1:59 PM CDT -----  Please let patient know that her mammogram was normal. The next is due in one year

## 2020-06-08 ENCOUNTER — PATIENT OUTREACH (OUTPATIENT)
Dept: ADMINISTRATIVE | Facility: OTHER | Age: 55
End: 2020-06-08

## 2020-06-10 ENCOUNTER — TELEPHONE (OUTPATIENT)
Dept: OBSTETRICS AND GYNECOLOGY | Facility: CLINIC | Age: 55
End: 2020-06-10

## 2020-06-10 NOTE — TELEPHONE ENCOUNTER
----- Message from Libby Ng sent at 6/10/2020  6:10 AM CDT -----  Contact: self  Type:  Cancel Appointment Request      Name of Caller:patient states unable to make appointment scheduled for this morning.  Patient will call to reschedule  When is the first available appointment?  Symptoms:  Best Call Back Number:  Additional Information:

## 2020-06-20 DIAGNOSIS — Z01.84 ANTIBODY RESPONSE EXAMINATION: ICD-10-CM

## 2020-07-06 ENCOUNTER — TELEPHONE (OUTPATIENT)
Dept: PRIMARY CARE CLINIC | Facility: CLINIC | Age: 55
End: 2020-07-06

## 2020-07-06 DIAGNOSIS — Z20.822 CLOSE EXPOSURE TO COVID-19 VIRUS: Primary | ICD-10-CM

## 2020-07-06 NOTE — TELEPHONE ENCOUNTER
COVID testing ordered, please schedule.    When is patient going to see me in clinic again?? We can do telemedicine.    Thanks,  KEIRA

## 2020-07-06 NOTE — TELEPHONE ENCOUNTER
----- Message from Rich Garcia sent at 7/6/2020  2:44 PM CDT -----  Regarding: Needing Covid Testing  Pt would like to be called back regarding ordering covid testing for FMLA.    Pt can be reached at [phone].    Thank You.

## 2020-07-07 NOTE — TELEPHONE ENCOUNTER
Spoke with pt, she scheduled FMLA for 2 months, she will be gone until October. Pt will call for appt with you when she returns.    Scheduled covid test for tomorrow.

## 2020-07-08 ENCOUNTER — LAB VISIT (OUTPATIENT)
Dept: LAB | Facility: HOSPITAL | Age: 55
End: 2020-07-08
Attending: INTERNAL MEDICINE
Payer: COMMERCIAL

## 2020-07-08 ENCOUNTER — LAB VISIT (OUTPATIENT)
Dept: INTERNAL MEDICINE | Facility: CLINIC | Age: 55
End: 2020-07-08
Payer: COMMERCIAL

## 2020-07-08 DIAGNOSIS — Z20.822 CLOSE EXPOSURE TO COVID-19 VIRUS: ICD-10-CM

## 2020-07-08 DIAGNOSIS — Z01.84 ANTIBODY RESPONSE EXAMINATION: ICD-10-CM

## 2020-07-08 LAB — SARS-COV-2 IGG SERPLBLD QL IA.RAPID: NEGATIVE

## 2020-07-08 PROCEDURE — U0003 INFECTIOUS AGENT DETECTION BY NUCLEIC ACID (DNA OR RNA); SEVERE ACUTE RESPIRATORY SYNDROME CORONAVIRUS 2 (SARS-COV-2) (CORONAVIRUS DISEASE [COVID-19]), AMPLIFIED PROBE TECHNIQUE, MAKING USE OF HIGH THROUGHPUT TECHNOLOGIES AS DESCRIBED BY CMS-2020-01-R: HCPCS

## 2020-07-08 PROCEDURE — 36415 COLL VENOUS BLD VENIPUNCTURE: CPT

## 2020-07-08 PROCEDURE — 86769 SARS-COV-2 COVID-19 ANTIBODY: CPT

## 2020-07-09 LAB — SARS-COV-2 RNA RESP QL NAA+PROBE: NEGATIVE

## 2020-09-17 ENCOUNTER — PATIENT OUTREACH (OUTPATIENT)
Dept: ADMINISTRATIVE | Facility: OTHER | Age: 55
End: 2020-09-17

## 2020-09-17 NOTE — PROGRESS NOTES
Care Everywhere:   Immunization: updated  Health Maintenance: updated  Media Review:  Legacy Review:   Order placed:   Upcoming appts:

## 2020-09-29 ENCOUNTER — PATIENT MESSAGE (OUTPATIENT)
Dept: OTHER | Facility: OTHER | Age: 55
End: 2020-09-29

## 2020-10-13 ENCOUNTER — TELEPHONE (OUTPATIENT)
Dept: INTERNAL MEDICINE | Facility: CLINIC | Age: 55
End: 2020-10-13

## 2020-10-13 NOTE — TELEPHONE ENCOUNTER
Regarding: Appointment request  Appointment  Request      Who called:Patient  Reason for visit:annual, medication refills  New or Existing Patient:existing  Callback or [facility] response:callback  Best contact number:[phone]  Additional information:       Please call patient to schedule annual appointment

## 2020-10-17 ENCOUNTER — NURSE TRIAGE (OUTPATIENT)
Dept: ADMINISTRATIVE | Facility: CLINIC | Age: 55
End: 2020-10-17

## 2020-10-17 NOTE — TELEPHONE ENCOUNTER
Spoke with patient she states she states she was taking wellbutrin brand name for the last 10 years.  She states that medication was recently discontinued and she was placed on a generic brand.  Patient reports that she has a 8/10 headache, nausea and feeling shaky.  Patient states she started new medication 48 hours ago.  Also reports having pain in both eyes.  Advised patient to go to ER and have another adult drive.  Advised patient to call [phone] if unable to get transportation.  Patient verbalized understanding.     Reason for Disposition   Severe pain in one eye    Additional Information   Negative: Difficult to awaken or acting confused (e.g., disoriented, slurred speech)   Negative: [1] Weakness of the face, arm or leg on one side of the body AND [2] new onset   Negative: [1] Numbness of the face, arm or leg on one side of the body AND [2] new onset   Negative: [1] Loss of speech or garbled speech AND [2] new onset   Negative: Passed out (i.e., lost consciousness, collapsed and was not responding)   Negative: Sounds like a life-threatening emergency to the triager   Negative: Unable to walk, or can only walk with assistance (e.g., requires support)   Negative: Stiff neck (can't touch chin to chest)    Protocols used: HEADACHE-A-AH

## 2020-10-17 NOTE — TELEPHONE ENCOUNTER
"  Reason for Disposition   [1] SEVERE headache (e.g., excruciating) AND [2] not improved after 2 hours of pain medicine    Additional Information   Negative: Difficult to awaken or acting confused (e.g., disoriented, slurred speech)   Negative: [1] Weakness of the face, arm or leg on one side of the body AND [2] new onset   Negative: [1] Numbness of the face, arm or leg on one side of the body AND [2] new onset   Negative: [1] Loss of speech or garbled speech AND [2] new onset   Negative: Passed out (i.e., lost consciousness, collapsed and was not responding)   Negative: Sounds like a life-threatening emergency to the triager   Negative: Followed a head injury   Negative: Pregnant   Negative: Postpartum (from 0 to 6 weeks after delivery)   Negative: Traumatic Brain Injury (TBI) is suspected   Negative: Unable to walk, or can only walk with assistance (e.g., requires support)   Negative: Stiff neck (can't touch chin to chest)   Negative: Severe pain in one eye   Negative: [1] Other family members (or roommates) with headaches AND [2] possibility of carbon monoxide exposure   Negative: [1] SEVERE headache (e.g., excruciating) AND [2] "worst headache" of life   Negative: [1] SEVERE headache AND [2] sudden-onset (i.e., reaching maximum intensity within seconds)   Negative: [1] SEVERE headache AND [2] fever   Negative: Loss of vision or double vision (Exception: same as prior migraines)   Negative: [1] Fever > 100.0 F (37.8 C) AND [2] diabetes mellitus or weak immune system (e.g., HIV positive, cancer chemo, splenectomy, organ transplant, chronic steroids)   Negative: Patient sounds very sick or weak to the triager    Protocols used: HEADACHE-A-AH  pt on bed and states she recently took advil. Pt spoke with another triager that rec ED. pt states she wants to speak with dr thomas    taking wellbutrin brand name for the last 10 years. Pt states she was placed on a generic brand.  thurs late " afternoon started with BADILLO after taking med. HA waxes and wanes but HA present 95 % of the time. able to sleep. Pt is employee at [employer]. Rates HA 8 despite advil - took advil at 1115am.  took med this am at 0430 then last dose of Wellbutrin 10 mins ago at 1150am. Spoke with dr velez rec UC/ED due to level of HA. Continue to take med as prescribed. Call office on Monday. Pt notified. Call back with questions.

## 2020-10-19 ENCOUNTER — TELEPHONE (OUTPATIENT)
Dept: PRIMARY CARE CLINIC | Facility: CLINIC | Age: 55
End: 2020-10-19

## 2020-10-19 NOTE — TELEPHONE ENCOUNTER
Please get patient an appointmetn with me this week. You can open the schedule, so that the students and residents can see her.    She had a severe headache this weekend. Did she go to the ER? She should try to come her instead for this chronic condition.    Thanks,  KEIRA

## 2020-10-21 ENCOUNTER — OFFICE VISIT (OUTPATIENT)
Dept: PRIMARY CARE CLINIC | Facility: CLINIC | Age: 55
End: 2020-10-21
Payer: COMMERCIAL

## 2020-10-21 ENCOUNTER — DOCUMENTATION ONLY (OUTPATIENT)
Dept: PRIMARY CARE CLINIC | Facility: CLINIC | Age: 55
End: 2020-10-21

## 2020-10-21 VITALS
HEIGHT: 68 IN | OXYGEN SATURATION: 98 % | SYSTOLIC BLOOD PRESSURE: 132 MMHG | WEIGHT: 244.19 LBS | HEART RATE: 100 BPM | DIASTOLIC BLOOD PRESSURE: 70 MMHG | BODY MASS INDEX: 37.01 KG/M2

## 2020-10-21 DIAGNOSIS — F41.9 ANXIETY: ICD-10-CM

## 2020-10-21 DIAGNOSIS — F43.23 ADJUSTMENT DISORDER WITH MIXED ANXIETY AND DEPRESSED MOOD: ICD-10-CM

## 2020-10-21 DIAGNOSIS — E03.9 ACQUIRED HYPOTHYROIDISM: ICD-10-CM

## 2020-10-21 PROCEDURE — 3075F PR MOST RECENT SYSTOLIC BLOOD PRESS GE 130-139MM HG: ICD-10-PCS | Mod: CPTII,S$GLB,, | Performed by: INTERNAL MEDICINE

## 2020-10-21 PROCEDURE — 3078F PR MOST RECENT DIASTOLIC BLOOD PRESSURE < 80 MM HG: ICD-10-PCS | Mod: CPTII,S$GLB,, | Performed by: INTERNAL MEDICINE

## 2020-10-21 PROCEDURE — 99215 PR OFFICE/OUTPT VISIT, EST, LEVL V, 40-54 MIN: ICD-10-PCS | Mod: S$GLB,,, | Performed by: INTERNAL MEDICINE

## 2020-10-21 PROCEDURE — 99215 OFFICE O/P EST HI 40 MIN: CPT | Mod: S$GLB,,, | Performed by: INTERNAL MEDICINE

## 2020-10-21 PROCEDURE — 3008F BODY MASS INDEX DOCD: CPT | Mod: CPTII,S$GLB,, | Performed by: INTERNAL MEDICINE

## 2020-10-21 PROCEDURE — 3078F DIAST BP <80 MM HG: CPT | Mod: CPTII,S$GLB,, | Performed by: INTERNAL MEDICINE

## 2020-10-21 PROCEDURE — 3008F PR BODY MASS INDEX (BMI) DOCUMENTED: ICD-10-PCS | Mod: CPTII,S$GLB,, | Performed by: INTERNAL MEDICINE

## 2020-10-21 PROCEDURE — 3075F SYST BP GE 130 - 139MM HG: CPT | Mod: CPTII,S$GLB,, | Performed by: INTERNAL MEDICINE

## 2020-10-21 NOTE — PROGRESS NOTES
Primary Care Provider Appointment - [facility]  SHARED NOTE: Rian Khalil (MS4), Dr Ellis (Attending)    Subjective:      Patient ID: Maria G Cameron is a 55 y.o. female with chronic migraine, HTN, hypothyroidism, and anxiety.    Chief Complaint: Medication Problem, Tremors, Headache, and Nausea    Pt visits today for complain of tremors and HA. Pt noted that she recently switched from a brand-name bupropion which her pharmacy ([facility]) told her was discontinued and switched her to a generic brand. She attempted to call three other [facility] pharmacies to get prescription from her old  but was unsuccessful. Her sx began 6 days ago which nausea, tremors, bilateral eye discomfort, and HA she rates a 8/10. The discomfort waxes and wanes but is present most of the time throughout the day. She has taken advil (4x/day) which offer mild/moderate relief her pain. Pt took fioricet one day after onset which did help.     Her current HA feels different from her chronic migraines. She has also been experiencing anxiety and agitations. Pt denies Numbness, tingling, vision changes, weakness, gait abnormalities, dysphagia, GI/, or confusion. She spoke with a triage nurse 4 days ago who advised her to continue taking new prescription of bupropion and set up an appointment. She normally takes one 100mg tablet twice daily but has reduced total dose to 150mg and took her morning dose today.    No other changes to medications. She is able to take them as prescribed without issues and denies any other side effects No refills needed    Prior to this visit, patient's last encounter with PCP was Visit date not found.      Past Surgical History:   Procedure Laterality Date    BREAST BIOPSY Left 2010    breast core biopsy  3/7/11    fibrocystic changes    COLONOSCOPY N/A 4/15/2016    Procedure: COLONOSCOPY;  Surgeon: Coleman Moss MD;  Location: [facility] ([address] [place] [address]);  Service: Endoscopy;  Laterality:  N/A;       Past Medical History:   Diagnosis Date    Adrenal adenoma     Allergic rhinitis     Allergy     Allergic to trees, weeds, mold and standardized mite    Anxiety     Eustachian tube dysfunction     Fibrocystic changes of left breast     12 o'clock L breast mass bx 3/7/11    Hypertension     Hypothyroidism     Migraine headache     Recurrent otitis media        Social History     Socioeconomic History    Marital status: Single     Spouse name: Not on file    Number of children: Not on file    Years of education: Not on file    Highest education level: Not on file   Occupational History    Occupation:      Comment: clinical    Social Needs    Financial resource strain: Not hard at all    Food insecurity     Worry: Never true     Inability: Never true    Transportation needs     Medical: No     Non-medical: No   Tobacco Use    Smoking status: Current Every Day Smoker     Packs/day: 0.25     Years: 20.00     Pack years: 5.00     Types: Cigarettes    Smokeless tobacco: Current User   Substance and Sexual Activity    Alcohol use: No     Alcohol/week: 0.0 standard drinks    Drug use: No    Sexual activity: Not Currently     Comment: single with no children   Lifestyle    Physical activity     Days per week: Not on file     Minutes per session: Not on file    Stress: Not on file   Relationships    Social connections     Talks on phone: Not on file     Gets together: Not on file     Attends Protestant service: Not on file     Active member of club or organization: Not on file     Attends meetings of clubs or organizations: Not on file     Relationship status: Not on file   Other Topics Concern    Not on file   Social History Narrative    Lives alone and no assistance with ADLs.     Single with no children       Review of Systems   Constitutional: Positive for diaphoresis. Negative for appetite change, chills and fever.   HENT: Negative for postnasal drip, rhinorrhea and  "sore throat.    Eyes: Positive for pain and redness. Negative for photophobia.   Respiratory: Negative for cough, shortness of breath and wheezing.    Cardiovascular: Negative for chest pain, palpitations and leg swelling.   Gastrointestinal: Positive for nausea. Negative for constipation and diarrhea.   Genitourinary: Negative for difficulty urinating, dysuria and hematuria.   Neurological: Positive for headaches. Negative for dizziness and numbness.   Psychiatric/Behavioral: Positive for agitation. Negative for confusion, hallucinations, self-injury and sleep disturbance. The patient is nervous/anxious.        Objective:   /70   Pulse 100   Ht 5' 8" (1.727 m)   Wt 110.7 kg (244 lb 2.6 oz)   SpO2 98%   BMI 37.12 kg/m²     Physical Exam  Constitutional:       Appearance: Normal appearance. She is obese. She is not diaphoretic.   HENT:      Head: Normocephalic and atraumatic.      Mouth/Throat:      Mouth: Mucous membranes are moist.      Pharynx: Oropharynx is clear. No oropharyngeal exudate or posterior oropharyngeal erythema.   Eyes:      Extraocular Movements: Extraocular movements intact.      Conjunctiva/sclera: Conjunctivae normal.      Pupils: Pupils are equal, round, and reactive to light.   Neck:      Musculoskeletal: Full passive range of motion without pain, normal range of motion and neck supple.      Thyroid: No thyroid mass, thyromegaly or thyroid tenderness.   Cardiovascular:      Rate and Rhythm: Normal rate and regular rhythm.  No extrasystoles are present.     Pulses: Normal pulses. No decreased pulses.      Heart sounds: Normal heart sounds. Heart sounds not distant. No murmur.      Comments: Borderline tachycardia, HR high 90s  Lymphadenopathy:      Cervical: No cervical adenopathy.   Skin:     General: Skin is warm and dry.      Capillary Refill: Capillary refill takes less than 2 seconds.      Coloration: Skin is not cyanotic or jaundiced.   Neurological:      Mental Status: She is " alert and oriented to person, place, and time.      GCS: GCS eye subscore is 4. GCS verbal subscore is 5. GCS motor subscore is 6.      Sensory: Sensation is intact.      Motor: Motor function is intact.      Coordination: Coordination is intact.   Psychiatric:         Attention and Perception: Attention and perception normal. She is attentive. She does not perceive auditory hallucinations.         Mood and Affect: Mood is anxious. Mood is not depressed. Affect is not labile.         Speech: Speech normal.         Behavior: Behavior normal. Behavior is cooperative.         Thought Content: Thought content normal.         Cognition and Memory: Cognition and memory normal.         Judgment: Judgment normal.      Comments: Pt anxious and upset - very concerned that her anxiety will heighten if unable to continue bupropion             Lab Results   Component Value Date    WBC 7.10 12/16/2019    HGB 14.3 12/16/2019    HCT 41.8 12/16/2019     12/16/2019    CHOL 225 (H) 12/16/2019    TRIG 103 12/16/2019    HDL 77 (H) 12/16/2019    ALT 21 12/16/2019    AST 20 12/16/2019     12/16/2019    K 4.1 12/16/2019     12/16/2019    CREATININE 1.0 12/16/2019    BUN 13 12/16/2019    CO2 26 12/16/2019    TSH 2.722 12/16/2019    HGBA1C 5.6 12/16/2019       Current Outpatient Medications on File Prior to Visit   Medication Sig Dispense Refill    butalbital-acetaminophen-caffeine -40 mg (FIORICET, ESGIC) -40 mg per tablet Take 1 tablet by mouth every 4 (four) hours as needed for Pain. 90 tablet 1    cholecalciferol, vitamin D3, (VITAMIN D3) 125 mcg (5,000 unit) Tab Take 1 tablet (5,000 Units total) by mouth once daily. 90 tablet 3    enalapril (VASOTEC) 20 MG tablet Take 1 tablet (20 mg total) by mouth once daily. 90 tablet 3    hydroCHLOROthiazide (HYDRODIURIL) 25 MG tablet Take 1 tablet (25 mg total) by mouth once daily. 90 tablet 3    HYDROcodone-acetaminophen (NORCO) 5-325 mg per tablet Take 1 tablet  by mouth every 8 (eight) hours as needed for Pain. 6 tablet 0    levothyroxine (SYNTHROID) 125 MCG tablet Take 1 tablet (125 mcg total) by mouth once daily. 90 tablet 3    ondansetron (ZOFRAN) 8 MG tablet Take 1 tablet (8 mg total) by mouth every 8 (eight) hours as needed for Nausea. 8 tablet 0    promethazine (PHENERGAN) 50 MG suppository Unwrap and insert 1 suppository (50 mg total) rectally every 6 (six) hours as needed for Nausea. 12 suppository 1    verapamiL (VERELAN) 360 MG C24P Take 1 capsule (360 mg total) by mouth once daily. 30 capsule 11     No current facility-administered medications on file prior to visit.          Assessment:   55 y.o. female with multiple co-morbid illnesses here to follow-up with PCP and continue work-up of chronic issues notably chronic migraine, HTN, hypothyroidism, and anxiety.     Plan:     Problem List Items Addressed This Visit        Psychiatric    Adjustment disorder with mixed anxiety and depressed mood     Patient's sister recently  and mother diagnosed with terminal illness. She is traveling back and forth to [place] to care for her mom  · Completed EAP  · Needs continued counseling  · Patient not appropriate for [facility] opioids  · Referred to [facility] (non-opioids)  · Prefers buproprion from [employer]  which is not under production at this time  · Per conversation with multiple pharmacists will change her to SR buproprion  · Message sent to [facility] pharmacist about possibly changing meds  · Supportive listening         Relevant Orders    Ambulatory referral/consult to Primary Care Behavioral Health (Opioids)    Ambulatory referral/consult to Psychiatry       Endocrine    Hypothyroidism     Levo increased on 3/13/17, 2017. TSH elevated 4. Symptoms of hypO improved  · Increased levo 125mcg on 10/17  · Levels normal in 2018  · Needs check, but patient defers labs until 2020               Health Maintenance       Date Due Completion Date    HIV Screening  [birth year]/1978 ---    TETANUS VACCINE 01/02/2012 1/2/2002    Cervical Cancer Screening 01/12/2020 1/12/2017 (ClinicallyNA)    Override on 1/12/2017: Not Clinically Appropriate (last 3 PAPs normal)    Override on 5/24/2013: Done    Influenza Vaccine (1) 08/01/2020 9/20/2019- ALREADY PERFORMED    Lipid Panel 12/16/2020 12/16/2019    Mammogram 06/03/2021 6/3/2020    Override on 12/8/2011: Done    Override on 12/8/2011: Done    Colorectal Cancer Screening 04/26/2026 4/26/2016          Follow up in about 2 months (around 12/21/2020). Total face-to-face time was 60 min, >50% of this was spent on counseling and coordination of care. The following issues were discussed: chronic migraine, HTN, hypothyroidism, and anxiety.    Rian Khalil (student)    Pearl Ellis MD/MPH  [facility]  [phone] [facility]

## 2020-10-21 NOTE — PATIENT INSTRUCTIONS
TODAY:  - speak with pharmacy about buproprion manufactured by [employer]  - flu vaccine updated in chart    NEXT:  - need PAP smear  - labs in December

## 2020-10-21 NOTE — ASSESSMENT & PLAN NOTE
Patient's sister recently  and mother diagnosed with terminal illness. She is traveling back and forth to [place] to care for her mom  · Completed EAP  · Needs continued counseling  · Patient not appropriate for [facility] opioids  · Referred to [facility] (non-opioids)  · Prefers buproprion from [employer]  which is not under production at this time  · Per conversation with multiple pharmacists will change her to SR buproprion  · Message sent to [facility] pharmacist about possibly changing meds  · Supportive listening

## 2020-10-21 NOTE — Clinical Note
Enrique Faye,  Ms Rakesh has a PHQ-4 of 8, and has narcotics on her med list, but it isn't clear whether her dose/scheduling make her appropriate for your program. Can you direct me on whether she is appropriate?   Thanks,  Dr CROCKETT

## 2020-10-22 RX ORDER — BUPROPION HYDROCHLORIDE 100 MG/1
100 TABLET, EXTENDED RELEASE ORAL 2 TIMES DAILY
Qty: 180 TABLET | Refills: 3 | Status: SHIPPED | OUTPATIENT
Start: 2020-10-22 | End: 2020-12-16

## 2020-10-22 RX ORDER — BUPROPION HYDROCHLORIDE 100 MG/1
100 TABLET ORAL 2 TIMES DAILY
Qty: 180 TABLET | Refills: 3 | Status: SHIPPED | OUTPATIENT
Start: 2020-10-22 | End: 2020-10-22 | Stop reason: DRUGHIGH

## 2020-10-22 NOTE — ASSESSMENT & PLAN NOTE
Levo increased on 3/13/17, 7/2017. TSH elevated 4. Symptoms of hypO improved  · Increased levo 125mcg on 10/17  · Levels normal in 7/2018  · Needs check, but patient defers labs until 12/2020

## 2020-10-22 NOTE — PROGRESS NOTES
Patient does not meet criteria for enrollment as she has not filled 3+ prescriptions for opioids in the past 4 months.  Last opioid fill was on 1/2/20 for hydrocodone-APAP 5-325 mg three times daily for 2 days.  It is possible that the patient could qualify for the non-opioid [facility] service, though she will need to be be further screened prior to enrollment.    Brando Reyes, PharmD, BCPP  [facility] Pharmacist

## 2020-10-22 NOTE — TELEPHONE ENCOUNTER
Referral to [facility] (non-opioids) placed. Please let patient know that they will reach out to her to get her scheduled.    This is the talk counseling group that I told her about. I think she will enjoy having someone to talk. They can also help us with the buproprion change if her new dosing with SR doesn't work for her.     Thanks,  KJ

## 2020-11-04 ENCOUNTER — TELEPHONE (OUTPATIENT)
Dept: PRIMARY CARE CLINIC | Facility: CLINIC | Age: 55
End: 2020-11-04

## 2020-11-04 ENCOUNTER — TELEPHONE (OUTPATIENT)
Dept: BEHAVIORAL HEALTH | Facility: CLINIC | Age: 55
End: 2020-11-04

## 2020-11-04 NOTE — PROGRESS NOTES
Patient was referred to the [facility] Non-Opioid program by PCP. CHW reached out to patient who stated [she] was not interested in enrolling in the [facility] Non-Opioid program pt states she already has a appointment with Psych and she is okay with that.  CHW informed patient to notify [her] PCP of [her] wishes to engage in the [facility]-Opio[facility]  program in the future.  CHW sent follow-up message to PCP via EPIC.

## 2020-11-04 NOTE — TELEPHONE ENCOUNTER
----- Message from Berny Damico sent at 11/4/2020 11:00 AM CST -----  CHW reached out to patient who stated [she] was not interested in enrolling in the [facility] Non-Opioid program pt states she already has a appointment with Psych and she is okay with that.  CHW informed patient to notify [her] PCP of [her] wishes to engage in the [facility] Non-Opioid  program in the future.

## 2020-12-11 ENCOUNTER — PATIENT MESSAGE (OUTPATIENT)
Dept: OTHER | Facility: OTHER | Age: 55
End: 2020-12-11

## 2020-12-15 ENCOUNTER — PATIENT OUTREACH (OUTPATIENT)
Dept: ADMINISTRATIVE | Facility: OTHER | Age: 55
End: 2020-12-15

## 2020-12-16 ENCOUNTER — OFFICE VISIT (OUTPATIENT)
Dept: PSYCHIATRY | Facility: CLINIC | Age: 55
End: 2020-12-16
Payer: COMMERCIAL

## 2020-12-16 ENCOUNTER — TELEPHONE (OUTPATIENT)
Dept: PRIMARY CARE CLINIC | Facility: CLINIC | Age: 55
End: 2020-12-16

## 2020-12-16 ENCOUNTER — OFFICE VISIT (OUTPATIENT)
Dept: PRIMARY CARE CLINIC | Facility: CLINIC | Age: 55
End: 2020-12-16
Payer: COMMERCIAL

## 2020-12-16 ENCOUNTER — OFFICE VISIT (OUTPATIENT)
Dept: OBSTETRICS AND GYNECOLOGY | Facility: CLINIC | Age: 55
End: 2020-12-16
Payer: COMMERCIAL

## 2020-12-16 ENCOUNTER — LAB VISIT (OUTPATIENT)
Dept: LAB | Facility: HOSPITAL | Age: 55
End: 2020-12-16
Attending: INTERNAL MEDICINE
Payer: COMMERCIAL

## 2020-12-16 VITALS
SYSTOLIC BLOOD PRESSURE: 136 MMHG | DIASTOLIC BLOOD PRESSURE: 83 MMHG | HEIGHT: 71 IN | BODY MASS INDEX: 34.72 KG/M2 | WEIGHT: 248 LBS

## 2020-12-16 VITALS
HEART RATE: 95 BPM | HEIGHT: 68 IN | BODY MASS INDEX: 37.62 KG/M2 | SYSTOLIC BLOOD PRESSURE: 136 MMHG | WEIGHT: 248.25 LBS | DIASTOLIC BLOOD PRESSURE: 83 MMHG

## 2020-12-16 VITALS
BODY MASS INDEX: 35.03 KG/M2 | HEIGHT: 71 IN | DIASTOLIC BLOOD PRESSURE: 83 MMHG | RESPIRATION RATE: 20 BRPM | HEART RATE: 95 BPM | WEIGHT: 250.25 LBS | OXYGEN SATURATION: 99 % | TEMPERATURE: 97 F | SYSTOLIC BLOOD PRESSURE: 136 MMHG

## 2020-12-16 DIAGNOSIS — F32.2 CURRENT SEVERE EPISODE OF MAJOR DEPRESSIVE DISORDER WITHOUT PSYCHOTIC FEATURES WITHOUT PRIOR EPISODE: Primary | ICD-10-CM

## 2020-12-16 DIAGNOSIS — E66.01 MORBID OBESITY DUE TO EXCESS CALORIES: ICD-10-CM

## 2020-12-16 DIAGNOSIS — E55.9 VITAMIN D DEFICIENCY: ICD-10-CM

## 2020-12-16 DIAGNOSIS — I10 ESSENTIAL HYPERTENSION: ICD-10-CM

## 2020-12-16 DIAGNOSIS — E03.9 ACQUIRED HYPOTHYROIDISM: ICD-10-CM

## 2020-12-16 DIAGNOSIS — Z12.4 PAP SMEAR FOR CERVICAL CANCER SCREENING: ICD-10-CM

## 2020-12-16 DIAGNOSIS — G89.29 CHRONIC INTRACTABLE HEADACHE, UNSPECIFIED HEADACHE TYPE: ICD-10-CM

## 2020-12-16 DIAGNOSIS — E78.2 MIXED HYPERLIPIDEMIA: ICD-10-CM

## 2020-12-16 DIAGNOSIS — F43.20 ADJUSTMENT DISORDER, UNSPECIFIED TYPE: ICD-10-CM

## 2020-12-16 DIAGNOSIS — F43.23 ADJUSTMENT DISORDER WITH MIXED ANXIETY AND DEPRESSED MOOD: ICD-10-CM

## 2020-12-16 DIAGNOSIS — Z01.419 ENCOUNTER FOR GYNECOLOGICAL EXAMINATION WITHOUT ABNORMAL FINDING: Primary | ICD-10-CM

## 2020-12-16 DIAGNOSIS — G43.909 MIGRAINE WITHOUT STATUS MIGRAINOSUS, NOT INTRACTABLE, UNSPECIFIED MIGRAINE TYPE: ICD-10-CM

## 2020-12-16 DIAGNOSIS — F41.1 GENERALIZED ANXIETY DISORDER: ICD-10-CM

## 2020-12-16 DIAGNOSIS — G47.00 INSOMNIA, UNSPECIFIED TYPE: ICD-10-CM

## 2020-12-16 DIAGNOSIS — R51.9 CHRONIC INTRACTABLE HEADACHE, UNSPECIFIED HEADACHE TYPE: ICD-10-CM

## 2020-12-16 LAB
25(OH)D3+25(OH)D2 SERPL-MCNC: 59 NG/ML (ref 30–96)
CHOLEST SERPL-MCNC: 234 MG/DL (ref 120–199)
CHOLEST/HDLC SERPL: 3.9 {RATIO} (ref 2–5)
ESTIMATED AVG GLUCOSE: 108 MG/DL (ref 68–131)
HBA1C MFR BLD HPLC: 5.4 % (ref 4–5.6)
HDLC SERPL-MCNC: 60 MG/DL (ref 40–75)
HDLC SERPL: 25.6 % (ref 20–50)
LDLC SERPL CALC-MCNC: 129.8 MG/DL (ref 63–159)
NONHDLC SERPL-MCNC: 174 MG/DL
TRIGL SERPL-MCNC: 221 MG/DL (ref 30–150)
TSH SERPL DL<=0.005 MIU/L-ACNC: 1.74 UIU/ML (ref 0.4–4)

## 2020-12-16 PROCEDURE — 3079F PR MOST RECENT DIASTOLIC BLOOD PRESSURE 80-89 MM HG: ICD-10-PCS | Mod: CPTII,S$GLB,, | Performed by: OBSTETRICS & GYNECOLOGY

## 2020-12-16 PROCEDURE — 3079F DIAST BP 80-89 MM HG: CPT | Mod: CPTII,S$GLB,, | Performed by: INTERNAL MEDICINE

## 2020-12-16 PROCEDURE — 1126F PR PAIN SEVERITY QUANTIFIED, NO PAIN PRESENT: ICD-10-PCS | Mod: S$GLB,,, | Performed by: INTERNAL MEDICINE

## 2020-12-16 PROCEDURE — 1126F AMNT PAIN NOTED NONE PRSNT: CPT | Mod: S$GLB,,, | Performed by: OBSTETRICS & GYNECOLOGY

## 2020-12-16 PROCEDURE — 3008F BODY MASS INDEX DOCD: CPT | Mod: CPTII,S$GLB,, | Performed by: OBSTETRICS & GYNECOLOGY

## 2020-12-16 PROCEDURE — 3079F DIAST BP 80-89 MM HG: CPT | Mod: CPTII,S$GLB,, | Performed by: OBSTETRICS & GYNECOLOGY

## 2020-12-16 PROCEDURE — 99386 PREV VISIT NEW AGE 40-64: CPT | Mod: S$GLB,,, | Performed by: OBSTETRICS & GYNECOLOGY

## 2020-12-16 PROCEDURE — 99999 PR PBB SHADOW E&M-EST. PATIENT-LVL III: ICD-10-PCS | Mod: PBBFAC,,, | Performed by: OBSTETRICS & GYNECOLOGY

## 2020-12-16 PROCEDURE — 80061 LIPID PANEL: CPT

## 2020-12-16 PROCEDURE — 3075F SYST BP GE 130 - 139MM HG: CPT | Mod: CPTII,S$GLB,, | Performed by: INTERNAL MEDICINE

## 2020-12-16 PROCEDURE — 1126F AMNT PAIN NOTED NONE PRSNT: CPT | Mod: S$GLB,,, | Performed by: INTERNAL MEDICINE

## 2020-12-16 PROCEDURE — 87624 HPV HI-RISK TYP POOLED RSLT: CPT

## 2020-12-16 PROCEDURE — 3079F PR MOST RECENT DIASTOLIC BLOOD PRESSURE 80-89 MM HG: ICD-10-PCS | Mod: CPTII,S$GLB,, | Performed by: INTERNAL MEDICINE

## 2020-12-16 PROCEDURE — 82306 VITAMIN D 25 HYDROXY: CPT

## 2020-12-16 PROCEDURE — 84443 ASSAY THYROID STIM HORMONE: CPT

## 2020-12-16 PROCEDURE — 3075F SYST BP GE 130 - 139MM HG: CPT | Mod: CPTII,S$GLB,, | Performed by: OBSTETRICS & GYNECOLOGY

## 2020-12-16 PROCEDURE — 36415 COLL VENOUS BLD VENIPUNCTURE: CPT

## 2020-12-16 PROCEDURE — 1126F PR PAIN SEVERITY QUANTIFIED, NO PAIN PRESENT: ICD-10-PCS | Mod: S$GLB,,, | Performed by: OBSTETRICS & GYNECOLOGY

## 2020-12-16 PROCEDURE — 99999 PR PBB SHADOW E&M-EST. PATIENT-LVL IV: CPT | Mod: PBBFAC,,, | Performed by: NURSE PRACTITIONER

## 2020-12-16 PROCEDURE — 99215 OFFICE O/P EST HI 40 MIN: CPT | Mod: S$GLB,,, | Performed by: INTERNAL MEDICINE

## 2020-12-16 PROCEDURE — 3008F BODY MASS INDEX DOCD: CPT | Mod: CPTII,S$GLB,, | Performed by: NURSE PRACTITIONER

## 2020-12-16 PROCEDURE — 99999 PR PBB SHADOW E&M-EST. PATIENT-LVL IV: ICD-10-PCS | Mod: PBBFAC,,, | Performed by: NURSE PRACTITIONER

## 2020-12-16 PROCEDURE — 3008F PR BODY MASS INDEX (BMI) DOCUMENTED: ICD-10-PCS | Mod: CPTII,S$GLB,, | Performed by: INTERNAL MEDICINE

## 2020-12-16 PROCEDURE — 88175 CYTOPATH C/V AUTO FLUID REDO: CPT

## 2020-12-16 PROCEDURE — 3008F PR BODY MASS INDEX (BMI) DOCUMENTED: ICD-10-PCS | Mod: CPTII,S$GLB,, | Performed by: NURSE PRACTITIONER

## 2020-12-16 PROCEDURE — 83036 HEMOGLOBIN GLYCOSYLATED A1C: CPT

## 2020-12-16 PROCEDURE — 3008F BODY MASS INDEX DOCD: CPT | Mod: CPTII,S$GLB,, | Performed by: INTERNAL MEDICINE

## 2020-12-16 PROCEDURE — 99215 PR OFFICE/OUTPT VISIT, EST, LEVL V, 40-54 MIN: ICD-10-PCS | Mod: S$GLB,,, | Performed by: INTERNAL MEDICINE

## 2020-12-16 PROCEDURE — 99999 PR PBB SHADOW E&M-EST. PATIENT-LVL III: CPT | Mod: PBBFAC,,, | Performed by: OBSTETRICS & GYNECOLOGY

## 2020-12-16 PROCEDURE — 90792 PR PSYCHIATRIC DIAGNOSTIC EVALUATION W/MEDICAL SERVICES: ICD-10-PCS | Mod: S$GLB,,, | Performed by: NURSE PRACTITIONER

## 2020-12-16 PROCEDURE — 3075F PR MOST RECENT SYSTOLIC BLOOD PRESS GE 130-139MM HG: ICD-10-PCS | Mod: CPTII,S$GLB,, | Performed by: OBSTETRICS & GYNECOLOGY

## 2020-12-16 PROCEDURE — 3008F PR BODY MASS INDEX (BMI) DOCUMENTED: ICD-10-PCS | Mod: CPTII,S$GLB,, | Performed by: OBSTETRICS & GYNECOLOGY

## 2020-12-16 PROCEDURE — 99386 PR PREVENTIVE VISIT,NEW,40-64: ICD-10-PCS | Mod: S$GLB,,, | Performed by: OBSTETRICS & GYNECOLOGY

## 2020-12-16 PROCEDURE — 90792 PSYCH DIAG EVAL W/MED SRVCS: CPT | Mod: S$GLB,,, | Performed by: NURSE PRACTITIONER

## 2020-12-16 PROCEDURE — 3075F PR MOST RECENT SYSTOLIC BLOOD PRESS GE 130-139MM HG: ICD-10-PCS | Mod: CPTII,S$GLB,, | Performed by: INTERNAL MEDICINE

## 2020-12-16 RX ORDER — PROMETHAZINE HYDROCHLORIDE 50 MG/1
50 SUPPOSITORY RECTAL EVERY 6 HOURS PRN
Qty: 12 SUPPOSITORY | Refills: 1 | Status: SHIPPED | OUTPATIENT
Start: 2020-12-16 | End: 2021-08-22 | Stop reason: SDUPTHER

## 2020-12-16 RX ORDER — BUTALBITAL, ACETAMINOPHEN AND CAFFEINE 50; 325; 40 MG/1; MG/1; MG/1
1 TABLET ORAL EVERY 4 HOURS PRN
Qty: 90 TABLET | Refills: 3 | Status: SHIPPED | OUTPATIENT
Start: 2020-12-16 | End: 2022-01-11 | Stop reason: SDUPTHER

## 2020-12-16 RX ORDER — TRAZODONE HYDROCHLORIDE 50 MG/1
50 TABLET ORAL NIGHTLY PRN
Qty: 30 TABLET | Refills: 11 | Status: SHIPPED | OUTPATIENT
Start: 2020-12-16 | End: 2020-12-16

## 2020-12-16 RX ORDER — ENALAPRIL MALEATE 20 MG/1
20 TABLET ORAL DAILY
Qty: 90 TABLET | Refills: 3 | Status: SHIPPED | OUTPATIENT
Start: 2020-12-16 | End: 2022-01-11 | Stop reason: SDUPTHER

## 2020-12-16 RX ORDER — FLUOXETINE HYDROCHLORIDE 20 MG/1
20 CAPSULE ORAL DAILY
Qty: 30 CAPSULE | Refills: 2 | Status: SHIPPED | OUTPATIENT
Start: 2020-12-16 | End: 2021-01-25

## 2020-12-16 RX ORDER — VERAPAMIL HYDROCHLORIDE 360 MG/1
360 CAPSULE, DELAYED RELEASE PELLETS ORAL DAILY
Qty: 90 CAPSULE | Refills: 3 | Status: SHIPPED | OUTPATIENT
Start: 2020-12-16 | End: 2021-12-13 | Stop reason: SDUPTHER

## 2020-12-16 RX ORDER — HYDROCHLOROTHIAZIDE 25 MG/1
25 TABLET ORAL DAILY
Qty: 90 TABLET | Refills: 3 | Status: SHIPPED | OUTPATIENT
Start: 2020-12-16 | End: 2021-12-06
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
## 2020-12-16 NOTE — Clinical Note
Please place 1 year recall reminder for this patient's next appointment - holding ASA in setting of possible GI bleeding   - continue statin

## 2020-12-16 NOTE — PROGRESS NOTES
Subjective:       Patient ID: Maria G Cameron is a 55 y.o. female.    Chief Complaint:  Annual Exam      History of Present Illness  HPI    Maria G Cameron is a 55 y.o. female  new to me  here for her annual GYN exam.    She describes her periods as stopped with menopause in ,  denies break through bleeding.   denies vaginal itching or irritation.  Denies vaginal discharge.  She is not sexually active.    History of abnormal pap: No  Last Pap: approximate date  and was normal  Last MMG: normal-2020-routine follow-up in 12 months  Last Colonoscopy:  colonoscopy 4 years ago without abnormalities.  denies domestic violence. She does feel safe at home.     Past Medical History:   Diagnosis Date    Adrenal adenoma     Allergic rhinitis     Allergy     Allergic to trees, weeds, mold and standardized mite    Anxiety     Eustachian tube dysfunction     Fibrocystic changes of left breast     12 o'clock L breast mass bx 3/7/11    Hypertension     Hypothyroidism     Migraine headache     Recurrent otitis media      Past Surgical History:   Procedure Laterality Date    BREAST BIOPSY Left     breast core biopsy  3/7/11    fibrocystic changes    COLONOSCOPY N/A 4/15/2016    Procedure: COLONOSCOPY;  Surgeon: Coleman Moss MD;  Location: [address];  Service: Endoscopy;  Laterality: N/A;     Social History     Socioeconomic History    Marital status: Single     Spouse name: Not on file    Number of children: Not on file    Years of education: Not on file    Highest education level: Not on file   Occupational History    Occupation:      Comment: clinical    Social Needs    Financial resource strain: Not hard at all    Food insecurity     Worry: Never true     Inability: Never true    Transportation needs     Medical: No     Non-medical: No   Tobacco Use    Smoking status: Current Every Day Smoker     Packs/day: 0.25     Years: 40.00     Pack years: 10.00  "    Types: Cigarettes    Smokeless tobacco: Current User   Substance and Sexual Activity    Alcohol use: No     Alcohol/week: 0.0 standard drinks    Drug use: No    Sexual activity: Not Currently     Comment: single with no children   Lifestyle    Physical activity     Days per week: Not on file     Minutes per session: Not on file    Stress: Not on file   Relationships    Social connections     Talks on phone: Not on file     Gets together: Not on file     Attends [employer] service: Not on file     Active member of club or organization: Not on file     Attends meetings of clubs or organizations: Not on file     Relationship status: Not on file   Other Topics Concern    Not on file   Social History Narrative    Lives alone and no assistance with ADLs.     Single with no children     Family History   Problem Relation Age of Onset    Heart disease Mother 79    Thyroid disease Mother     Hypertension Mother     Hemochromatosis Father     Heart disease Maternal Grandmother     Lupus Sister     Thyroid disease Sister     Liver cancer Sister     Breast cancer Neg Hx     Colon cancer Neg Hx     Diabetes Neg Hx     Stroke Neg Hx      OB History        0    Para   0    Term   0       0    AB   0    Living   0       SAB   0    TAB   0    Ectopic   0    Multiple   0    Live Births   0                 /83   Ht 5' 11" (1.803 m)   Wt 112.5 kg (248 lb)   LMP 2015 (Approximate)   BMI 34.59 kg/m²         GYN & OB History  Patient's last menstrual period was 2015 (approximate).   Date of Last Pap: 2020    OB History    Para Term  AB Living   0 0 0 0 0 0   SAB TAB Ectopic Multiple Live Births   0 0 0 0 0       Review of Systems  Review of Systems   Constitutional: Negative for activity change, appetite change, fatigue and unexpected weight change.   HENT: Negative.    Eyes: Negative for visual disturbance.   Respiratory: Negative for shortness of breath and " wheezing.    Cardiovascular: Negative for chest pain, palpitations and leg swelling.   Gastrointestinal: Negative for abdominal pain, bloating and blood in stool.   Endocrine: Positive for hypothyroidism. Negative for diabetes, hair loss and hot flashes.   Genitourinary: Positive for vaginal dryness. Negative for decreased libido, dyspareunia, hot flashes and postmenopausal bleeding.   Musculoskeletal: Negative for back pain and joint swelling.   Integumentary:  Negative for acne, hair changes and nipple discharge.   Neurological: Positive for headaches.   Hematological: Does not bruise/bleed easily.   Psychiatric/Behavioral: Positive for depression. Negative for sleep disturbance. The patient is not nervous/anxious.    Breast: Negative for mastodynia and nipple discharge          Objective:      Physical Exam:   Constitutional: She is oriented to person, place, and time. She appears well-developed and well-nourished.    HENT:   Head: Normocephalic and atraumatic.    Eyes: Pupils are equal, round, and reactive to light. EOM are normal.    Neck: Normal range of motion. Neck supple.    Cardiovascular: Normal rate and regular rhythm.     Pulmonary/Chest: Effort normal and breath sounds normal.   BREASTS:  no mass, no tenderness, no deformity and no retraction. Right breast exhibits no inverted nipple, no mass, no nipple discharge, no skin change, no tenderness, no bleeding and no swelling. Left breast exhibits no inverted nipple, no mass, no nipple discharge, no skin change, no tenderness, no bleeding and no swelling. Breasts are symmetrical.              Abdominal: Soft. Bowel sounds are normal.     Genitourinary:    Pelvic exam was performed with patient supine.      Genitourinary Comments: PELVIC: Normal external genitalia without lesions.  Normal hair distribution.  Adequate perineal body, normal urethral meatus.  Vagina  Dry and poorly rugated, atrophic, without lesions or discharge.  Cervix pink, without lesions,  discharge or tenderness.  No significant cystocele or rectocele.  Bimanual exam shows uterus to be NOT PALPABLY ENLARGED, BUT DIFFICULT TO DELINEATE CLEARLY, and nontender.  Adnexa without masses or tenderness.    RECTAL:Deferred               Musculoskeletal: Normal range of motion and moves all extremeties.       Neurological: She is alert and oriented to person, place, and time.    Skin: Skin is warm and dry.    Psychiatric: She has a normal mood and affect.              Assessment:        1. Encounter for gynecological examination without abnormal finding    2. Pap smear for cervical cancer screening                Plan:        1. Encounter for gynecological examination without abnormal finding  COUNSELING:  The patient was counseled today on regular weight bearing exercise. Patient was counseled today on the new ACS guidelines for cervical cytology screening as well as the current recommendations for breast cancer screening. Counseling session lasted approximately 10 minutes, and all her questions were answered. She was advised to see her primary care physician for all other health maintenance.   FOLLOW-UP with me for next routine visit.         2. Pap smear for cervical cancer screening      - Liquid-Based Pap Smear, Screening  - HPV High Risk Genotypes, PCR       Follow up in about 1 year (around 12/16/2021).

## 2020-12-16 NOTE — ASSESSMENT & PLAN NOTE
"BP previously uncontrolled on ACE and propranolol  · Followed by Cardiology  CONTINUE  · ACE 20mg  · HCTZ to 25mg   · Verapamil 360mg  STOP  · ASA 81mg  TRIED BUT "DIDN'T WORK"  · propranolol (previously on 80mg TID)  · Acetazolamide 500mg BID  · To treat elevated intracranial pressure  · Amlodipine was transitioned to verapamil (due to prophylactic effect on migraines)  · HCTZ may be helping with pseudotumor cerebri  · Propranolol stopped  · Patient reports that it isn't helping  · Enalapril tolerated  "

## 2020-12-16 NOTE — TELEPHONE ENCOUNTER
----- Message from Pearl Ellis MD sent at 12/16/2020  4:42 PM CST -----  Please let patient know that her labs are normal, except her cholesterol was high. GREAT JOB!!!    KJ

## 2020-12-16 NOTE — PROGRESS NOTES
Primary Care Provider Appointment- [facility]    Subjective:      Patient ID: Maria G Cameron is a 55 y.o. female with anxiety, migraines, HTN, obesity    Chief Complaint: Follow-up    Patient seen by psychiatry. Her wellbutrin was stopped, prozac started. Recommended trazodone for sleep, but patient prefers melatonin. She is tolerating these changes.    She is due for labs today for thyroid and lipids. Needs refills.    She is coping with isolation precautions. Recently lost her sister, and her mother has a terminal illness. She is coping with these issues.    Past Surgical History:   Procedure Laterality Date    BREAST BIOPSY Left 2010    breast core biopsy  3/7/11    fibrocystic changes    COLONOSCOPY N/A 4/15/2016    Procedure: COLONOSCOPY;  Surgeon: Coleman Moss MD;  Location: [address]);  Service: Endoscopy;  Laterality: N/A;       Past Medical History:   Diagnosis Date    Adrenal adenoma     Allergic rhinitis     Allergy     Allergic to trees, weeds, mold and standardized mite    Anxiety     Eustachian tube dysfunction     Fibrocystic changes of left breast     12 o'clock L breast mass bx 3/7/11    Hypertension     Hypothyroidism     Migraine headache     Recurrent otitis media        Social History     Socioeconomic History    Marital status: Single     Spouse name: Not on file    Number of children: Not on file    Years of education: Not on file    Highest education level: Not on file   Occupational History    Occupation:      Comment: clinical    Social Needs    Financial resource strain: Not hard at all    Food insecurity     Worry: Never true     Inability: Never true    Transportation needs     Medical: No     Non-medical: No   Tobacco Use    Smoking status: Current Every Day Smoker     Packs/day: 0.25     Years: 40.00     Pack years: 10.00     Types: Cigarettes    Smokeless tobacco: Current User   Substance and Sexual Activity    Alcohol use: No      "Alcohol/week: 0.0 standard drinks    Drug use: No    Sexual activity: Not Currently     Comment: single with no children   Lifestyle    Physical activity     Days per week: Not on file     Minutes per session: Not on file    Stress: Not on file   Relationships    Social connections     Talks on phone: Not on file     Gets together: Not on file     Attends Cheondo[employer] service: Not on file     Active member of club or organization: Not on file     Attends meetings of clubs or organizations: Not on file     Relationship status: Not on file   Other Topics Concern    Not on file   Social History Narrative    Lives alone and no assistance with ADLs.     Single with no children       Review of Systems   Constitutional: Negative for activity change and appetite change.   Psychiatric/Behavioral: Negative for agitation and behavioral problems.       Objective:   /83 (BP Location: Right arm, Patient Position: Sitting, BP Method: Large (Manual))   Pulse 95   Temp 97.3 °F (36.3 °C)   Resp 20   Ht 5' 11" (1.803 m)   Wt 113.5 kg (250 lb 3.6 oz)   LMP 12/16/2015 (Approximate)   SpO2 99%   BMI 34.90 kg/m²     Physical Exam  Constitutional:       Appearance: She is obese.   Neurological:      Mental Status: She is alert.   Psychiatric:         Mood and Affect: Mood normal.         Behavior: Behavior normal.         Lab Results   Component Value Date    WBC 7.10 12/16/2019    HGB 14.3 12/16/2019    HCT 41.8 12/16/2019     12/16/2019    CHOL 234 (H) 12/16/2020    TRIG 221 (H) 12/16/2020    HDL 60 12/16/2020    ALT 21 12/16/2019    AST 20 12/16/2019     12/16/2019    K 4.1 12/16/2019     12/16/2019    CREATININE 1.0 12/16/2019    BUN 13 12/16/2019    CO2 26 12/16/2019    TSH 1.740 12/16/2020    HGBA1C 5.4 12/16/2020       RESULTS: Reviewed labs and images today    Assessment:   55 y.o. female with multiple co-morbid illnesses here to continue work-up of chronic issues notably  with anxiety, " "migraines, HTN, obesity.     Plan:     Problem List Items Addressed This Visit        Neuro    Chronic intractable headache    Relevant Medications    promethazine (PHENERGAN) 50 MG suppository    butalbital-acetaminophen-caffeine -40 mg (FIORICET, ESGIC) -40 mg per tablet       Psychiatric    Adjustment disorder with mixed anxiety and depressed mood     Patient's sister recently  and mother diagnosed with terminal illness. She is traveling back and forth to [place] to care for her mom  · Completed EAP  · F/U psychiatry  · Prefers buproprion from [employer]  which is not under production at this time  · Per conversation with multiple pharmacists attempted to change her to SR buproprion  · Patient prefers to switch to prozac  · Is doing well with this change  · Supportive listening            Cardiac/Vascular    Hypertension     BP previously uncontrolled on ACE and propranolol  · Followed by Cardiology  CONTINUE  · ACE 20mg  · HCTZ to 25mg   · Verapamil 360mg  STOP  · ASA 81mg  TRIED BUT "DIDN'T WORK"  · propranolol (previously on 80mg TID)  · Acetazolamide 500mg BID  · To treat elevated intracranial pressure  · Amlodipine was transitioned to verapamil (due to prophylactic effect on migraines)  · HCTZ may be helping with pseudotumor cerebri  · Propranolol stopped  · Patient reports that it isn't helping  · Enalapril tolerated         Relevant Medications    verapamiL (VERELAN) 360 MG C24P    hydroCHLOROthiazide (HYDRODIURIL) 25 MG tablet    enalapril (VASOTEC) 20 MG tablet    Mixed hyperlipidemia     ASCVD risk 10-year 7.2%, 50% lifetime. Mod-intensity statin recommended.  · Patient prefers lifestyle changes  · stopped ASA due to NSAID use  · Start Weight Watchers         Relevant Orders    Lipid Panel (Completed)       Endocrine    Hypothyroidism     Levo increased on 3/13/17, 2017. TSH elevated 4. Symptoms of hypO improved  · Increased levo 125mcg on 10/17  · Levels normal in " 7/2018  · Needs check, but patient defers labs until 12/2020         Relevant Medications    levothyroxine (SYNTHROID) 125 MCG tablet    Other Relevant Orders    TSH (Completed)    Morbid obesity due to excess calories     BMI >35, HTN, HLD  · Will enroll in Weight Watchers         Relevant Orders    Hemoglobin A1C (Completed)    Vitamin D deficiency     Level 23 in 12/2017  · Continue Vit D3 5000U daily OTC  · Monitor level         Relevant Orders    Vitamin D (Completed)      Other Visit Diagnoses     Migraine without status migrainosus, not intractable, unspecified migraine type        Relevant Medications    promethazine (PHENERGAN) 50 MG suppository    butalbital-acetaminophen-caffeine -40 mg (FIORICET, ESGIC) -40 mg per tablet          Health Maintenance       Date Due Completion Date    HIV Screening 09/18/1978 ---    TETANUS VACCINE 01/02/2012 1/2/2002    Cervical Cancer Screening 01/12/2020 1/12/2017 (ClinicallyNA)    Override on 1/12/2017: Not Clinically Appropriate (last 3 PAPs normal)    Override on 5/24/2013: Done    Lipid Panel 12/16/2020 12/16/2019    Mammogram 06/03/2021 6/3/2020    Override on 12/8/2011: Done    Override on 12/8/2011: Done    Colorectal Cancer Screening 04/26/2026 4/26/2016          Follow up in about 1 year (around 12/16/2021). Total face-to-face time was 60 min, >50% of this was spent on counseling and coordination of care. The following issues were discussed:  with anxiety, migraines, HTN, obesity      Pearl Ellis MD/MPH  Internal Medicine  [facility]  [phone]

## 2020-12-16 NOTE — ASSESSMENT & PLAN NOTE
Patient's sister recently  and mother diagnosed with terminal illness. She is traveling back and forth to [place] to care for her mom  · Completed EAP  · F/U psychiatry  · Prefers buproprion from [employer]  which is not under production at this time  · Per conversation with multiple pharmacists attempted to change her to SR buproprion  · Patient prefers to switch to prozac  · Is doing well with this change  · Supportive listening

## 2020-12-16 NOTE — PATIENT INSTRUCTIONS
Sleep hygiene Instructions     1.  Sleep only as much as you need to feel refreshed during the following day.  a. Restricting your time in bed helps consolidated and deepen your sleep.    b. Excessively long times in bed lead to fragmented and shallow sleep.    c. Get up at your regular time the next day, no matter how little you slept.  2. Get up the same time each day, 7 days a week.    a. A regular wake time in the morning leads to regular times of sleep onset and helps to set your biological clock.  3. Exercise regularly  a. Schedule exercise times so that they do not occur within 3 hours of when you intend to go to bed.  Exercise makes it easier to initiate sleep and deepen sleep.  4. Make sure your bedroom is comfortable and free from light and noise.  a. A comfortable, noise-free sleep environment will reduce the likelihood that you will wake up during the night.  Noise that does not awaken you may also disturb the quality of your sleep.  Carpeting, insulated curtains, and closing the door may help.  5. Make sure that your bedroom is at a comfortable temperature during the night.  a. Excessively war or cold sleep environment may disturb sleep.  b. Hot showers and bathes may delay sleep onset  6. Eat regular meals and do not go to bed hungry.  a. Hunger may disturb sleep.  A light snack at bedtime (especially carbohydrates) may help sleep, but avoid greasy or heavy foods.  7. Avoid excessive liquids in the evening.  a. Reducing liquid intake will minimize the need for nighttime trips to the bathroom.  8. Cut down on all caffeine products.  a. Caffeinated beverages and foods (coffee, tea, cola, chocolate) can cause difficulty falling asleep, awakenings during the night, and shallow sleep.  Even caffeine early in the day can disrupt nighttime sleep.  9. Avoid alcohol, especially in the evening.   a. Although alcohol helps tense people fall asleep more easily, it causes awakenings later in the  night.  10. Smoking may disturb sleep.  a. Nicotine is a stimulant.  Try not to smoke during the night when you have trouble sleeping.  11. Dont take you problem to bed.  a. Plan some time earlier in the evening for working on your problems or planning the next days activities.    b. Worrying may interfere with initiating sleep and produce shallow sleep.  12. Do not try to fall asleep.  a. This will only makes the problem worse.  Instead, turn on the light, leave the bedroom, and do something different like reading a book.  Dont engage in stimulating activity.  Return to bed only when you are sleepy.  13. Put the clock under the bed or turn it so that you cant see it.  a. Clock watching may lead to frustration, anger, and worry which interfere with sleep.  14. Avoid naps.  a. Staying awake during the day helps you to fall asleep at night.              [facility] - DEPARTMENT OF PSYCHIATRY   NEW PATIENT ORIENTATION INFORMATION  OUTPATIENT SERVICES PSYCHIATRY CONTRACT    We appreciate the opportunity to participate in your medical care and hope the following protocols will make it easier for you to receive quality treatment in our department.    1. PUNCTUALITY: Your appointment is scheduled for a fixed amount of time reserved especially for you.  To get the benefit of your appointment, please arrive early enough to allow time for parking and registration.  If you are late for your appointment, your clinician is not able to offer additional time.  Please make every effort to be on time.  You may be asked to reschedule.    2. PAYMENT FOR SERVICES:   Payments are expected at the time of service.  Please contact [phone] if you need to resolve issues involving your account at [facility] or to set up a payment plan.    3. CANCELLATION / MISSED APPOINTMENTS:   In order to receive quality care, all appointments must be kept.  Appointment may be cancelled, ONLY by talking with an  at phone  number [phone], between 8:00 a.m. and 5:00 p.m., Monday through Friday, at least 24 hours before your appointment time.  Your clinician reserves this time specifically for you, and if you will be unable to use it, it is necessary that you cancel in a timely manner.  If you do not give at least 24-hour notice of cancellation a fee may be assessed.  Please note that insurance does not cover no-show charges, so you will be billed directly.  If you are consistently late, cancel, or do not show for your appointments, our department reserves the right to terminate treatment.    4. CALLING THE DEPARTMENT:   MESSAGES, SCHEDULE OR CANCEL APPOINTMENTS- In general you can reach the department by calling [phone], between 8:00 a.m. and 5:00 p.m., Monday through Friday, to schedule or cancel appointments or leave a message for your clinician.  It is advisable to schedule your visits far in advance to obtain the most convenient times for your appointments.   AFTER HOURS, WEEKEND OR HOLIDAYS- For urgent questions after hours, weekends and holidays, calling the department number [phone] will connect you to the [facility] nursing staff or the Psychiatry Inpatient Unit.  The [facility] nursing staff will speak with you and direct your call/care as necessary.   EMERGENCY-  In case of a crisis when there is a concern of harm to self or others, call 911 or the office [phone] between 8:00 a.m. and 5:00 p.m., Monday through Friday.  After hours, weekends or holidays, please call 911 or go to the Emergency Department where you can be thoroughly evaluated by a physician.    5. TEAM APPROACH:  Most patients receive therapy through our team system.  In the team system, your primary therapist will be a nurse practitioner, , psychologist, psychiatry resident or psychiatrist.  If your therapist is a , psychologist or psychiatry resident, please contact your primary therapist first  in matters other than medications or acute medical problems.    6. PRESCRIPTION REFILLS:  Prescription refills must be done at your physician office visit.  You will be given a sufficient number of refills to last one extra month beyond your next appointment.  No additional refills will be approved beyond the original treatment plan.  After hours, sufficient medication may be approved to last until the next scheduled appointment. After hours requests for refills on controlled substances will be declined by the psychiatrist on call, as he or she may not be familiar with your case.  Please work closely with your doctor so that you have sufficient medication until your next appointment.  Again, please note that no additional prescriptions will be approved per patient request over the phone.   No additional refills will be approved beyond the original treatment plan.   In certain exceptional situations, a phone consult appointment may be arranged, with appropriate charge, for the review and approval of prescription refills to last until the next scheduled appointment.    7. FOLLOW UP APPOINTMENTS:  Follow-up appointments can be made in person at the [facility] office, or by calling [phone], from 8am to 5pm, between Monday and Friday.  It is advisable to schedule your office visits far enough in advance to obtain the most convenient times for your appointments.    Revised Aug. 28, 2020 - F/OA1/Newpatient          Fluoxetine capsules or tablets (Depression/Mood Disorders)  What is this medicine?  FLUOXETINE (floo OX e teen) belongs to a class of drugs known as selective serotonin reuptake inhibitors (SSRIs). It helps to treat mood problems such as depression, obsessive compulsive disorder, and panic attacks. It can also treat certain eating disorders.  How should I use this medicine?  Take this medicine by mouth with a glass of water. Follow the directions on the prescription label. You can take this  medicine with or without food. Take your medicine at regular intervals. Do not take it more often than directed. Do not stop taking this medicine suddenly except upon the advice of your doctor. Stopping this medicine too quickly may cause serious side effects or your condition may worsen.  A special MedGuide will be given to you by the pharmacist with each prescription and refill. Be sure to read this information carefully each time.  Talk to your pediatrician regarding the use of this medicine in children. While this drug may be prescribed for children as young as 7 years for selected conditions, precautions do apply.  What side effects may I notice from receiving this medicine?  Side effects that you should report to your doctor or health care professional as soon as possible:  · allergic reactions like skin rash, itching or hives, swelling of the face, lips, or tongue  · breathing problems  · confusion  · eye pain, changes in vision  · fast or irregular heart rate, palpitations  · flu-like fever, chills, cough, muscle or joint aches and pains  · seizures  · suicidal thoughts or other mood changes  · swelling or redness in or around the eye  · tremors  · trouble sleeping  · unusual bleeding or bruising  · unusually tired or weak  · vomiting  Side effects that usually do not require medical attention (report to your doctor or health care professional if they continue or are bothersome):  · change in sex drive or performance  · diarrhea  · dry mouth  · flushing  · headache  · increased or decreased appetite  · nausea  · sweating  What may interact with this medicine?  Do not take fluoxetine with any of the following medications:  · other medicines containing fluoxetine, like Sarafem or Symbyax  · cisapride  · linezolid  · MAOIs like Carbex, Eldepryl, Marplan, Nardil, and Parnate  · methylene blue (injected into a vein)  · pimozide  · thioridazine  This medicine may also interact with the following  medications:  · alcohol  · aspirin and aspirin-like medicines  · carbamazepine  · certain medicines for depression, anxiety, or psychotic disturbances  · certain medicines for migraine headaches like almotriptan, eletriptan, frovatriptan, naratriptan, rizatriptan, sumatriptan, zolmitriptan  · digoxin  · diuretics  · fentanyl  · flecainide  · furazolidone  · isoniazid  · lithium  · medicines for sleep  · medicines that treat or prevent blood clots like warfarin, enoxaparin, and dalteparin  · NSAIDs, medicines for pain and inflammation, like ibuprofen or naproxen  · phenytoin  · procarbazine  · propafenone  · rasagiline  · ritonavir  · supplements like Sheree's wort, kava kava, valerian  · tramadol  · tryptophan  · vinblastine  What if I miss a dose?  If you miss a dose, skip the missed dose and go back to your regular dosing schedule. Do not take double or extra doses.  Where should I keep my medicine?  Keep out of the reach of children.  Store at room temperature between 15 and 30 degrees C (59 and 86 degrees F). Throw away any unused medicine after the expiration date.  What should I tell my health care provider before I take this medicine?  They need to know if you have any of these conditions:  · bipolar disorder or michael  · diabetes  · glaucoma  · liver disease  · psychosis  · seizures  · suicidal thoughts or history of attempted suicide  · an unusual or allergic reaction to fluoxetine, other medicines, foods, dyes, or preservatives  · pregnant or trying to get pregnant  · breast-feeding  What should I watch for while using this medicine?  Tell your doctor if your symptoms do not get better or if they get worse. Visit your doctor or health care professional for regular checks on your progress. Because it may take several weeks to see the full effects of this medicine, it is important to continue your treatment as prescribed by your doctor.  Patients and their families should watch out for new or worsening  thoughts of suicide or depression. Also watch out for sudden changes in feelings such as feeling anxious, agitated, panicky, irritable, hostile, aggressive, impulsive, severely restless, overly excited and hyperactive, or not being able to sleep. If this happens, especially at the beginning of treatment or after a change in dose, call your health care professional.  You may get drowsy or dizzy. Do not drive, use machinery, or do anything that needs mental alertness until you know how this medicine affects you. Do not stand or sit up quickly, especially if you are an older patient. This reduces the risk of dizzy or fainting spells. Alcohol may interfere with the effect of this medicine. Avoid alcoholic drinks.  Your mouth may get dry. Chewing sugarless gum or sucking hard candy, and drinking plenty of water may help. Contact your doctor if the problem does not go away or is severe.  This medicine may affect blood sugar levels. If you have diabetes, check with your doctor or health care professional before you change your diet or the dose of your diabetic medicine.  NOTE:This sheet is a summary. It may not cover all possible information. If you have questions about this medicine, talk to your doctor, pharmacist, or health care provider. Copyright© 2017 [employer]        Trazodone tablets  What is this medicine?  TRAZODONE (TRAZ oh done) is used to treat depression.  How should I use this medicine?  Take this medicine by mouth with a glass of water. Follow the directions on the prescription label. Take this medicine shortly after a meal or a light snack. Take your medicine at regular intervals. Do not take your medicine more often than directed. Do not stop taking this medicine suddenly except upon the advice of your doctor. Stopping this medicine too quickly may cause serious side effects or your condition may worsen.  A special MedGuide will be given to you by the pharmacist with each prescription and refill. Be  sure to read this information carefully each time.  Talk to your pediatrician regarding the use of this medicine in children. Special care may be needed.  What side effects may I notice from receiving this medicine?  Side effects that you should report to your doctor or health care professional as soon as possible:  · allergic reactions like skin rash, itching or hives, swelling of the face, lips, or tongue  · fast, irregular heartbeat  · feeling faint or lightheaded, falls  · painful erections or other sexual dysfunction  · suicidal thoughts or other mood changes  · trembling  Side effects that usually do not require medical attention (report to your doctor or health care professional if they continue or are bothersome):  · constipation  · headache  · muscle aches or pains  · nausea, vomiting  · unusually weak or tired  What may interact with this medicine?  Do not take this medicine with any of the following medications:  · certain medicines for fungal infections like fluconazole, itraconazole, ketoconazole, posaconazole, voriconazole  · cisapride  · dofetilide  · dronedarone  · linezolid  · MAOIs like Carbex, Eldepryl, Marplan, Nardil, and Parnate  · mesoridazine  · methylene blue (injected into a vein)  · pimozide  · saquinavir  · thioridazine  · ziprasidone  This medicine may also interact with the following medications:  · alcohol  · antiviral medicines for HIV or AIDS  · aspirin and aspirin-like medicines  · barbiturates like phenobarbital  · certain medicines for blood pressure, heart disease, irregular heart beat  · certain medicines for depression, anxiety, or psychotic disturbances  · certain medicines for migraine headache like almotriptan, eletriptan, frovatriptan, naratriptan, rizatriptan, sumatriptan, zolmitriptan  · certain medicines for seizures like carbamazepine and phenytoin  · certain medicines for sleep  · certain medicines that treat or prevent blood clots like dalteparin, enoxaparin,  warfarin  · digoxin  · fentanyl  · lithium  · NSAIDS, medicines for pain and inflammation, like ibuprofen or naproxen  · other medicines that prolong the QT interval (cause an abnormal heart rhythm)  · rasagiline  · supplements like Sheree's wort, kava kava, valerian  · tramadol  · tryptophan  What if I miss a dose?  If you miss a dose, take it as soon as you can. If it is almost time for your next dose, take only that dose. Do not take double or extra doses.  Where should I keep my medicine?  Keep out of the reach of children.  Store at room temperature between 15 and 30 degrees C (59 to 86 degrees F). Protect from light. Keep container tightly closed. Throw away any unused medicine after the expiration date.  What should I tell my health care provider before I take this medicine?  They need to know if you have any of these conditions:  · attempted suicide or thinking about it  · bipolar disorder  · bleeding problems  · glaucoma  · heart disease, or previous heart attack  · irregular heart beat  · kidney or liver disease  · low levels of sodium in the blood  · an unusual or allergic reaction to trazodone, other medicines, foods, dyes or preservatives  · pregnant or trying to get pregnant  · breast-feeding  What should I watch for while using this medicine?  Tell your doctor if your symptoms do not get better or if they get worse. Visit your doctor or health care professional for regular checks on your progress. Because it may take several weeks to see the full effects of this medicine, it is important to continue your treatment as prescribed by your doctor.  Patients and their families should watch out for new or worsening thoughts of suicide or depression. Also watch out for sudden changes in feelings such as feeling anxious, agitated, panicky, irritable, hostile, aggressive, impulsive, severely restless, overly excited and hyperactive, or not being able to sleep. If this happens, especially at the beginning of  treatment or after a change in dose, call your health care professional.  You may get drowsy or dizzy. Do not drive, use machinery, or do anything that needs mental alertness until you know how this medicine affects you. Do not stand or sit up quickly, especially if you are an older patient. This reduces the risk of dizzy or fainting spells. Alcohol may interfere with the effect of this medicine. Avoid alcoholic drinks.  This medicine may cause dry eyes and blurred vision. If you wear contact lenses you may feel some discomfort. Lubricating drops may help. See your eye doctor if the problem does not go away or is severe.  Your mouth may get dry. Chewing sugarless gum, sucking hard candy and drinking plenty of water may help. Contact your doctor if the problem does not go away or is severe.  NOTE:This sheet is a summary. It may not cover all possible information. If you have questions about this medicine, talk to your doctor, pharmacist, or health care provider. Copyright© 2017 [employer]

## 2020-12-18 RX ORDER — LEVOTHYROXINE SODIUM 125 UG/1
125 TABLET ORAL DAILY
Qty: 90 TABLET | Refills: 3 | Status: SHIPPED | OUTPATIENT
Start: 2020-12-18 | End: 2021-12-06

## 2020-12-21 LAB
HPV HR 12 DNA SPEC QL NAA+PROBE: NEGATIVE
HPV16 AG SPEC QL: NEGATIVE
HPV18 DNA SPEC QL NAA+PROBE: NEGATIVE

## 2020-12-23 ENCOUNTER — IMMUNIZATION (OUTPATIENT)
Dept: INTERNAL MEDICINE | Facility: CLINIC | Age: 55
End: 2020-12-23
Payer: COMMERCIAL

## 2020-12-23 DIAGNOSIS — Z23 NEED FOR VACCINATION: ICD-10-CM

## 2020-12-23 PROCEDURE — 0001A COVID-19, MRNA, LNP-S, PF, 30 MCG/0.3 ML DOSE VACCINE: ICD-10-PCS | Mod: CV19,,, | Performed by: INTERNAL MEDICINE

## 2020-12-23 PROCEDURE — 0001A COVID-19, MRNA, LNP-S, PF, 30 MCG/0.3 ML DOSE VACCINE: CPT | Mod: CV19,,, | Performed by: INTERNAL MEDICINE

## 2020-12-23 PROCEDURE — 91300 COVID-19, MRNA, LNP-S, PF, 30 MCG/0.3 ML DOSE VACCINE: CPT | Mod: ,,, | Performed by: INTERNAL MEDICINE

## 2020-12-23 PROCEDURE — 91300 COVID-19, MRNA, LNP-S, PF, 30 MCG/0.3 ML DOSE VACCINE: ICD-10-PCS | Mod: ,,, | Performed by: INTERNAL MEDICINE

## 2021-01-14 ENCOUNTER — IMMUNIZATION (OUTPATIENT)
Dept: INTERNAL MEDICINE | Facility: CLINIC | Age: 56
End: 2021-01-14
Payer: COMMERCIAL

## 2021-01-14 DIAGNOSIS — Z23 NEED FOR VACCINATION: ICD-10-CM

## 2021-01-14 PROCEDURE — 0002A COVID-19, MRNA, LNP-S, PF, 30 MCG/0.3 ML DOSE VACCINE: CPT | Mod: PBBFAC | Performed by: INTERNAL MEDICINE

## 2021-01-14 PROCEDURE — 91300 COVID-19, MRNA, LNP-S, PF, 30 MCG/0.3 ML DOSE VACCINE: CPT | Mod: PBBFAC | Performed by: INTERNAL MEDICINE

## 2021-01-25 ENCOUNTER — OFFICE VISIT (OUTPATIENT)
Dept: PSYCHIATRY | Facility: CLINIC | Age: 56
End: 2021-01-25
Payer: COMMERCIAL

## 2021-01-25 DIAGNOSIS — G47.00 INSOMNIA, UNSPECIFIED TYPE: ICD-10-CM

## 2021-01-25 DIAGNOSIS — F32.2 CURRENT SEVERE EPISODE OF MAJOR DEPRESSIVE DISORDER WITHOUT PSYCHOTIC FEATURES WITHOUT PRIOR EPISODE: Primary | ICD-10-CM

## 2021-01-25 DIAGNOSIS — F41.1 GENERALIZED ANXIETY DISORDER: ICD-10-CM

## 2021-01-25 LAB
FINAL PATHOLOGIC DIAGNOSIS: NORMAL
Lab: NORMAL

## 2021-01-25 PROCEDURE — 99214 PR OFFICE/OUTPT VISIT, EST, LEVL IV, 30-39 MIN: ICD-10-PCS | Mod: S$GLB,,, | Performed by: NURSE PRACTITIONER

## 2021-01-25 PROCEDURE — 99999 PR PBB SHADOW E&M-EST. PATIENT-LVL I: CPT | Mod: PBBFAC,,, | Performed by: NURSE PRACTITIONER

## 2021-01-25 PROCEDURE — 99999 PR PBB SHADOW E&M-EST. PATIENT-LVL I: ICD-10-PCS | Mod: PBBFAC,,, | Performed by: NURSE PRACTITIONER

## 2021-01-25 PROCEDURE — 99214 OFFICE O/P EST MOD 30 MIN: CPT | Mod: S$GLB,,, | Performed by: NURSE PRACTITIONER

## 2021-01-25 RX ORDER — BUPROPION HYDROCHLORIDE 100 MG/1
100 TABLET ORAL 2 TIMES DAILY
Qty: 60 TABLET | Refills: 11 | Status: SHIPPED | OUTPATIENT
Start: 2021-01-25 | End: 2022-01-11 | Stop reason: SDUPTHER

## 2021-01-25 RX ORDER — ESCITALOPRAM OXALATE 5 MG/1
5 TABLET ORAL DAILY
Qty: 30 TABLET | Refills: 2 | Status: SHIPPED | OUTPATIENT
Start: 2021-01-25 | End: 2021-04-23 | Stop reason: SDUPTHER

## 2021-01-25 RX ORDER — CLONAZEPAM 0.5 MG/1
0.5 TABLET ORAL 2 TIMES DAILY PRN
Qty: 10 TABLET | Refills: 0 | Status: SHIPPED | OUTPATIENT
Start: 2021-01-25 | End: 2021-03-22 | Stop reason: SDUPTHER

## 2021-03-22 RX ORDER — CLONAZEPAM 0.5 MG/1
0.5 TABLET ORAL 2 TIMES DAILY PRN
Qty: 10 TABLET | Refills: 0 | Status: SHIPPED | OUTPATIENT
Start: 2021-03-22 | End: 2021-05-12 | Stop reason: SDUPTHER

## 2021-04-20 RX ORDER — ESCITALOPRAM OXALATE 5 MG/1
5 TABLET ORAL DAILY
Qty: 30 TABLET | Refills: 2 | OUTPATIENT
Start: 2021-04-20

## 2021-04-23 RX ORDER — ESCITALOPRAM OXALATE 5 MG/1
5 TABLET ORAL DAILY
Qty: 30 TABLET | Refills: 2 | OUTPATIENT
Start: 2021-04-23

## 2021-05-12 ENCOUNTER — OFFICE VISIT (OUTPATIENT)
Dept: PSYCHIATRY | Facility: CLINIC | Age: 56
End: 2021-05-12
Payer: COMMERCIAL

## 2021-05-12 DIAGNOSIS — G47.00 INSOMNIA, UNSPECIFIED TYPE: ICD-10-CM

## 2021-05-12 DIAGNOSIS — F41.1 GENERALIZED ANXIETY DISORDER: ICD-10-CM

## 2021-05-12 DIAGNOSIS — F32.2 CURRENT SEVERE EPISODE OF MAJOR DEPRESSIVE DISORDER WITHOUT PSYCHOTIC FEATURES WITHOUT PRIOR EPISODE: Primary | ICD-10-CM

## 2021-05-12 PROCEDURE — 99214 PR OFFICE/OUTPT VISIT, EST, LEVL IV, 30-39 MIN: ICD-10-PCS | Mod: S$GLB,,, | Performed by: NURSE PRACTITIONER

## 2021-05-12 PROCEDURE — 99999 PR PBB SHADOW E&M-EST. PATIENT-LVL I: CPT | Mod: PBBFAC,,, | Performed by: NURSE PRACTITIONER

## 2021-05-12 PROCEDURE — 90833 PR PSYCHOTHERAPY W/PATIENT W/E&M, 30 MIN (ADD ON): ICD-10-PCS | Mod: S$GLB,,, | Performed by: NURSE PRACTITIONER

## 2021-05-12 PROCEDURE — 99999 PR PBB SHADOW E&M-EST. PATIENT-LVL I: ICD-10-PCS | Mod: PBBFAC,,, | Performed by: NURSE PRACTITIONER

## 2021-05-12 PROCEDURE — 99214 OFFICE O/P EST MOD 30 MIN: CPT | Mod: S$GLB,,, | Performed by: NURSE PRACTITIONER

## 2021-05-12 PROCEDURE — 90833 PSYTX W PT W E/M 30 MIN: CPT | Mod: S$GLB,,, | Performed by: NURSE PRACTITIONER

## 2021-05-12 RX ORDER — CLONAZEPAM 0.5 MG/1
0.5 TABLET ORAL 2 TIMES DAILY PRN
Qty: 10 TABLET | Refills: 0 | Status: SHIPPED | OUTPATIENT
Start: 2021-05-12 | End: 2021-07-28 | Stop reason: SDUPTHER

## 2021-05-12 RX ORDER — ESCITALOPRAM OXALATE 5 MG/1
10 TABLET ORAL DAILY
Qty: 30 TABLET | Refills: 5 | Status: SHIPPED | OUTPATIENT
Start: 2021-05-12 | End: 2021-05-25

## 2021-05-25 RX ORDER — SERTRALINE HYDROCHLORIDE 25 MG/1
TABLET, FILM COATED ORAL
Qty: 53 TABLET | Refills: 0 | Status: SHIPPED | OUTPATIENT
Start: 2021-05-25 | End: 2021-06-17 | Stop reason: SDUPTHER

## 2021-06-17 ENCOUNTER — PATIENT MESSAGE (OUTPATIENT)
Dept: PSYCHIATRY | Facility: CLINIC | Age: 56
End: 2021-06-17

## 2021-06-21 RX ORDER — SERTRALINE HYDROCHLORIDE 25 MG/1
TABLET, FILM COATED ORAL
Qty: 53 TABLET | Refills: 0 | Status: SHIPPED | OUTPATIENT
Start: 2021-06-21 | End: 2021-07-15

## 2021-07-15 RX ORDER — SERTRALINE HYDROCHLORIDE 50 MG/1
50 TABLET, FILM COATED ORAL DAILY
Qty: 30 TABLET | Refills: 2 | Status: SHIPPED | OUTPATIENT
Start: 2021-07-15 | End: 2021-10-07 | Stop reason: SDUPTHER

## 2021-07-28 ENCOUNTER — OFFICE VISIT (OUTPATIENT)
Dept: PSYCHIATRY | Facility: CLINIC | Age: 56
End: 2021-07-28
Payer: COMMERCIAL

## 2021-07-28 DIAGNOSIS — G47.00 INSOMNIA, UNSPECIFIED TYPE: ICD-10-CM

## 2021-07-28 DIAGNOSIS — F41.1 GENERALIZED ANXIETY DISORDER: ICD-10-CM

## 2021-07-28 DIAGNOSIS — F32.2 CURRENT SEVERE EPISODE OF MAJOR DEPRESSIVE DISORDER WITHOUT PSYCHOTIC FEATURES WITHOUT PRIOR EPISODE: Primary | ICD-10-CM

## 2021-07-28 DIAGNOSIS — F43.20 ADJUSTMENT DISORDER, UNSPECIFIED TYPE: ICD-10-CM

## 2021-07-28 PROCEDURE — 99214 PR OFFICE/OUTPT VISIT, EST, LEVL IV, 30-39 MIN: ICD-10-PCS | Mod: S$GLB,,, | Performed by: NURSE PRACTITIONER

## 2021-07-28 PROCEDURE — 99214 OFFICE O/P EST MOD 30 MIN: CPT | Mod: S$GLB,,, | Performed by: NURSE PRACTITIONER

## 2021-07-28 RX ORDER — CLONAZEPAM 0.5 MG/1
0.5 TABLET ORAL 2 TIMES DAILY PRN
Qty: 10 TABLET | Refills: 2 | Status: SHIPPED | OUTPATIENT
Start: 2021-07-28 | End: 2021-11-16

## 2021-08-04 ENCOUNTER — TELEPHONE (OUTPATIENT)
Dept: PRIMARY CARE CLINIC | Facility: CLINIC | Age: 56
End: 2021-08-04

## 2021-08-04 DIAGNOSIS — Z12.39 ENCOUNTER FOR SCREENING FOR MALIGNANT NEOPLASM OF BREAST, UNSPECIFIED SCREENING MODALITY: Primary | ICD-10-CM

## 2021-08-22 DIAGNOSIS — G43.909 MIGRAINE WITHOUT STATUS MIGRAINOSUS, NOT INTRACTABLE, UNSPECIFIED MIGRAINE TYPE: ICD-10-CM

## 2021-08-22 DIAGNOSIS — R51.9 CHRONIC INTRACTABLE HEADACHE, UNSPECIFIED HEADACHE TYPE: ICD-10-CM

## 2021-08-22 DIAGNOSIS — G89.29 CHRONIC INTRACTABLE HEADACHE, UNSPECIFIED HEADACHE TYPE: ICD-10-CM

## 2021-08-23 RX ORDER — PROMETHAZINE HYDROCHLORIDE 50 MG/1
50 SUPPOSITORY RECTAL EVERY 6 HOURS PRN
Qty: 12 SUPPOSITORY | Refills: 1 | Status: SHIPPED | OUTPATIENT
Start: 2021-08-23 | End: 2022-01-11 | Stop reason: SDUPTHER

## 2021-09-07 NOTE — TELEPHONE ENCOUNTER
SAMUEL Zamora 41 minutes ago (11:33 AM)      Spoke to Elodia Cowan ([phone]) and she explained [facility] will reach out to the pt to set up the home sleep study.     Routing comment       SAMUEL Zamora Kelly Jo   45 minutes ago (11:29 AM)      Spoke to Elodia Cowan ([phone]) re: Home sleep study        
detailed exam

## 2021-09-17 ENCOUNTER — HOSPITAL ENCOUNTER (OUTPATIENT)
Dept: RADIOLOGY | Facility: HOSPITAL | Age: 56
Discharge: HOME | End: 2021-09-17
Attending: INTERNAL MEDICINE
Payer: COMMERCIAL

## 2021-09-17 VITALS — BODY MASS INDEX: 34.86 KG/M2 | HEIGHT: 68 IN | WEIGHT: 230 LBS

## 2021-09-17 DIAGNOSIS — Z12.39 ENCOUNTER FOR SCREENING FOR MALIGNANT NEOPLASM OF BREAST, UNSPECIFIED SCREENING MODALITY: ICD-10-CM

## 2021-09-17 PROCEDURE — 77067 SCR MAMMO BI INCL CAD: CPT | Mod: TC

## 2021-09-17 PROCEDURE — 77067 MAMMO DIGITAL SCREENING BILAT WITH TOMO: ICD-10-PCS | Mod: 26,,, | Performed by: RADIOLOGY

## 2021-09-17 PROCEDURE — 77063 MAMMO DIGITAL SCREENING BILAT WITH TOMO: ICD-10-PCS | Mod: 26,,, | Performed by: RADIOLOGY

## 2021-09-17 PROCEDURE — 77067 SCR MAMMO BI INCL CAD: CPT | Mod: 26,,, | Performed by: RADIOLOGY

## 2021-09-17 PROCEDURE — 77063 BREAST TOMOSYNTHESIS BI: CPT | Mod: 26,,, | Performed by: RADIOLOGY

## 2021-09-21 ENCOUNTER — TELEPHONE (OUTPATIENT)
Dept: RADIOLOGY | Facility: HOSPITAL | Age: 56
End: 2021-09-21

## 2021-09-21 ENCOUNTER — TELEPHONE (OUTPATIENT)
Dept: PRIMARY CARE CLINIC | Facility: CLINIC | Age: 56
End: 2021-09-21

## 2021-09-21 DIAGNOSIS — N63.10 BREAST MASS, RIGHT: Primary | ICD-10-CM

## 2021-09-21 NOTE — TELEPHONE ENCOUNTER
Please let patient know she had an abnormal mammogram.  Did she get scheduled for the ultrasound for follow-up diagnostic mammogram?  If not I can order for her and we can schedule.    Thanks,  KJ

## 2021-09-23 ENCOUNTER — TELEPHONE (OUTPATIENT)
Dept: PRIMARY CARE CLINIC | Facility: CLINIC | Age: 56
End: 2021-09-23

## 2021-09-23 ENCOUNTER — HOSPITAL ENCOUNTER (OUTPATIENT)
Dept: RADIOLOGY | Facility: HOSPITAL | Age: 56
Discharge: HOME | End: 2021-09-23
Attending: INTERNAL MEDICINE
Payer: COMMERCIAL

## 2021-09-23 DIAGNOSIS — N63.10 BREAST MASS, RIGHT: ICD-10-CM

## 2021-09-23 DIAGNOSIS — R92.8 ABNORMAL MAMMOGRAM: ICD-10-CM

## 2021-09-23 PROCEDURE — 77061 BREAST TOMOSYNTHESIS UNI: CPT | Mod: TC,RT

## 2021-09-23 PROCEDURE — G0279 TOMOSYNTHESIS, MAMMO: HCPCS | Mod: 26,RT,, | Performed by: RADIOLOGY

## 2021-09-23 PROCEDURE — G0279 MAMMO DIGITAL DIAGNOSTIC RIGHT WITH TOMO: ICD-10-PCS | Mod: 26,RT,, | Performed by: RADIOLOGY

## 2021-09-23 PROCEDURE — 76642 ULTRASOUND BREAST LIMITED: CPT | Mod: TC,RT

## 2021-09-23 PROCEDURE — 77065 MAMMO DIGITAL DIAGNOSTIC RIGHT WITH TOMO: ICD-10-PCS | Mod: 26,RT,, | Performed by: RADIOLOGY

## 2021-09-23 PROCEDURE — 76642 US BREAST RIGHT LIMITED: ICD-10-PCS | Mod: 26,RT,, | Performed by: RADIOLOGY

## 2021-09-23 PROCEDURE — 77065 DX MAMMO INCL CAD UNI: CPT | Mod: 26,RT,, | Performed by: RADIOLOGY

## 2021-09-23 PROCEDURE — 76642 ULTRASOUND BREAST LIMITED: CPT | Mod: 26,RT,, | Performed by: RADIOLOGY

## 2021-09-24 ENCOUNTER — TELEPHONE (OUTPATIENT)
Dept: RADIOLOGY | Facility: HOSPITAL | Age: 56
End: 2021-09-24

## 2021-10-01 ENCOUNTER — HOSPITAL ENCOUNTER (OUTPATIENT)
Dept: RADIOLOGY | Facility: HOSPITAL | Age: 56
Discharge: HOME | End: 2021-10-01
Attending: INTERNAL MEDICINE
Payer: COMMERCIAL

## 2021-10-01 DIAGNOSIS — R92.8 ABNORMAL FINDING ON BREAST IMAGING: ICD-10-CM

## 2021-10-01 PROCEDURE — 77065 MAMMO DIGITAL DIAGNOSTIC RIGHT: ICD-10-PCS | Mod: 26,RT,, | Performed by: RADIOLOGY

## 2021-10-01 PROCEDURE — A4648 IMPLANTABLE TISSUE MARKER: HCPCS

## 2021-10-01 PROCEDURE — 88305 TISSUE EXAM BY PATHOLOGIST: CPT | Performed by: PATHOLOGY

## 2021-10-01 PROCEDURE — 88305 TISSUE EXAM BY PATHOLOGIST: ICD-10-PCS | Mod: 26,,, | Performed by: PATHOLOGY

## 2021-10-01 PROCEDURE — 77065 DX MAMMO INCL CAD UNI: CPT | Mod: 26,RT,, | Performed by: RADIOLOGY

## 2021-10-01 PROCEDURE — 19083 BX BREAST 1ST LESION US IMAG: CPT | Mod: RT,,, | Performed by: RADIOLOGY

## 2021-10-01 PROCEDURE — 88305 TISSUE EXAM BY PATHOLOGIST: CPT | Mod: 26,,, | Performed by: PATHOLOGY

## 2021-10-01 PROCEDURE — 77065 DX MAMMO INCL CAD UNI: CPT | Mod: TC,RT

## 2021-10-01 PROCEDURE — 19083 US BREAST BIOPSY WITH IMAGING 1ST SITE RIGHT: ICD-10-PCS | Mod: RT,,, | Performed by: RADIOLOGY

## 2021-10-01 PROCEDURE — 25000003 PHARM REV CODE 250: Performed by: INTERNAL MEDICINE

## 2021-10-01 PROCEDURE — 27200937 US BREAST BIOPSY WITH IMAGING 1ST SITE RIGHT

## 2021-10-01 RX ORDER — LIDOCAINE HYDROCHLORIDE 10 MG/ML
3 INJECTION, SOLUTION EPIDURAL; INFILTRATION; INTRACAUDAL; PERINEURAL ONCE
Status: COMPLETED | OUTPATIENT
Start: 2021-10-01 | End: 2021-10-01

## 2021-10-01 RX ORDER — LIDOCAINE HYDROCHLORIDE AND EPINEPHRINE 20; 10 MG/ML; UG/ML
10 INJECTION, SOLUTION INFILTRATION; PERINEURAL ONCE
Status: COMPLETED | OUTPATIENT
Start: 2021-10-01 | End: 2021-10-01

## 2021-10-01 RX ADMIN — LIDOCAINE HYDROCHLORIDE 3 ML: 10 INJECTION, SOLUTION EPIDURAL; INFILTRATION; INTRACAUDAL; PERINEURAL at 09:10

## 2021-10-01 RX ADMIN — LIDOCAINE HYDROCHLORIDE,EPINEPHRINE BITARTRATE 20 ML: 20; .01 INJECTION, SOLUTION INFILTRATION; PERINEURAL at 09:10

## 2021-10-04 ENCOUNTER — TELEPHONE (OUTPATIENT)
Dept: SURGERY | Facility: CLINIC | Age: 56
End: 2021-10-04

## 2021-10-04 ENCOUNTER — TELEPHONE (OUTPATIENT)
Dept: PRIMARY CARE CLINIC | Facility: CLINIC | Age: 56
End: 2021-10-04

## 2021-10-04 DIAGNOSIS — N63.10 BREAST MASS, RIGHT: Primary | ICD-10-CM

## 2021-10-04 LAB
COMMENT: NORMAL
FINAL PATHOLOGIC DIAGNOSIS: NORMAL
GROSS: NORMAL
Lab: NORMAL

## 2021-10-04 NOTE — TELEPHONE ENCOUNTER
Breast surgery referral placed. Please assist.    Thanks,  KJ (PCP)    Maria Teresa Smith MD  [facility] Breast Navigation  Cc: Pearl Ellis MD  Concordant and benign, but referral to breast surgery (which can be arranged by surgery nurse navigators) is recommended given finding of papilloma.

## 2021-10-07 RX ORDER — SERTRALINE HYDROCHLORIDE 50 MG/1
50 TABLET, FILM COATED ORAL DAILY
Qty: 30 TABLET | Refills: 2 | Status: SHIPPED | OUTPATIENT
Start: 2021-10-07 | End: 2022-01-05 | Stop reason: SDUPTHER

## 2021-10-08 ENCOUNTER — IMMUNIZATION (OUTPATIENT)
Dept: INTERNAL MEDICINE | Facility: CLINIC | Age: 56
End: 2021-10-08
Payer: COMMERCIAL

## 2021-10-08 DIAGNOSIS — Z23 NEED FOR VACCINATION: Primary | ICD-10-CM

## 2021-10-08 PROCEDURE — 91300 COVID-19, MRNA, LNP-S, PF, 30 MCG/0.3 ML DOSE VACCINE: CPT | Mod: PBBFAC | Performed by: INTERNAL MEDICINE

## 2021-10-08 PROCEDURE — 0003A COVID-19, MRNA, LNP-S, PF, 30 MCG/0.3 ML DOSE VACCINE: CPT | Mod: CV19,PBBFAC | Performed by: INTERNAL MEDICINE

## 2021-10-08 RX ORDER — SERTRALINE HYDROCHLORIDE 50 MG/1
50 TABLET, FILM COATED ORAL DAILY
Qty: 30 TABLET | Refills: 2 | Status: SHIPPED | OUTPATIENT
Start: 2021-10-08 | End: 2022-03-18

## 2021-10-20 ENCOUNTER — OFFICE VISIT (OUTPATIENT)
Dept: SURGERY | Facility: CLINIC | Age: 56
End: 2021-10-20
Payer: COMMERCIAL

## 2021-10-20 ENCOUNTER — LAB VISIT (OUTPATIENT)
Dept: LAB | Facility: HOSPITAL | Age: 56
End: 2021-10-20
Payer: COMMERCIAL

## 2021-10-20 ENCOUNTER — HOSPITAL ENCOUNTER (OUTPATIENT)
Dept: CARDIOLOGY | Facility: CLINIC | Age: 56
Discharge: HOME | End: 2021-10-20
Payer: COMMERCIAL

## 2021-10-20 VITALS
HEIGHT: 68 IN | HEART RATE: 86 BPM | WEIGHT: 253 LBS | SYSTOLIC BLOOD PRESSURE: 136 MMHG | BODY MASS INDEX: 38.34 KG/M2 | DIASTOLIC BLOOD PRESSURE: 91 MMHG

## 2021-10-20 DIAGNOSIS — N63.10 BREAST MASS, RIGHT: ICD-10-CM

## 2021-10-20 DIAGNOSIS — Z01.818 PRE-OP TESTING: ICD-10-CM

## 2021-10-20 DIAGNOSIS — D24.1 INTRADUCTAL PAPILLOMA OF RIGHT BREAST: Primary | ICD-10-CM

## 2021-10-20 LAB
ALBUMIN SERPL BCP-MCNC: 4.1 G/DL (ref 3.5–5.2)
ALP SERPL-CCNC: 70 U/L (ref 55–135)
ALT SERPL W/O P-5'-P-CCNC: 24 U/L (ref 10–44)
ANION GAP SERPL CALC-SCNC: 12 MMOL/L (ref 8–16)
AST SERPL-CCNC: 21 U/L (ref 10–40)
BASOPHILS # BLD AUTO: 0.04 K/UL (ref 0–0.2)
BASOPHILS NFR BLD: 0.6 % (ref 0–1.9)
BILIRUB SERPL-MCNC: 0.3 MG/DL (ref 0.1–1)
BUN SERPL-MCNC: 19 MG/DL (ref 6–20)
CALCIUM SERPL-MCNC: 9.7 MG/DL (ref 8.7–10.5)
CHLORIDE SERPL-SCNC: 102 MMOL/L (ref 95–110)
CO2 SERPL-SCNC: 25 MMOL/L (ref 23–29)
CREAT SERPL-MCNC: 0.8 MG/DL (ref 0.5–1.4)
DIFFERENTIAL METHOD: ABNORMAL
EOSINOPHIL # BLD AUTO: 0.1 K/UL (ref 0–0.5)
EOSINOPHIL NFR BLD: 1.9 % (ref 0–8)
ERYTHROCYTE [DISTWIDTH] IN BLOOD BY AUTOMATED COUNT: 12.8 % (ref 11.5–14.5)
EST. GFR  (AFRICAN AMERICAN): >60 ML/MIN/1.73 M^2
EST. GFR  (NON AFRICAN AMERICAN): >60 ML/MIN/1.73 M^2
GLUCOSE SERPL-MCNC: 92 MG/DL (ref 70–110)
HCT VFR BLD AUTO: 43.2 % (ref 37–48.5)
HGB BLD-MCNC: 14.3 G/DL (ref 12–16)
IMM GRANULOCYTES # BLD AUTO: 0.04 K/UL (ref 0–0.04)
IMM GRANULOCYTES NFR BLD AUTO: 0.6 % (ref 0–0.5)
LYMPHOCYTES # BLD AUTO: 3.5 K/UL (ref 1–4.8)
LYMPHOCYTES NFR BLD: 50.1 % (ref 18–48)
MCH RBC QN AUTO: 32.2 PG (ref 27–31)
MCHC RBC AUTO-ENTMCNC: 33.1 G/DL (ref 32–36)
MCV RBC AUTO: 97 FL (ref 82–98)
MONOCYTES # BLD AUTO: 0.7 K/UL (ref 0.3–1)
MONOCYTES NFR BLD: 10.1 % (ref 4–15)
NEUTROPHILS # BLD AUTO: 2.6 K/UL (ref 1.8–7.7)
NEUTROPHILS NFR BLD: 36.7 % (ref 38–73)
NRBC BLD-RTO: 0 /100 WBC
PLATELET # BLD AUTO: 341 K/UL (ref 150–450)
PMV BLD AUTO: 9.1 FL (ref 9.2–12.9)
POTASSIUM SERPL-SCNC: 3.9 MMOL/L (ref 3.5–5.1)
PROT SERPL-MCNC: 7.4 G/DL (ref 6–8.4)
RBC # BLD AUTO: 4.44 M/UL (ref 4–5.4)
SODIUM SERPL-SCNC: 139 MMOL/L (ref 136–145)
WBC # BLD AUTO: 6.94 K/UL (ref 3.9–12.7)

## 2021-10-20 PROCEDURE — 1160F PR REVIEW ALL MEDS BY PRESCRIBER/CLIN PHARMACIST DOCUMENTED: ICD-10-PCS | Mod: CPTII,S$GLB,, | Performed by: SURGERY

## 2021-10-20 PROCEDURE — 93010 EKG 12-LEAD: ICD-10-PCS | Mod: S$GLB,,, | Performed by: INTERNAL MEDICINE

## 2021-10-20 PROCEDURE — 1159F MED LIST DOCD IN RCRD: CPT | Mod: CPTII,S$GLB,, | Performed by: SURGERY

## 2021-10-20 PROCEDURE — 93010 ELECTROCARDIOGRAM REPORT: CPT | Mod: S$GLB,,, | Performed by: INTERNAL MEDICINE

## 2021-10-20 PROCEDURE — 80053 COMPREHEN METABOLIC PANEL: CPT | Performed by: SURGERY

## 2021-10-20 PROCEDURE — 3080F PR MOST RECENT DIASTOLIC BLOOD PRESSURE >= 90 MM HG: ICD-10-PCS | Mod: CPTII,S$GLB,, | Performed by: SURGERY

## 2021-10-20 PROCEDURE — 99203 OFFICE O/P NEW LOW 30 MIN: CPT | Mod: S$GLB,,, | Performed by: SURGERY

## 2021-10-20 PROCEDURE — 1160F RVW MEDS BY RX/DR IN RCRD: CPT | Mod: CPTII,S$GLB,, | Performed by: SURGERY

## 2021-10-20 PROCEDURE — 99999 PR PBB SHADOW E&M-EST. PATIENT-LVL III: ICD-10-PCS | Mod: PBBFAC,,, | Performed by: SURGERY

## 2021-10-20 PROCEDURE — 36415 COLL VENOUS BLD VENIPUNCTURE: CPT | Performed by: SURGERY

## 2021-10-20 PROCEDURE — 3075F SYST BP GE 130 - 139MM HG: CPT | Mod: CPTII,S$GLB,, | Performed by: SURGERY

## 2021-10-20 PROCEDURE — 4010F PR ACE/ARB THEARPY RXD/TAKEN: ICD-10-PCS | Mod: CPTII,S$GLB,, | Performed by: SURGERY

## 2021-10-20 PROCEDURE — 99999 PR PBB SHADOW E&M-EST. PATIENT-LVL III: CPT | Mod: PBBFAC,,, | Performed by: SURGERY

## 2021-10-20 PROCEDURE — 3008F PR BODY MASS INDEX (BMI) DOCUMENTED: ICD-10-PCS | Mod: CPTII,S$GLB,, | Performed by: SURGERY

## 2021-10-20 PROCEDURE — 3075F PR MOST RECENT SYSTOLIC BLOOD PRESS GE 130-139MM HG: ICD-10-PCS | Mod: CPTII,S$GLB,, | Performed by: SURGERY

## 2021-10-20 PROCEDURE — 93005 ELECTROCARDIOGRAM TRACING: CPT | Mod: S$GLB,,, | Performed by: SURGERY

## 2021-10-20 PROCEDURE — 85025 COMPLETE CBC W/AUTO DIFF WBC: CPT | Performed by: SURGERY

## 2021-10-20 PROCEDURE — 3080F DIAST BP >= 90 MM HG: CPT | Mod: CPTII,S$GLB,, | Performed by: SURGERY

## 2021-10-20 PROCEDURE — 4010F ACE/ARB THERAPY RXD/TAKEN: CPT | Mod: CPTII,S$GLB,, | Performed by: SURGERY

## 2021-10-20 PROCEDURE — 93005 EKG 12-LEAD: ICD-10-PCS | Mod: S$GLB,,, | Performed by: SURGERY

## 2021-10-20 PROCEDURE — 99203 PR OFFICE/OUTPT VISIT, NEW, LEVL III, 30-44 MIN: ICD-10-PCS | Mod: S$GLB,,, | Performed by: SURGERY

## 2021-10-20 PROCEDURE — 1159F PR MEDICATION LIST DOCUMENTED IN MEDICAL RECORD: ICD-10-PCS | Mod: CPTII,S$GLB,, | Performed by: SURGERY

## 2021-10-20 PROCEDURE — 3008F BODY MASS INDEX DOCD: CPT | Mod: CPTII,S$GLB,, | Performed by: SURGERY

## 2021-10-25 ENCOUNTER — DOCUMENTATION ONLY (OUTPATIENT)
Dept: HEMATOLOGY/ONCOLOGY | Facility: CLINIC | Age: 56
End: 2021-10-25
Payer: COMMERCIAL

## 2021-11-03 ENCOUNTER — HOSPITAL ENCOUNTER (OUTPATIENT)
Dept: RADIOLOGY | Facility: HOSPITAL | Age: 56
Discharge: HOME | End: 2021-11-03
Attending: SURGERY
Payer: COMMERCIAL

## 2021-11-03 DIAGNOSIS — D24.1 INTRADUCTAL PAPILLOMA OF RIGHT BREAST: ICD-10-CM

## 2021-11-03 PROCEDURE — 19285 PERQ DEV BREAST 1ST US IMAG: CPT | Mod: RT,,, | Performed by: RADIOLOGY

## 2021-11-03 PROCEDURE — 77065 DX MAMMO INCL CAD UNI: CPT | Mod: 26,RT,, | Performed by: RADIOLOGY

## 2021-11-03 PROCEDURE — 25000003 PHARM REV CODE 250: Performed by: SURGERY

## 2021-11-03 PROCEDURE — 77065 MAMMO DIGITAL DIAGNOSTIC RIGHT: ICD-10-PCS | Mod: 26,RT,, | Performed by: RADIOLOGY

## 2021-11-03 PROCEDURE — 19285 US BREAST RADAR REFLECTOR LOCALIZATION W/GUIDANCE, 1ST LESION, RIGHT: ICD-10-PCS | Mod: RT,,, | Performed by: RADIOLOGY

## 2021-11-03 PROCEDURE — 77065 DX MAMMO INCL CAD UNI: CPT | Mod: TC,RT

## 2021-11-03 PROCEDURE — A4648 IMPLANTABLE TISSUE MARKER: HCPCS

## 2021-11-03 RX ORDER — LIDOCAINE HYDROCHLORIDE 20 MG/ML
20 INJECTION, SOLUTION INFILTRATION; PERINEURAL ONCE
Status: COMPLETED | OUTPATIENT
Start: 2021-11-03 | End: 2021-11-03

## 2021-11-03 RX ADMIN — LIDOCAINE HYDROCHLORIDE 10 ML: 20 INJECTION, SOLUTION INFILTRATION; PERINEURAL at 10:11

## 2021-11-04 ENCOUNTER — TELEPHONE (OUTPATIENT)
Dept: SURGERY | Facility: CLINIC | Age: 56
End: 2021-11-04
Payer: COMMERCIAL

## 2021-11-04 ENCOUNTER — ANESTHESIA EVENT (OUTPATIENT)
Dept: SURGERY | Facility: HOSPITAL | Age: 56
End: 2021-11-04
Payer: COMMERCIAL

## 2021-11-04 RX ORDER — HALOPERIDOL 5 MG/ML
0.5 INJECTION INTRAMUSCULAR EVERY 10 MIN PRN
Status: CANCELLED | OUTPATIENT
Start: 2021-11-04

## 2021-11-04 RX ORDER — FENTANYL CITRATE 50 UG/ML
25 INJECTION, SOLUTION INTRAMUSCULAR; INTRAVENOUS EVERY 5 MIN PRN
Status: CANCELLED | OUTPATIENT
Start: 2021-11-04

## 2021-11-04 RX ORDER — TRAMADOL HYDROCHLORIDE 50 MG/1
50 TABLET ORAL EVERY 6 HOURS
Qty: 10 TABLET | Refills: 0 | Status: SHIPPED | OUTPATIENT
Start: 2021-11-04 | End: 2022-09-28

## 2021-11-05 ENCOUNTER — ANESTHESIA (OUTPATIENT)
Dept: SURGERY | Facility: HOSPITAL | Age: 56
End: 2021-11-05
Payer: COMMERCIAL

## 2021-11-05 ENCOUNTER — HOSPITAL ENCOUNTER (OUTPATIENT)
Facility: HOSPITAL | Age: 56
Discharge: HOME | End: 2021-11-05
Admitting: SURGERY
Payer: COMMERCIAL

## 2021-11-05 ENCOUNTER — HOSPITAL ENCOUNTER (OUTPATIENT)
Dept: RADIOLOGY | Facility: HOSPITAL | Age: 56
Discharge: HOME | End: 2021-11-05
Attending: SURGERY | Admitting: SURGERY
Payer: COMMERCIAL

## 2021-11-05 VITALS
HEART RATE: 71 BPM | OXYGEN SATURATION: 95 % | TEMPERATURE: 98 F | DIASTOLIC BLOOD PRESSURE: 99 MMHG | WEIGHT: 250 LBS | BODY MASS INDEX: 38.01 KG/M2 | RESPIRATION RATE: 16 BRPM | SYSTOLIC BLOOD PRESSURE: 168 MMHG

## 2021-11-05 DIAGNOSIS — D24.1 INTRADUCTAL PAPILLOMA OF RIGHT BREAST: ICD-10-CM

## 2021-11-05 DIAGNOSIS — D24.1 INTRADUCTAL PAPILLOMA OF RIGHT BREAST: Primary | ICD-10-CM

## 2021-11-05 PROCEDURE — 37000008 HC ANESTHESIA 1ST 15 MINUTES: Performed by: SURGERY

## 2021-11-05 PROCEDURE — 63600175 PHARM REV CODE 636 W HCPCS: Performed by: NURSE ANESTHETIST, CERTIFIED REGISTERED

## 2021-11-05 PROCEDURE — 36000706: Performed by: SURGERY

## 2021-11-05 PROCEDURE — 76098 X-RAY EXAM SURGICAL SPECIMEN: CPT | Mod: 26,,, | Performed by: RADIOLOGY

## 2021-11-05 PROCEDURE — 37000009 HC ANESTHESIA EA ADD 15 MINS: Performed by: SURGERY

## 2021-11-05 PROCEDURE — 88307 TISSUE EXAM BY PATHOLOGIST: CPT | Performed by: PATHOLOGY

## 2021-11-05 PROCEDURE — 63600175 PHARM REV CODE 636 W HCPCS: Performed by: STUDENT IN AN ORGANIZED HEALTH CARE EDUCATION/TRAINING PROGRAM

## 2021-11-05 PROCEDURE — D9220A PRA ANESTHESIA: Mod: CRNA,,, | Performed by: NURSE ANESTHETIST, CERTIFIED REGISTERED

## 2021-11-05 PROCEDURE — D9220A PRA ANESTHESIA: Mod: ANES,,, | Performed by: ANESTHESIOLOGY

## 2021-11-05 PROCEDURE — 19125 PR EXCISE BREAST LES W XRAY MARKER: ICD-10-PCS | Mod: RT,,, | Performed by: SURGERY

## 2021-11-05 PROCEDURE — 71000044 HC DOSC ROUTINE RECOVERY FIRST HOUR: Performed by: SURGERY

## 2021-11-05 PROCEDURE — 36000707: Performed by: SURGERY

## 2021-11-05 PROCEDURE — 25000003 PHARM REV CODE 250: Performed by: STUDENT IN AN ORGANIZED HEALTH CARE EDUCATION/TRAINING PROGRAM

## 2021-11-05 PROCEDURE — D9220A PRA ANESTHESIA: ICD-10-PCS | Mod: CRNA,,, | Performed by: NURSE ANESTHETIST, CERTIFIED REGISTERED

## 2021-11-05 PROCEDURE — 76098 X-RAY EXAM SURGICAL SPECIMEN: CPT | Mod: TC

## 2021-11-05 PROCEDURE — 88307 TISSUE EXAM BY PATHOLOGIST: CPT | Mod: 26,,, | Performed by: PATHOLOGY

## 2021-11-05 PROCEDURE — 25000003 PHARM REV CODE 250: Performed by: NURSE ANESTHETIST, CERTIFIED REGISTERED

## 2021-11-05 PROCEDURE — 88307 PR  SURG PATH,LEVEL V: ICD-10-PCS | Mod: 26,,, | Performed by: PATHOLOGY

## 2021-11-05 PROCEDURE — 76098 MAMMO BREAST SPECIMEN: ICD-10-PCS | Mod: 26,,, | Performed by: RADIOLOGY

## 2021-11-05 PROCEDURE — 19125 EXCISION BREAST LESION: CPT | Mod: RT,,, | Performed by: SURGERY

## 2021-11-05 PROCEDURE — 25000003 PHARM REV CODE 250: Performed by: SURGERY

## 2021-11-05 PROCEDURE — 71000015 HC POSTOP RECOV 1ST HR: Performed by: SURGERY

## 2021-11-05 PROCEDURE — D9220A PRA ANESTHESIA: ICD-10-PCS | Mod: ANES,,, | Performed by: ANESTHESIOLOGY

## 2021-11-05 RX ORDER — MIDAZOLAM HYDROCHLORIDE 1 MG/ML
INJECTION, SOLUTION INTRAMUSCULAR; INTRAVENOUS
Status: DISCONTINUED | OUTPATIENT
Start: 2021-11-05 | End: 2021-11-05

## 2021-11-05 RX ORDER — PROPOFOL 10 MG/ML
VIAL (ML) INTRAVENOUS
Status: DISCONTINUED | OUTPATIENT
Start: 2021-11-05 | End: 2021-11-05

## 2021-11-05 RX ORDER — CEFAZOLIN SODIUM 1 G/3ML
2 INJECTION, POWDER, FOR SOLUTION INTRAMUSCULAR; INTRAVENOUS
Status: COMPLETED | OUTPATIENT
Start: 2021-11-05 | End: 2021-11-05

## 2021-11-05 RX ORDER — SODIUM CHLORIDE 9 MG/ML
INJECTION, SOLUTION INTRAVENOUS CONTINUOUS
Status: ACTIVE | OUTPATIENT
Start: 2021-11-05

## 2021-11-05 RX ORDER — DEXAMETHASONE SODIUM PHOSPHATE 4 MG/ML
INJECTION, SOLUTION INTRA-ARTICULAR; INTRALESIONAL; INTRAMUSCULAR; INTRAVENOUS; SOFT TISSUE
Status: DISCONTINUED | OUTPATIENT
Start: 2021-11-05 | End: 2021-11-05

## 2021-11-05 RX ORDER — ONDANSETRON 2 MG/ML
INJECTION INTRAMUSCULAR; INTRAVENOUS
Status: DISCONTINUED | OUTPATIENT
Start: 2021-11-05 | End: 2021-11-05

## 2021-11-05 RX ORDER — FAMOTIDINE 10 MG/ML
INJECTION INTRAVENOUS
Status: DISCONTINUED | OUTPATIENT
Start: 2021-11-05 | End: 2021-11-05

## 2021-11-05 RX ORDER — BUPIVACAINE HYDROCHLORIDE 5 MG/ML
INJECTION, SOLUTION EPIDURAL; INTRACAUDAL
Status: DISCONTINUED | OUTPATIENT
Start: 2021-11-05 | End: 2021-11-05 | Stop reason: HOSPADM

## 2021-11-05 RX ORDER — FENTANYL CITRATE 50 UG/ML
INJECTION, SOLUTION INTRAMUSCULAR; INTRAVENOUS
Status: DISCONTINUED | OUTPATIENT
Start: 2021-11-05 | End: 2021-11-05

## 2021-11-05 RX ORDER — LIDOCAINE HYDROCHLORIDE 20 MG/ML
INJECTION INTRAVENOUS
Status: DISCONTINUED | OUTPATIENT
Start: 2021-11-05 | End: 2021-11-05

## 2021-11-05 RX ADMIN — FENTANYL CITRATE 25 MCG: 50 INJECTION, SOLUTION INTRAMUSCULAR; INTRAVENOUS at 02:11

## 2021-11-05 RX ADMIN — CEFAZOLIN 2 G: 330 INJECTION, POWDER, FOR SOLUTION INTRAMUSCULAR; INTRAVENOUS at 02:11

## 2021-11-05 RX ADMIN — GLYCOPYRROLATE 0.2 MG: 0.2 INJECTION, SOLUTION INTRAMUSCULAR; INTRAVITREAL at 02:11

## 2021-11-05 RX ADMIN — DEXAMETHASONE SODIUM PHOSPHATE 4 MG: 4 INJECTION, SOLUTION INTRAMUSCULAR; INTRAVENOUS at 02:11

## 2021-11-05 RX ADMIN — FAMOTIDINE 20 MG: 10 INJECTION INTRAVENOUS at 02:11

## 2021-11-05 RX ADMIN — FENTANYL CITRATE 25 MCG: 50 INJECTION, SOLUTION INTRAMUSCULAR; INTRAVENOUS at 03:11

## 2021-11-05 RX ADMIN — Medication 20 MG: at 02:11

## 2021-11-05 RX ADMIN — Medication 50 MG: at 02:11

## 2021-11-05 RX ADMIN — ONDANSETRON 4 MG: 2 INJECTION INTRAMUSCULAR; INTRAVENOUS at 02:11

## 2021-11-05 RX ADMIN — LIDOCAINE HYDROCHLORIDE 100 MG: 20 INJECTION, SOLUTION INTRAVENOUS at 02:11

## 2021-11-05 RX ADMIN — SODIUM CHLORIDE: 0.9 INJECTION, SOLUTION INTRAVENOUS at 02:11

## 2021-11-05 RX ADMIN — MIDAZOLAM HYDROCHLORIDE 2 MG: 1 INJECTION, SOLUTION INTRAMUSCULAR; INTRAVENOUS at 02:11

## 2021-11-05 RX ADMIN — PROPOFOL 100 MCG/KG/MIN: 10 INJECTION, EMULSION INTRAVENOUS at 02:11

## 2021-11-12 LAB
FINAL PATHOLOGIC DIAGNOSIS: NORMAL
GROSS: NORMAL
Lab: NORMAL

## 2021-11-15 ENCOUNTER — PATIENT MESSAGE (OUTPATIENT)
Dept: SURGERY | Facility: CLINIC | Age: 56
End: 2021-11-15
Payer: COMMERCIAL

## 2021-11-16 RX ORDER — CLONAZEPAM 0.5 MG/1
TABLET ORAL
Qty: 10 TABLET | Refills: 0 | Status: SHIPPED | OUTPATIENT
Start: 2021-11-16 | End: 2022-05-24

## 2021-11-19 ENCOUNTER — OFFICE VISIT (OUTPATIENT)
Dept: SURGERY | Facility: CLINIC | Age: 56
End: 2021-11-19
Payer: COMMERCIAL

## 2021-11-19 VITALS
SYSTOLIC BLOOD PRESSURE: 136 MMHG | BODY MASS INDEX: 38.34 KG/M2 | HEIGHT: 68 IN | WEIGHT: 253 LBS | HEART RATE: 89 BPM | DIASTOLIC BLOOD PRESSURE: 72 MMHG

## 2021-11-19 DIAGNOSIS — Z12.31 SCREENING MAMMOGRAM FOR HIGH-RISK PATIENT: Primary | ICD-10-CM

## 2021-11-19 PROCEDURE — 3075F SYST BP GE 130 - 139MM HG: CPT | Mod: CPTII,S$GLB,, | Performed by: SURGERY

## 2021-11-19 PROCEDURE — 3078F DIAST BP <80 MM HG: CPT | Mod: CPTII,S$GLB,, | Performed by: SURGERY

## 2021-11-19 PROCEDURE — 4010F PR ACE/ARB THEARPY RXD/TAKEN: ICD-10-PCS | Mod: CPTII,S$GLB,, | Performed by: SURGERY

## 2021-11-19 PROCEDURE — 4010F ACE/ARB THERAPY RXD/TAKEN: CPT | Mod: CPTII,S$GLB,, | Performed by: SURGERY

## 2021-11-19 PROCEDURE — 1159F MED LIST DOCD IN RCRD: CPT | Mod: CPTII,S$GLB,, | Performed by: SURGERY

## 2021-11-19 PROCEDURE — 99999 PR PBB SHADOW E&M-EST. PATIENT-LVL III: CPT | Mod: PBBFAC,,, | Performed by: SURGERY

## 2021-11-19 PROCEDURE — 99024 POSTOP FOLLOW-UP VISIT: CPT | Mod: S$GLB,,, | Performed by: SURGERY

## 2021-11-19 PROCEDURE — 3008F BODY MASS INDEX DOCD: CPT | Mod: CPTII,S$GLB,, | Performed by: SURGERY

## 2021-11-19 PROCEDURE — 99024 PR POST-OP FOLLOW-UP VISIT: ICD-10-PCS | Mod: S$GLB,,, | Performed by: SURGERY

## 2021-11-19 PROCEDURE — 3008F PR BODY MASS INDEX (BMI) DOCUMENTED: ICD-10-PCS | Mod: CPTII,S$GLB,, | Performed by: SURGERY

## 2021-11-19 PROCEDURE — 1159F PR MEDICATION LIST DOCUMENTED IN MEDICAL RECORD: ICD-10-PCS | Mod: CPTII,S$GLB,, | Performed by: SURGERY

## 2021-11-19 PROCEDURE — 99999 PR PBB SHADOW E&M-EST. PATIENT-LVL III: ICD-10-PCS | Mod: PBBFAC,,, | Performed by: SURGERY

## 2021-11-19 PROCEDURE — 1160F RVW MEDS BY RX/DR IN RCRD: CPT | Mod: CPTII,S$GLB,, | Performed by: SURGERY

## 2021-11-19 PROCEDURE — 3075F PR MOST RECENT SYSTOLIC BLOOD PRESS GE 130-139MM HG: ICD-10-PCS | Mod: CPTII,S$GLB,, | Performed by: SURGERY

## 2021-11-19 PROCEDURE — 1160F PR REVIEW ALL MEDS BY PRESCRIBER/CLIN PHARMACIST DOCUMENTED: ICD-10-PCS | Mod: CPTII,S$GLB,, | Performed by: SURGERY

## 2021-11-19 PROCEDURE — 3078F PR MOST RECENT DIASTOLIC BLOOD PRESSURE < 80 MM HG: ICD-10-PCS | Mod: CPTII,S$GLB,, | Performed by: SURGERY

## 2021-11-26 ENCOUNTER — TELEPHONE (OUTPATIENT)
Dept: PRIMARY CARE CLINIC | Facility: CLINIC | Age: 56
End: 2021-11-26

## 2021-11-26 NOTE — TELEPHONE ENCOUNTER
----- Message from Cuca Gaffney sent at 11/26/2021 10:55 AM CST -----  Regarding: Annual  Contact: pt  Type: Appointment Request    Caller is requesting an appointment  Name of Caller: pt  Reason for appointment: Annual  Would the patient rather a call back or a response via [facility]? Call back  Best Call Back Number: [phone]  Additional Information:  yearly medication refill

## 2021-12-06 DIAGNOSIS — E03.9 ACQUIRED HYPOTHYROIDISM: ICD-10-CM

## 2021-12-06 DIAGNOSIS — I10 ESSENTIAL HYPERTENSION: ICD-10-CM

## 2021-12-06 RX ORDER — LEVOTHYROXINE SODIUM 125 UG/1
TABLET ORAL
Qty: 90 TABLET | Refills: 3 | Status: SHIPPED | OUTPATIENT
Start: 2021-12-06 | End: 2022-01-11 | Stop reason: SDUPTHER

## 2021-12-06 RX ORDER — HYDROCHLOROTHIAZIDE 25 MG/1
TABLET ORAL
Qty: 90 TABLET | Refills: 3 | Status: SHIPPED | OUTPATIENT
Start: 2021-12-06 | End: 2022-01-11 | Stop reason: SDUPTHER

## 2021-12-13 DIAGNOSIS — I10 ESSENTIAL HYPERTENSION: ICD-10-CM

## 2021-12-13 RX ORDER — VERAPAMIL HYDROCHLORIDE 360 MG/1
360 CAPSULE, DELAYED RELEASE PELLETS ORAL DAILY
Qty: 90 CAPSULE | Refills: 3 | Status: SHIPPED | OUTPATIENT
Start: 2021-12-13 | End: 2022-01-11 | Stop reason: SDUPTHER

## 2022-01-05 RX ORDER — SERTRALINE HYDROCHLORIDE 50 MG/1
50 TABLET, FILM COATED ORAL DAILY
Qty: 30 TABLET | Refills: 2 | Status: SHIPPED | OUTPATIENT
Start: 2022-01-05 | End: 2022-02-18 | Stop reason: SDUPTHER

## 2022-01-11 ENCOUNTER — OFFICE VISIT (OUTPATIENT)
Dept: PRIMARY CARE CLINIC | Facility: CLINIC | Age: 57
End: 2022-01-11
Payer: COMMERCIAL

## 2022-01-11 VITALS
SYSTOLIC BLOOD PRESSURE: 143 MMHG | HEIGHT: 68 IN | OXYGEN SATURATION: 98 % | WEIGHT: 253.5 LBS | TEMPERATURE: 98 F | BODY MASS INDEX: 38.42 KG/M2 | DIASTOLIC BLOOD PRESSURE: 84 MMHG | HEART RATE: 79 BPM

## 2022-01-11 DIAGNOSIS — G89.29 CHRONIC INTRACTABLE HEADACHE, UNSPECIFIED HEADACHE TYPE: ICD-10-CM

## 2022-01-11 DIAGNOSIS — E03.9 ACQUIRED HYPOTHYROIDISM: ICD-10-CM

## 2022-01-11 DIAGNOSIS — R51.9 CHRONIC INTRACTABLE HEADACHE, UNSPECIFIED HEADACHE TYPE: ICD-10-CM

## 2022-01-11 DIAGNOSIS — J41.0 SMOKERS' COUGH: Primary | ICD-10-CM

## 2022-01-11 DIAGNOSIS — G43.909 MIGRAINE WITHOUT STATUS MIGRAINOSUS, NOT INTRACTABLE, UNSPECIFIED MIGRAINE TYPE: ICD-10-CM

## 2022-01-11 DIAGNOSIS — I10 ESSENTIAL HYPERTENSION: ICD-10-CM

## 2022-01-11 DIAGNOSIS — Z11.4 ENCOUNTER FOR SCREENING FOR HIV: ICD-10-CM

## 2022-01-11 DIAGNOSIS — E03.9 HYPOTHYROIDISM, UNSPECIFIED TYPE: ICD-10-CM

## 2022-01-11 DIAGNOSIS — E55.9 VITAMIN D DEFICIENCY: ICD-10-CM

## 2022-01-11 DIAGNOSIS — E78.2 MIXED HYPERLIPIDEMIA: ICD-10-CM

## 2022-01-11 PROCEDURE — 3008F BODY MASS INDEX DOCD: CPT | Mod: CPTII,S$GLB,, | Performed by: INTERNAL MEDICINE

## 2022-01-11 PROCEDURE — 4010F ACE/ARB THERAPY RXD/TAKEN: CPT | Mod: CPTII,S$GLB,, | Performed by: INTERNAL MEDICINE

## 2022-01-11 PROCEDURE — 1159F MED LIST DOCD IN RCRD: CPT | Mod: CPTII,S$GLB,, | Performed by: INTERNAL MEDICINE

## 2022-01-11 PROCEDURE — 3077F SYST BP >= 140 MM HG: CPT | Mod: CPTII,S$GLB,, | Performed by: INTERNAL MEDICINE

## 2022-01-11 PROCEDURE — 3079F DIAST BP 80-89 MM HG: CPT | Mod: CPTII,S$GLB,, | Performed by: INTERNAL MEDICINE

## 2022-01-11 PROCEDURE — 3077F PR MOST RECENT SYSTOLIC BLOOD PRESSURE >= 140 MM HG: ICD-10-PCS | Mod: CPTII,S$GLB,, | Performed by: INTERNAL MEDICINE

## 2022-01-11 PROCEDURE — 3008F PR BODY MASS INDEX (BMI) DOCUMENTED: ICD-10-PCS | Mod: CPTII,S$GLB,, | Performed by: INTERNAL MEDICINE

## 2022-01-11 PROCEDURE — 1159F PR MEDICATION LIST DOCUMENTED IN MEDICAL RECORD: ICD-10-PCS | Mod: CPTII,S$GLB,, | Performed by: INTERNAL MEDICINE

## 2022-01-11 PROCEDURE — 99215 PR OFFICE/OUTPT VISIT, EST, LEVL V, 40-54 MIN: ICD-10-PCS | Mod: S$GLB,,, | Performed by: INTERNAL MEDICINE

## 2022-01-11 PROCEDURE — 3079F PR MOST RECENT DIASTOLIC BLOOD PRESSURE 80-89 MM HG: ICD-10-PCS | Mod: CPTII,S$GLB,, | Performed by: INTERNAL MEDICINE

## 2022-01-11 PROCEDURE — 99215 OFFICE O/P EST HI 40 MIN: CPT | Mod: S$GLB,,, | Performed by: INTERNAL MEDICINE

## 2022-01-11 PROCEDURE — 4010F PR ACE/ARB THEARPY RXD/TAKEN: ICD-10-PCS | Mod: CPTII,S$GLB,, | Performed by: INTERNAL MEDICINE

## 2022-01-11 RX ORDER — BUTALBITAL, ACETAMINOPHEN AND CAFFEINE 50; 325; 40 MG/1; MG/1; MG/1
1 TABLET ORAL EVERY 4 HOURS PRN
Qty: 90 TABLET | Refills: 3 | Status: SHIPPED | OUTPATIENT
Start: 2022-01-11 | End: 2022-12-09 | Stop reason: SDUPTHER

## 2022-01-11 RX ORDER — ENALAPRIL MALEATE 20 MG/1
20 TABLET ORAL DAILY
Qty: 90 TABLET | Refills: 3 | Status: SHIPPED | OUTPATIENT
Start: 2022-01-11 | End: 2022-12-09 | Stop reason: SDUPTHER

## 2022-01-11 RX ORDER — ACETAMINOPHEN 500 MG
5000 TABLET ORAL DAILY
Qty: 90 TABLET | Refills: 3
Start: 2022-01-11 | End: 2022-12-09 | Stop reason: SDUPTHER

## 2022-01-11 RX ORDER — HYDROCHLOROTHIAZIDE 25 MG/1
25 TABLET ORAL DAILY
Qty: 90 TABLET | Refills: 3 | Status: SHIPPED | OUTPATIENT
Start: 2022-01-11 | End: 2022-12-09 | Stop reason: SDUPTHER

## 2022-01-11 RX ORDER — VERAPAMIL HYDROCHLORIDE 360 MG/1
360 CAPSULE, DELAYED RELEASE PELLETS ORAL DAILY
Qty: 90 CAPSULE | Refills: 3 | Status: SHIPPED | OUTPATIENT
Start: 2022-01-11 | End: 2022-12-09 | Stop reason: SDUPTHER

## 2022-01-11 RX ORDER — BUPROPION HYDROCHLORIDE 100 MG/1
100 TABLET ORAL 2 TIMES DAILY
Qty: 180 TABLET | Refills: 3 | Status: SHIPPED | OUTPATIENT
Start: 2022-01-11 | End: 2022-02-18

## 2022-01-11 RX ORDER — PROMETHAZINE HYDROCHLORIDE 50 MG/1
50 SUPPOSITORY RECTAL EVERY 6 HOURS PRN
Qty: 12 SUPPOSITORY | Refills: 1 | Status: SHIPPED | OUTPATIENT
Start: 2022-01-11 | End: 2022-12-09 | Stop reason: SDUPTHER

## 2022-01-11 RX ORDER — LEVOTHYROXINE SODIUM 125 UG/1
125 TABLET ORAL DAILY
Qty: 90 TABLET | Refills: 3 | Status: SHIPPED | OUTPATIENT
Start: 2022-01-11 | End: 2022-12-09 | Stop reason: SDUPTHER

## 2022-01-11 NOTE — PATIENT INSTRUCTIONS
TODAY:  - do some research on [employer]gality ([email or web address]/)  - labs tomorrow at main campus  - call us with BP readings ([phone])

## 2022-01-11 NOTE — PROGRESS NOTES
Primary Care Provider Appointment - [facility]  SHARED NOTE: Radha Saucedo (Trainee), Dr Ellis (Attending)    Subjective:      Patient ID: Maria G Cameron is a 56 y.o. female with chronic migraine, HTN, hypothyroidism, and anxiety.      Chief Complaint:   Prior to this visit, patient's last encounter with PCP was Visit date not found.    Breast cancer: Had biopsy with the following results:      Migraines: Patient takes butalbital-acetaminophen-caffeine -40 mg (FIORICET, ESGIC) -40 mg per tablet PRN, and promethazine (PROMETHEGAN) 50 MG suppository PRN as needed.    HTN: Patient is taking enalapril (VASOTEC) 20 MG tablet QD, hydroCHLOROthiazide (HYDRODIURIL) 25 MG tablet, and verapamiL (VERELAN) 360 MG C24P. BP this morning elevated at 143/84. Patient has a BP cuff at home, she takes them at home, systolic usually 130s and diastolic usually in the 90s. Consider recording Bps for a week to see if any med changes need to be made.    Hypothyroidism: Patient is taking levothyroxine (SYNTHROID) 125 MCG tablet QD. Last TSH done 12/2020 was WNL. Repeat labs (tomorrow as patient wants to run errands).    Anxiety: Patient is followed Jose Barajas NP, although patient believes she no longer needs to see him. Patient no longer taking clonazePAM (KLONOPIN) 0.5 MG tablet QD, buPROPion (WELLBUTRIN) 100 MG tablet 60 tablet by mouth 2 (two) times daily, and she states she will wean herself off sertraline (ZOLOFT) 50 MG tablet QD    MDD: scored high on PHQ9      She is not interested in pill packs at this time. She would like a low dose CT of chest, as she is a former smoker and she has frequent cough.         Past Surgical History:   Procedure Laterality Date    BREAST BIOPSY Left 2010    breast core biopsy  3/7/11    fibrocystic changes    COLONOSCOPY N/A 4/15/2016    Procedure: COLONOSCOPY;  Surgeon: Coleman Moss MD;  Location: [address]);  Service: Endoscopy;  Laterality: N/A;    EXCISIONAL  "BIOPSY Right 11/5/2021    Procedure: EXCISIONAL BIOPSY-Right with radiological marker;  Surgeon: SCOTT Farah MD;  Location: [facility] OR [address] [place] [address];  Service: General;  Laterality: Right;       Past Medical History:   Diagnosis Date    Adrenal adenoma     Allergic rhinitis     Allergy     Allergic to trees, weeds, mold and standardized mite    Anxiety     Eustachian tube dysfunction     Fibrocystic changes of left breast     12 o'clock L breast mass bx 3/7/11    Hypertension     Hypothyroidism     Migraine headache     Recurrent otitis media        Social History     Socioeconomic History    Marital status: Single   Occupational History    Occupation:      Comment: clinical    Tobacco Use    Smoking status: Current Every Day Smoker     Packs/day: 0.25     Years: 40.00     Pack years: 10.00     Types: Cigarettes    Smokeless tobacco: Never Used   Substance and Sexual Activity    Alcohol use: No     Alcohol/week: 0.0 standard drinks    Drug use: No    Sexual activity: Not Currently     Comment: single with no children   Social History Narrative    Lives alone and no assistance with ADLs.     Single with no children       Review of Systems   Constitutional: Negative for fatigue and fever.   Respiratory: Positive for shortness of breath.         Related to quitting smoking   Cardiovascular: Negative for chest pain.   Gastrointestinal: Negative for diarrhea, nausea and vomiting.   Genitourinary: Negative.    Neurological: Negative for dizziness, syncope and light-headedness.       Objective:   BP (!) 143/84 (BP Location: Right arm, Patient Position: Sitting, BP Method: Large (Manual))   Pulse 79   Temp 98.4 °F (36.9 °C) (Oral)   Ht 5' 8" (1.727 m)   Wt 115 kg (253 lb 8.5 oz)   LMP 12/16/2015 (Approximate)   SpO2 98%   BMI 38.55 kg/m²     Physical Exam  Cardiovascular:      Rate and Rhythm: Normal rate and regular rhythm.   Pulmonary:      Effort: Pulmonary effort is normal. "   Neurological:      Mental Status: She is alert and oriented to person, place, and time.             Lab Results   Component Value Date    WBC 6.94 10/20/2021    HGB 14.3 10/20/2021    HCT 43.2 10/20/2021     10/20/2021    CHOL 234 (H) 12/16/2020    TRIG 221 (H) 12/16/2020    HDL 60 12/16/2020    ALT 24 10/20/2021    AST 21 10/20/2021     10/20/2021    K 3.9 10/20/2021     10/20/2021    CREATININE 0.8 10/20/2021    BUN 19 10/20/2021    CO2 25 10/20/2021    TSH 1.740 12/16/2020    HGBA1C 5.4 12/16/2020       Current Outpatient Medications on File Prior to Visit   Medication Sig Dispense Refill    sertraline (ZOLOFT) 50 MG tablet Take 1 tablet (50 mg total) by mouth once daily. 30 tablet 2    [DISCONTINUED] buPROPion (WELLBUTRIN) 100 MG tablet Take 1 tablet (100 mg total) by mouth 2 (two) times daily. 60 tablet 11    [DISCONTINUED] butalbital-acetaminophen-caffeine -40 mg (FIORICET, ESGIC) -40 mg per tablet Take 1 tablet by mouth every 4 (four) hours as needed for Pain. 90 tablet 3    [DISCONTINUED] cholecalciferol, vitamin D3, (VITAMIN D3) 125 mcg (5,000 unit) Tab Take 1 tablet (5,000 Units total) by mouth once daily. 90 tablet 3    [DISCONTINUED] enalapril (VASOTEC) 20 MG tablet Take 1 tablet (20 mg total) by mouth once daily. 90 tablet 3    [DISCONTINUED] hydroCHLOROthiazide (HYDRODIURIL) 25 MG tablet Take 1 tablet by mouth once daily 90 tablet 3    [DISCONTINUED] levothyroxine (SYNTHROID) 125 MCG tablet Take 1 tablet by mouth once daily 90 tablet 3    [DISCONTINUED] promethazine (PROMETHEGAN) 50 MG suppository Unwrap and insert 1 suppository (50 mg total) rectally every 6 (six) hours as needed for Nausea. 12 suppository 1    [DISCONTINUED] verapamiL (VERELAN) 360 MG C24P Take 1 capsule (360 mg total) by mouth once daily. 90 capsule 3    clonazePAM (KLONOPIN) 0.5 MG tablet Take 1 tablet by mouth twice daily as needed for anxiety (Patient not taking: Reported on 1/11/2022)  10 tablet 0    sertraline (ZOLOFT) 50 MG tablet Take 1 tablet (50 mg total) by mouth once daily. (Patient not taking: Reported on 1/11/2022) 30 tablet 2    traMADoL (ULTRAM) 50 mg tablet Take 1 tablet (50 mg total) by mouth every 6 (six) hours. (Patient not taking: Reported on 1/11/2022) 10 tablet 0     Current Facility-Administered Medications on File Prior to Visit   Medication Dose Route Frequency Provider Last Rate Last Admin    0.9%  NaCl infusion   Intravenous Continuous Lubna Fan MD   Stopped at 11/05/21 7131         Assessment:   56 y.o. female with multiple co-morbid illnesses here to follow-up with PCP and continue work-up of chronic issues.     Plan:     Problem List Items Addressed This Visit        Neuro    Chronic intractable headache    Relevant Medications    buPROPion (WELLBUTRIN) 100 MG tablet    butalbital-acetaminophen-caffeine -40 mg (FIORICET, ESGIC) -40 mg per tablet    promethazine (PROMETHEGAN) 50 MG suppository       Pulmonary    Smokers' cough - Primary    Relevant Medications    buPROPion (WELLBUTRIN) 100 MG tablet    Other Relevant Orders    CT Chest Lung Screening Low Dose       Cardiac/Vascular    Mixed hyperlipidemia    Relevant Orders    Lipid Panel    Hemoglobin A1C       Endocrine    Hypothyroidism    Relevant Medications    levothyroxine (SYNTHROID) 125 MCG tablet    Other Relevant Orders    TSH    Vitamin D deficiency    Relevant Medications    cholecalciferol, vitamin D3, (VITAMIN D3) 125 mcg (5,000 unit) Tab      Other Visit Diagnoses     Migraine without status migrainosus, not intractable, unspecified migraine type        Relevant Medications    buPROPion (WELLBUTRIN) 100 MG tablet    butalbital-acetaminophen-caffeine -40 mg (FIORICET, ESGIC) -40 mg per tablet    promethazine (PROMETHEGAN) 50 MG suppository    Essential hypertension        Relevant Medications    enalapril (VASOTEC) 20 MG tablet    hydroCHLOROthiazide (HYDRODIURIL) 25 MG  tablet    verapamiL (VERELAN) 360 MG C24P    Encounter for screening for HIV        Relevant Orders    HIV 1/2 Ag/Ab (4th Gen)          Health Maintenance       Date Due Completion Date    HIV Screening Never done ---TODAY    TETANUS VACCINE 01/02/2012 1/2/2002    Lipid Panel 12/16/2021 12/16/2020- TODAY    Mammogram 11/03/2022 11/3/2021- ALREADY DONE    Override on 12/8/2011: Done    Override on 12/8/2011: Done    Cervical Cancer Screening 12/16/2023 12/16/2020    Override on 1/12/2017: Not Clinically Appropriate (last 3 PAPs normal)    Override on 5/24/2013: Done    Colorectal Cancer Screening 04/26/2026 4/26/2016    Pneumococcal Vaccines (Age 0-64) (2 of 2 - PPSV23) 09/18/2028 1/1/2013          Future Appointments   Date Time Provider Department Center   9/28/2022  1:00 PM [facility] KAROL MAMMO2 [facility] MAMMO Manuelito Aldana   9/28/2022  2:00 PM SCOTT Farah MD [facility] BRSMAZIN [place] christopher         No follow-ups on file. Total clinical care time was 60 min, issues addressed include migraines, breast mass      Pearl Ellis MD/MPH  [facility]  [phone] [employer]

## 2022-01-13 ENCOUNTER — LAB VISIT (OUTPATIENT)
Dept: LAB | Facility: HOSPITAL | Age: 57
End: 2022-01-13
Attending: INTERNAL MEDICINE
Payer: COMMERCIAL

## 2022-01-13 DIAGNOSIS — E03.9 HYPOTHYROIDISM, UNSPECIFIED TYPE: ICD-10-CM

## 2022-01-13 DIAGNOSIS — Z11.4 ENCOUNTER FOR SCREENING FOR HIV: ICD-10-CM

## 2022-01-13 DIAGNOSIS — E78.2 MIXED HYPERLIPIDEMIA: ICD-10-CM

## 2022-01-13 LAB
CHOLEST SERPL-MCNC: 249 MG/DL (ref 120–199)
CHOLEST/HDLC SERPL: 4.3 {RATIO} (ref 2–5)
ESTIMATED AVG GLUCOSE: 111 MG/DL (ref 68–131)
HBA1C MFR BLD: 5.5 % (ref 4–5.6)
HDLC SERPL-MCNC: 58 MG/DL (ref 40–75)
HDLC SERPL: 23.3 % (ref 20–50)
HIV 1+2 AB+HIV1 P24 AG SERPL QL IA: NEGATIVE
LDLC SERPL CALC-MCNC: 166.8 MG/DL (ref 63–159)
NONHDLC SERPL-MCNC: 191 MG/DL
TRIGL SERPL-MCNC: 121 MG/DL (ref 30–150)
TSH SERPL DL<=0.005 MIU/L-ACNC: 2.19 UIU/ML (ref 0.4–4)

## 2022-01-13 PROCEDURE — 80061 LIPID PANEL: CPT | Performed by: INTERNAL MEDICINE

## 2022-01-13 PROCEDURE — 87389 HIV-1 AG W/HIV-1&-2 AB AG IA: CPT | Performed by: INTERNAL MEDICINE

## 2022-01-13 PROCEDURE — 83036 HEMOGLOBIN GLYCOSYLATED A1C: CPT | Performed by: INTERNAL MEDICINE

## 2022-01-13 PROCEDURE — 84443 ASSAY THYROID STIM HORMONE: CPT | Performed by: INTERNAL MEDICINE

## 2022-01-13 PROCEDURE — 36415 COLL VENOUS BLD VENIPUNCTURE: CPT | Performed by: INTERNAL MEDICINE

## 2022-01-14 ENCOUNTER — TELEPHONE (OUTPATIENT)
Dept: PRIMARY CARE CLINIC | Facility: CLINIC | Age: 57
End: 2022-01-14
Payer: COMMERCIAL

## 2022-01-14 NOTE — TELEPHONE ENCOUNTER
Patient called and made aware that he labs were perfect, Just like her!    Verbalized appreciation.

## 2022-01-14 NOTE — TELEPHONE ENCOUNTER
Please let patient know that her labs were perfect, just like her!    Her cholesterol was high, overall risk of a heart attack or stroke in the next 10 years is 11.4%. I think she should try to do some cardio exercise or see a health . Is she interested in that?    Thanks,  KJ    The 10-year ASCVD risk score (Jessika GRAYSON Jr., et al., 2013) is: 11.4%    Values used to calculate the score:      Age: 56 years      Sex: Female      Is Non- : No      Diabetic: No      Tobacco smoker: Yes      Systolic Blood Pressure: 143 mmHg      Is BP treated: Yes      HDL Cholesterol: 58 mg/dL      Total Cholesterol: 249 mg/dL

## 2022-01-19 ENCOUNTER — HOSPITAL ENCOUNTER (OUTPATIENT)
Dept: RADIOLOGY | Facility: HOSPITAL | Age: 57
Discharge: HOME | End: 2022-01-19
Attending: INTERNAL MEDICINE
Payer: COMMERCIAL

## 2022-01-19 DIAGNOSIS — J41.0 SMOKERS' COUGH: ICD-10-CM

## 2022-01-19 PROCEDURE — 71271 CT THORAX LUNG CANCER SCR C-: CPT | Mod: TC

## 2022-01-19 PROCEDURE — 71271 CT CHEST LUNG SCREENING LOW DOSE: ICD-10-PCS | Mod: 26,,, | Performed by: RADIOLOGY

## 2022-01-19 PROCEDURE — 71271 CT THORAX LUNG CANCER SCR C-: CPT | Mod: 26,,, | Performed by: RADIOLOGY

## 2022-01-25 ENCOUNTER — TELEPHONE (OUTPATIENT)
Dept: PRIMARY CARE CLINIC | Facility: CLINIC | Age: 57
End: 2022-01-25
Payer: COMMERCIAL

## 2022-01-25 ENCOUNTER — LAB VISIT (OUTPATIENT)
Dept: PRIMARY CARE CLINIC | Facility: OTHER | Age: 57
End: 2022-01-25
Attending: INTERNAL MEDICINE

## 2022-01-25 DIAGNOSIS — Z91.89 AT RISK FOR BONE DENSITY LOSS: Primary | ICD-10-CM

## 2022-01-25 DIAGNOSIS — R05.9 COUGH: Primary | ICD-10-CM

## 2022-01-25 LAB
CTP QC/QA: YES
SARS-COV-2 AG RESP QL IA.RAPID: NEGATIVE

## 2022-01-25 PROCEDURE — 87811 SARS-COV-2 COVID19 W/OPTIC: CPT | Performed by: PREVENTIVE MEDICINE

## 2022-01-25 NOTE — LETTER
January 25, 2022    Maria G Cameron  [address]  [address]  [facility] [address]             [facility]  Primary Care  [address]  [facility] [address]  Phone: [phone]  Fax: [phone]   Dear Ms. Maria G Irenexiomara:    Below are the results from your recent visit:    Resulted Orders   CT Chest Lung Screening Low Dose    Narrative    EXAMINATION:  CT CHEST LUNG SCREENING LOW DOSE    CLINICAL HISTORY:  Lung cancer screening, >= 20 pk-yr smoking history, risk factor(s) (Age >= 50y); Simple chronic bronchitis    TECHNIQUE:  CT of the thorax was performed with low dose, lung screening protocol.  No contrast was administered.  Sagittal and coronal reconstructions were obtained.    COMPARISON:  Chest radiograph 06/01/2016.  CT abdomen 01/27/2017    FINDINGS:  Lungs: There are no abnormal opacities that require further evaluation.  The largest opacity in the right lung appears solid and measures 0.4 cm on series 4, image 247.  The largest opacity in the left lung appears solid and measures 0.4 cm on series 4, image 213.  The lungs show no findings consistent with emphysema.    Bandlike opacities of the right middle lobe and lingula, likely subsegmental atelectasis versus scarring.    Pleura:   No effusion..    Heart and pericardium: Normal size without effusion.    Aorta and vasculature: Minimal aortic calcific atherosclerosis.    Chest wall and skeletal structures: Asymmetric breast tissue.  No acute displaced fracture.  Diffuse bony demineralization.    Upper abdomen: Stable appearance of hypodensity of the left hepatic lobe, measuring 1.8 cm (series 2, image 90), with indeterminate attenuation; this lesion was previously characterized as a likely benign hemangioma on prior contrast-enhanced CT abdomen.  Hypodensity of the right hepatic lobe, measuring 1.0 cm (series 2, image 90), with simple fluid attenuation.    Miscellaneous: Atrophic configuration of the thyroid.       Impression    Lung-RADS Category:  2 - Benign Appearance or Behavior - continue annual screening with LDCT in 12 months.    Clinically or potentially clinically significant non lung cancer finding:  S - Significant.    Prior Lung Cancer Modifier:  No history of prior lung cancer.    1. Possible osteoporosis.  Consider further evaluation with DEXA scan as clinically indicated.  2. High point there are new rating thyroid gland.  Patient is known to be on synthetic thyroid hormone.  3. Left hepatic lobe hypodensity, measuring up to 1.8 cm, with indeterminate attenuation; previously characterized as a benign hemangioma on prior contrast-enhanced CT abdomen dated 01/27/2017.  4. Asymmetric appearance of the breast tissue.  Per chart, excisional biopsy of the right breast was negative for atypia or malignancy.    Electronically signed by resident: Kashif Muro  Date:    01/19/2022  Time:    08:27    Electronically signed by: Alicia Smith  Date:    01/19/2022  Time:    10:14             Sincerely,        Pearl Ellis MD

## 2022-01-25 NOTE — TELEPHONE ENCOUNTER
----- Message from Yane Echavarria sent at 1/25/2022  7:05 AM CST -----  Name Of Caller: Maria G     Provider Name: Pearl Ellis    Does patient feel the need to be seen today?    Relationship to the Pt?: pt    Contact Preference?: [phone]    What is the nature of the call?:Maria G called and said if you can mail her lab and ct results to her. To please call her back with your fax number

## 2022-01-25 NOTE — PROGRESS NOTES
Please call patient and let her know that I am mailing her results of her labs and CT. Please apologize to her on my behalf for getting so behind in my work. We are very short-staffed at this time and the demands are high related to COVID.    Overall, here are the results of her tests:    CT showed:  - possible weakened bones, so I am ordering a DEXA scan to assess that.  - evidence of an active thyroid, which is expected due to her thyroid supplementation  - liver mass, which was previously diagnosed as a hemangioma

## 2022-01-25 NOTE — TELEPHONE ENCOUNTER
Results letters printed, please mail to patient.    Here are the results, please review these with her:  CT:  - showed possible weakened bones, she needs a DEXA. This is ordered, please schedule.  - evidence of liver hemangioma (this was seen previously and is stable)  - evidence of thyroid hyperactivity, likely related to using synthetic thyroid hormone    Labs:  Also let her know that her elevated cholesterol shows an increased risk of heart attack and stroke. Would she be willing to start a low dose statin? Or start an organized health  plan?  The 10-year ASCVD risk score (Jessika GRAYSON Jr., et al., 2013) is: 11.4%    Values used to calculate the score:      Age: 56 years      Sex: Female      Is Non- : No      Diabetic: No      Tobacco smoker: Yes      Systolic Blood Pressure: 143 mmHg      Is BP treated: Yes      HDL Cholesterol: 58 mg/dL      Total Cholesterol: 249 mg/dL

## 2022-01-25 NOTE — LETTER
January 25, 2022    Massiel Muñoz  [address]  [address]  [place] [address]             [facility]  [address]  [facility] [address]  Phone: [phone]  Fax: [phone] Dear Ms. Muñoz:    Below are the results from your recent visit:    Results for MASSIEL MUÑOZ (MRN [id number]) as of 1/25/2022 12:22   Ref. Range 1/13/2022 07:07   Triglycerides Latest Ref Range: 30 - 150 mg/dL 121   Cholesterol Latest Ref Range: 120 - 199 mg/dL 249 (H)   HDL Latest Ref Range: 40 - 75 mg/dL 58   HDL/Cholesterol Ratio Latest Ref Range: 20.0 - 50.0 % 23.3   LDL Cholesterol External Latest Ref Range: 63.0 - 159.0 mg/dL 166.8 (H)   Non-HDL Cholesterol Latest Units: mg/dL 191   Total Cholesterol/HDL Ratio Latest Ref Range: 2.0 - 5.0  4.3   Hemoglobin A1C External Latest Ref Range: 4.0 - 5.6 % 5.5   Estimated Avg Glucose Latest Ref Range: 68 - 131 mg/dL 111   TSH Latest Ref Range: 0.400 - 4.000 uIU/mL 2.192   HIV 1/2 Ag/Ab Latest Ref Range: Negative  Negative       Sincerely,          Pearl Ellis MD

## 2022-01-25 NOTE — TELEPHONE ENCOUNTER
Called patient  Test results with her.  Made aware that the CT showed pssible weakness in her bones and needed to be scheduled for a dexa scan.  Patient stated that she would call and schedule herself.  (Works at [employer])      Patient made aware that her cholesterol level is elevated, which puts her at risk for heart attack and stroke.      When asked if she would like to start a statin, patient stated that she did not want that at this time.  Stated that she would like to change her diet and try to decrease cholesterol on her own.  Also refused  Health .

## 2022-01-25 NOTE — LETTER
January 25, 2022    Maria G Cameron  [address]  [address]  [place] [address]             [facility]  Primary Care  [address]  [facility] [address]  Phone: [phone]  Fax: [phone] Dear Ms. Maria G Irenexiomara:  Below are the results from your recent visit:    Resulted Orders   CT Chest Lung Screening Low Dose    Narrative    EXAMINATION:  CT CHEST LUNG SCREENING LOW DOSE    CLINICAL HISTORY:  Lung cancer screening, >= 20 pk-yr smoking history, risk factor(s) (Age >= 50y); Simple chronic bronchitis    TECHNIQUE:  CT of the thorax was performed with low dose, lung screening protocol.  No contrast was administered.  Sagittal and coronal reconstructions were obtained.    COMPARISON:  Chest radiograph 06/01/2016.  CT abdomen 01/27/2017    FINDINGS:  Lungs: There are no abnormal opacities that require further evaluation.  The largest opacity in the right lung appears solid and measures 0.4 cm on series 4, image 247.  The largest opacity in the left lung appears solid and measures 0.4 cm on series 4, image 213.  The lungs show no findings consistent with emphysema.    Bandlike opacities of the right middle lobe and lingula, likely subsegmental atelectasis versus scarring.    Pleura:   No effusion..    Heart and pericardium: Normal size without effusion.    Aorta and vasculature: Minimal aortic calcific atherosclerosis.    Chest wall and skeletal structures: Asymmetric breast tissue.  No acute displaced fracture.  Diffuse bony demineralization.    Upper abdomen: Stable appearance of hypodensity of the left hepatic lobe, measuring 1.8 cm (series 2, image 90), with indeterminate attenuation; this lesion was previously characterized as a likely benign hemangioma on prior contrast-enhanced CT abdomen.  Hypodensity of the right hepatic lobe, measuring 1.0 cm (series 2, image 90), with simple fluid attenuation.    Miscellaneous: Atrophic configuration of the thyroid.      Impression     Lung-RADS Category:  2 - Benign Appearance or Behavior - continue annual screening with LDCT in 12 months.    Clinically or potentially clinically significant non lung cancer finding:  S - Significant.    Prior Lung Cancer Modifier:  No history of prior lung cancer.    1. Possible osteoporosis.  Consider further evaluation with DEXA scan as clinically indicated.  2. [place] point there are new rating thyroid gland.  Patient is known to be on synthetic thyroid hormone.  3. Left hepatic lobe hypodensity, measuring up to 1.8 cm, with indeterminate attenuation; previously characterized as a benign hemangioma on prior contrast-enhanced CT abdomen dated 01/27/2017.  4. Asymmetric appearance of the breast tissue.  Per chart, excisional biopsy of the right breast was negative for atypia or malignancy.    Electronically signed by resident: Kashif Muro  Date:    01/19/2022  Time:    08:27    Electronically signed by: Alicia Smith  Date:    01/19/2022  Time:    10:14     I am ordering a DEXA scan to follow-up with this abnormality.    Sincerely,          Pearl Ellis MD

## 2022-01-25 NOTE — TELEPHONE ENCOUNTER
Spoke with pt, she would like copies of her lab results, not the images.     Pt does not have portal acces, she staed her laptop is down and she is not able to use her phone.     Pt is asking about results of CT scan.

## 2022-02-18 ENCOUNTER — OFFICE VISIT (OUTPATIENT)
Dept: PSYCHIATRY | Facility: CLINIC | Age: 57
End: 2022-02-18
Payer: COMMERCIAL

## 2022-02-18 DIAGNOSIS — F32.2 CURRENT SEVERE EPISODE OF MAJOR DEPRESSIVE DISORDER WITHOUT PSYCHOTIC FEATURES WITHOUT PRIOR EPISODE: ICD-10-CM

## 2022-02-18 DIAGNOSIS — G47.00 INSOMNIA, UNSPECIFIED TYPE: ICD-10-CM

## 2022-02-18 DIAGNOSIS — F41.1 GENERALIZED ANXIETY DISORDER: Primary | ICD-10-CM

## 2022-02-18 PROCEDURE — 3044F HG A1C LEVEL LT 7.0%: CPT | Mod: CPTII,S$GLB,, | Performed by: NURSE PRACTITIONER

## 2022-02-18 PROCEDURE — 4010F ACE/ARB THERAPY RXD/TAKEN: CPT | Mod: CPTII,S$GLB,, | Performed by: NURSE PRACTITIONER

## 2022-02-18 PROCEDURE — 99999 PR PBB SHADOW E&M-EST. PATIENT-LVL I: CPT | Mod: PBBFAC,,, | Performed by: NURSE PRACTITIONER

## 2022-02-18 PROCEDURE — 3044F PR MOST RECENT HEMOGLOBIN A1C LEVEL <7.0%: ICD-10-PCS | Mod: CPTII,S$GLB,, | Performed by: NURSE PRACTITIONER

## 2022-02-18 PROCEDURE — 4010F PR ACE/ARB THEARPY RXD/TAKEN: ICD-10-PCS | Mod: CPTII,S$GLB,, | Performed by: NURSE PRACTITIONER

## 2022-02-18 PROCEDURE — 99213 OFFICE O/P EST LOW 20 MIN: CPT | Mod: S$GLB,,, | Performed by: NURSE PRACTITIONER

## 2022-02-18 PROCEDURE — 99999 PR PBB SHADOW E&M-EST. PATIENT-LVL I: ICD-10-PCS | Mod: PBBFAC,,, | Performed by: NURSE PRACTITIONER

## 2022-02-18 PROCEDURE — 99213 PR OFFICE/OUTPT VISIT, EST, LEVL III, 20-29 MIN: ICD-10-PCS | Mod: S$GLB,,, | Performed by: NURSE PRACTITIONER

## 2022-02-18 RX ORDER — SERTRALINE HYDROCHLORIDE 100 MG/1
100 TABLET, FILM COATED ORAL DAILY
Qty: 90 TABLET | Refills: 3 | Status: SHIPPED | OUTPATIENT
Start: 2022-02-18 | End: 2022-03-18

## 2022-02-18 NOTE — PROGRESS NOTES
Outpatient Psychiatry Follow-Up Visit (MD/NP)    2/18/2022    Clinical Status of Patient:  Outpatient (Ambulatory)    Chief Complaint:  Maria G Cameron is a 56 y.o. female who presents today for follow-up of anxiety.  Met with patient. Pt new to me.      Last visit was: Dr.Raymond Louis, Chart and  reviewed    Interval History and Content of Current Session:  Current Psychiatric Medications/changes  · Medication Management:        Continue sertraline 50 mg by mouth once daily        Continue bupropion 100 mg in the am, and then 100 mg at noon.         Continue clonazepam 0.5 mg twice daily as needed, #10, 0 refills this visit    Reports that she had problems when [employer]  for Wellbutrin.  Will stop Wellbutrin and increase Zoloft for anxiety and depression symptoms: Endorses sx of poor motivation, lack of enthusiasm, and weight gain.  Denies SI/HI/AVH.     Psychotherapy:  · Target symptoms: anxiety   · Why chosen therapy is appropriate versus another modality: relevant to diagnosis  · Outcome monitoring methods: self-report  · Therapeutic intervention type: insight oriented psychotherapy  · Topics discussed/themes: building skills sets for symptom management, symptom recognition  · The patient's response to the intervention is accepting. The patient's progress toward treatment goals is good.   · Duration of intervention: 12 minutes.    Review of Systems   · PSYCHIATRIC: Pertinant items are noted in the narrative.  · CONSTITUTIONAL: No weight gain or loss.   · MUSCULOSKELETAL: No pain or stiffness of the joints.  · NEUROLOGIC: No weakness, sensory changes, seizures, confusion, memory loss, tremor or other abnormal movements.  · ENDOCRINE: No polydipsia or polyuria.  · INTEGUMENTARY: No rashes or lacerations.  · EYES: No exophthalmos, jaundice or blindness.  · ENT: No dizziness, tinnitus or hearing loss.  · RESPIRATORY: No shortness of breath.  · CARDIOVASCULAR: No tachycardia or chest  pain.  · GASTROINTESTINAL: No nausea, vomiting, pain, constipation or diarrhea.  · GENITOURINARY: No frequency, dysuria or sexual dysfunction.  · HEMATOLOGIC/LYMPHATIC: No excessive bleeding, prolonged or excessive bleeding after dental extraction/injury.  · ALLERGIC/IMMUNOLOGIC: No allergic response to materials, foods or animals at this time.    Past Medical, Family and Social History: The patient's past medical, family and social history have been reviewed and updated as appropriate within the electronic medical record - see encounter notes.    Compliance: yes    Side effects: see above    Risk Parameters:  Patient reports no suicidal ideation  Patient reports no homicidal ideation  Patient reports no self-injurious behavior  Patient reports no violent behavior    Exam (detailed: at least 9 elements; comprehensive: all 15 elements)   Constitutional  Vitals:  Most recent vital signs, dated greater than 90 days prior to this appointment, were not reviewed.   There were no vitals filed for this visit.     General:  unremarkable, age appropriate     Musculoskeletal  Muscle Strength/Tone:  no tremor, no tic   Gait & Station:  non-ataxic     Psychiatric  Speech:  no latency; no press   Mood & Affect:  steady  congruent and appropriate   Thought Process:  normal and logical   Associations:  intact   Thought Content:  normal, no suicidality, no homicidality, delusions, or paranoia   Insight:  intact   Judgement: behavior is adequate to circumstances   Orientation:  grossly intact   Memory: intact for content of interview   Language: grossly intact   Attention Span & Concentration:  able to focus   Fund of Knowledge:  intact and appropriate to age and level of education     Assessment and Diagnosis   Status/Progress: Based on the examination today, the patient's problem(s) is/are adequately but not ideally controlled.  New problems have not been presented today.   Co-morbidities and Lack of compliance are not complicating  management of the primary condition.  There are no active rule-out diagnoses for this patient at this time.     General Impression:       ICD-10-CM ICD-9-CM   1. Generalized anxiety disorder  F41.1 300.02   2. Current severe episode of major depressive disorder without psychotic features without prior episode  F32.2 296.23   3. Insomnia, unspecified type  G47.00 780.52       Intervention/Counseling/Treatment Plan   · Medication Management: The risks and benefits of medication were discussed with the patient.   · Increase to Zoloft 100 mg po daily  · DC Wellbutrin ( side effects from change in  version)  · Continue Klonopin 0.5 mg po daily PRN severe anxiety - not refilled at this time.    Return to Clinic: 6 months    Risks, benefits, side effects and alternative treatments discussed with patient. Patient agrees with the current plan as documented.  Encouraged Patient to keep future appointments.  Take medications as prescribed and abstain from substance abuse.  Pt to present to ED for thoughts to harm herself or others

## 2022-03-18 RX ORDER — SERTRALINE HYDROCHLORIDE 100 MG/1
150 TABLET, FILM COATED ORAL DAILY
Qty: 135 TABLET | Refills: 3 | Status: SHIPPED | OUTPATIENT
Start: 2022-04-15 | End: 2022-12-12 | Stop reason: SDUPTHER

## 2022-03-31 ENCOUNTER — HOSPITAL ENCOUNTER (OUTPATIENT)
Dept: RADIOLOGY | Facility: CLINIC | Age: 57
Discharge: HOME | End: 2022-03-31
Attending: INTERNAL MEDICINE
Payer: COMMERCIAL

## 2022-03-31 DIAGNOSIS — Z91.89 AT RISK FOR BONE DENSITY LOSS: ICD-10-CM

## 2022-03-31 PROCEDURE — 77080 DXA BONE DENSITY AXIAL: CPT | Mod: TC

## 2022-03-31 PROCEDURE — 77080 DXA BONE DENSITY AXIAL: CPT | Mod: 26,,, | Performed by: INTERNAL MEDICINE

## 2022-03-31 PROCEDURE — 77080 DEXA BONE DENSITY SPINE HIP: ICD-10-PCS | Mod: 26,,, | Performed by: INTERNAL MEDICINE

## 2022-04-25 ENCOUNTER — TELEPHONE (OUTPATIENT)
Dept: PRIMARY CARE CLINIC | Facility: CLINIC | Age: 57
End: 2022-04-25
Payer: COMMERCIAL

## 2022-04-25 NOTE — TELEPHONE ENCOUNTER
----- Message from Danny Guardado sent at 4/21/2022 10:41 AM CDT -----  Contact: Patient  The pt called and said that she needs to have a  6 mo follow up for a [facility]ne breast imaging    Please enter the order for her    Also please mail her a copy of her dexa scan results

## 2022-04-25 NOTE — TELEPHONE ENCOUNTER
Spoke with pt, she stated that she received a notice that she needs f/u imaging on her right breast.     Pt had a bx done in 10/2021.     Please advise.

## 2022-04-26 NOTE — TELEPHONE ENCOUNTER
Message sent to [facility]-   Patient needs her follow-up breast imaging ordered and scheduled. Its unclear to me what the timing and type of imaging that your team needs. Could you please get her scheduled per your clinical needs and her preference for follow-up breast imaging?    Thanks,  Pearl Ellis MD/MPH  [facility]  [phone] spectralink

## 2022-04-26 NOTE — TELEPHONE ENCOUNTER
RAND Dee MD  Caller: Unspecified (Yesterday,  5:54 PM)  Called patient with POC for breast imaging. Pt previously scheduled for yearly follow up.   No new appt needed.     Eugenie Gonzalez             Previous Messages          Patient Calls    RAND Dee 2 hours ago (10:39 AM)     EK    Called patient with POC for breast imaging. Pt previously scheduled for yearly follow up.   No new appt needed.     Eugenie Gonzalez     Message text       Corinne E Coniglio, RN Elizabeth Keran, RN; Demetria Arambula RN 2 hours ago (10:22 AM)         Not sure what she is referring to, sending to y'all.   Corinne    Message text

## 2022-08-02 ENCOUNTER — OFFICE VISIT (OUTPATIENT)
Dept: URGENT CARE | Facility: CLINIC | Age: 57
End: 2022-08-02
Payer: COMMERCIAL

## 2022-08-02 VITALS
RESPIRATION RATE: 16 BRPM | DIASTOLIC BLOOD PRESSURE: 78 MMHG | OXYGEN SATURATION: 96 % | SYSTOLIC BLOOD PRESSURE: 121 MMHG | BODY MASS INDEX: 35.16 KG/M2 | HEART RATE: 85 BPM | TEMPERATURE: 99 F | WEIGHT: 232 LBS | HEIGHT: 68 IN

## 2022-08-02 DIAGNOSIS — R42 DIZZINESS: ICD-10-CM

## 2022-08-02 DIAGNOSIS — R09.81 SINUS CONGESTION: ICD-10-CM

## 2022-08-02 DIAGNOSIS — J30.9 ALLERGIC RHINITIS, UNSPECIFIED SEASONALITY, UNSPECIFIED TRIGGER: Primary | ICD-10-CM

## 2022-08-02 DIAGNOSIS — H92.02 EAR PAIN, LEFT: ICD-10-CM

## 2022-08-02 LAB
CTP QC/QA: YES
SARS-COV-2 RDRP RESP QL NAA+PROBE: NEGATIVE

## 2022-08-02 PROCEDURE — 3074F PR MOST RECENT SYSTOLIC BLOOD PRESSURE < 130 MM HG: ICD-10-PCS | Mod: CPTII,S$GLB,, | Performed by: NURSE PRACTITIONER

## 2022-08-02 PROCEDURE — 3074F SYST BP LT 130 MM HG: CPT | Mod: CPTII,S$GLB,, | Performed by: NURSE PRACTITIONER

## 2022-08-02 PROCEDURE — 4010F ACE/ARB THERAPY RXD/TAKEN: CPT | Mod: CPTII,S$GLB,, | Performed by: NURSE PRACTITIONER

## 2022-08-02 PROCEDURE — 3008F BODY MASS INDEX DOCD: CPT | Mod: CPTII,S$GLB,, | Performed by: NURSE PRACTITIONER

## 2022-08-02 PROCEDURE — 1160F PR REVIEW ALL MEDS BY PRESCRIBER/CLIN PHARMACIST DOCUMENTED: ICD-10-PCS | Mod: CPTII,S$GLB,, | Performed by: NURSE PRACTITIONER

## 2022-08-02 PROCEDURE — 1159F PR MEDICATION LIST DOCUMENTED IN MEDICAL RECORD: ICD-10-PCS | Mod: CPTII,S$GLB,, | Performed by: NURSE PRACTITIONER

## 2022-08-02 PROCEDURE — 3044F PR MOST RECENT HEMOGLOBIN A1C LEVEL <7.0%: ICD-10-PCS | Mod: CPTII,S$GLB,, | Performed by: NURSE PRACTITIONER

## 2022-08-02 PROCEDURE — 3078F DIAST BP <80 MM HG: CPT | Mod: CPTII,S$GLB,, | Performed by: NURSE PRACTITIONER

## 2022-08-02 PROCEDURE — 99203 PR OFFICE/OUTPT VISIT, NEW, LEVL III, 30-44 MIN: ICD-10-PCS | Mod: S$GLB,,, | Performed by: NURSE PRACTITIONER

## 2022-08-02 PROCEDURE — 3044F HG A1C LEVEL LT 7.0%: CPT | Mod: CPTII,S$GLB,, | Performed by: NURSE PRACTITIONER

## 2022-08-02 PROCEDURE — 1159F MED LIST DOCD IN RCRD: CPT | Mod: CPTII,S$GLB,, | Performed by: NURSE PRACTITIONER

## 2022-08-02 PROCEDURE — 3078F PR MOST RECENT DIASTOLIC BLOOD PRESSURE < 80 MM HG: ICD-10-PCS | Mod: CPTII,S$GLB,, | Performed by: NURSE PRACTITIONER

## 2022-08-02 PROCEDURE — 1160F RVW MEDS BY RX/DR IN RCRD: CPT | Mod: CPTII,S$GLB,, | Performed by: NURSE PRACTITIONER

## 2022-08-02 PROCEDURE — 4010F PR ACE/ARB THEARPY RXD/TAKEN: ICD-10-PCS | Mod: CPTII,S$GLB,, | Performed by: NURSE PRACTITIONER

## 2022-08-02 PROCEDURE — 99203 OFFICE O/P NEW LOW 30 MIN: CPT | Mod: S$GLB,,, | Performed by: NURSE PRACTITIONER

## 2022-08-02 PROCEDURE — U0002 COVID-19 LAB TEST NON-CDC: HCPCS | Mod: QW,S$GLB,, | Performed by: NURSE PRACTITIONER

## 2022-08-02 PROCEDURE — U0002: ICD-10-PCS | Mod: QW,S$GLB,, | Performed by: NURSE PRACTITIONER

## 2022-08-02 PROCEDURE — 3008F PR BODY MASS INDEX (BMI) DOCUMENTED: ICD-10-PCS | Mod: CPTII,S$GLB,, | Performed by: NURSE PRACTITIONER

## 2022-08-02 RX ORDER — CETIRIZINE HYDROCHLORIDE 10 MG/1
10 TABLET ORAL DAILY
Qty: 30 TABLET | Refills: 1 | Status: SHIPPED | OUTPATIENT
Start: 2022-08-02 | End: 2023-12-08

## 2022-08-02 RX ORDER — CETIRIZINE HYDROCHLORIDE 10 MG/1
10 TABLET ORAL DAILY
Qty: 30 TABLET | Refills: 1 | Status: SHIPPED | OUTPATIENT
Start: 2022-08-02 | End: 2022-08-02 | Stop reason: SDUPTHER

## 2022-08-02 RX ORDER — FLUTICASONE PROPIONATE 50 MCG
2 SPRAY, SUSPENSION (ML) NASAL DAILY
Qty: 16 G | Refills: 0 | Status: SHIPPED | OUTPATIENT
Start: 2022-08-02 | End: 2022-09-01

## 2022-08-02 RX ORDER — FLUTICASONE PROPIONATE 50 MCG
2 SPRAY, SUSPENSION (ML) NASAL DAILY
Qty: 16 G | Refills: 0 | Status: SHIPPED | OUTPATIENT
Start: 2022-08-02 | End: 2022-08-02 | Stop reason: SDUPTHER

## 2022-08-02 NOTE — PROGRESS NOTES
"Subjective:       Patient ID: Maria G Cameron is a 56 y.o. female.    Vitals:  height is 5' 8" (1.727 m) and weight is 105.2 kg (232 lb). Her oral temperature is 98.7 °F (37.1 °C). Her blood pressure is 121/78 and her pulse is 85. Her respiration is 16 and oxygen saturation is 96%.     Chief Complaint: Otalgia    55yo female pt c/o pain to L ear x4 days.  Reports that she has been having an allergy flare-up over the past 2 months, with itchy ears and clear drainage from both ears, reports that pain increased to the L side starting 4 days ago.  Reports pain with palpation behind L ear as well, denies new or purulent drainage.  Reports that she used q-tips this morning with no relief.  Denies fever/chills, reports intermittent dry cough and reports "scratchy throat" and hoarse voice with sinus congestion, worse on L side, denies chest pain or SOB.  Reports intermittent nausea and dizziness, denies vomiting, denies diarrhea.  Denies known sick contacts, reports COVID vaccination.  Reports HX of vertigo several years ago, reports that symptoms feel similar, reports relief in the past with meclizine, but states that it makes her feel very sleepy.    Otalgia   There is pain in the left ear. This is a new problem. The current episode started in the past 7 days. The problem occurs constantly. The problem has been unchanged. There has been no fever. The pain is at a severity of 8/10. The pain is moderate. Associated symptoms include ear discharge and headaches. Pertinent negatives include no abdominal pain, coughing, diarrhea, hearing loss, neck pain, rash, rhinorrhea, sore throat or vomiting. She has tried nothing for the symptoms.       Constitution: Negative for chills and fever.   HENT: Positive for ear pain and ear discharge. Negative for hearing loss and sore throat.    Neck: Negative for neck pain.   Respiratory: Negative for cough, shortness of breath and wheezing.    Gastrointestinal: Positive for nausea. Negative for " abdominal pain, vomiting and diarrhea.   Skin: Negative for rash.   Neurological: Positive for headaches.       Objective:      Physical Exam   Constitutional: She is oriented to person, place, and time. She appears well-developed. She is cooperative.  Non-toxic appearance. She does not appear ill. No distress.   HENT:   Head: Normocephalic and atraumatic.   Ears:   Right Ear: Hearing, external ear and ear canal normal. No drainage, swelling or tenderness. No mastoid tenderness. Tympanic membrane is retracted. Tympanic membrane is not erythematous and not bulging. A middle ear effusion (clear fluid) is present. No decreased hearing is noted.   Left Ear: Hearing, external ear and ear canal normal. There is tenderness. No drainage or swelling. No mastoid tenderness (no mastoid TTP, but area directly behind pinna is tender). Tympanic membrane is retracted. Tympanic membrane is not erythematous and not bulging. A middle ear effusion (clear fluid) is present. No decreased hearing is noted.   Nose: Rhinorrhea (clear to BL nares) present. No mucosal edema, purulent discharge or nasal deformity. No epistaxis. Right sinus exhibits maxillary sinus tenderness. Right sinus exhibits no frontal sinus tenderness. Left sinus exhibits maxillary sinus tenderness (L > R). Left sinus exhibits no frontal sinus tenderness.   Mouth/Throat: Uvula is midline and mucous membranes are normal. No trismus in the jaw. Normal dentition. No uvula swelling. Oropharyngeal exudate (clear postnasal drip) and posterior oropharyngeal erythema (mild) present. No posterior oropharyngeal edema. Tonsils are 1+ on the right. Tonsils are 1+ on the left. No tonsillar exudate.   Eyes: Conjunctivae and lids are normal. No scleral icterus.   Neck: Trachea normal and phonation normal. Neck supple. No edema present. No erythema present. No neck rigidity present.   Cardiovascular: Normal rate, regular rhythm, normal heart sounds and normal pulses.   Pulmonary/Chest:  Effort normal and breath sounds normal. No accessory muscle usage or stridor. No tachypnea. No respiratory distress. She has no decreased breath sounds. She has no wheezes. She has no rhonchi. She has no rales.   Abdominal: Normal appearance.   Musculoskeletal: Normal range of motion.         General: No deformity. Normal range of motion.   Lymphadenopathy:        Head (right side): No submandibular adenopathy present.        Head (left side): No submandibular adenopathy present.     She has no cervical adenopathy.   Neurological: no focal deficit. She is alert and oriented to person, place, and time. She has normal sensation. She displays tremor (resting tremor noted to BL hands). She exhibits normal muscle tone. Coordination: Romberg sign negative. Gait and coordination normal. Coordination normal. GCS eye subscore is 4. GCS verbal subscore is 5. GCS motor subscore is 6.      Comments: During visit, pt became very dizzy while changing positions on exam table.  Sat with pt while in seated position until symptoms resolved, approx 2-3 minutes.  Pt reports that dizziness has resolved before being discharge.   Skin: Skin is warm, dry, intact, not diaphoretic and not pale.   Psychiatric: Her speech is normal and behavior is normal. Judgment and thought content normal.   Nursing note and vitals reviewed.    Results for orders placed or performed in visit on 08/02/22   POCT COVID-19 Rapid Screening   Result Value Ref Range    POC Rapid COVID Negative Negative     Acceptable Yes              Assessment:       1. Allergic rhinitis, unspecified seasonality, unspecified trigger    2. Ear pain, left    3. Sinus congestion    4. Dizziness          Plan:       Provided education on prescribed medications, recommended slow position changes and increasing fluid intake as well.  Provided education on return/ER precautions, recommended follow-up with PCP or ENT if symptoms do not start to improve with treatment, ben  with dizziness with position changes.  Pt verbalized understanding and agreed to plan.      Allergic rhinitis, unspecified seasonality, unspecified trigger  -     fluticasone propionate (FLONASE) 50 mcg/actuation nasal spray; 2 sprays (100 mcg total) by Each Nostril route once daily.  Dispense: 16 g; Refill: 0  -     cetirizine (ZYRTEC) 10 MG tablet; Take 1 tablet (10 mg total) by mouth once daily.  Dispense: 30 tablet; Refill: 1    Ear pain, left  -     Discontinue: cetirizine (ZYRTEC) 10 MG tablet; Take 1 tablet (10 mg total) by mouth once daily.  Dispense: 30 tablet; Refill: 1  -     Discontinue: fluticasone propionate (FLONASE) 50 mcg/actuation nasal spray; 2 sprays (100 mcg total) by Each Nostril route once daily.  Dispense: 16 g; Refill: 0  -     fluticasone propionate (FLONASE) 50 mcg/actuation nasal spray; 2 sprays (100 mcg total) by Each Nostril route once daily.  Dispense: 16 g; Refill: 0  -     cetirizine (ZYRTEC) 10 MG tablet; Take 1 tablet (10 mg total) by mouth once daily.  Dispense: 30 tablet; Refill: 1    Sinus congestion  -     POCT COVID-19 Rapid Screening    Dizziness  -     fluticasone propionate (FLONASE) 50 mcg/actuation nasal spray; 2 sprays (100 mcg total) by Each Nostril route once daily.  Dispense: 16 g; Refill: 0  -     cetirizine (ZYRTEC) 10 MG tablet; Take 1 tablet (10 mg total) by mouth once daily.  Dispense: 30 tablet; Refill: 1      Patient Instructions   If your condition worsens or fails to improve, we recommend that you receive another evaluation at the ER immediately contact your PCP to discuss your concerns, or return here.  You must understand that you've received an urgent care treatment only, and that you may be released before all your medical problems are known or treated.  You, the patient, will arrange for follow-up care as instructed.     Flonase (fluticasone) is a nasal spray which is available over the counter and may help with your symptoms.  Zyrtec D, Claritin D, or  "Allegra D can also help with symptoms of congestion and drainage.  If you have hypertension, avoid using the "D" which is the decongestant formula.    If you just have drainage, you can take plain Zyrtec, Claritin, or Allegra.  If you just have a congested feeling, you can take pseudoephedrine (unless you have high blood pressure), which you have to sign for behind the counter.  Do not buy phenylephrine OTC, as it is not effective.    Rest and fluids are also important.  Tylenol or ibuprofen can also be used as directed for pain, unless you have an allergy to them or medical condition (such as stomach ulcers, kidney or liver disease, or use blood thinners, etc.) for which you should not be taking these type of medications.     If you are flying in the next few days, Afrin nose drops for the airplane flight upon take off and landing may help.  Other than at those times, refrain from using Afrin.     If you were prescribed a narcotic or sedating cough medicine, do not drive or operate heavy machinery while taking these medications.                        "

## 2022-08-02 NOTE — PATIENT INSTRUCTIONS
"If your condition worsens or fails to improve, we recommend that you receive another evaluation at the ER immediately contact your PCP to discuss your concerns, or return here.  You must understand that you've received an urgent care treatment only, and that you may be released before all your medical problems are known or treated.  You, the patient, will arrange for follow-up care as instructed.     Flonase (fluticasone) is a nasal spray which is available over the counter and may help with your symptoms.  Zyrtec D, Claritin D, or Allegra D can also help with symptoms of congestion and drainage.  If you have hypertension, avoid using the "D" which is the decongestant formula.    If you just have drainage, you can take plain Zyrtec, Claritin, or Allegra.  If you just have a congested feeling, you can take pseudoephedrine (unless you have high blood pressure), which you have to sign for behind the counter.  Do not buy phenylephrine OTC, as it is not effective.    Rest and fluids are also important.  Tylenol or ibuprofen can also be used as directed for pain, unless you have an allergy to them or medical condition (such as stomach ulcers, kidney or liver disease, or use blood thinners, etc.) for which you should not be taking these type of medications.     If you are flying in the next few days, Afrin nose drops for the airplane flight upon take off and landing may help.  Other than at those times, refrain from using Afrin.     If you were prescribed a narcotic or sedating cough medicine, do not drive or operate heavy machinery while taking these medications.       "

## 2022-08-02 NOTE — LETTER
August 2, 2022      Urgent Care - [address] [address]  Phone: [phone]  Fax: [phone]       Patient: Maria G Cameron   YOB: 1965  Date of Visit: 08/02/2022    To Whom It May Concern:    Jessica Cameron  was at [facility] on 08/02/2022. The patient may return to work/school on 8/3/2022 with no restrictions. If you have any questions or concerns, or if I can be of further assistance, please do not hesitate to contact me.    Sincerely,            Dawn Ching NP

## 2022-08-08 ENCOUNTER — TELEPHONE (OUTPATIENT)
Dept: PRIMARY CARE CLINIC | Facility: CLINIC | Age: 57
End: 2022-08-08
Payer: COMMERCIAL

## 2022-08-08 NOTE — TELEPHONE ENCOUNTER
Called and spoke with patient.  Patient stated that she went to urgent care last Wednesday.  Stated that it was her allergies and that everything was good.  Patient still slightly hoarse.    Verbalized appreciation for phone call.

## 2022-09-28 ENCOUNTER — HOSPITAL ENCOUNTER (OUTPATIENT)
Dept: RADIOLOGY | Facility: HOSPITAL | Age: 57
Discharge: HOME | End: 2022-09-28
Attending: SURGERY
Payer: COMMERCIAL

## 2022-09-28 ENCOUNTER — OFFICE VISIT (OUTPATIENT)
Dept: SURGERY | Facility: CLINIC | Age: 57
End: 2022-09-28
Payer: COMMERCIAL

## 2022-09-28 VITALS
WEIGHT: 232 LBS | BODY MASS INDEX: 35.16 KG/M2 | HEIGHT: 68 IN | DIASTOLIC BLOOD PRESSURE: 76 MMHG | HEART RATE: 71 BPM | SYSTOLIC BLOOD PRESSURE: 135 MMHG

## 2022-09-28 DIAGNOSIS — Z12.31 SCREENING MAMMOGRAM FOR HIGH-RISK PATIENT: ICD-10-CM

## 2022-09-28 DIAGNOSIS — D24.1 INTRADUCTAL PAPILLOMA OF RIGHT BREAST: Primary | ICD-10-CM

## 2022-09-28 PROCEDURE — 3078F PR MOST RECENT DIASTOLIC BLOOD PRESSURE < 80 MM HG: ICD-10-PCS | Mod: CPTII,S$GLB,, | Performed by: SURGERY

## 2022-09-28 PROCEDURE — 3044F PR MOST RECENT HEMOGLOBIN A1C LEVEL <7.0%: ICD-10-PCS | Mod: CPTII,S$GLB,, | Performed by: SURGERY

## 2022-09-28 PROCEDURE — 3078F DIAST BP <80 MM HG: CPT | Mod: CPTII,S$GLB,, | Performed by: SURGERY

## 2022-09-28 PROCEDURE — 4010F ACE/ARB THERAPY RXD/TAKEN: CPT | Mod: CPTII,S$GLB,, | Performed by: SURGERY

## 2022-09-28 PROCEDURE — 77067 SCR MAMMO BI INCL CAD: CPT | Mod: 26,,, | Performed by: RADIOLOGY

## 2022-09-28 PROCEDURE — 77063 BREAST TOMOSYNTHESIS BI: CPT | Mod: TC

## 2022-09-28 PROCEDURE — 1160F RVW MEDS BY RX/DR IN RCRD: CPT | Mod: CPTII,S$GLB,, | Performed by: SURGERY

## 2022-09-28 PROCEDURE — 99203 PR OFFICE/OUTPT VISIT, NEW, LEVL III, 30-44 MIN: ICD-10-PCS | Mod: S$GLB,,, | Performed by: SURGERY

## 2022-09-28 PROCEDURE — 3008F PR BODY MASS INDEX (BMI) DOCUMENTED: ICD-10-PCS | Mod: CPTII,S$GLB,, | Performed by: SURGERY

## 2022-09-28 PROCEDURE — 1160F PR REVIEW ALL MEDS BY PRESCRIBER/CLIN PHARMACIST DOCUMENTED: ICD-10-PCS | Mod: CPTII,S$GLB,, | Performed by: SURGERY

## 2022-09-28 PROCEDURE — 4010F PR ACE/ARB THEARPY RXD/TAKEN: ICD-10-PCS | Mod: CPTII,S$GLB,, | Performed by: SURGERY

## 2022-09-28 PROCEDURE — 77067 SCR MAMMO BI INCL CAD: CPT | Mod: TC

## 2022-09-28 PROCEDURE — 99999 PR PBB SHADOW E&M-EST. PATIENT-LVL III: ICD-10-PCS | Mod: PBBFAC,,, | Performed by: SURGERY

## 2022-09-28 PROCEDURE — 77063 MAMMO DIGITAL SCREENING BILAT WITH TOMO: ICD-10-PCS | Mod: 26,,, | Performed by: RADIOLOGY

## 2022-09-28 PROCEDURE — 1159F PR MEDICATION LIST DOCUMENTED IN MEDICAL RECORD: ICD-10-PCS | Mod: CPTII,S$GLB,, | Performed by: SURGERY

## 2022-09-28 PROCEDURE — 1159F MED LIST DOCD IN RCRD: CPT | Mod: CPTII,S$GLB,, | Performed by: SURGERY

## 2022-09-28 PROCEDURE — 3044F HG A1C LEVEL LT 7.0%: CPT | Mod: CPTII,S$GLB,, | Performed by: SURGERY

## 2022-09-28 PROCEDURE — 3075F SYST BP GE 130 - 139MM HG: CPT | Mod: CPTII,S$GLB,, | Performed by: SURGERY

## 2022-09-28 PROCEDURE — 99203 OFFICE O/P NEW LOW 30 MIN: CPT | Mod: S$GLB,,, | Performed by: SURGERY

## 2022-09-28 PROCEDURE — 77067 MAMMO DIGITAL SCREENING BILAT WITH TOMO: ICD-10-PCS | Mod: 26,,, | Performed by: RADIOLOGY

## 2022-09-28 PROCEDURE — 3075F PR MOST RECENT SYSTOLIC BLOOD PRESS GE 130-139MM HG: ICD-10-PCS | Mod: CPTII,S$GLB,, | Performed by: SURGERY

## 2022-09-28 PROCEDURE — 77063 BREAST TOMOSYNTHESIS BI: CPT | Mod: 26,,, | Performed by: RADIOLOGY

## 2022-09-28 PROCEDURE — 3008F BODY MASS INDEX DOCD: CPT | Mod: CPTII,S$GLB,, | Performed by: SURGERY

## 2022-09-28 PROCEDURE — 99999 PR PBB SHADOW E&M-EST. PATIENT-LVL III: CPT | Mod: PBBFAC,,, | Performed by: SURGERY

## 2022-09-28 NOTE — PROGRESS NOTES
[facility]       Post-Op        REFERRING PHYSICIAN:  No referring provider defined for this encounter.       Pearl Ellis MD      DIAGNOSIS:    This is a 57 y.o. female with intraductal papilloma of the right breast.     TREATMENT SUMMARY:  The patient is status post right excisional biopsy on 11/5/2021.  Final pathology showed intraductal papilloma without atypia or malignancy.     INTERVAL HISTORY 9/28/22:   Maria G Cameron comes in for follow-up. She is doing well and has not any notable breast changes. Her MMG from today was negative.      MEDICATIONS:  Current Outpatient Medications   Medication Sig Dispense Refill    butalbital-acetaminophen-caffeine -40 mg (FIORICET, ESGIC) -40 mg per tablet Take 1 tablet by mouth every 4 (four) hours as needed for Pain. 90 tablet 3    cetirizine (ZYRTEC) 10 MG tablet Take 1 tablet (10 mg total) by mouth once daily. 30 tablet 1    cholecalciferol, vitamin D3, (VITAMIN D3) 125 mcg (5,000 unit) Tab Take 1 tablet (5,000 Units total) by mouth once daily. 90 tablet 3    clonazePAM (KLONOPIN) 0.5 MG tablet Take 1 tablet by mouth twice daily as needed for anxiety 10 tablet 0    enalapril (VASOTEC) 20 MG tablet Take 1 tablet (20 mg total) by mouth once daily. 90 tablet 3    hydroCHLOROthiazide (HYDRODIURIL) 25 MG tablet Take 1 tablet (25 mg total) by mouth once daily. 90 tablet 3    levothyroxine (SYNTHROID) 125 MCG tablet Take 1 tablet (125 mcg total) by mouth once daily. 90 tablet 3    promethazine (PROMETHEGAN) 50 MG suppository Unwrap and insert 1 suppository (50 mg total) rectally every 6 (six) hours as needed for Nausea. 12 suppository 1    sertraline (ZOLOFT) 100 MG tablet Take 1.5 tablets (150 mg total) by mouth once daily. 135 tablet 3    traMADoL (ULTRAM) 50 mg tablet Take 1 tablet (50 mg total) by mouth every 6 (six) hours. 10 tablet 0    verapamiL (VERELAN) 360 MG C24P Take 1 capsule (360 mg total) by mouth once daily. 90 capsule 3     No  current facility-administered medications for this visit.     Facility-Administered Medications Ordered in Other Visits   Medication Dose Route Frequency Provider Last Rate Last Admin    0.9%  NaCl infusion   Intravenous Continuous Lubna Fan MD   Stopped at 11/05/21 [id number]       ALLERGIES:   Review of patient's allergies indicates:   Allergen Reactions    Adhesive Blisters    Imitrex [sumatriptan] Shortness Of Breath    Codeine Nausea And Vomiting       PHYSICAL EXAMINATION:   General:  This is a well appearing female with appropriate speech, affect and gait.     Breast:  Incision healed well.  Slight indentation of the incision with some firm scar tissue Benign bilateral breast and axilla exam.     IMPRESSION:   57F s/p excisional biopsy showing intraductal papilloma 11/5/21. Now here with negative screening MMG and no breast changes.     PLAN:   Patient is doing well after excision of the intraductal papilloma with some minor scar tissue at the incision site.  Recommended scar massage.  We discussed that the finding of intraductal papilloma very minimally increased risk of breast cancer.  She does not require high-risk screening.  She should continue population risk screening of once yearly mammograms.  Continue breast exams with primary care.  Continue self-breast awareness and self exam.  1. Return to clinic as needed   2. MMG in year(9/2023)

## 2022-10-12 NOTE — TELEPHONE ENCOUNTER
Please advise patient that the CT of her adrenal glands did not show a mass. Could she try to find out who diagnosed her with an adrenal adenoma in [place] so that we can get records?    Also, how is she feeling with the new med (acetazolamide)? When she's ready we can increase the thyroid medicaiton.    Thanks,  KEIRA   wife and special needs daughter  Wife and special needs daughter

## 2022-10-20 ENCOUNTER — OFFICE VISIT (OUTPATIENT)
Dept: ALLERGY | Facility: CLINIC | Age: 57
End: 2022-10-20
Payer: COMMERCIAL

## 2022-10-20 VITALS
WEIGHT: 259.94 LBS | OXYGEN SATURATION: 96 % | SYSTOLIC BLOOD PRESSURE: 140 MMHG | DIASTOLIC BLOOD PRESSURE: 83 MMHG | HEART RATE: 78 BPM | BODY MASS INDEX: 39.4 KG/M2 | HEIGHT: 68 IN

## 2022-10-20 DIAGNOSIS — J30.9 ALLERGIC RHINITIS, UNSPECIFIED SEASONALITY, UNSPECIFIED TRIGGER: ICD-10-CM

## 2022-10-20 DIAGNOSIS — R05.9 COUGH, UNSPECIFIED TYPE: Primary | ICD-10-CM

## 2022-10-20 PROCEDURE — 99999 PR PBB SHADOW E&M-EST. PATIENT-LVL III: CPT | Mod: PBBFAC,,, | Performed by: ALLERGY & IMMUNOLOGY

## 2022-10-20 PROCEDURE — 3079F PR MOST RECENT DIASTOLIC BLOOD PRESSURE 80-89 MM HG: ICD-10-PCS | Mod: CPTII,S$GLB,, | Performed by: ALLERGY & IMMUNOLOGY

## 2022-10-20 PROCEDURE — 3079F DIAST BP 80-89 MM HG: CPT | Mod: CPTII,S$GLB,, | Performed by: ALLERGY & IMMUNOLOGY

## 2022-10-20 PROCEDURE — 99999 PR PBB SHADOW E&M-EST. PATIENT-LVL III: ICD-10-PCS | Mod: PBBFAC,,, | Performed by: ALLERGY & IMMUNOLOGY

## 2022-10-20 PROCEDURE — 3077F SYST BP >= 140 MM HG: CPT | Mod: CPTII,S$GLB,, | Performed by: ALLERGY & IMMUNOLOGY

## 2022-10-20 PROCEDURE — 3044F HG A1C LEVEL LT 7.0%: CPT | Mod: CPTII,S$GLB,, | Performed by: ALLERGY & IMMUNOLOGY

## 2022-10-20 PROCEDURE — 3044F PR MOST RECENT HEMOGLOBIN A1C LEVEL <7.0%: ICD-10-PCS | Mod: CPTII,S$GLB,, | Performed by: ALLERGY & IMMUNOLOGY

## 2022-10-20 PROCEDURE — 1159F MED LIST DOCD IN RCRD: CPT | Mod: CPTII,S$GLB,, | Performed by: ALLERGY & IMMUNOLOGY

## 2022-10-20 PROCEDURE — 99204 OFFICE O/P NEW MOD 45 MIN: CPT | Mod: S$GLB,,, | Performed by: ALLERGY & IMMUNOLOGY

## 2022-10-20 PROCEDURE — 1159F PR MEDICATION LIST DOCUMENTED IN MEDICAL RECORD: ICD-10-PCS | Mod: CPTII,S$GLB,, | Performed by: ALLERGY & IMMUNOLOGY

## 2022-10-20 PROCEDURE — 4010F ACE/ARB THERAPY RXD/TAKEN: CPT | Mod: CPTII,S$GLB,, | Performed by: ALLERGY & IMMUNOLOGY

## 2022-10-20 PROCEDURE — 4010F PR ACE/ARB THEARPY RXD/TAKEN: ICD-10-PCS | Mod: CPTII,S$GLB,, | Performed by: ALLERGY & IMMUNOLOGY

## 2022-10-20 PROCEDURE — 99204 PR OFFICE/OUTPT VISIT, NEW, LEVL IV, 45-59 MIN: ICD-10-PCS | Mod: S$GLB,,, | Performed by: ALLERGY & IMMUNOLOGY

## 2022-10-20 PROCEDURE — 3077F PR MOST RECENT SYSTOLIC BLOOD PRESSURE >= 140 MM HG: ICD-10-PCS | Mod: CPTII,S$GLB,, | Performed by: ALLERGY & IMMUNOLOGY

## 2022-10-20 RX ORDER — ALBUTEROL SULFATE 90 UG/1
2 AEROSOL, METERED RESPIRATORY (INHALATION) EVERY 4 HOURS PRN
Qty: 18 G | Refills: 2 | Status: SHIPPED | OUTPATIENT
Start: 2022-10-20 | End: 2023-02-20

## 2022-10-20 RX ORDER — ALBUTEROL SULFATE 90 UG/1
2 AEROSOL, METERED RESPIRATORY (INHALATION) EVERY 4 HOURS PRN
Qty: 18 G | Refills: 3 | Status: SHIPPED | OUTPATIENT
Start: 2022-10-20 | End: 2023-02-20

## 2022-10-20 NOTE — PROGRESS NOTES
Subjective:       Patient ID: Maria G Cameron is a 57 y.o. female.    Chief Complaint:  Allergies      HPI:   Patient's symptoms include clear rhinorrhea, cough, itchy eyes, itchy nose, and postnasal drip. These symptoms are seasonal. Current triggers include exposure to fumes/strong odors and cat . The patient has been suffering from these symptoms for approximately most of adulthood. The patient has tried  flonase, zyrtec, patanase, nasal saline  with good relief of symptoms. Immunotherapy has never been tried. The patient has never had nasal polyps. The patient has no history of asthma. The patient has no history of eczema. The patient does not suffer from frequent sinopulmonary infections. Symptoms do improve with antibiotics. The patient has not had sinus surgery in the past.   Allergy testing positive in [place]  ~25 yrs ago  [place] been in [place] 6 [place]  Sx's were worse in [place]    Yest in office, perfume/strong sent--sneezing, watery eyes, cough, throat clearing, SOB.  Previously strong scents may have triggered HA.  Had flonase in am  Had to go home  No previous hx of wheeze other than maybe w distant bronchitis 3 yrs ago    Environmental History: Pets in the home: none.  [place]: hardwood floors  Tobacco Smoke in Home: no  Quit 1 yr ago    Past Medical History:   Diagnosis Date    Adrenal adenoma     Allergic rhinitis     Allergy     Allergic to trees, weeds, mold and standardized mite    Anxiety     Eustachian tube dysfunction     Fibrocystic changes of left breast     12 o'clock L breast mass bx 3/7/11    Hypertension     Hypothyroidism     Migraine headache     Recurrent otitis media          Family History   Problem Relation Age of Onset    Heart disease Mother 79    Thyroid disease Mother     Hypertension Mother     Hemochromatosis Father     Heart disease Maternal Grandmother     Lupus Sister     Thyroid disease Sister     Liver cancer Sister     Breast cancer Neg Hx     Colon cancer Neg Hx     Diabetes Neg Hx      Stroke Neg Hx    Mom w AR      Review of Systems   Constitutional:  Negative for activity change, fatigue and fever.   HENT:  Negative for congestion, postnasal drip, rhinorrhea, sinus pressure and sneezing.    Eyes:  Negative for discharge, redness and itching.   Respiratory:  Negative for cough, shortness of breath and wheezing.    Cardiovascular:  Negative for chest pain.   Gastrointestinal:  Negative for diarrhea, nausea and vomiting.   Genitourinary:  Negative for dysuria.   Musculoskeletal:  Negative for arthralgias and joint swelling.   Skin:  Negative for rash.   Neurological:  Negative for headaches.   Hematological:  Does not bruise/bleed easily.   Psychiatric/Behavioral:  Negative for behavioral problems and sleep disturbance.         Objective:   Physical Exam  Vitals and nursing note reviewed.   Constitutional:       General: She is not in acute distress.     Appearance: She is well-developed.   HENT:      Head: Normocephalic.      Right Ear: Tympanic membrane and external ear normal.      Left Ear: Tympanic membrane and external ear normal.      Nose: No septal deviation, mucosal edema or rhinorrhea.      Right Sinus: No maxillary sinus tenderness or frontal sinus tenderness.      Left Sinus: No maxillary sinus tenderness or frontal sinus tenderness.      Mouth/Throat:      Pharynx: Uvula midline. No uvula swelling.   Eyes:      General:         Right eye: No discharge.         Left eye: No discharge.      Conjunctiva/sclera: Conjunctivae normal.   Cardiovascular:      Rate and Rhythm: Normal rate and regular rhythm.   Pulmonary:      Effort: Pulmonary effort is normal. No respiratory distress.      Breath sounds: Normal breath sounds. No wheezing.   Abdominal:      General: Bowel sounds are normal.      Palpations: Abdomen is soft.      Tenderness: There is no abdominal tenderness.   Musculoskeletal:         General: No tenderness. Normal range of motion.      Cervical back: Normal range of motion  and neck supple.   Lymphadenopathy:      Cervical: No cervical adenopathy.   Skin:     General: Skin is warm.      Findings: No erythema or rash.   Neurological:      Mental Status: She is alert and oriented to person, place, and time.   Psychiatric:         Behavior: Behavior normal.         Thought Content: Thought content normal.         Judgment: Judgment normal.           No results found for: IGGSERUM, IGM, IGA     No results found for: IGE    Eos #   Date Value Ref Range Status   10/20/2021 0.1 0.0 - 0.5 K/uL Final   12/16/2019 0.1 0.0 - 0.5 K/uL Final   01/13/2017 0.1 0.0 - 0.5 K/uL Final     Eosinophil %   Date Value Ref Range Status   10/20/2021 1.9 0.0 - 8.0 % Final   12/16/2019 1.0 0.0 - 8.0 % Final   01/13/2017 1.7 0.0 - 8.0 % Final           Assessment:       1. Cough, unspecified type    2. Allergic rhinitis, unspecified seasonality, unspecified trigger         Plan:       Maria G was seen today for allergies.    Diagnoses and all orders for this visit:    Cough, unspecified type  -     albuterol (PROVENTIL/VENTOLIN HFA) 90 mcg/actuation inhaler; Inhale 2 puffs into the lungs every 4 (four) hours as needed for Wheezing or Shortness of Breath.    Allergic rhinitis, unspecified seasonality, unspecified trigger    Other orders  -     albuterol (PROVENTIL/VENTOLIN HFA) 90 mcg/actuation inhaler; Inhale 2 puffs into the lungs every 4 (four) hours as needed for Wheezing or Shortness of Breath.    Counseled that yesterday's episode was more likely triggered by an irritant rather than allergen. Uncertain if she experienced wheeze but will give albuterol to have on hand. Counseled on prn indications and proper administration.  Also keep nasal saline on hand.  If increase in freq of these episodes, routine Flonase and zyrtec may also be helpful.   Defer allergy testing for now.

## 2022-12-06 ENCOUNTER — OFFICE VISIT (OUTPATIENT)
Dept: PRIMARY CARE CLINIC | Facility: CLINIC | Age: 57
End: 2022-12-06
Payer: COMMERCIAL

## 2022-12-06 ENCOUNTER — IMMUNIZATION (OUTPATIENT)
Dept: INTERNAL MEDICINE | Facility: CLINIC | Age: 57
End: 2022-12-06
Payer: COMMERCIAL

## 2022-12-06 VITALS
HEIGHT: 68 IN | OXYGEN SATURATION: 95 % | SYSTOLIC BLOOD PRESSURE: 122 MMHG | HEART RATE: 98 BPM | BODY MASS INDEX: 39.22 KG/M2 | DIASTOLIC BLOOD PRESSURE: 84 MMHG | WEIGHT: 258.81 LBS

## 2022-12-06 DIAGNOSIS — Z23 NEED FOR VACCINATION: Primary | ICD-10-CM

## 2022-12-06 DIAGNOSIS — E03.9 HYPOTHYROIDISM, UNSPECIFIED TYPE: ICD-10-CM

## 2022-12-06 DIAGNOSIS — E55.9 VITAMIN D DEFICIENCY: ICD-10-CM

## 2022-12-06 DIAGNOSIS — E03.9 ACQUIRED HYPOTHYROIDISM: ICD-10-CM

## 2022-12-06 DIAGNOSIS — G89.29 CHRONIC INTRACTABLE HEADACHE, UNSPECIFIED HEADACHE TYPE: ICD-10-CM

## 2022-12-06 DIAGNOSIS — G43.909 MIGRAINE WITHOUT STATUS MIGRAINOSUS, NOT INTRACTABLE, UNSPECIFIED MIGRAINE TYPE: ICD-10-CM

## 2022-12-06 DIAGNOSIS — Z13.6 ENCOUNTER FOR SCREENING FOR CARDIOVASCULAR DISORDERS: Primary | ICD-10-CM

## 2022-12-06 DIAGNOSIS — R51.9 CHRONIC INTRACTABLE HEADACHE, UNSPECIFIED HEADACHE TYPE: ICD-10-CM

## 2022-12-06 DIAGNOSIS — I10 ESSENTIAL HYPERTENSION: ICD-10-CM

## 2022-12-06 DIAGNOSIS — E78.2 MIXED HYPERLIPIDEMIA: ICD-10-CM

## 2022-12-06 DIAGNOSIS — G25.0 BENIGN ESSENTIAL TREMOR: ICD-10-CM

## 2022-12-06 PROCEDURE — 3044F PR MOST RECENT HEMOGLOBIN A1C LEVEL <7.0%: ICD-10-PCS | Mod: CPTII,S$GLB,, | Performed by: INTERNAL MEDICINE

## 2022-12-06 PROCEDURE — 0124A COVID-19, MRNA, LNP-S, BIVALENT BOOSTER, PF, 30 MCG/0.3 ML DOSE: CPT | Mod: CV19,PBBFAC | Performed by: INTERNAL MEDICINE

## 2022-12-06 PROCEDURE — 4010F ACE/ARB THERAPY RXD/TAKEN: CPT | Mod: CPTII,S$GLB,, | Performed by: INTERNAL MEDICINE

## 2022-12-06 PROCEDURE — 3079F PR MOST RECENT DIASTOLIC BLOOD PRESSURE 80-89 MM HG: ICD-10-PCS | Mod: CPTII,S$GLB,, | Performed by: INTERNAL MEDICINE

## 2022-12-06 PROCEDURE — 3079F DIAST BP 80-89 MM HG: CPT | Mod: CPTII,S$GLB,, | Performed by: INTERNAL MEDICINE

## 2022-12-06 PROCEDURE — 99213 PR OFFICE/OUTPT VISIT, EST, LEVL III, 20-29 MIN: ICD-10-PCS | Mod: S$GLB,,, | Performed by: INTERNAL MEDICINE

## 2022-12-06 PROCEDURE — 3074F SYST BP LT 130 MM HG: CPT | Mod: CPTII,S$GLB,, | Performed by: INTERNAL MEDICINE

## 2022-12-06 PROCEDURE — 99213 OFFICE O/P EST LOW 20 MIN: CPT | Mod: S$GLB,,, | Performed by: INTERNAL MEDICINE

## 2022-12-06 PROCEDURE — 3074F PR MOST RECENT SYSTOLIC BLOOD PRESSURE < 130 MM HG: ICD-10-PCS | Mod: CPTII,S$GLB,, | Performed by: INTERNAL MEDICINE

## 2022-12-06 PROCEDURE — 3008F PR BODY MASS INDEX (BMI) DOCUMENTED: ICD-10-PCS | Mod: CPTII,S$GLB,, | Performed by: INTERNAL MEDICINE

## 2022-12-06 PROCEDURE — 4010F PR ACE/ARB THEARPY RXD/TAKEN: ICD-10-PCS | Mod: CPTII,S$GLB,, | Performed by: INTERNAL MEDICINE

## 2022-12-06 PROCEDURE — 3008F BODY MASS INDEX DOCD: CPT | Mod: CPTII,S$GLB,, | Performed by: INTERNAL MEDICINE

## 2022-12-06 PROCEDURE — 91312 COVID-19, MRNA, LNP-S, BIVALENT BOOSTER, PF, 30 MCG/0.3 ML DOSE: ICD-10-PCS | Mod: S$GLB,,, | Performed by: INTERNAL MEDICINE

## 2022-12-06 PROCEDURE — 91312 COVID-19, MRNA, LNP-S, BIVALENT BOOSTER, PF, 30 MCG/0.3 ML DOSE: CPT | Mod: S$GLB,,, | Performed by: INTERNAL MEDICINE

## 2022-12-06 PROCEDURE — 3044F HG A1C LEVEL LT 7.0%: CPT | Mod: CPTII,S$GLB,, | Performed by: INTERNAL MEDICINE

## 2022-12-06 NOTE — PATIENT INSTRUCTIONS
"TODAY:  - COVID vaccine (omicron)  - continue supportive care for allergies  - consider emgality monthly injections  - consider meditation with the "calm john" search for "migraine"  - we will assist you with getting appt for med mgt/counseling  - labs today    NEXT:  - call me when you are ready to get your sleep medicine appt scheduled  "

## 2022-12-06 NOTE — ASSESSMENT & PLAN NOTE
Lifetime essential tremor, familial. Significant improvement with propranolol.  · No longer taking propranolol 80mg TID

## 2022-12-06 NOTE — PROGRESS NOTES
"Primary Care Provider Appointment- [facility]    Subjective:      Patient ID: Maria G Cameron is a 57 y.o. female with allergies, migraines, HTN    Chief Complaint: Annual Exam    Patient requesting coronary artery calcium scoring test. She does not want a stress  test at this time. She saw Dr Amezcua in Cardiology in 1/2019. Had last EKG 10/2021 was normal.   The 10-year ASCVD risk score (Yonatan LAUGHLIN, et al., 2019) is: 4.2%    Values used to calculate the score:      Age: 57 years      Sex: Female      Is Non- : No      Diabetic: No      Tobacco smoker: No      Systolic Blood Pressure: 122 mmHg      Is BP treated: Yes      HDL Cholesterol: 58 mg/dL      Total Cholesterol: 249 mg/dL    She was advised sleep medicine by Dr Amezcua. She does not want to see sleep medicine until after she has her "heart examined." She reports that "Maybe the second half of the year" she will be open to a sleep medicine appointment.    She had lumpectomy in 11/2021, had no chemo afterwards. She has no other complaints with this.     She lost her mom and her sister this past 2 years. Saw a counselor for this, which helped. She is open to re-entering counseling today, but is having difficulty navigating this.    She is followed by allergy/immunology. Was prescribed albuterol. Was also seen in urgent care also for this. Was not tested for allergens. Was advised flonase, zyrtec, nasal saline.     Reports being stable for migraines. No longer followed by Neuro. Has 3 episodes per month.    Past Surgical History:   Procedure Laterality Date    BREAST BIOPSY Left 2010    breast core biopsy  03/07/2011    fibrocystic changes    BREAST MASS EXCISION Right 11/03/2021    Intraductal papilloma    COLONOSCOPY N/A 04/15/2016    Procedure: COLONOSCOPY;  Surgeon: Coleman Moss MD;  Location: [facility] ([address] [place] [address]);  Service: Endoscopy;  Laterality: N/A;    EXCISIONAL BIOPSY Right 11/05/2021    Procedure: EXCISIONAL BIOPSY-Right with " "radiological marker;  Surgeon: SCOTT Farah MD;  Location: [facility] OR [address] [place] [address];  Service: General;  Laterality: Right;       Past Medical History:   Diagnosis Date    Adrenal adenoma     Allergic rhinitis     Allergy     Allergic to trees, weeds, mold and standardized mite    Anxiety     Eustachian tube dysfunction     Fibrocystic changes of left breast     12 o'clock L breast mass bx 3/7/11    Hypertension     Hypothyroidism     Migraine headache     Recurrent otitis media        Social History     Socioeconomic History    Marital status: Single   Occupational History    Occupation:      Comment: clinical    Tobacco Use    Smoking status: Former     Packs/day: 0.25     Years: 40.00     Pack years: 10.00     Types: Cigarettes     Quit date: 10/1/2021     Years since quittin.1    Smokeless tobacco: Never   Substance and Sexual Activity    Alcohol use: No     Alcohol/week: 0.0 standard drinks    Drug use: No    Sexual activity: Not Currently     Comment: single with no children   Social History Narrative    Lives alone and no assistance with ADLs.     Single with no children       Review of Systems   Constitutional:  Negative for activity change.   Musculoskeletal:  Positive for arthralgias and back pain.   Neurological:  Positive for headaches.     Objective:   /84 (BP Location: Left arm, Patient Position: Sitting, BP Method: Large (Manual))   Pulse 98   Ht 5' 8" (1.727 m)   Wt 117.4 kg (258 lb 13.1 oz)   LMP 2015 (Approximate)   SpO2 95%   BMI 39.35 kg/m²     Physical Exam  Constitutional:       Appearance: She is obese.   Neurological:      Mental Status: She is alert.      Comments: Hand tremor   Psychiatric:         Mood and Affect: Mood normal.       Lab Results   Component Value Date    WBC 6.94 10/20/2021    HGB 14.3 10/20/2021    HCT 43.2 10/20/2021     10/20/2021    CHOL 249 (H) 2022    TRIG 121 2022    HDL 58 2022    ALT 24 10/20/2021    AST 21 " 10/20/2021     10/20/2021    K 3.9 10/20/2021     10/20/2021    CREATININE 0.8 10/20/2021    BUN 19 10/20/2021    CO2 25 10/20/2021    TSH 2.192 01/13/2022    HGBA1C 5.5 01/13/2022       RESULTS: Reviewed labs and images today    Assessment:   57 y.o. female with multiple co-morbid illnesses here to continue work-up of chronic issues     Plan:     Problem List Items Addressed This Visit          Neuro    Chronic intractable headache    Relevant Medications    promethazine (PROMETHEGAN) 50 MG suppository    butalbital-acetaminophen-caffeine -40 mg (FIORICET, ESGIC) -40 mg per tablet    Benign essential tremor     Lifetime essential tremor, familial. Significant improvement with propranolol.  No longer taking propranolol 80mg TID            Cardiac/Vascular    Mixed hyperlipidemia    Relevant Orders    CBC Auto Differential    Comprehensive Metabolic Panel    Hemoglobin A1C       Endocrine    Hypothyroidism    Relevant Medications    levothyroxine (SYNTHROID) 125 MCG tablet    Other Relevant Orders    TSH    Vitamin D deficiency    Relevant Medications    cholecalciferol, vitamin D3, (VITAMIN D3) 125 mcg (5,000 unit) Tab     Other Visit Diagnoses       Encounter for screening for cardiovascular disorders    -  Primary    Relevant Orders    CT Cardiac Scoring (Completed)    Hemoglobin A1C    Essential hypertension        Relevant Medications    verapamiL (VERELAN) 360 MG C24P    hydroCHLOROthiazide (HYDRODIURIL) 25 MG tablet    enalapril (VASOTEC) 20 MG tablet    Migraine without status migrainosus, not intractable, unspecified migraine type        Relevant Medications    promethazine (PROMETHEGAN) 50 MG suppository    butalbital-acetaminophen-caffeine -40 mg (FIORICET, ESGIC) -40 mg per tablet            Health Maintenance         Date Due Completion Date    TETANUS VACCINE 01/02/2012 1/2/2002    COVID-19 Vaccine (4 - Booster for Pfizer series) 12/03/2021 10/8/2021    Lipid Panel  01/13/2023 1/13/2022    Mammogram 09/28/2023 9/28/2022    Override on 12/8/2011: Done    Override on 12/8/2011: Done    Cervical Cancer Screening 12/16/2025 12/16/2020    Override on 1/12/2017: Not Clinically Appropriate (last 3 PAPs normal)    Override on 5/24/2013: Done    Colorectal Cancer Screening 04/26/2026 4/26/2016            Follow up ROUTINE. 30min spent with patient today, issues addressed include: tremor, MDD, vascular screening    Pearl Ellis MD/MPH  Internal Medicine  [facility]  [phone]

## 2022-12-08 ENCOUNTER — HOSPITAL ENCOUNTER (OUTPATIENT)
Dept: RADIOLOGY | Facility: HOSPITAL | Age: 57
Discharge: HOME | End: 2022-12-08
Attending: INTERNAL MEDICINE
Payer: COMMERCIAL

## 2022-12-08 DIAGNOSIS — Z13.6 ENCOUNTER FOR SCREENING FOR CARDIOVASCULAR DISORDERS: ICD-10-CM

## 2022-12-08 PROCEDURE — 75571 CT CALCIUM SCORING CARDIAC: ICD-10-PCS | Mod: 26,,, | Performed by: RADIOLOGY

## 2022-12-08 PROCEDURE — 75571 CT HRT W/O DYE W/CA TEST: CPT | Mod: 26,,, | Performed by: RADIOLOGY

## 2022-12-08 PROCEDURE — 75571 CT HRT W/O DYE W/CA TEST: CPT | Mod: TC

## 2022-12-09 ENCOUNTER — TELEPHONE (OUTPATIENT)
Dept: PRIMARY CARE CLINIC | Facility: CLINIC | Age: 57
End: 2022-12-09
Payer: COMMERCIAL

## 2022-12-09 RX ORDER — HYDROCHLOROTHIAZIDE 25 MG/1
25 TABLET ORAL DAILY
Qty: 90 TABLET | Refills: 3 | Status: SHIPPED | OUTPATIENT
Start: 2022-12-09 | End: 2023-12-08 | Stop reason: SDUPTHER

## 2022-12-09 RX ORDER — ENALAPRIL MALEATE 20 MG/1
20 TABLET ORAL DAILY
Qty: 90 TABLET | Refills: 3 | Status: SHIPPED | OUTPATIENT
Start: 2022-12-09 | End: 2023-12-08 | Stop reason: SDUPTHER

## 2022-12-09 RX ORDER — ACETAMINOPHEN 500 MG
5000 TABLET ORAL DAILY
Qty: 90 TABLET | Refills: 3 | Status: SHIPPED | OUTPATIENT
Start: 2022-12-09 | End: 2023-12-08

## 2022-12-09 RX ORDER — LEVOTHYROXINE SODIUM 125 UG/1
125 TABLET ORAL DAILY
Qty: 90 TABLET | Refills: 3 | Status: SHIPPED | OUTPATIENT
Start: 2022-12-09 | End: 2023-12-12 | Stop reason: SDUPTHER

## 2022-12-09 RX ORDER — VERAPAMIL HYDROCHLORIDE 360 MG/1
360 CAPSULE, DELAYED RELEASE PELLETS ORAL DAILY
Qty: 90 CAPSULE | Refills: 3 | Status: SHIPPED | OUTPATIENT
Start: 2022-12-09 | End: 2023-12-08 | Stop reason: SDUPTHER

## 2022-12-09 RX ORDER — BUTALBITAL, ACETAMINOPHEN AND CAFFEINE 50; 325; 40 MG/1; MG/1; MG/1
1 TABLET ORAL EVERY 4 HOURS PRN
Qty: 90 TABLET | Refills: 3 | Status: SHIPPED | OUTPATIENT
Start: 2022-12-09 | End: 2023-12-08 | Stop reason: SDUPTHER

## 2022-12-09 RX ORDER — PROMETHAZINE HYDROCHLORIDE 50 MG/1
50 SUPPOSITORY RECTAL EVERY 6 HOURS PRN
Qty: 12 SUPPOSITORY | Refills: 1 | Status: SHIPPED | OUTPATIENT
Start: 2022-12-09 | End: 2023-12-08 | Stop reason: SDUPTHER

## 2022-12-09 NOTE — TELEPHONE ENCOUNTER
Called Ms. Cummins. Told that the MD has a photo of meds needed for refills. Will follow-up with MD for list.

## 2022-12-09 NOTE — TELEPHONE ENCOUNTER
Please let patient know that her coronary artery CT was completely normal.    She does continue to have a pulmonary nodule that we need to monitor.    Thanks,  KJ

## 2022-12-09 NOTE — TELEPHONE ENCOUNTER
Clarification: Patient was notified that medications from list provided from MD was sent to appropriate pharmacies per patient request. Verbalized understanding and appreciated phone call.

## 2022-12-12 ENCOUNTER — LAB VISIT (OUTPATIENT)
Dept: LAB | Facility: HOSPITAL | Age: 57
End: 2022-12-12
Attending: INTERNAL MEDICINE
Payer: COMMERCIAL

## 2022-12-12 ENCOUNTER — TELEPHONE (OUTPATIENT)
Dept: PRIMARY CARE CLINIC | Facility: CLINIC | Age: 57
End: 2022-12-12
Payer: COMMERCIAL

## 2022-12-12 DIAGNOSIS — E78.2 MIXED HYPERLIPIDEMIA: ICD-10-CM

## 2022-12-12 DIAGNOSIS — Z13.6 ENCOUNTER FOR SCREENING FOR CARDIOVASCULAR DISORDERS: ICD-10-CM

## 2022-12-12 DIAGNOSIS — E03.9 HYPOTHYROIDISM, UNSPECIFIED TYPE: ICD-10-CM

## 2022-12-12 DIAGNOSIS — E03.9 HYPOTHYROIDISM, UNSPECIFIED TYPE: Primary | ICD-10-CM

## 2022-12-12 LAB
ALBUMIN SERPL BCP-MCNC: 3.9 G/DL (ref 3.5–5.2)
ALP SERPL-CCNC: 65 U/L (ref 55–135)
ALT SERPL W/O P-5'-P-CCNC: 21 U/L (ref 10–44)
ANION GAP SERPL CALC-SCNC: 12 MMOL/L (ref 8–16)
AST SERPL-CCNC: 17 U/L (ref 10–40)
BASOPHILS # BLD AUTO: 0.04 K/UL (ref 0–0.2)
BASOPHILS NFR BLD: 0.6 % (ref 0–1.9)
BILIRUB SERPL-MCNC: 0.4 MG/DL (ref 0.1–1)
BUN SERPL-MCNC: 16 MG/DL (ref 6–20)
CALCIUM SERPL-MCNC: 9.5 MG/DL (ref 8.7–10.5)
CHLORIDE SERPL-SCNC: 104 MMOL/L (ref 95–110)
CO2 SERPL-SCNC: 25 MMOL/L (ref 23–29)
CREAT SERPL-MCNC: 0.8 MG/DL (ref 0.5–1.4)
DIFFERENTIAL METHOD: ABNORMAL
EOSINOPHIL # BLD AUTO: 0.1 K/UL (ref 0–0.5)
EOSINOPHIL NFR BLD: 2.1 % (ref 0–8)
ERYTHROCYTE [DISTWIDTH] IN BLOOD BY AUTOMATED COUNT: 13 % (ref 11.5–14.5)
EST. GFR  (NO RACE VARIABLE): >60 ML/MIN/1.73 M^2
ESTIMATED AVG GLUCOSE: 111 MG/DL (ref 68–131)
GLUCOSE SERPL-MCNC: 107 MG/DL (ref 70–110)
HBA1C MFR BLD: 5.5 % (ref 4–5.6)
HCT VFR BLD AUTO: 42 % (ref 37–48.5)
HGB BLD-MCNC: 13.9 G/DL (ref 12–16)
IMM GRANULOCYTES # BLD AUTO: 0.03 K/UL (ref 0–0.04)
IMM GRANULOCYTES NFR BLD AUTO: 0.5 % (ref 0–0.5)
LYMPHOCYTES # BLD AUTO: 2.4 K/UL (ref 1–4.8)
LYMPHOCYTES NFR BLD: 37.9 % (ref 18–48)
MCH RBC QN AUTO: 32.4 PG (ref 27–31)
MCHC RBC AUTO-ENTMCNC: 33.1 G/DL (ref 32–36)
MCV RBC AUTO: 98 FL (ref 82–98)
MONOCYTES # BLD AUTO: 0.4 K/UL (ref 0.3–1)
MONOCYTES NFR BLD: 6.8 % (ref 4–15)
NEUTROPHILS # BLD AUTO: 3.2 K/UL (ref 1.8–7.7)
NEUTROPHILS NFR BLD: 52.1 % (ref 38–73)
NRBC BLD-RTO: 0 /100 WBC
PLATELET # BLD AUTO: 312 K/UL (ref 150–450)
PMV BLD AUTO: 9.2 FL (ref 9.2–12.9)
POTASSIUM SERPL-SCNC: 3.8 MMOL/L (ref 3.5–5.1)
PROT SERPL-MCNC: 7.3 G/DL (ref 6–8.4)
RBC # BLD AUTO: 4.29 M/UL (ref 4–5.4)
SODIUM SERPL-SCNC: 141 MMOL/L (ref 136–145)
T4 FREE SERPL-MCNC: 0.98 NG/DL (ref 0.71–1.51)
TSH SERPL DL<=0.005 MIU/L-ACNC: 6.51 UIU/ML (ref 0.4–4)
WBC # BLD AUTO: 6.2 K/UL (ref 3.9–12.7)

## 2022-12-12 PROCEDURE — 36415 COLL VENOUS BLD VENIPUNCTURE: CPT | Performed by: INTERNAL MEDICINE

## 2022-12-12 PROCEDURE — 85025 COMPLETE CBC W/AUTO DIFF WBC: CPT | Performed by: INTERNAL MEDICINE

## 2022-12-12 PROCEDURE — 80053 COMPREHEN METABOLIC PANEL: CPT | Performed by: INTERNAL MEDICINE

## 2022-12-12 PROCEDURE — 84439 ASSAY OF FREE THYROXINE: CPT | Performed by: INTERNAL MEDICINE

## 2022-12-12 PROCEDURE — 83036 HEMOGLOBIN GLYCOSYLATED A1C: CPT | Performed by: INTERNAL MEDICINE

## 2022-12-12 PROCEDURE — 84443 ASSAY THYROID STIM HORMONE: CPT | Performed by: INTERNAL MEDICINE

## 2022-12-12 RX ORDER — SERTRALINE HYDROCHLORIDE 100 MG/1
150 TABLET, FILM COATED ORAL DAILY
Qty: 135 TABLET | Refills: 0 | Status: SHIPPED | OUTPATIENT
Start: 2022-12-12 | End: 2023-01-27 | Stop reason: SDUPTHER

## 2022-12-12 NOTE — TELEPHONE ENCOUNTER
Please let patient know that her TSH was slightly high. Did she skip her dose of levothyroxine.    We should recheck her TSH in about 6 weeks to see if she needs a dose change. Lab is ordered, please schedule per her preference.    Thanks,  KJ

## 2022-12-13 NOTE — TELEPHONE ENCOUNTER
Called and spoke with patient.  Informed patient that her TSH was slightly elevated.  Patient stated that she had not skipped her Levothyroxine    Informed patient that we would recheck her lab in 6 weeks.  Scheduled to have labs drawn on 1/17/2023 at 7:00 AM.  Patient verbalized understanding.

## 2022-12-22 ENCOUNTER — OFFICE VISIT (OUTPATIENT)
Dept: URGENT CARE | Facility: CLINIC | Age: 57
End: 2022-12-22
Payer: COMMERCIAL

## 2022-12-22 VITALS
OXYGEN SATURATION: 98 % | DIASTOLIC BLOOD PRESSURE: 86 MMHG | TEMPERATURE: 98 F | BODY MASS INDEX: 37.89 KG/M2 | SYSTOLIC BLOOD PRESSURE: 134 MMHG | RESPIRATION RATE: 18 BRPM | WEIGHT: 250 LBS | HEIGHT: 68 IN | HEART RATE: 88 BPM

## 2022-12-22 DIAGNOSIS — J40 BRONCHITIS: Primary | ICD-10-CM

## 2022-12-22 DIAGNOSIS — R51.9 ACUTE NONINTRACTABLE HEADACHE, UNSPECIFIED HEADACHE TYPE: ICD-10-CM

## 2022-12-22 DIAGNOSIS — R05.9 COUGH, UNSPECIFIED TYPE: ICD-10-CM

## 2022-12-22 DIAGNOSIS — J02.9 PHARYNGITIS, UNSPECIFIED ETIOLOGY: ICD-10-CM

## 2022-12-22 LAB
CTP QC/QA: YES
SARS-COV-2 AG RESP QL IA.RAPID: NEGATIVE

## 2022-12-22 PROCEDURE — 1159F PR MEDICATION LIST DOCUMENTED IN MEDICAL RECORD: ICD-10-PCS | Mod: CPTII,S$GLB,, | Performed by: NURSE PRACTITIONER

## 2022-12-22 PROCEDURE — 4010F ACE/ARB THERAPY RXD/TAKEN: CPT | Mod: CPTII,S$GLB,, | Performed by: NURSE PRACTITIONER

## 2022-12-22 PROCEDURE — 3044F PR MOST RECENT HEMOGLOBIN A1C LEVEL <7.0%: ICD-10-PCS | Mod: CPTII,S$GLB,, | Performed by: NURSE PRACTITIONER

## 2022-12-22 PROCEDURE — 96372 THER/PROPH/DIAG INJ SC/IM: CPT | Mod: 59,S$GLB,, | Performed by: FAMILY MEDICINE

## 2022-12-22 PROCEDURE — 3075F SYST BP GE 130 - 139MM HG: CPT | Mod: CPTII,S$GLB,, | Performed by: NURSE PRACTITIONER

## 2022-12-22 PROCEDURE — 3075F PR MOST RECENT SYSTOLIC BLOOD PRESS GE 130-139MM HG: ICD-10-PCS | Mod: CPTII,S$GLB,, | Performed by: NURSE PRACTITIONER

## 2022-12-22 PROCEDURE — 87811 SARS CORONAVIRUS 2 ANTIGEN POCT, MANUAL READ: ICD-10-PCS | Mod: QW,S$GLB,, | Performed by: NURSE PRACTITIONER

## 2022-12-22 PROCEDURE — 3079F DIAST BP 80-89 MM HG: CPT | Mod: CPTII,S$GLB,, | Performed by: NURSE PRACTITIONER

## 2022-12-22 PROCEDURE — 3079F PR MOST RECENT DIASTOLIC BLOOD PRESSURE 80-89 MM HG: ICD-10-PCS | Mod: CPTII,S$GLB,, | Performed by: NURSE PRACTITIONER

## 2022-12-22 PROCEDURE — 94640 PR INHAL RX, AIRWAY OBST/DX SPUTUM INDUCT: ICD-10-PCS | Mod: S$GLB,,, | Performed by: NURSE PRACTITIONER

## 2022-12-22 PROCEDURE — 3008F PR BODY MASS INDEX (BMI) DOCUMENTED: ICD-10-PCS | Mod: CPTII,S$GLB,, | Performed by: NURSE PRACTITIONER

## 2022-12-22 PROCEDURE — 99213 OFFICE O/P EST LOW 20 MIN: CPT | Mod: 25,S$GLB,, | Performed by: NURSE PRACTITIONER

## 2022-12-22 PROCEDURE — 1160F PR REVIEW ALL MEDS BY PRESCRIBER/CLIN PHARMACIST DOCUMENTED: ICD-10-PCS | Mod: CPTII,S$GLB,, | Performed by: NURSE PRACTITIONER

## 2022-12-22 PROCEDURE — 4010F PR ACE/ARB THEARPY RXD/TAKEN: ICD-10-PCS | Mod: CPTII,S$GLB,, | Performed by: NURSE PRACTITIONER

## 2022-12-22 PROCEDURE — 87811 SARS-COV-2 COVID19 W/OPTIC: CPT | Mod: QW,S$GLB,, | Performed by: NURSE PRACTITIONER

## 2022-12-22 PROCEDURE — 3044F HG A1C LEVEL LT 7.0%: CPT | Mod: CPTII,S$GLB,, | Performed by: NURSE PRACTITIONER

## 2022-12-22 PROCEDURE — 1159F MED LIST DOCD IN RCRD: CPT | Mod: CPTII,S$GLB,, | Performed by: NURSE PRACTITIONER

## 2022-12-22 PROCEDURE — 99213 PR OFFICE/OUTPT VISIT, EST, LEVL III, 20-29 MIN: ICD-10-PCS | Mod: 25,S$GLB,, | Performed by: NURSE PRACTITIONER

## 2022-12-22 PROCEDURE — 1160F RVW MEDS BY RX/DR IN RCRD: CPT | Mod: CPTII,S$GLB,, | Performed by: NURSE PRACTITIONER

## 2022-12-22 PROCEDURE — 96372 PR INJECTION,THERAP/PROPH/DIAG2ST, IM OR SUBCUT: ICD-10-PCS | Mod: 59,S$GLB,, | Performed by: FAMILY MEDICINE

## 2022-12-22 PROCEDURE — 94640 AIRWAY INHALATION TREATMENT: CPT | Mod: S$GLB,,, | Performed by: NURSE PRACTITIONER

## 2022-12-22 PROCEDURE — 3008F BODY MASS INDEX DOCD: CPT | Mod: CPTII,S$GLB,, | Performed by: NURSE PRACTITIONER

## 2022-12-22 RX ORDER — ALBUTEROL SULFATE 0.83 MG/ML
2.5 SOLUTION RESPIRATORY (INHALATION)
Status: COMPLETED | OUTPATIENT
Start: 2022-12-22 | End: 2022-12-22

## 2022-12-22 RX ORDER — PREDNISONE 20 MG/1
20 TABLET ORAL DAILY
Qty: 3 TABLET | Refills: 0 | Status: SHIPPED | OUTPATIENT
Start: 2022-12-22 | End: 2022-12-25

## 2022-12-22 RX ORDER — DEXAMETHASONE SODIUM PHOSPHATE 100 MG/10ML
10 INJECTION INTRAMUSCULAR; INTRAVENOUS
Status: COMPLETED | OUTPATIENT
Start: 2022-12-22 | End: 2022-12-22

## 2022-12-22 RX ORDER — BENZONATATE 100 MG/1
100 CAPSULE ORAL 3 TIMES DAILY PRN
Qty: 30 CAPSULE | Refills: 0 | Status: SHIPPED | OUTPATIENT
Start: 2022-12-22 | End: 2023-01-01

## 2022-12-22 RX ADMIN — ALBUTEROL SULFATE 2.5 MG: 0.83 SOLUTION RESPIRATORY (INHALATION) at 10:12

## 2022-12-22 RX ADMIN — DEXAMETHASONE SODIUM PHOSPHATE 10 MG: 100 INJECTION INTRAMUSCULAR; INTRAVENOUS at 10:12

## 2022-12-22 NOTE — LETTER
December 22, 2022      Urgent Care - [address] [address]  Phone: [phone]  Fax: [phone]       Patient: Maria G Cameron   YOB: 1965  Date of Visit: 12/22/2022    To Whom It May Concern:    Jessica Cameron  was at [facility] on 12/22/2022. The patient may return to work/school on 12/23/2022 with no restrictions. If you have any questions or concerns, or if I can be of further assistance, please do not hesitate to contact me.    Sincerely,    Elizabeth Hamm NP

## 2022-12-22 NOTE — PATIENT INSTRUCTIONS
Drink lots of water, juice, or broth to replace fluids lost in runny nose and fever.  If you have medicines to take when you are feeling short of breath, be sure to carry them with you. Then, you can take them when needed.  If you smoke, stop.  Take warm, steamy showers to help soothe the cough.  Use a cool mist humidifier. This may make it easier to breathe.  Use hard candy or cough drops to soothe sore throat and cough.  Wash your hands often. This will help prevent you from spreading germs to others.  Please drink plenty of fluids.  Please get plenty of rest.  If you were given a steroid shot in the clinic and have also been given a prescription for a steroid such as Prednisone or a Medrol Dose Pack, please begin taking them tomorrow.  If you do not have Hypertension or any history of palpitations, it is ok to take over the counter Sudafed or Mucinex D or Allegra-D or Claritin-D or Zyrtec-D.  If you do take one of the above, it is ok to combine that with plain over the counter Mucinex or Allegra or Claritin or Zyrtec.  If for example you are taking Zyrtec -D, you can combine that with Mucinex, but not Mucinex-D.  If you are taking Mucinex-D, you can combine that with plain Allegra or Claritin or Zyrtec.   If you do have Hypertension or palpitations, it is safe to take Coricidin HBP for relief of sinus symptoms.  If not allergic, please take over the counter Tylenol (Acetaminophen) and/or Motrin (Ibuprofen) as directed for control of pain and/or fever.  Please follow up with your primary care doctor or specialist as needed.  Please return here or go to the Emergency Department for any concerns or worsening of condition.  If you  smoke, please stop smoking.

## 2022-12-22 NOTE — PROGRESS NOTES
"Subjective:       Patient ID: Maria G Cameron is a 57 y.o. female.    Vitals:  height is 5' 8" (1.727 m) and weight is 113.4 kg (250 lb). Her temperature is 98.2 °F (36.8 °C). Her blood pressure is 134/86 and her pulse is 88. Her respiration is 18 and oxygen saturation is 98%.     Chief Complaint: Cough    Patient reports sore throat, headaches, and dry cough since yesterday. 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 0/10 pain. Patient has not tried any treatment medications.     57 year old female presents to clinic with complaints of cough, sore throat,swollen neck glands and headache. History positive for Pfizer covid vaccine x 4 doses 12/23/20, 1/14/21, 10/8//21, bivalent booster 12/6/22 and influenza vaccine 10/5/22; 40 year history of tobacco use quit 10/2021.     Cough  This is a new problem. The current episode started yesterday. The problem has been unchanged. The problem occurs every few minutes. The cough is Non-productive. Associated symptoms include headaches, nasal congestion, postnasal drip and a sore throat. Pertinent negatives include no chills, fever, myalgias or shortness of breath. Nothing aggravates the symptoms. She has tried nothing for the symptoms. The treatment provided no relief.     Constitution: Negative for chills, sweating, fatigue and fever.   HENT:  Positive for congestion, postnasal drip and sore throat.    Neck: Positive for painful lymph nodes.   Respiratory:  Positive for chest tightness and cough. Negative for COPD, shortness of breath and asthma.    Musculoskeletal:  Negative for muscle ache.   Allergic/Immunologic: Negative for asthma.   Neurological:  Positive for headaches.   Hematologic/Lymphatic: Positive for swollen lymph nodes.     Objective:      Physical Exam   Constitutional: She is oriented to person, place, and time. She appears well-developed. She is cooperative.  Non-toxic appearance. She does not appear ill. No distress.   HENT:   Head: Normocephalic and " atraumatic.   Ears:   Right Ear: Hearing, tympanic membrane, external ear and ear canal normal.   Left Ear: Hearing, tympanic membrane, external ear and ear canal normal.   Nose: Mucosal edema and rhinorrhea present. No nasal deformity. No epistaxis. Right sinus exhibits no maxillary sinus tenderness and no frontal sinus tenderness. Left sinus exhibits no maxillary sinus tenderness and no frontal sinus tenderness.   Mouth/Throat: Uvula is midline, oropharynx is clear and moist and mucous membranes are normal. No trismus in the jaw. Normal dentition. No uvula swelling. No oropharyngeal exudate, posterior oropharyngeal edema or posterior oropharyngeal erythema.   Eyes: Conjunctivae and lids are normal. No scleral icterus.   Neck: Trachea normal and phonation normal. Neck supple. No edema present. No erythema present. No neck rigidity present.   Cardiovascular: Normal rate, regular rhythm, normal heart sounds and normal pulses.   Pulmonary/Chest: Effort normal and breath sounds normal. No respiratory distress. She has no decreased breath sounds. She has no rhonchi.   Crackles via bronchial region with deep inspiratory effort and non-productive cough         Comments: Crackles via bronchial region with deep inspiratory effort and non-productive cough    Abdominal: Normal appearance.   Musculoskeletal: Normal range of motion.         General: No deformity. Normal range of motion.   Neurological: She is alert and oriented to person, place, and time. She exhibits normal muscle tone. Coordination normal.   Skin: Skin is warm, dry, intact, not diaphoretic and not pale.   Psychiatric: Her speech is normal and behavior is normal. Judgment and thought content normal.   Nursing note and vitals reviewed.      Assessment:       1. Bronchitis    2. Pharyngitis, unspecified etiology    3. Cough, unspecified type    4. Acute nonintractable headache, unspecified headache type        Results for orders placed or performed in visit on  12/22/22   SARS Coronavirus 2 Antigen, POCT Manual Read   Result Value Ref Range    SARS Coronavirus 2 Antigen Negative Negative     Acceptable Yes       Plan:         Bronchitis  -     predniSONE (DELTASONE) 20 MG tablet; Take 1 tablet (20 mg total) by mouth once daily. for 3 days  Dispense: 3 tablet; Refill: 0    Pharyngitis, unspecified etiology    Cough, unspecified type  -     SARS Coronavirus 2 Antigen, POCT Manual Read  -     albuterol nebulizer solution 2.5 mg  -     benzonatate (TESSALON) 100 MG capsule; Take 1 capsule (100 mg total) by mouth 3 (three) times daily as needed for Cough.  Dispense: 30 capsule; Refill: 0    Acute nonintractable headache, unspecified headache type         Patient Instructions   Drink lots of water, juice, or broth to replace fluids lost in runny nose and fever.  If you have medicines to take when you are feeling short of breath, be sure to carry them with you. Then, you can take them when needed.  If you smoke, stop.  Take warm, steamy showers to help soothe the cough.  Use a cool mist humidifier. This may make it easier to breathe.  Use hard candy or cough drops to soothe sore throat and cough.  Wash your hands often. This will help prevent you from spreading germs to others.  Please drink plenty of fluids.  Please get plenty of rest.  If you were given a steroid shot in the clinic and have also been given a prescription for a steroid such as Prednisone or a Medrol Dose Pack, please begin taking them tomorrow.  If you do not have Hypertension or any history of palpitations, it is ok to take over the counter Sudafed or Mucinex D or Allegra-D or Claritin-D or Zyrtec-D.  If you do take one of the above, it is ok to combine that with plain over the counter Mucinex or Allegra or Claritin or Zyrtec.  If for example you are taking Zyrtec -D, you can combine that with Mucinex, but not Mucinex-D.  If you are taking Mucinex-D, you can combine that with plain Allegra or  Claritin or Zyrtec.   If you do have Hypertension or palpitations, it is safe to take Coricidin HBP for relief of sinus symptoms.  If not allergic, please take over the counter Tylenol (Acetaminophen) and/or Motrin (Ibuprofen) as directed for control of pain and/or fever.  Please follow up with your primary care doctor or specialist as needed.  Please return here or go to the Emergency Department for any concerns or worsening of condition.  If you  smoke, please stop smoking.       Additional MDM:     Heart Failure Score:   COPD = No

## 2023-01-17 ENCOUNTER — LAB VISIT (OUTPATIENT)
Dept: LAB | Facility: HOSPITAL | Age: 58
End: 2023-01-17
Attending: INTERNAL MEDICINE
Payer: COMMERCIAL

## 2023-01-17 DIAGNOSIS — E03.9 HYPOTHYROIDISM, UNSPECIFIED TYPE: ICD-10-CM

## 2023-01-17 LAB — TSH SERPL DL<=0.005 MIU/L-ACNC: 3.57 UIU/ML (ref 0.4–4)

## 2023-01-17 PROCEDURE — 36415 COLL VENOUS BLD VENIPUNCTURE: CPT | Performed by: INTERNAL MEDICINE

## 2023-01-17 PROCEDURE — 84443 ASSAY THYROID STIM HORMONE: CPT | Performed by: INTERNAL MEDICINE

## 2023-01-27 ENCOUNTER — OFFICE VISIT (OUTPATIENT)
Dept: PSYCHIATRY | Facility: CLINIC | Age: 58
End: 2023-01-27
Payer: COMMERCIAL

## 2023-01-27 VITALS
SYSTOLIC BLOOD PRESSURE: 156 MMHG | WEIGHT: 266.88 LBS | HEART RATE: 80 BPM | DIASTOLIC BLOOD PRESSURE: 85 MMHG | BODY MASS INDEX: 40.58 KG/M2

## 2023-01-27 DIAGNOSIS — G47.00 INSOMNIA, UNSPECIFIED TYPE: ICD-10-CM

## 2023-01-27 DIAGNOSIS — F41.1 GENERALIZED ANXIETY DISORDER: Primary | ICD-10-CM

## 2023-01-27 DIAGNOSIS — F32.2 CURRENT SEVERE EPISODE OF MAJOR DEPRESSIVE DISORDER WITHOUT PSYCHOTIC FEATURES WITHOUT PRIOR EPISODE: ICD-10-CM

## 2023-01-27 PROCEDURE — 3008F BODY MASS INDEX DOCD: CPT | Mod: CPTII,S$GLB,, | Performed by: NURSE PRACTITIONER

## 2023-01-27 PROCEDURE — 3079F PR MOST RECENT DIASTOLIC BLOOD PRESSURE 80-89 MM HG: ICD-10-PCS | Mod: CPTII,S$GLB,, | Performed by: NURSE PRACTITIONER

## 2023-01-27 PROCEDURE — 3008F PR BODY MASS INDEX (BMI) DOCUMENTED: ICD-10-PCS | Mod: CPTII,S$GLB,, | Performed by: NURSE PRACTITIONER

## 2023-01-27 PROCEDURE — 99999 PR PBB SHADOW E&M-EST. PATIENT-LVL II: CPT | Mod: PBBFAC,,, | Performed by: NURSE PRACTITIONER

## 2023-01-27 PROCEDURE — 99999 PR PBB SHADOW E&M-EST. PATIENT-LVL II: ICD-10-PCS | Mod: PBBFAC,,, | Performed by: NURSE PRACTITIONER

## 2023-01-27 PROCEDURE — 99214 PR OFFICE/OUTPT VISIT, EST, LEVL IV, 30-39 MIN: ICD-10-PCS | Mod: S$GLB,,, | Performed by: NURSE PRACTITIONER

## 2023-01-27 PROCEDURE — 3077F SYST BP >= 140 MM HG: CPT | Mod: CPTII,S$GLB,, | Performed by: NURSE PRACTITIONER

## 2023-01-27 PROCEDURE — 3079F DIAST BP 80-89 MM HG: CPT | Mod: CPTII,S$GLB,, | Performed by: NURSE PRACTITIONER

## 2023-01-27 PROCEDURE — 99214 OFFICE O/P EST MOD 30 MIN: CPT | Mod: S$GLB,,, | Performed by: NURSE PRACTITIONER

## 2023-01-27 PROCEDURE — 3077F PR MOST RECENT SYSTOLIC BLOOD PRESSURE >= 140 MM HG: ICD-10-PCS | Mod: CPTII,S$GLB,, | Performed by: NURSE PRACTITIONER

## 2023-01-27 RX ORDER — CLONAZEPAM 0.5 MG/1
0.5 TABLET ORAL 2 TIMES DAILY
Qty: 10 TABLET | Refills: 0 | Status: SHIPPED | OUTPATIENT
Start: 2023-01-27 | End: 2023-03-06 | Stop reason: SDUPTHER

## 2023-01-27 RX ORDER — SERTRALINE HYDROCHLORIDE 100 MG/1
150 TABLET, FILM COATED ORAL DAILY
Qty: 135 TABLET | Refills: 3 | Status: SHIPPED | OUTPATIENT
Start: 2023-01-27 | End: 2023-09-02 | Stop reason: SDUPTHER

## 2023-01-27 NOTE — PROGRESS NOTES
"7/28/2021 5:30 PM   Maria G Cameron   1965   [id number]                                                                   OUTPATIENT PSYCHIATRY FOLLOW- UP VISIT     Reason for Encounter:  Maria G Cameron, a 55 y.o. female,who presents today for follow up of anxiety. Met with patient.     Interval History and Content of Current Session:     Today,  Pt reports today for f/u visit today after starting sertraline about 4 months prior. Patient reports has been doing well on current dosage of sertraline. Mother passed away last year and has been dealing with grief of this.     Reports work load has substantially increased and feeling pressure and stress from this - "I get home and I just want to sleep and do nothing."    We discussed she has been having difficulties setting boundaries with family members lately and we discussed consideration of limiting interactions with family members                    Denies ETOH use, SI/HI, racing thoughts this visit.     Psychosocial stressors: occupational, family, social pressures        Review Of Systems:      Medical Review Of Systems:  Pertinent items are noted in HPI.     Psychiatric Review Of Systems:  sleep: no  appetite changes: no  weight changes: no  energy/anergy: no  interest/pleasure/anhedonia: no  somatic symptoms: no  libido: no  anxiety/panic: no  guilty/hopeless: no  S.I.B.s/risky behavior: no  any drugs: no  alcohol: no       Sleep: 6 to 7 hours per night currently.      Risk Parameters:  Patient reports no suicidal ideation  Patient reports no homicidal ideation  Patient reports no self-injurious behavior  Patient reports no violent behavior        Current meds- sertraline, clonazepam     Compliance: yes     Side effects: none        Psychiatric, social and family history reviewed this visit                 Objective      ALL MEDICATIONS:     Current Outpatient Medications:     butalbital-acetaminophen-caffeine -40 mg (FIORICET, ESGIC) -40 mg per " tablet, Take 1 tablet by mouth every 4 (four) hours as needed for Pain., Disp: 90 tablet, Rfl: 3    cholecalciferol, vitamin D3, (VITAMIN D3) 125 mcg (5,000 unit) Tab, Take 1 tablet (5,000 Units total) by mouth once daily., Disp: 90 tablet, Rfl: 3    enalapril (VASOTEC) 20 MG tablet, Take 1 tablet (20 mg total) by mouth once daily., Disp: 90 tablet, Rfl: 3  Sertraline 50 mg by mouth once daily    hydroCHLOROthiazide (HYDRODIURIL) 25 MG tablet, Take 1 tablet (25 mg total) by mouth once daily., Disp: 90 tablet, Rfl: 3    levothyroxine (SYNTHROID) 125 MCG tablet, Take 1 tablet (125 mcg total) by mouth once daily., Disp: 90 tablet, Rfl: 3    promethazine (PHENERGAN) 50 MG suppository, Unwrap and insert 1 suppository (50 mg total) rectally every 6 (six) hours as needed for Nausea., Disp: 12 suppository, Rfl: 1    verapamiL (VERELAN) 360 MG C24P, Take 1 capsule (360 mg total) by mouth once daily., Disp: 90 capsule, Rfl: 3     ALLERGIES:          Review of patient's allergies indicates:   Allergen Reactions    Imitrex [sumatriptan] Shortness Of Breath    Codeine Nausea And Vomiting         RELEVANT LABS/STUDIES:              Lab Results   Component Value Date     WBC 7.10 12/16/2019     HGB 14.3 12/16/2019     HCT 41.8 12/16/2019     MCV 97 12/16/2019      12/16/2019      BMP            Lab Results   Component Value Date      12/16/2019     K 4.1 12/16/2019      12/16/2019     CO2 26 12/16/2019     BUN 13 12/16/2019     CREATININE 1.0 12/16/2019     CALCIUM 9.5 12/16/2019     ANIONGAP 10 12/16/2019     ESTGFRAFRICA >60 12/16/2019     EGFRNONAA >60 12/16/2019                Lab Results   Component Value Date     ALT 21 12/16/2019     AST 20 12/16/2019     ALKPHOS 62 12/16/2019     BILITOT 0.5 12/16/2019                Lab Results   Component Value Date     TSH 1.740 12/16/2020                Lab Results   Component Value Date     HGBA1C 5.4 12/16/2020         Constitutional  Vitals:  Most recent vital  signs, dated less than 90 days prior to this appointment, were reviewed.    There were no vitals filed for this visit.            PHYSICAL EXAM  General: well developed, well nourished  Neurologic: noted resting fine tremor bilaterally to hands   Gait: Normal   Psychomotor signs:  No involuntary movements or tremor  AIMS: none     PSYCHIATRIC EXAM:     Mental Status Exam:  Appearance: unremarkable, age appropriate  Behavior/Cooperation: normal, cooperative  Speech: normal tone, normal rate, normal pitch, normal volume, appears anxious  Language: uses words appropriately; NO aphasia or dysarthria  Mood: anxious, depressed  Affect: full, congruent  Thought Process: normal and logical  Thought Content: normal, no suicidality, no homicidality, delusions, or paranoia  Level of Consciousness: Alert and Oriented x3  Memory:  Intact  Attention/concentration: appropriate for age/education.   Fund of Knowledge: appears adequate  Insight: Intact  Judgment: Intact      Assessment and Diagnosis   Status/Progress: Based on the examination today, the patient's problem(s) is/are treatment resistant and failing to respond as expected to treatment.  New problems have not been presented today.   Lack of compliance are complicating management of the primary condition.  There are no active rule-out diagnoses for this patient at this time.      General Impression:   Major Depressive Disorder, Severe, without psychotic features                       Adjustment Disorder with anxious and depressive features                       ROCIO                       Insomnia        Intervention/Counseling/Treatment Plan   Medication Management:        Continue sertraline 50 mg by mouth once daily        Continue clonazepam 0.5 mg twice daily as needed, #10, 0 refills this visit  Labs: reviewed most recent  The treatment plan and follow up plan were reviewed with the patient.  Discussed with patient informed consent, risks vs. benefits, alternative  treatments, side effect profile and the inherent unpredictability of individual responses to these treatments. The patient expresses understanding of the above and displays the capacity to agree with this current plan and had no other questions.  Encouraged Patient to keep future appointments.   Take medications as prescribed and abstain from substance abuse.   In the event of an emergency patient was advised to go to the emergency room.     Return to Clinic: 3 to 6 months     > than 50% of total time spend on coordination of care and counseling   (which included pts differential diagnosis and prognosis for psychiatric conditions, risks, benefits of treatments, instructions and adherence to treatment plan, risk reduction, reviewing current psychiatric medication regimen, medical problems and social stressors. In addtion to possible discussion with other healthcare provider/s)     Add on Psychotherapy time:0  Total Face time:20 min        Jose Louis, MSN, APRN, PMHNP-BC  [facility]   1/27/2023 1:00 PM

## 2023-02-20 ENCOUNTER — HOSPITAL ENCOUNTER (OUTPATIENT)
Dept: RADIOLOGY | Facility: HOSPITAL | Age: 58
Discharge: HOME | End: 2023-02-20
Attending: PHYSICIAN ASSISTANT
Payer: COMMERCIAL

## 2023-02-20 ENCOUNTER — OFFICE VISIT (OUTPATIENT)
Dept: INTERNAL MEDICINE | Facility: CLINIC | Age: 58
End: 2023-02-20
Payer: COMMERCIAL

## 2023-02-20 VITALS
DIASTOLIC BLOOD PRESSURE: 86 MMHG | OXYGEN SATURATION: 97 % | BODY MASS INDEX: 40.16 KG/M2 | WEIGHT: 265 LBS | TEMPERATURE: 99 F | HEART RATE: 88 BPM | SYSTOLIC BLOOD PRESSURE: 134 MMHG | HEIGHT: 68 IN

## 2023-02-20 DIAGNOSIS — J40 BRONCHITIS: Primary | ICD-10-CM

## 2023-02-20 DIAGNOSIS — J40 BRONCHITIS: ICD-10-CM

## 2023-02-20 LAB
CTP QC/QA: YES
CTP QC/QA: YES
POC MOLECULAR INFLUENZA A AGN: NEGATIVE
POC MOLECULAR INFLUENZA B AGN: NEGATIVE
SARS-COV-2 RDRP RESP QL NAA+PROBE: NEGATIVE

## 2023-02-20 PROCEDURE — 71046 XR CHEST PA AND LATERAL: ICD-10-PCS | Mod: 26,,, | Performed by: RADIOLOGY

## 2023-02-20 PROCEDURE — 1159F PR MEDICATION LIST DOCUMENTED IN MEDICAL RECORD: ICD-10-PCS | Mod: CPTII,S$GLB,, | Performed by: PHYSICIAN ASSISTANT

## 2023-02-20 PROCEDURE — 99214 OFFICE O/P EST MOD 30 MIN: CPT | Mod: S$GLB,,, | Performed by: PHYSICIAN ASSISTANT

## 2023-02-20 PROCEDURE — 71046 X-RAY EXAM CHEST 2 VIEWS: CPT | Mod: TC

## 2023-02-20 PROCEDURE — 3008F PR BODY MASS INDEX (BMI) DOCUMENTED: ICD-10-PCS | Mod: CPTII,S$GLB,, | Performed by: PHYSICIAN ASSISTANT

## 2023-02-20 PROCEDURE — 1160F PR REVIEW ALL MEDS BY PRESCRIBER/CLIN PHARMACIST DOCUMENTED: ICD-10-PCS | Mod: CPTII,S$GLB,, | Performed by: PHYSICIAN ASSISTANT

## 2023-02-20 PROCEDURE — 3079F DIAST BP 80-89 MM HG: CPT | Mod: CPTII,S$GLB,, | Performed by: PHYSICIAN ASSISTANT

## 2023-02-20 PROCEDURE — 1160F RVW MEDS BY RX/DR IN RCRD: CPT | Mod: CPTII,S$GLB,, | Performed by: PHYSICIAN ASSISTANT

## 2023-02-20 PROCEDURE — 3079F PR MOST RECENT DIASTOLIC BLOOD PRESSURE 80-89 MM HG: ICD-10-PCS | Mod: CPTII,S$GLB,, | Performed by: PHYSICIAN ASSISTANT

## 2023-02-20 PROCEDURE — 1159F MED LIST DOCD IN RCRD: CPT | Mod: CPTII,S$GLB,, | Performed by: PHYSICIAN ASSISTANT

## 2023-02-20 PROCEDURE — 99999 PR PBB SHADOW E&M-EST. PATIENT-LVL IV: CPT | Mod: PBBFAC,,, | Performed by: PHYSICIAN ASSISTANT

## 2023-02-20 PROCEDURE — 99999 PR PBB SHADOW E&M-EST. PATIENT-LVL IV: ICD-10-PCS | Mod: PBBFAC,,, | Performed by: PHYSICIAN ASSISTANT

## 2023-02-20 PROCEDURE — 3075F SYST BP GE 130 - 139MM HG: CPT | Mod: CPTII,S$GLB,, | Performed by: PHYSICIAN ASSISTANT

## 2023-02-20 PROCEDURE — 87502 POCT INFLUENZA A/B MOLECULAR: ICD-10-PCS | Mod: QW,S$GLB,, | Performed by: PHYSICIAN ASSISTANT

## 2023-02-20 PROCEDURE — 3008F BODY MASS INDEX DOCD: CPT | Mod: CPTII,S$GLB,, | Performed by: PHYSICIAN ASSISTANT

## 2023-02-20 PROCEDURE — 87502 INFLUENZA DNA AMP PROBE: CPT | Mod: QW,S$GLB,, | Performed by: PHYSICIAN ASSISTANT

## 2023-02-20 PROCEDURE — 87635 SARS-COV-2 COVID-19 AMP PRB: CPT | Mod: QW,S$GLB,, | Performed by: PHYSICIAN ASSISTANT

## 2023-02-20 PROCEDURE — 71046 X-RAY EXAM CHEST 2 VIEWS: CPT | Mod: 26,,, | Performed by: RADIOLOGY

## 2023-02-20 PROCEDURE — 99214 PR OFFICE/OUTPT VISIT, EST, LEVL IV, 30-39 MIN: ICD-10-PCS | Mod: S$GLB,,, | Performed by: PHYSICIAN ASSISTANT

## 2023-02-20 PROCEDURE — 87635: ICD-10-PCS | Mod: QW,S$GLB,, | Performed by: PHYSICIAN ASSISTANT

## 2023-02-20 PROCEDURE — 3075F PR MOST RECENT SYSTOLIC BLOOD PRESS GE 130-139MM HG: ICD-10-PCS | Mod: CPTII,S$GLB,, | Performed by: PHYSICIAN ASSISTANT

## 2023-02-20 RX ORDER — ALBUTEROL SULFATE 90 UG/1
2 AEROSOL, METERED RESPIRATORY (INHALATION) EVERY 4 HOURS PRN
Qty: 18 G | Refills: 3 | Status: SHIPPED | OUTPATIENT
Start: 2023-02-20 | End: 2023-07-07 | Stop reason: ALTCHOICE

## 2023-02-20 RX ORDER — PREDNISONE 20 MG/1
20 TABLET ORAL DAILY
Qty: 5 TABLET | Refills: 0 | Status: SHIPPED | OUTPATIENT
Start: 2023-02-20 | End: 2023-02-25

## 2023-02-20 RX ORDER — ALBUTEROL SULFATE 90 UG/1
2 AEROSOL, METERED RESPIRATORY (INHALATION) EVERY 4 HOURS PRN
Qty: 18 G | Refills: 3 | Status: SHIPPED | OUTPATIENT
Start: 2023-02-20 | End: 2023-02-20 | Stop reason: SDUPTHER

## 2023-02-20 RX ORDER — BENZONATATE 100 MG/1
100 CAPSULE ORAL 3 TIMES DAILY PRN
Qty: 20 CAPSULE | Refills: 0 | Status: SHIPPED | OUTPATIENT
Start: 2023-02-20 | End: 2023-03-02

## 2023-02-20 RX ORDER — BENZONATATE 100 MG/1
100 CAPSULE ORAL 3 TIMES DAILY PRN
Qty: 20 CAPSULE | Refills: 0 | Status: SHIPPED | OUTPATIENT
Start: 2023-02-20 | End: 2023-02-20 | Stop reason: SDUPTHER

## 2023-02-20 RX ORDER — PREDNISONE 20 MG/1
20 TABLET ORAL DAILY
Qty: 5 TABLET | Refills: 0 | Status: SHIPPED | OUTPATIENT
Start: 2023-02-20 | End: 2023-02-20 | Stop reason: SDUPTHER

## 2023-02-20 NOTE — LETTER
February 20, 2023    Maria G Cameron  [address] [facility] [address]             [facility]  Internal Medicine  [address]  [facility] [address]  Phone: [phone]  Fax: [phone]   February 20, 2023     Patient: Maria G Cameron   YOB: 1965   Date of Visit: 2/20/2023       To Whom it May Concern:    Maria G Cameron was seen in my clinic on 2/20/2023. She may return to work on 02/22/2023 .    Please excuse her from any classes or work missed.    If you have any questions or concerns, please don't hesitate to call.    Sincerely,           Bianka Santiago PA-C

## 2023-02-20 NOTE — PROGRESS NOTES
"Subjective:       Patient ID: Maria G Cameron is a 57 y.o. female.    Chief Complaint: Cough    HPI    Established pt of Pearl Ellis MD (new to me)    Same day/urgent care    "I think I have bronchitis again"    Here with concerns of cough, chest congestion, stuff nose, body aches, sore throat and wheezing. Onset 4 days ago.   No fevers, cp or sob. Home COVID test negative. Taking Coricidin, mucinex and throat lozenges with mild improvement.   Former smoker. More flares of bronchitis past several months    Past Medical History:   Diagnosis Date    Adrenal adenoma     Allergic rhinitis     Allergy     Allergic to trees, weeds, mold and standardized mite    Anxiety     Eustachian tube dysfunction     Fibrocystic changes of left breast     12 o'clock L breast mass bx 3/7/11    Hypertension     Hypothyroidism     Migraine headache     Recurrent otitis media      Social History     Tobacco Use    Smoking status: Former     Packs/day: 0.25     Years: 40.00     Pack years: 10.00     Types: Cigarettes     Quit date: 10/1/2021     Years since quittin.3    Smokeless tobacco: Never   Substance Use Topics    Alcohol use: No     Alcohol/week: 0.0 standard drinks    Drug use: No     Review of patient's allergies indicates:   Allergen Reactions    Adhesive Blisters    Imitrex [sumatriptan] Shortness Of Breath    Codeine Nausea And Vomiting       Review of Systems   Constitutional:  Positive for fatigue. Negative for chills, fever and unexpected weight change.   HENT:  Positive for sore throat.    Respiratory:  Positive for cough and wheezing. Negative for shortness of breath.    Cardiovascular:  Negative for chest pain and leg swelling.   Gastrointestinal:  Negative for abdominal pain, nausea and vomiting.   Musculoskeletal:         Body aches   Integumentary:  Negative for rash.   Neurological:  Negative for weakness and headaches.       Objective: /86 (BP Location: Right arm, Patient Position: Sitting, BP " "Method: Large (Manual))   Pulse 88   Temp 99.1 °F (37.3 °C) (Oral)   Ht 5' 8" (1.727 m)   Wt 120.2 kg (264 lb 15.9 oz)   LMP 12/16/2015 (Approximate)   SpO2 97%   BMI 40.29 kg/m²         Physical Exam  Vitals reviewed.   Constitutional:       General: She is not in acute distress.     Appearance: She is well-developed. She is not ill-appearing.   HENT:      Head: Normocephalic and atraumatic.      Right Ear: Tympanic membrane, ear canal and external ear normal.      Left Ear: Tympanic membrane, ear canal and external ear normal.      Mouth/Throat:      Mouth: Mucous membranes are moist.      Pharynx: Oropharynx is clear.   Cardiovascular:      Rate and Rhythm: Normal rate and regular rhythm.      Heart sounds: No murmur heard.  Pulmonary:      Effort: Pulmonary effort is normal.      Breath sounds: Wheezing present. No rales.   Abdominal:      General: Bowel sounds are normal.      Palpations: Abdomen is soft.   Skin:     General: Skin is warm and dry.      Findings: No rash.   Neurological:      Mental Status: She is alert.       Assessment:       Problem List Items Addressed This Visit    None  Visit Diagnoses       Cough, unspecified type    -  Primary    Relevant Orders    POCT COVID-19 Rapid Screening    POCT Influenza A/B Molecular            Plan:       Maria G was seen today for cough.    Diagnoses and all orders for this visit:    Bronchitis  -     POCT COVID-19 Rapid Screening  -     POCT Influenza A/B Molecular  -     predniSONE (DELTASONE) 20 MG tablet; Take 1 tablet (20 mg total) by mouth once daily. for 5 days  -     albuterol (PROVENTIL/VENTOLIN HFA) 90 mcg/actuation inhaler; Inhale 2 puffs into the lungs every 4 (four) hours as needed for Wheezing or Shortness of Breath.  -     benzonatate (TESSALON) 100 MG capsule; Take 1 capsule (100 mg total) by mouth 3 (three) times daily as needed.    POC COVID and FLU are negative  Symptomatic care discussed  CXR today  Notify clinic if symptoms worsen or " fail to improve.        Bianka Santiago PA-C

## 2023-03-07 DIAGNOSIS — F17.200 CURRENT SMOKER: Primary | ICD-10-CM

## 2023-03-07 RX ORDER — CLONAZEPAM 0.5 MG/1
0.5 TABLET ORAL 2 TIMES DAILY
Qty: 10 TABLET | Refills: 0 | Status: SHIPPED | OUTPATIENT
Start: 2023-03-07 | End: 2023-04-13 | Stop reason: SDUPTHER

## 2023-03-16 ENCOUNTER — TELEPHONE (OUTPATIENT)
Dept: PRIMARY CARE CLINIC | Facility: CLINIC | Age: 58
End: 2023-03-16
Payer: COMMERCIAL

## 2023-03-16 NOTE — TELEPHONE ENCOUNTER
Please let patient know that her CXR didn't show any acute lung disease. There were chronic changes. She definitely needs ot stay away from cigarettes and not smoke.    Thanks,  KJ

## 2023-03-16 NOTE — TELEPHONE ENCOUNTER
----- Message from Inessa Byrne MA sent at 3/14/2023  8:53 AM CDT -----  Regarding: FW: Pt called to request the Results of her Xrays taken on Feb 20, 2023  Please advise  ----- Message -----  From: Tsering Garcia  Sent: 3/14/2023   6:45 AM CDT  To: [facility] Staff  Subject: Pt called to request the Results of her Xray#    Test Results:    Pt called to request the Results of her Xrays taken on Feb 20, 2023.    Pt can be reached at [phone]

## 2023-03-22 ENCOUNTER — HOSPITAL ENCOUNTER (OUTPATIENT)
Dept: RADIOLOGY | Facility: HOSPITAL | Age: 58
Discharge: HOME | End: 2023-03-22
Attending: INTERNAL MEDICINE
Payer: COMMERCIAL

## 2023-03-22 DIAGNOSIS — F17.200 CURRENT SMOKER: ICD-10-CM

## 2023-03-22 PROCEDURE — 71271 CT CHEST LUNG SCREENING LOW DOSE: ICD-10-PCS | Mod: 26,,, | Performed by: RADIOLOGY

## 2023-03-22 PROCEDURE — 71271 CT THORAX LUNG CANCER SCR C-: CPT | Mod: 26,,, | Performed by: RADIOLOGY

## 2023-03-22 PROCEDURE — 71271 CT THORAX LUNG CANCER SCR C-: CPT | Mod: TC

## 2023-04-14 RX ORDER — CLONAZEPAM 0.5 MG/1
0.5 TABLET ORAL 2 TIMES DAILY
Qty: 10 TABLET | Refills: 0 | Status: SHIPPED | OUTPATIENT
Start: 2023-04-14 | End: 2023-05-11 | Stop reason: SDUPTHER

## 2023-05-11 DIAGNOSIS — F41.1 GENERALIZED ANXIETY DISORDER: Primary | ICD-10-CM

## 2023-05-12 RX ORDER — CLONAZEPAM 0.5 MG/1
0.5 TABLET ORAL 2 TIMES DAILY
Qty: 10 TABLET | Refills: 0 | Status: SHIPPED | OUTPATIENT
Start: 2023-05-12 | End: 2023-06-22

## 2023-06-16 ENCOUNTER — PATIENT MESSAGE (OUTPATIENT)
Dept: PSYCHIATRY | Facility: CLINIC | Age: 58
End: 2023-06-16
Payer: COMMERCIAL

## 2023-06-22 ENCOUNTER — TELEPHONE (OUTPATIENT)
Dept: PSYCHIATRY | Facility: CLINIC | Age: 58
End: 2023-06-22
Payer: COMMERCIAL

## 2023-06-22 DIAGNOSIS — F41.1 GENERALIZED ANXIETY DISORDER: ICD-10-CM

## 2023-06-22 RX ORDER — CLONAZEPAM 0.5 MG/1
0.5 TABLET ORAL 2 TIMES DAILY
Qty: 10 TABLET | Refills: 0 | Status: SHIPPED | OUTPATIENT
Start: 2023-06-22 | End: 2023-08-10 | Stop reason: SDUPTHER

## 2023-06-22 NOTE — TELEPHONE ENCOUNTER
----- Message from Blanca Collado sent at 6/22/2023 11:49 AM CDT -----  Regarding: Refill  Pt requests a refill of clonazePAM (KLONOPIN) 0.5 MG tablet from [facility]  Phone: [phone], Fax: [phone].  Last seen on 1/27/23 and scheduled to be seen on 7/7/23.    Pt can be reached at [phone].    Thank you.

## 2023-07-07 ENCOUNTER — OFFICE VISIT (OUTPATIENT)
Dept: PSYCHIATRY | Facility: CLINIC | Age: 58
End: 2023-07-07
Payer: COMMERCIAL

## 2023-07-07 VITALS
BODY MASS INDEX: 40.44 KG/M2 | SYSTOLIC BLOOD PRESSURE: 133 MMHG | WEIGHT: 266 LBS | HEART RATE: 87 BPM | DIASTOLIC BLOOD PRESSURE: 68 MMHG

## 2023-07-07 DIAGNOSIS — F32.9 MAJOR DEPRESSIVE DISORDER WITHOUT PSYCHOTIC FEATURES: Primary | ICD-10-CM

## 2023-07-07 DIAGNOSIS — F41.1 GENERALIZED ANXIETY DISORDER: ICD-10-CM

## 2023-07-07 PROCEDURE — 1160F RVW MEDS BY RX/DR IN RCRD: CPT | Mod: CPTII,S$GLB,, | Performed by: STUDENT IN AN ORGANIZED HEALTH CARE EDUCATION/TRAINING PROGRAM

## 2023-07-07 PROCEDURE — 3075F SYST BP GE 130 - 139MM HG: CPT | Mod: CPTII,S$GLB,, | Performed by: STUDENT IN AN ORGANIZED HEALTH CARE EDUCATION/TRAINING PROGRAM

## 2023-07-07 PROCEDURE — 3078F PR MOST RECENT DIASTOLIC BLOOD PRESSURE < 80 MM HG: ICD-10-PCS | Mod: CPTII,S$GLB,, | Performed by: STUDENT IN AN ORGANIZED HEALTH CARE EDUCATION/TRAINING PROGRAM

## 2023-07-07 PROCEDURE — 3008F BODY MASS INDEX DOCD: CPT | Mod: CPTII,S$GLB,, | Performed by: STUDENT IN AN ORGANIZED HEALTH CARE EDUCATION/TRAINING PROGRAM

## 2023-07-07 PROCEDURE — 99999 PR PBB SHADOW E&M-EST. PATIENT-LVL III: ICD-10-PCS | Mod: PBBFAC,,, | Performed by: STUDENT IN AN ORGANIZED HEALTH CARE EDUCATION/TRAINING PROGRAM

## 2023-07-07 PROCEDURE — 3075F PR MOST RECENT SYSTOLIC BLOOD PRESS GE 130-139MM HG: ICD-10-PCS | Mod: CPTII,S$GLB,, | Performed by: STUDENT IN AN ORGANIZED HEALTH CARE EDUCATION/TRAINING PROGRAM

## 2023-07-07 PROCEDURE — 99215 PR OFFICE/OUTPT VISIT, EST, LEVL V, 40-54 MIN: ICD-10-PCS | Mod: S$GLB,,, | Performed by: STUDENT IN AN ORGANIZED HEALTH CARE EDUCATION/TRAINING PROGRAM

## 2023-07-07 PROCEDURE — 4010F PR ACE/ARB THEARPY RXD/TAKEN: ICD-10-PCS | Mod: CPTII,S$GLB,, | Performed by: STUDENT IN AN ORGANIZED HEALTH CARE EDUCATION/TRAINING PROGRAM

## 2023-07-07 PROCEDURE — 4010F ACE/ARB THERAPY RXD/TAKEN: CPT | Mod: CPTII,S$GLB,, | Performed by: STUDENT IN AN ORGANIZED HEALTH CARE EDUCATION/TRAINING PROGRAM

## 2023-07-07 PROCEDURE — 3008F PR BODY MASS INDEX (BMI) DOCUMENTED: ICD-10-PCS | Mod: CPTII,S$GLB,, | Performed by: STUDENT IN AN ORGANIZED HEALTH CARE EDUCATION/TRAINING PROGRAM

## 2023-07-07 PROCEDURE — 99999 PR PBB SHADOW E&M-EST. PATIENT-LVL III: CPT | Mod: PBBFAC,,, | Performed by: STUDENT IN AN ORGANIZED HEALTH CARE EDUCATION/TRAINING PROGRAM

## 2023-07-07 PROCEDURE — 1159F MED LIST DOCD IN RCRD: CPT | Mod: CPTII,S$GLB,, | Performed by: STUDENT IN AN ORGANIZED HEALTH CARE EDUCATION/TRAINING PROGRAM

## 2023-07-07 PROCEDURE — 99215 OFFICE O/P EST HI 40 MIN: CPT | Mod: S$GLB,,, | Performed by: STUDENT IN AN ORGANIZED HEALTH CARE EDUCATION/TRAINING PROGRAM

## 2023-07-07 PROCEDURE — 1160F PR REVIEW ALL MEDS BY PRESCRIBER/CLIN PHARMACIST DOCUMENTED: ICD-10-PCS | Mod: CPTII,S$GLB,, | Performed by: STUDENT IN AN ORGANIZED HEALTH CARE EDUCATION/TRAINING PROGRAM

## 2023-07-07 PROCEDURE — 1159F PR MEDICATION LIST DOCUMENTED IN MEDICAL RECORD: ICD-10-PCS | Mod: CPTII,S$GLB,, | Performed by: STUDENT IN AN ORGANIZED HEALTH CARE EDUCATION/TRAINING PROGRAM

## 2023-07-07 PROCEDURE — 3078F DIAST BP <80 MM HG: CPT | Mod: CPTII,S$GLB,, | Performed by: STUDENT IN AN ORGANIZED HEALTH CARE EDUCATION/TRAINING PROGRAM
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
## 2023-08-10 DIAGNOSIS — F41.1 GENERALIZED ANXIETY DISORDER: ICD-10-CM

## 2023-08-10 RX ORDER — CLONAZEPAM 0.5 MG/1
0.5 TABLET ORAL 2 TIMES DAILY
Qty: 10 TABLET | Refills: 0 | Status: SHIPPED | OUTPATIENT
Start: 2023-08-10 | End: 2023-10-11 | Stop reason: SDUPTHER

## 2023-08-10 NOTE — TELEPHONE ENCOUNTER
Received a medication refill request from patient. Sent prescription for PRN Klonopin 0.5mg quantity #10 to [facility]. Called patient and informed her that refill was sent to pharmacy. Patient has follow up appointment scheduled on 9/1/23. Informed patient of importance of attending next appointment for ongoing medication refills to be done. Patient verbalized understanding and is agreeable to the plan.       Jessee Mirza MD  [facility] PGY-III  08/10/2023

## 2023-08-18 ENCOUNTER — TELEPHONE (OUTPATIENT)
Dept: PULMONOLOGY | Facility: CLINIC | Age: 58
End: 2023-08-18
Payer: COMMERCIAL

## 2023-08-18 DIAGNOSIS — Z87.891 HISTORY OF TOBACCO USE: Primary | ICD-10-CM

## 2023-08-18 NOTE — TELEPHONE ENCOUNTER
Called and spoke with pt to confirm scheduling of follow up LDCT scan.  Agreeable with scheduling on September 19th, instructions given on where to go.

## 2023-08-29 ENCOUNTER — HOSPITAL ENCOUNTER (EMERGENCY)
Facility: HOSPITAL | Age: 58
Discharge: HOME | End: 2023-08-29
Attending: EMERGENCY MEDICINE
Payer: COMMERCIAL

## 2023-08-29 VITALS
SYSTOLIC BLOOD PRESSURE: 140 MMHG | RESPIRATION RATE: 18 BRPM | TEMPERATURE: 98 F | BODY MASS INDEX: 40.29 KG/M2 | DIASTOLIC BLOOD PRESSURE: 71 MMHG | WEIGHT: 265 LBS | HEART RATE: 76 BPM | OXYGEN SATURATION: 95 %

## 2023-08-29 DIAGNOSIS — M25.579 ANKLE PAIN: ICD-10-CM
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
## 2023-08-29 DIAGNOSIS — M79.671 FOOT PAIN, RIGHT: Primary | ICD-10-CM

## 2023-08-29 PROCEDURE — 99284 EMERGENCY DEPT VISIT MOD MDM: CPT

## 2023-08-29 RX ORDER — HYDROCODONE BITARTRATE AND ACETAMINOPHEN 5; 325 MG/1; MG/1
1 TABLET ORAL EVERY 6 HOURS PRN
Qty: 9 TABLET | Refills: 0 | Status: SHIPPED | OUTPATIENT
Start: 2023-08-29 | End: 2023-09-12

## 2023-08-29 NOTE — ED TRIAGE NOTES
Pt. Is a 57 yr old  female presenting with after falling in heels at home.  Her right foot is swollen with pain in the arch and across the top. Pt. Can hear a crunch when weight bearing.

## 2023-08-30 NOTE — DISCHARGE INSTRUCTIONS
As discussed on imaging no fracture seen however due to significant tenderness and swelling will place you in a walking boot with outpatient follow up with Orthopedics.    I have prescribed pain management do not take in conjunction with any other sedating medications.    Weightbearing as tolerated however I would avoid strenuous activity as this could exacerbate pain

## 2023-08-30 NOTE — ED PROVIDER NOTES
Encounter Date: 8/29/2023       History     Chief Complaint   Patient presents with    Fall     Fell stepping off curb, -head trauma, -LOC, -AC. Pt states she believes she broke her ankle. No obvious deformities noted      57-year-old presents to emergency department after falling in her heels injuring her right foot.  Patient reports this occurred this morning however she was able to go to work elevating her leg throughout the day.  She reports the pain is primarily on the dorsum of her foot wrapping around to the plantar.  Pain is currently controlled as she is taken ibuprofen.  Patient states when she fell she did not injure her head does not endorse neck or back pain.      Review of patient's allergies indicates:   Allergen Reactions    Adhesive Blisters    Imitrex [sumatriptan] Shortness Of Breath    Codeine Nausea And Vomiting     Past Medical History:   Diagnosis Date    Adrenal adenoma     Allergic rhinitis     Allergy     Allergic to trees, weeds, mold and standardized mite    Anxiety     Eustachian tube dysfunction     Fibrocystic changes of left breast     12 o'clock L breast mass bx 3/7/11    Hypertension     Hypothyroidism     Migraine headache     Recurrent otitis media      Past Surgical History:   Procedure Laterality Date    BREAST BIOPSY Left 2010    breast core biopsy  03/07/2011    fibrocystic changes    BREAST MASS EXCISION Right 11/03/2021    Intraductal papilloma    COLONOSCOPY N/A 04/15/2016    Procedure: COLONOSCOPY;  Surgeon: Coleman Moss MD;  Location: [facility] (4TH FLR);  Service: Endoscopy;  Laterality: N/A;    EXCISIONAL BIOPSY Right 11/05/2021    Procedure: EXCISIONAL BIOPSY-Right with radiological marker;  Surgeon: SCOTT Farah MD;  Location: [facility] OR 2ND FLR;  Service: General;  Laterality: Right;     Family History   Problem Relation Age of Onset    Heart disease Mother 79    Thyroid disease Mother     Hypertension Mother     Hemochromatosis Father     Heart disease Maternal  Grandmother     Lupus Sister     Thyroid disease Sister     Liver cancer Sister     Breast cancer Neg Hx     Colon cancer Neg Hx     Diabetes Neg Hx     Stroke Neg Hx      Social History     Tobacco Use    Smoking status: Former     Current packs/day: 0.00     Average packs/day: 0.3 packs/day for 40.0 years (10.0 ttl pk-yrs)     Types: Cigarettes     Start date: 10/1/1981     Quit date: 10/1/2021     Years since quittin.9    Smokeless tobacco: Never   Substance Use Topics    Alcohol use: No     Alcohol/week: 0.0 standard drinks of alcohol    Drug use: No     Review of Systems   Constitutional:  Negative for fever.   HENT:  Negative for sore throat.    Respiratory:  Negative for shortness of breath.    Cardiovascular:  Negative for chest pain.   Gastrointestinal:  Negative for nausea.   Genitourinary:  Negative for dysuria.   Musculoskeletal:  Positive for joint swelling and myalgias. Negative for back pain.   Skin:  Negative for rash.   Neurological:  Negative for weakness.   Hematological:  Does not bruise/bleed easily.       Physical Exam     Initial Vitals [23 1625]   BP Pulse Resp Temp SpO2   (!) 145/80 84 18 97.9 °F (36.6 °C) 97 %      MAP       --         Physical Exam    Vitals reviewed.  Constitutional: She appears well-developed and well-nourished.   HENT:   Head: Normocephalic and atraumatic.   Eyes: Conjunctivae and EOM are normal.   Neck:   Normal range of motion.  Cardiovascular:  Normal rate.           Pulmonary/Chest: No respiratory distress.   Abdominal: Abdomen is soft. She exhibits no distension.   Musculoskeletal:      Cervical back: Normal range of motion.      Comments: There is nonpitting edema on the dorsum of the right midfoot.  Patient maintains movement of toes along with plantar and dorse flexion.  However there is significant tendon as of the midfoot.     Neurological: She is alert and oriented to person, place, and time.   Skin: Skin is warm and dry.   Psychiatric: She has a  normal mood and affect. Thought content normal.         ED Course   Procedures  Labs Reviewed - No data to display       Imaging Results              X-Ray Foot Complete Right (Final result)  Result time 08/29/23 21:21:21      Final result by Madeline Harvey MD (08/29/23 21:21:21)                   Impression:      No acute abnormality involving the right foot or radiopaque foreign body.      Electronically signed by: Madeline Harvey  Date:    08/29/2023  Time:    21:21               Narrative:    EXAMINATION:  XR FOOT COMPLETE 3 VIEW RIGHT    CLINICAL HISTORY:  . Unspecified fall, initial encounter    TECHNIQUE:  AP, lateral, and oblique views of the right foot were performed.    COMPARISON:  Right ankle three views same date 08/29/2023, right foot views 06/22/2018    FINDINGS:  There is no appreciable acute fracture or dislocation.  Degenerative changes are noted along the tarsometatarsal joints particularly the 2nd and 3rd toes.  Joint spaces appear well maintained with Lisfranc joints appearing.  There is a stable appearance of the distal fibular well corticated calcification.  Subtalar joint is intact.  Soft tissues appear intact with no evidence of radiopaque foreign bodies.                                       X-Ray Ankle Complete Right (Final result)  Result time 08/29/23 20:34:56      Final result by Trae Beltran MD (08/29/23 20:34:56)                   Impression:      1. Well corticated ossific structure along the distal aspect of the fibula noting no overlying edema.  Findings may reflect prior injury, more recent avulsion fracture is a consideration although correlation is needed with any focal tenderness.      Electronically signed by: Trae Beltran MD  Date:    08/29/2023  Time:    20:34               Narrative:    EXAMINATION:  XR ANKLE COMPLETE 3 VIEW RIGHT    CLINICAL HISTORY:  Unspecified fall, initial encounter    TECHNIQUE:  AP, lateral, and oblique images of the right ankle were  performed.    COMPARISON:  07/09/2018    FINDINGS:  Three views right ankle.    There is a well corticated ossific structure along the distal aspect of the fibula, finding may reflect sequela of prior injury however small avulsion is not excluded.  Please note, no significant edema to suggest acute injury.  The ankle mortise is intact.  No radiopaque foreign body.                                       Medications - No data to display  Medical Decision Making  Bruising, Lisfranc fracture, dislocation    57-year-old female presents after fall with tenderness to palpation overlying the dorsum of the midfoot.  Patient given symptomatic management in emergency department, along with instructions to elevate foot with ice.  X-ray images of ankle and foot does not reveal fracture.  However due to patient's presentation with significant swelling patient placed in his boot with follow up with Orthopedics for repeat evaluation if advanced imaging is needed.  Patient discharged home.    Amount and/or Complexity of Data Reviewed  Radiology: ordered.    Risk  Prescription drug management.                               Clinical Impression:   Final diagnoses:  [M25.579] Ankle pain  [W19.XXXA] Fall  [M79.671] Foot pain, right (Primary)        ED Disposition Condition    Discharge Stable          ED Prescriptions       Medication Sig Dispense Start Date End Date Auth. Provider    HYDROcodone-acetaminophen (NORCO) 5-325 mg per tablet Take 1 tablet by mouth every 6 (six) hours as needed for Pain. 9 tablet 8/29/2023 -- Tamar Xie PA-C          Follow-up Information       Follow up With Specialties Details Why Contact Info Additional Information    [facility]   [address], [address]  [facility] [address] [phone] [facility] - Main Building, 5th Floor Please park in [facility] and take [address] elevator             Tamar Xie PA-C  08/30/23  1374

## 2023-09-01 ENCOUNTER — OFFICE VISIT (OUTPATIENT)
Dept: PSYCHIATRY | Facility: CLINIC | Age: 58
End: 2023-09-01
Payer: COMMERCIAL

## 2023-09-01 VITALS — SYSTOLIC BLOOD PRESSURE: 162 MMHG | DIASTOLIC BLOOD PRESSURE: 81 MMHG | HEART RATE: 72 BPM

## 2023-09-01 DIAGNOSIS — F32.9 MAJOR DEPRESSIVE DISORDER WITHOUT PSYCHOTIC FEATURES: ICD-10-CM

## 2023-09-01 DIAGNOSIS — F41.1 GENERALIZED ANXIETY DISORDER: Primary | ICD-10-CM

## 2023-09-01 PROCEDURE — 4010F PR ACE/ARB THEARPY RXD/TAKEN: ICD-10-PCS | Mod: CPTII,S$GLB,, | Performed by: STUDENT IN AN ORGANIZED HEALTH CARE EDUCATION/TRAINING PROGRAM

## 2023-09-01 PROCEDURE — 99214 PR OFFICE/OUTPT VISIT, EST, LEVL IV, 30-39 MIN: ICD-10-PCS | Mod: S$GLB,,, | Performed by: STUDENT IN AN ORGANIZED HEALTH CARE EDUCATION/TRAINING PROGRAM

## 2023-09-01 PROCEDURE — 3077F SYST BP >= 140 MM HG: CPT | Mod: CPTII,S$GLB,, | Performed by: STUDENT IN AN ORGANIZED HEALTH CARE EDUCATION/TRAINING PROGRAM

## 2023-09-01 PROCEDURE — 99214 OFFICE O/P EST MOD 30 MIN: CPT | Mod: S$GLB,,, | Performed by: STUDENT IN AN ORGANIZED HEALTH CARE EDUCATION/TRAINING PROGRAM

## 2023-09-01 PROCEDURE — 3077F PR MOST RECENT SYSTOLIC BLOOD PRESSURE >= 140 MM HG: ICD-10-PCS | Mod: CPTII,S$GLB,, | Performed by: STUDENT IN AN ORGANIZED HEALTH CARE EDUCATION/TRAINING PROGRAM

## 2023-09-01 PROCEDURE — 99999 PR PBB SHADOW E&M-EST. PATIENT-LVL II: ICD-10-PCS | Mod: PBBFAC,,, | Performed by: STUDENT IN AN ORGANIZED HEALTH CARE EDUCATION/TRAINING PROGRAM

## 2023-09-01 PROCEDURE — 99999 PR PBB SHADOW E&M-EST. PATIENT-LVL II: CPT | Mod: PBBFAC,,, | Performed by: STUDENT IN AN ORGANIZED HEALTH CARE EDUCATION/TRAINING PROGRAM

## 2023-09-01 PROCEDURE — 3079F PR MOST RECENT DIASTOLIC BLOOD PRESSURE 80-89 MM HG: ICD-10-PCS | Mod: CPTII,S$GLB,, | Performed by: STUDENT IN AN ORGANIZED HEALTH CARE EDUCATION/TRAINING PROGRAM

## 2023-09-01 PROCEDURE — 3079F DIAST BP 80-89 MM HG: CPT | Mod: CPTII,S$GLB,, | Performed by: STUDENT IN AN ORGANIZED HEALTH CARE EDUCATION/TRAINING PROGRAM

## 2023-09-01 PROCEDURE — 4010F ACE/ARB THERAPY RXD/TAKEN: CPT | Mod: CPTII,S$GLB,, | Performed by: STUDENT IN AN ORGANIZED HEALTH CARE EDUCATION/TRAINING PROGRAM

## 2023-09-01 NOTE — PROGRESS NOTES
"OUTPATIENT PSYCHIATRY FOLLOW UP VISIT    ENCOUNTER DATE:  9/1/2023  SITE:  [facility], [facility]  LENGTH OF SESSION:  30 minutes      CHIEF COMPLAINT:  Follow up for anxiety and depression    HISTORY OF PRESENTING ILLNESS:  Maria G Cameron is a 57 y.o. female with history of major depressive disorder without psychotic features and   Generalized anxiety disorder who presents for follow up appointment.     Current psychotropic medication regimen: Zoloft 150mg daily, PRN Klonopin 0.5mg        Interval history as told by Patient - & or family/friend/spouse/caregiver with pts permission    In interval patient seen in ED after fall with R foot pain. No fracture noted, given prescription for Norco, and to follow up with Ortho later this month. Advised against taking Norco and Klonopin together. Patient reports codeine allergy and did not like the way Norco made her feel, so is not planning on continuing this medication.     Patient tried increased dose of Zoloft 200mg for < 2 weeks and had daily headache and nausea and felt "antsy and irritated." Decreased back to 150mg daily herself and has had no issues since.   Mood lately has been "ok." Still having anxiety - worries about family and work. Endorses anxiety has been 6/10. Has missed 6 days of work due to stress and foot pain, which has been stressful. Sleep, appetite, and energy are at baseline.   Depression has been 4/10. No changes in irritability or concentration.   Has been talking to family lately and "no major catastrophes."   Has been reading old books for fun.     Has taken 4 Klonopin 0.5mg since filled on 8/11/23. Typically takes when at work when stressed. Does not believe medication to be associated with recent fall. Does not request refill of Klonopin today.       PSYCHIATRIC REVIEW OF SYSTEMS:(none/ yes- better/worse/stable/& what symptoms)    Symptoms of Depression: endorses sleep, appetite, energy, concentration are at baseline    Symptoms " of Anxiety/ panic attacks: rate anxiety 6/10, attributes to recent foot injury and having to miss work    Symptoms of Britni/Hypomania: denies    Symptoms of psychosis: denies    Alcohol: minimal    Illicit Drugs: denies    Psychosocial stressors: work, family, recent foot injury    Risk Parameters:  Patient reports no suicidal ideation  Patient reports no homicidal ideation  Patient reports no self-injurious behavior  Patient reports no violent behavior    PSYCHIATRIC MED REVIEW    Current psych meds: Zoloft 150mg daily, PRN Klonopin 0.5mg daily   Medication side effects: denies  Medication compliance:  endorses daily compliance with Zoloft    Previous psych meds trials  Wellbutrin    PAST PSYCHIATRIC, MEDICAL, AND SOCIAL HISTORY REVIEWED  The patient's past medical, family and social history have been reviewed and updated as appropriate within the electronic medical record - see encounter notes.    MEDICAL REVIEW OF SYSTEMS:  Complete review of systems performed covering Constitutional, Musculoskeletal, Neurologic.  All systems negative/ except R foot pain    ALL MEDICATIONS:    Current Outpatient Medications:     butalbital-acetaminophen-caffeine -40 mg (FIORICET, ESGIC) -40 mg per tablet, Take 1 tablet by mouth every 4 (four) hours as needed for Pain., Disp: 90 tablet, Rfl: 3    cetirizine (ZYRTEC) 10 MG tablet, Take 1 tablet (10 mg total) by mouth once daily., Disp: 30 tablet, Rfl: 1    cholecalciferol, vitamin D3, (VITAMIN D3) 125 mcg (5,000 unit) Tab, Take 1 tablet (5,000 Units total) by mouth once daily., Disp: 90 tablet, Rfl: 3    clonazePAM (KLONOPIN) 0.5 MG tablet, Take 1 tablet (0.5 mg total) by mouth 2 (two) times daily. as needed for anxiety., Disp: 10 tablet, Rfl: 0    enalapril (VASOTEC) 20 MG tablet, Take 1 tablet (20 mg total) by mouth once daily., Disp: 90 tablet, Rfl: 3    hydroCHLOROthiazide (HYDRODIURIL) 25 MG tablet, Take 1 tablet (25 mg total) by mouth once daily., Disp: 90 tablet,  Rfl: 3    HYDROcodone-acetaminophen (NORCO) 5-325 mg per tablet, Take 1 tablet by mouth every 6 (six) hours as needed for Pain., Disp: 9 tablet, Rfl: 0    levothyroxine (SYNTHROID) 125 MCG tablet, Take 1 tablet (125 mcg total) by mouth once daily., Disp: 90 tablet, Rfl: 3    promethazine (PROMETHEGAN) 50 MG suppository, Unwrap and insert 1 suppository (50 mg total) rectally every 6 (six) hours as needed for Nausea., Disp: 12 suppository, Rfl: 1    sertraline (ZOLOFT) 100 MG tablet, Take 1½ tablets (150 mg total) by mouth once daily., Disp: 135 tablet, Rfl: 3    verapamiL (VERELAN) 360 MG C24P, Take 1 capsule (360 mg total) by mouth once daily., Disp: 90 capsule, Rfl: 3  No current facility-administered medications for this visit.    Facility-Administered Medications Ordered in Other Visits:     0.9%  NaCl infusion, , Intravenous, Continuous, Lubna Fan MD, Stopped at 11/05/21 1605    ALLERGIES:  Review of patient's allergies indicates:   Allergen Reactions    Adhesive Blisters    Imitrex [sumatriptan] Shortness Of Breath    Codeine Nausea And Vomiting       RELEVANT LABS/STUDIES:    Lab Results   Component Value Date    WBC 6.20 12/12/2022    HGB 13.9 12/12/2022    HCT 42.0 12/12/2022    MCV 98 12/12/2022     12/12/2022     BMP  Lab Results   Component Value Date     12/12/2022    K 3.8 12/12/2022     12/12/2022    CO2 25 12/12/2022    BUN 16 12/12/2022    CREATININE 0.8 12/12/2022    CALCIUM 9.5 12/12/2022    ANIONGAP 12 12/12/2022    ESTGFRAFRICA >60.0 10/20/2021    EGFRNONAA >60.0 10/20/2021     Lab Results   Component Value Date    ALT 21 12/12/2022    AST 17 12/12/2022    ALKPHOS 65 12/12/2022    BILITOT 0.4 12/12/2022     Lab Results   Component Value Date    TSH 3.573 01/17/2023     Lab Results   Component Value Date    HGBA1C 5.5 12/12/2022       VITALS  Vitals:    09/01/23 1548   BP: (!) 162/81   Pulse: 72       PHYSICAL EXAM  General: well developed, well nourished, appears  stated age, no distress  Neurologic:   Gait: able to ambulate unassisted though with limp due to R foot pain  Psychomotor signs:  No involuntary movements or tremor  AIMS: none    PSYCHIATRIC EXAM:     Mental Status Exam:  Appearance: unremarkable, age appropriate, casually dressed  Behavior/Cooperation: normal, friendly and cooperative, eye contact normal  Speech: normal tone, normal rate, normal pitch, normal volume  Language: uses words appropriately; NO aphasia or dysarthria  Mood: euthymic  Affect: appropriate, reactive   Thought Process: normal and logical  Thought Content: normal, no suicidality, no homicidality, delusions, or paranoia  Level of Consciousness: Alert and Oriented x3  Memory:  Intact   Recent: Intact, able to report recent events   Remote: Intact to conversation  Attention/concentration: appropriate for age/education.   Fund of Knowledge: appears adequate  Insight:  Intact - has awareness of illness  Judgment: Intact - behavior appropriate to circumstances      IMPRESSION:    Maria G Cameron is a 57 y.o. female with history of MDD and ROCIO who presents for follow up appointment for her anxious and depressive symptoms. She reports mood has been stable and anxiety has been manageable. Some recent stress due to R foot injury, but coping appropriately. Reports trying increased dose of Zoloft but had side effects, so self-decreased back to 150mg daily with no issues. Use of Klonopin has been minimal and she does not request refill today.     Status/Progress:  Based on the examination today, the patient's problem(s) is/are adequately but not ideally controlled.  New problems have not been presented today.     DIAGNOSES:  Major depressive disorder without psychotic features  Generalized anxiety disorder  Hx adjustment disorder  Hx insomnia     PLAN:  Psych Med:  Continue Zoloft 150mg daily for anxious and depressive symptoms  Continue PRN Klonopin 0.5mg for breakthrough anxiety  PDMP reviewed with no  irregularities or inconsistencies noted. Last filled Klonopin 0.5mg #10 on 8/11/23  Discussed diagnosis, risks and benefits of proposed treatment vs alternative treatments vs no treatment, inherent unpredictability of medications and the potential side effects of these treatments specifically for: SSRIs, including but not limited to SI,Serotonin syndrome, MI,CVA, birth defects, Coma, Death, withdrawls) Tx vs no Tx Alt Tx, discussed, expressed understanding.   Discussed diagnosis, risks and benefits of proposed treatment vs alternative treatments vs no treatment, potential side effects of these treatments specifically for Xanax, including but not limited to addictive properties, sleepiness, dizziness, dry mouth, change in appetite, nausea, constipation, sexual dysfunction, feeling tired and change in weight. Counseled patient to use medication sparingly and cautiously as an abortive for anxiety that does not steve. Counseled patient on not driving heavy equipment while taking this medication and not to take in conjunction with an opioid medication.   Discussed with patient informed consent, risks vs. benefits, alternative treatments, side effect profile and the inherent unpredictability of individual responses to these treatments. Answered any questions patient may have had. The patient expresses understanding of the above and displays the capacity to agree with this current plan       RETURN TO CLINIC:  1 month      Jessee Mirza MD  [facility] PGY-3  09/02/2023

## 2023-09-02 RX ORDER — SERTRALINE HYDROCHLORIDE 100 MG/1
150 TABLET, FILM COATED ORAL DAILY
Qty: 135 TABLET | Refills: 3 | Status: SHIPPED | OUTPATIENT
Start: 2023-09-02 | End: 2024-02-05 | Stop reason: SDUPTHER

## 2023-09-12 ENCOUNTER — OFFICE VISIT (OUTPATIENT)
Dept: ORTHOPEDICS | Facility: CLINIC | Age: 58
End: 2023-09-12
Payer: COMMERCIAL

## 2023-09-12 ENCOUNTER — HOSPITAL ENCOUNTER (OUTPATIENT)
Dept: RADIOLOGY | Facility: HOSPITAL | Age: 58
Discharge: HOME | End: 2023-09-12
Attending: PHYSICIAN ASSISTANT
Payer: COMMERCIAL

## 2023-09-12 VITALS
SYSTOLIC BLOOD PRESSURE: 117 MMHG | HEIGHT: 68 IN | BODY MASS INDEX: 39.99 KG/M2 | HEART RATE: 89 BPM | WEIGHT: 263.88 LBS | DIASTOLIC BLOOD PRESSURE: 73 MMHG

## 2023-09-12 DIAGNOSIS — S99.921A INJURY OF RIGHT FOOT, INITIAL ENCOUNTER: Primary | ICD-10-CM

## 2023-09-12 DIAGNOSIS — M79.671 FOOT PAIN, RIGHT: ICD-10-CM

## 2023-09-12 PROCEDURE — 73630 XR FOOT COMPLETE 3 VIEW RIGHT: ICD-10-PCS | Mod: 26,RT,, | Performed by: RADIOLOGY

## 2023-09-12 PROCEDURE — 3008F PR BODY MASS INDEX (BMI) DOCUMENTED: ICD-10-PCS | Mod: CPTII,S$GLB,, | Performed by: PHYSICIAN ASSISTANT

## 2023-09-12 PROCEDURE — 1159F PR MEDICATION LIST DOCUMENTED IN MEDICAL RECORD: ICD-10-PCS | Mod: CPTII,S$GLB,, | Performed by: PHYSICIAN ASSISTANT

## 2023-09-12 PROCEDURE — 73630 X-RAY EXAM OF FOOT: CPT | Mod: 26,RT,, | Performed by: RADIOLOGY

## 2023-09-12 PROCEDURE — 99999 PR PBB SHADOW E&M-EST. PATIENT-LVL V: ICD-10-PCS | Mod: PBBFAC,,, | Performed by: PHYSICIAN ASSISTANT

## 2023-09-12 PROCEDURE — 1159F MED LIST DOCD IN RCRD: CPT | Mod: CPTII,S$GLB,, | Performed by: PHYSICIAN ASSISTANT

## 2023-09-12 PROCEDURE — 3074F SYST BP LT 130 MM HG: CPT | Mod: CPTII,S$GLB,, | Performed by: PHYSICIAN ASSISTANT

## 2023-09-12 PROCEDURE — 73630 X-RAY EXAM OF FOOT: CPT | Mod: TC,RT

## 2023-09-12 PROCEDURE — 3078F PR MOST RECENT DIASTOLIC BLOOD PRESSURE < 80 MM HG: ICD-10-PCS | Mod: CPTII,S$GLB,, | Performed by: PHYSICIAN ASSISTANT

## 2023-09-12 PROCEDURE — 3008F BODY MASS INDEX DOCD: CPT | Mod: CPTII,S$GLB,, | Performed by: PHYSICIAN ASSISTANT

## 2023-09-12 PROCEDURE — 4010F ACE/ARB THERAPY RXD/TAKEN: CPT | Mod: CPTII,S$GLB,, | Performed by: PHYSICIAN ASSISTANT

## 2023-09-12 PROCEDURE — 1160F RVW MEDS BY RX/DR IN RCRD: CPT | Mod: CPTII,S$GLB,, | Performed by: PHYSICIAN ASSISTANT

## 2023-09-12 PROCEDURE — 99999 PR PBB SHADOW E&M-EST. PATIENT-LVL V: CPT | Mod: PBBFAC,,, | Performed by: PHYSICIAN ASSISTANT

## 2023-09-12 PROCEDURE — 1160F PR REVIEW ALL MEDS BY PRESCRIBER/CLIN PHARMACIST DOCUMENTED: ICD-10-PCS | Mod: CPTII,S$GLB,, | Performed by: PHYSICIAN ASSISTANT

## 2023-09-12 PROCEDURE — 99214 PR OFFICE/OUTPT VISIT, EST, LEVL IV, 30-39 MIN: ICD-10-PCS | Mod: S$GLB,,, | Performed by: PHYSICIAN ASSISTANT

## 2023-09-12 PROCEDURE — 3074F PR MOST RECENT SYSTOLIC BLOOD PRESSURE < 130 MM HG: ICD-10-PCS | Mod: CPTII,S$GLB,, | Performed by: PHYSICIAN ASSISTANT

## 2023-09-12 PROCEDURE — 99214 OFFICE O/P EST MOD 30 MIN: CPT | Mod: S$GLB,,, | Performed by: PHYSICIAN ASSISTANT

## 2023-09-12 PROCEDURE — 3078F DIAST BP <80 MM HG: CPT | Mod: CPTII,S$GLB,, | Performed by: PHYSICIAN ASSISTANT

## 2023-09-12 PROCEDURE — 4010F PR ACE/ARB THEARPY RXD/TAKEN: ICD-10-PCS | Mod: CPTII,S$GLB,, | Performed by: PHYSICIAN ASSISTANT

## 2023-09-12 RX ORDER — TRAMADOL HYDROCHLORIDE 50 MG/1
50 TABLET ORAL EVERY 6 HOURS PRN
Qty: 20 TABLET | Refills: 0 | Status: ON HOLD | OUTPATIENT
Start: 2023-09-12 | End: 2023-10-13

## 2023-09-12 NOTE — PROGRESS NOTES
SUBJECTIVE:     Chief Complaint & History of Present Illness:  Maria G Cameron is a  Established  patient 57 y.o. female who is seen here today with a complaint of    Chief Complaint   Patient presents with    Right Foot - Pain, Injury    Right Ankle - Pain, Injury    .  Patient is here today for continuing care for sudden onset pain in the right foot and ankle following a twisting fall off of her high heels 08/29/2023.  Patient was seen in the emergency room placed in a tall fracture boot but boot was too big she was unable to negotiate with it she discontinued after 1 day.  She is been weight-bearing as tolerated but continues to struggle with increasing pain consistent swelling in the right forefoot.  She reports pain at the same level or greater than at the time of injury  On a scale of 1-10, with 10 being worst pain imaginable, he rates this pain as 5 on good days and 8 on bad days.  she describes the pain as sore and tender.    Review of patient's allergies indicates:   Allergen Reactions    Adhesive Blisters    Imitrex [sumatriptan] Shortness Of Breath    Codeine Nausea And Vomiting         Current Outpatient Medications   Medication Sig Dispense Refill    butalbital-acetaminophen-caffeine -40 mg (FIORICET, ESGIC) -40 mg per tablet Take 1 tablet by mouth every 4 (four) hours as needed for Pain. 90 tablet 3    cholecalciferol, vitamin D3, (VITAMIN D3) 125 mcg (5,000 unit) Tab Take 1 tablet (5,000 Units total) by mouth once daily. 90 tablet 3    clonazePAM (KLONOPIN) 0.5 MG tablet Take 1 tablet (0.5 mg total) by mouth 2 (two) times daily. as needed for anxiety. 10 tablet 0    enalapril (VASOTEC) 20 MG tablet Take 1 tablet (20 mg total) by mouth once daily. 90 tablet 3    hydroCHLOROthiazide (HYDRODIURIL) 25 MG tablet Take 1 tablet (25 mg total) by mouth once daily. 90 tablet 3    levothyroxine (SYNTHROID) 125 MCG tablet Take 1 tablet (125 mcg total) by mouth once daily. 90 tablet 3    promethazine  (PROMETHEGAN) 50 MG suppository Unwrap and insert 1 suppository (50 mg total) rectally every 6 (six) hours as needed for Nausea. 12 suppository 1    sertraline (ZOLOFT) 100 MG tablet Take 1½ tablets (150 mg total) by mouth once daily. 135 tablet 3    verapamiL (VERELAN) 360 MG C24P Take 1 capsule (360 mg total) by mouth once daily. 90 capsule 3    cetirizine (ZYRTEC) 10 MG tablet Take 1 tablet (10 mg total) by mouth once daily. 30 tablet 1    traMADoL (ULTRAM) 50 mg tablet Take 1 tablet (50 mg total) by mouth every 6 (six) hours as needed for Pain. 20 tablet 0     No current facility-administered medications for this visit.     Facility-Administered Medications Ordered in Other Visits   Medication Dose Route Frequency Provider Last Rate Last Admin    0.9%  NaCl infusion   Intravenous Continuous Lubna Fan MD   Stopped at 11/05/21 1605       Past Medical History:   Diagnosis Date    Adrenal adenoma     Allergic rhinitis     Allergy     Allergic to trees, weeds, mold and standardized mite    Anxiety     Eustachian tube dysfunction     Fibrocystic changes of left breast     12 o'clock L breast mass bx 3/7/11    Hypertension     Hypothyroidism     Migraine headache     Recurrent otitis media        Past Surgical History:   Procedure Laterality Date    BREAST BIOPSY Left 2010    breast core biopsy  03/07/2011    fibrocystic changes    BREAST MASS EXCISION Right 11/03/2021    Intraductal papilloma    COLONOSCOPY N/A 04/15/2016    Procedure: COLONOSCOPY;  Surgeon: Coleman Moss MD;  Location: [facility] (4TH FLR);  Service: Endoscopy;  Laterality: N/A;    EXCISIONAL BIOPSY Right 11/05/2021    Procedure: EXCISIONAL BIOPSY-Right with radiological marker;  Surgeon: SCOTT Farah MD;  Location: [address];  Service: General;  Laterality: Right;       Vital Signs (Most Recent)  Vitals:    09/12/23 0705   BP: 117/73   Pulse: 89       Review of Systems:  ROS:  Constitutional: no fever or chills  Eyes: no visual  "changes  ENT: no nasal congestion or sore throat, Eustachian tube dysfunction  Respiratory: no cough or shortness of breath  Cardiovascular: no chest pain or palpitations, positive hypertension  Gastrointestinal: no nausea or vomiting, tolerating diet  Genitourinary: no hematuria or dysuria  Integument/Breast: no rash or pruritis, fibrocystic breast disease  Hematologic/Lymphatic: no easy bruising or lymphadenopathy  Musculoskeletal: no arthralgias or myalgias  Neurological:  Positive for migraines, benign essential tremor  Behavioral/Psych: no auditory or visual hallucinations, adjustment disorder with depressed mood, anxiety  Endocrine: no heat or cold intolerance, vitamin-D deficiency morbid obesity BMI 40.12 hypothyroidism      OBJECTIVE:     PHYSICAL EXAM:  Height: 5' 8" (172.7 cm) Weight: 119.7 kg (263 lb 14.3 oz), General Appearance: Well nourished, well developed, in no acute distress.  Neurological: Mood & affect are normal.  right  Foot/Ankle    Skin: bruising at the toes  Swelling: moderate  Warmth: no warmth  Tenderness: moderate and severe  ROM: 90 degrees dorsiflexion, 180 degrees plantarflexion, 45 degrees inversion, and 45 degrees eversion  Strength: normal, 5 on 5 tibialis anterior strength, 5 of 5 EHL strength, 5 of 5 EDL strength, 5 of 5 tibialis posterior, 5 of 5 FHL, and 5 of 5 FDL  Gait: antalgic  Stability: anterior drawer: negative, exterior rotation test: negative, Lachman: negative, and Cotton test: negative    soft tissue swelling noted over the dorsal aspect of the foot most notable at the 1st and 2nd digits near the MCP          RADIOGRAPHS:  Repeat x-rays taken today films reviewed by me demonstrate no evidence of fracture dislocation joint spaces well maintained no advanced degenerative joint disease    ASSESSMENT/PLAN:       ICD-10-CM ICD-9-CM   1. Foot pain, right  M79.671 729.5       Plan:  In light of patient's continued pain and difficulty with weight-bearing will place her in a " better fitting tall fracture boot  MRI I will contact the patient following the MRI to discuss treatment plans  Tramadol 50 mg q.6 hours p.r.n. for breakthrough pain  Continue ice and elevation

## 2023-09-15 ENCOUNTER — HOSPITAL ENCOUNTER (OUTPATIENT)
Dept: RADIOLOGY | Facility: HOSPITAL | Age: 58
Discharge: HOME | End: 2023-09-15
Attending: INTERNAL MEDICINE
Payer: COMMERCIAL

## 2023-09-15 ENCOUNTER — HOSPITAL ENCOUNTER (OUTPATIENT)
Dept: RADIOLOGY | Facility: HOSPITAL | Age: 58
Discharge: HOME | End: 2023-09-15
Attending: PHYSICIAN ASSISTANT
Payer: COMMERCIAL

## 2023-09-15 DIAGNOSIS — Z87.891 HISTORY OF TOBACCO USE: ICD-10-CM

## 2023-09-15 DIAGNOSIS — M79.671 FOOT PAIN, RIGHT: ICD-10-CM

## 2023-09-15 PROCEDURE — 73718 MRI LOWER EXTREMITY W/O DYE: CPT | Mod: TC,RT

## 2023-09-15 PROCEDURE — 73718 MRI FOOT (FOREFOOT) RIGHT WITHOUT CONTRAST: ICD-10-PCS | Mod: 26,RT,, | Performed by: RADIOLOGY

## 2023-09-15 PROCEDURE — 71250 CT LOW DOSE CHEST DIAGNOSTIC: ICD-10-PCS | Mod: 26,,, | Performed by: STUDENT IN AN ORGANIZED HEALTH CARE EDUCATION/TRAINING PROGRAM

## 2023-09-15 PROCEDURE — 71250 CT THORAX DX C-: CPT | Mod: TC

## 2023-09-15 PROCEDURE — 73718 MRI LOWER EXTREMITY W/O DYE: CPT | Mod: 26,RT,, | Performed by: RADIOLOGY

## 2023-09-15 PROCEDURE — 71250 CT THORAX DX C-: CPT | Mod: 26,,, | Performed by: STUDENT IN AN ORGANIZED HEALTH CARE EDUCATION/TRAINING PROGRAM

## 2023-09-18 NOTE — PROGRESS NOTES
Please let patient know her chest CT showed that she still has nodules, she should continue monitoring with annual CT chest. I will place a reminder in her chart to reach out to her in about 1 year to order the next one.

## 2023-09-19 ENCOUNTER — TELEPHONE (OUTPATIENT)
Dept: PRIMARY CARE CLINIC | Facility: CLINIC | Age: 58
End: 2023-09-19
Payer: COMMERCIAL

## 2023-09-19 ENCOUNTER — TELEPHONE (OUTPATIENT)
Dept: ORTHOPEDICS | Facility: CLINIC | Age: 58
End: 2023-09-19
Payer: COMMERCIAL

## 2023-09-19 NOTE — TELEPHONE ENCOUNTER
----- Message from Pearl Ellis MD sent at 9/18/2023  4:58 PM CDT -----  Please let patient know her chest CT showed that she still has nodules, she should continue monitoring with annual CT chest. I will place a reminder in her chart to reach out to her in about 1 year to order the next one.

## 2023-09-19 NOTE — TELEPHONE ENCOUNTER
Called pt in regard to MRI results. Pt stated she wanted to speak with clinician regarding her results and following steps.  ----- Message from Lyndsay Larry sent at 9/19/2023 10:13 AM CDT -----  Regarding: concerns  Name of who is calling:     Maria G    What is the request in detail: Pt is requesting a call back in ref to having MRI completed Saturday / please advise      Can the clinic reply by [facility]:no      What number to call back if not [facility]:[phone]

## 2023-09-20 ENCOUNTER — TELEPHONE (OUTPATIENT)
Dept: PRIMARY CARE CLINIC | Facility: CLINIC | Age: 58
End: 2023-09-20
Payer: COMMERCIAL

## 2023-09-20 ENCOUNTER — TELEPHONE (OUTPATIENT)
Dept: PODIATRY | Facility: CLINIC | Age: 58
End: 2023-09-20
Payer: COMMERCIAL

## 2023-09-20 NOTE — TELEPHONE ENCOUNTER
Spoke with pt in regards to scheduling surgery consult per Dr. Miguel. Pt scheduled 9/21/2023 at 11:30am. Pt verbalized understanding.

## 2023-09-21 ENCOUNTER — TELEPHONE (OUTPATIENT)
Dept: PODIATRY | Facility: CLINIC | Age: 58
End: 2023-09-21
Payer: COMMERCIAL

## 2023-09-21 NOTE — TELEPHONE ENCOUNTER
Spoke with pt in regards to r/s appt due to provider not being in clinic. Pt r/s and verbalized understanding.

## 2023-09-22 ENCOUNTER — TELEPHONE (OUTPATIENT)
Dept: PRIMARY CARE CLINIC | Facility: CLINIC | Age: 58
End: 2023-09-22

## 2023-09-22 DIAGNOSIS — Z12.2 ENCOUNTER FOR SCREENING FOR LUNG CANCER: Primary | ICD-10-CM

## 2023-09-22 NOTE — TELEPHONE ENCOUNTER
Chest CT ordered with corrected CPT code.    Jessica Ackerman Kathy Jo, MD  Cc: GÓMEZ Patel Staff  [place],     In regard to MRN: [id number] per TRACY the correct cpt code for ct low dose lung screening 92071, case will pend and potentially be denied. Please update referral with correct cpt code so that an auth can be obtained.     Any questions please feel free to reach out to myself @ ext [phone]     Thanks,     Jessica QUICK

## 2023-09-25 ENCOUNTER — TELEPHONE (OUTPATIENT)
Dept: PODIATRY | Facility: CLINIC | Age: 58
End: 2023-09-25
Payer: COMMERCIAL

## 2023-09-25 NOTE — TELEPHONE ENCOUNTER
----- Message from Gonsalo Brooks sent at 9/25/2023 10:02 AM CDT -----  Regarding: appt  Contact: @[phone]  Pt calling to get appt for foot pain says keeps getting appt cancelled was supposed to have one today with md velasquez ..... and would like one with anyone asap ...  Pls call and adv@[phone]

## 2023-09-25 NOTE — TELEPHONE ENCOUNTER
Spoke to pt and rescheduled appt. Original appt:9/25/2023. New Appt:9/26/2023 at 3pm. Pt works for [employer]. She states that she will call back if there is a conflict with this appt. Pt verbalized understanding.

## 2023-09-27 ENCOUNTER — TELEPHONE (OUTPATIENT)
Dept: PODIATRY | Facility: CLINIC | Age: 58
End: 2023-09-27
Payer: COMMERCIAL

## 2023-09-27 ENCOUNTER — PATIENT MESSAGE (OUTPATIENT)
Dept: PODIATRY | Facility: CLINIC | Age: 58
End: 2023-09-27
Payer: COMMERCIAL

## 2023-09-27 NOTE — TELEPHONE ENCOUNTER
Spoke with patient to help with rescheduling appointment due to provider out of office on tomorrow. Patient was offered appointment on tomorrow at another location and refused appointment.

## 2023-09-27 NOTE — TELEPHONE ENCOUNTER
Left voice message for patient to call office at [phone] to help with rescheduling appointment scheduled on today.

## 2023-10-02 ENCOUNTER — OFFICE VISIT (OUTPATIENT)
Dept: PODIATRY | Facility: CLINIC | Age: 58
End: 2023-10-02
Payer: COMMERCIAL

## 2023-10-02 VITALS
BODY MASS INDEX: 39.99 KG/M2 | SYSTOLIC BLOOD PRESSURE: 152 MMHG | HEART RATE: 84 BPM | DIASTOLIC BLOOD PRESSURE: 80 MMHG | WEIGHT: 263.88 LBS | HEIGHT: 68 IN

## 2023-10-02 DIAGNOSIS — S93.324A LISFRANC DISLOCATION, RIGHT, INITIAL ENCOUNTER: Primary | ICD-10-CM

## 2023-10-02 DIAGNOSIS — R60.0 LOWER EXTREMITY EDEMA: ICD-10-CM

## 2023-10-02 DIAGNOSIS — E55.9 VITAMIN D DEFICIENCY: ICD-10-CM

## 2023-10-02 DIAGNOSIS — M79.671 RIGHT FOOT PAIN: ICD-10-CM

## 2023-10-02 PROCEDURE — 1159F MED LIST DOCD IN RCRD: CPT | Mod: CPTII,S$GLB,, | Performed by: PODIATRIST

## 2023-10-02 PROCEDURE — 99999 PR PBB SHADOW E&M-EST. PATIENT-LVL IV: CPT | Mod: PBBFAC,,, | Performed by: PODIATRIST

## 2023-10-02 PROCEDURE — 3008F BODY MASS INDEX DOCD: CPT | Mod: CPTII,S$GLB,, | Performed by: PODIATRIST

## 2023-10-02 PROCEDURE — 1159F PR MEDICATION LIST DOCUMENTED IN MEDICAL RECORD: ICD-10-PCS | Mod: CPTII,S$GLB,, | Performed by: PODIATRIST

## 2023-10-02 PROCEDURE — 4010F PR ACE/ARB THEARPY RXD/TAKEN: ICD-10-PCS | Mod: CPTII,S$GLB,, | Performed by: PODIATRIST

## 2023-10-02 PROCEDURE — 4010F ACE/ARB THERAPY RXD/TAKEN: CPT | Mod: CPTII,S$GLB,, | Performed by: PODIATRIST

## 2023-10-02 PROCEDURE — 3079F DIAST BP 80-89 MM HG: CPT | Mod: CPTII,S$GLB,, | Performed by: PODIATRIST

## 2023-10-02 PROCEDURE — 99204 PR OFFICE/OUTPT VISIT, NEW, LEVL IV, 45-59 MIN: ICD-10-PCS | Mod: S$GLB,,, | Performed by: PODIATRIST

## 2023-10-02 PROCEDURE — 3077F SYST BP >= 140 MM HG: CPT | Mod: CPTII,S$GLB,, | Performed by: PODIATRIST

## 2023-10-02 PROCEDURE — 3079F PR MOST RECENT DIASTOLIC BLOOD PRESSURE 80-89 MM HG: ICD-10-PCS | Mod: CPTII,S$GLB,, | Performed by: PODIATRIST

## 2023-10-02 PROCEDURE — 3077F PR MOST RECENT SYSTOLIC BLOOD PRESSURE >= 140 MM HG: ICD-10-PCS | Mod: CPTII,S$GLB,, | Performed by: PODIATRIST

## 2023-10-02 PROCEDURE — 3008F PR BODY MASS INDEX (BMI) DOCUMENTED: ICD-10-PCS | Mod: CPTII,S$GLB,, | Performed by: PODIATRIST

## 2023-10-02 PROCEDURE — 99999 PR PBB SHADOW E&M-EST. PATIENT-LVL IV: ICD-10-PCS | Mod: PBBFAC,,, | Performed by: PODIATRIST

## 2023-10-02 PROCEDURE — 99204 OFFICE O/P NEW MOD 45 MIN: CPT | Mod: S$GLB,,, | Performed by: PODIATRIST

## 2023-10-02 RX ORDER — DICLOFENAC SODIUM 10 MG/G
2 GEL TOPICAL 4 TIMES DAILY
Qty: 100 G | Refills: 2 | Status: SHIPPED | OUTPATIENT
Start: 2023-10-02 | End: 2023-12-08

## 2023-10-03 DIAGNOSIS — F41.1 GENERALIZED ANXIETY DISORDER: ICD-10-CM

## 2023-10-03 RX ORDER — CLONAZEPAM 0.5 MG/1
0.5 TABLET ORAL 2 TIMES DAILY
Qty: 10 TABLET | Refills: 0 | Status: CANCELLED | OUTPATIENT
Start: 2023-10-03

## 2023-10-04 ENCOUNTER — OFFICE VISIT (OUTPATIENT)
Dept: PRIMARY CARE CLINIC | Facility: CLINIC | Age: 58
End: 2023-10-04
Payer: COMMERCIAL

## 2023-10-04 ENCOUNTER — TELEPHONE (OUTPATIENT)
Dept: PRIMARY CARE CLINIC | Facility: CLINIC | Age: 58
End: 2023-10-04
Payer: COMMERCIAL

## 2023-10-04 ENCOUNTER — TELEPHONE (OUTPATIENT)
Dept: PODIATRY | Facility: CLINIC | Age: 58
End: 2023-10-04
Payer: COMMERCIAL

## 2023-10-04 DIAGNOSIS — S93.324A LISFRANC DISLOCATION, RIGHT, INITIAL ENCOUNTER: Primary | ICD-10-CM

## 2023-10-04 DIAGNOSIS — Z01.818 PRE-OP EVALUATION: ICD-10-CM

## 2023-10-04 DIAGNOSIS — Z72.0 TOBACCO ABUSE: ICD-10-CM

## 2023-10-04 PROCEDURE — 4010F ACE/ARB THERAPY RXD/TAKEN: CPT | Mod: CPTII,95,, | Performed by: INTERNAL MEDICINE

## 2023-10-04 PROCEDURE — 99441 PR PHYSICIAN TELEPHONE EVALUATION 5-10 MIN: CPT | Mod: 95,,, | Performed by: INTERNAL MEDICINE

## 2023-10-04 PROCEDURE — 99441 PR PHYSICIAN TELEPHONE EVALUATION 5-10 MIN: ICD-10-PCS | Mod: 95,,, | Performed by: INTERNAL MEDICINE

## 2023-10-04 PROCEDURE — 4010F PR ACE/ARB THEARPY RXD/TAKEN: ICD-10-PCS | Mod: CPTII,95,, | Performed by: INTERNAL MEDICINE

## 2023-10-04 NOTE — TELEPHONE ENCOUNTER
----- Message from Shannan oH sent at 10/4/2023  9:10 AM CDT -----  .Type:  Needs Medical Advice    Who Called: pt    Would the patient rather a call back or a response via [facility]? Call back  Best Call Back Number: [phone]  Additional Information:     Pt stated she needs lab work and EKG for surgical clearance before 10/10 please call pt back

## 2023-10-04 NOTE — TELEPHONE ENCOUNTER
Spoke to pt and discussed 10/13 surgery date. Also advised pt to contact their PCP to schedule an appointment to be cleared for surgery. Then advised pt that someone will call them the day before surgery between 3p-5p with the surgery arrival time and instructions. Pt verbalized understanding and call ended.

## 2023-10-04 NOTE — PROGRESS NOTES
Established Patient - Audio Only Telehealth Visit with [facility] Provider  Shared Note: Taras Ferreira (MS4) & Dr Pearl Ellis (Attending)     The patient location is: HOME  The chief complaint leading to consultation is: routine care  Visit type: Virtual visit with audio only (telephone)     The reason for the audio only service rather than synchronous audio and video virtual visit was related to technical difficulties or patient preference/necessity.     Each patient to whom I provide medical services by telemedicine is:  (1) informed of the relationship between the physician and patient and the respective role of any other health care provider with respect to management of the patient; and (2) notified that they may decline to receive medical services by telemedicine and may withdraw from such care at any time. Patient verbally consented to receive this service via voice-only telephone call.       HPI:  Ms. Cameron was assessed today for pre-operative risk factors for her upcoming surgery on 10/13/23 for open reduction, internal fixation of her Lisfranc fracture of the right foot with Dr Miguel    Clinical assessment: Has Cardiovascular clinical risk factor of Essential Hypertension. (Risk factors include: CAD, DM, cerebrovascular disease,   Surgery-specific risk: LOW- low (endoscopic, breast, superficial, cartaract), intermediate (intrathoracic, intraperitoneal, prostate, CEA, head/neck, orthopedic), high (aortic, vascular, peripheral vasc)  Functional Capacity: 7-10 METS: 1-4 METS (ADLs, walks indoors, walk 1-2 level), 4-10 METS (climbs flights, walks briskly, golf, doubles tennis), >10 METS (strenuous sports)    As of my last clinical evaluation on 12/6/2022 Ms. Cameron had no active cardiac complaints and was able to exercise to greater than 4 METs. In relation to this specific surgery, Revised Cardiac Risk Index (RCRI) factors include they have no risk factors. which gives gives her a 3.9% risk of  major adverse cardiac events (MACE). However the most recent validation cohort of the RCRI risk factors include asymptomatic postoperative troponin elevation defined as perioperative myocardial infarction (MI) as the majority of their MACE. The risk of clinically diagnosed MI or cardiac arrest within 0 days of surgery is more accurately represented by the GRANT score, which gives this patient a risk of 0.1%.  As of my last evaluation Ms. Cameron was medically optimized, and no additional preoperative testing indicated. Please proceed on risk benefit basis.    Patient quit smoking on 10/1/21.     Patient should hold all NSAIDs (voltaren cream) and laxatives/stool softeners on day before and day of surgery, and hold vitamin D for one week before. No additional recommendations at this time prior to surgery.    Morbidity scale:    [place] comorbidities  https://www.mdcalc.com/calc/3917/lwspmbjq-xndemzapsmf-dhchq-cci#evidence  90% 10-year survival    Pre-op Risk:    [place]  https://www.mdcalc.com/calc/3927/ivpqwk-fhhusaikl-ukdkzlxif-mortality-risk  1.4% Predicted Mortality    Problem List Items Addressed This Visit          Other    Tobacco abuse     3 cig/d decreased to 1cig/d, 5 pack year history  Patient reports quitting recently  Advised to avoid all tobacco products, especially in the perioperative window         Pre-op evaluation     Patient with plan for ORIF for ankle fracture with Dr Miguel  No clear indication for labs and EKG given patient's stable medical history, and recent studies that were normal  Advised to avoid tobacco in the perioperative   Patient should hold all NSAIDs (voltaren cream) and laxatives/stool softeners on day before and day of surgery, and hold vitamin D for one week before. No additional recommendations at this time prior to surgery.              Taras Ferreira MS4    Pearl Ellis MD/MPH  [facility]  [phone]  spectralink     Time spent on this encounter was 10min on audio only appointment.

## 2023-10-04 NOTE — TELEPHONE ENCOUNTER
Spoke to pt, scheduled for audio f/u today 10/4 at 1pm for surgical clearance, pt will call at 1pm

## 2023-10-04 NOTE — H&P (VIEW-ONLY)
Established Patient - Audio Only Telehealth Visit with [facility] Provider  Shared Note: Taras Ferreira (MS4) & Dr Pearl Ellis (Attending)     The patient location is: HOME  The chief complaint leading to consultation is: routine care  Visit type: Virtual visit with audio only (telephone)     The reason for the audio only service rather than synchronous audio and video virtual visit was related to technical difficulties or patient preference/necessity.     Each patient to whom I provide medical services by telemedicine is:  (1) informed of the relationship between the physician and patient and the respective role of any other health care provider with respect to management of the patient; and (2) notified that they may decline to receive medical services by telemedicine and may withdraw from such care at any time. Patient verbally consented to receive this service via voice-only telephone call.       HPI:  Ms. Cameron was assessed today for pre-operative risk factors for her upcoming surgery on 10/13/23 for open reduction, internal fixation of her Lisfranc fracture of the right foot with Dr iMguel    Clinical assessment: Has Cardiovascular clinical risk factor of Essential Hypertension. (Risk factors include: CAD, DM, cerebrovascular disease,   Surgery-specific risk: LOW- low (endoscopic, breast, superficial, cartaract), intermediate (intrathoracic, intraperitoneal, prostate, CEA, head/neck, orthopedic), high (aortic, vascular, peripheral vasc)  Functional Capacity: 7-10 METS: 1-4 METS (ADLs, walks indoors, walk 1-2 level), 4-10 METS (climbs flights, walks briskly, golf, doubles tennis), >10 METS (strenuous sports)    As of my last clinical evaluation on 12/6/2022 Ms. Cameron had no active cardiac complaints and was able to exercise to greater than 4 METs. In relation to this specific surgery, Revised Cardiac Risk Index (RCRI) factors include they have no risk factors. which gives gives her a 3.9% risk of  major adverse cardiac events (MACE). However the most recent validation cohort of the RCRI risk factors include asymptomatic postoperative troponin elevation defined as perioperative myocardial infarction (MI) as the majority of their MACE. The risk of clinically diagnosed MI or cardiac arrest within 0 days of surgery is more accurately represented by the GRANT score, which gives this patient a risk of 0.1%.  As of my last evaluation Ms. Cameron was medically optimized, and no additional preoperative testing indicated. Please proceed on risk benefit basis.    Patient quit smoking on 10/1/21.     Patient should hold all NSAIDs (voltaren cream) and laxatives/stool softeners on day before and day of surgery, and hold vitamin D for one week before. No additional recommendations at this time prior to surgery.    Morbidity scale:    [place] comorbidities  https://www.mdcalc.com/calc/3917/zwucqsdn-shudshotuso-bszzu-cci#evidence  90% 10-year survival    Pre-op Risk:    [place]  https://www.mdcalc.com/calc/3927/begvpq-xpmgbhmrt-acrzujrno-mortality-risk  1.4% Predicted Mortality    Problem List Items Addressed This Visit          Other    Tobacco abuse     3 cig/d decreased to 1cig/d, 5 pack year history  Patient reports quitting recently  Advised to avoid all tobacco products, especially in the perioperative window         Pre-op evaluation     Patient with plan for ORIF for ankle fracture with Dr Miguel  No clear indication for labs and EKG given patient's stable medical history, and recent studies that were normal  Advised to avoid tobacco in the perioperative   Patient should hold all NSAIDs (voltaren cream) and laxatives/stool softeners on day before and day of surgery, and hold vitamin D for one week before. No additional recommendations at this time prior to surgery.              Taras Ferreira MS4    Pearl Ellis MD/MPH  [facility]  [phone]  spectralink

## 2023-10-04 NOTE — TELEPHONE ENCOUNTER
----- Message from Sisi Herny MA sent at 10/4/2023  9:08 AM CDT -----  Regarding: surgery scheduler  Maria G Miguel is scheduled to have a urgent surgery  with Dr. Miguel  on 10/13.  We request surgical clearance.  The patient has been instructed to contact your office; please assist in scheduling. We request a  EKG, CBC, BMP, and any other testing deemed necessary by your office. When completed please note stating whether the patient is cleared., along with any results to attention Sisi Henry.  Please contact us if you have any questions.  Our office number is [phone].    Sincerely,      Sisi Henry MA  Foot and Ankle Surgery  [facility], Department of Podiatry

## 2023-10-05 ENCOUNTER — PATIENT MESSAGE (OUTPATIENT)
Dept: PODIATRY | Facility: CLINIC | Age: 58
End: 2023-10-05
Payer: COMMERCIAL

## 2023-10-05 DIAGNOSIS — F41.1 GENERALIZED ANXIETY DISORDER: ICD-10-CM

## 2023-10-05 RX ORDER — CLONAZEPAM 0.5 MG/1
0.5 TABLET ORAL 2 TIMES DAILY
Qty: 10 TABLET | Refills: 0 | Status: CANCELLED | OUTPATIENT
Start: 2023-10-03

## 2023-10-06 DIAGNOSIS — F41.1 GENERALIZED ANXIETY DISORDER: ICD-10-CM

## 2023-10-06 RX ORDER — CLONAZEPAM 0.5 MG/1
0.5 TABLET ORAL 2 TIMES DAILY
Qty: 10 TABLET | Refills: 0 | Status: CANCELLED | OUTPATIENT
Start: 2023-10-03

## 2023-10-07 NOTE — PROGRESS NOTES
Subjective:      Patient ID: Maria G Cameron is a 58 y.o. female.    Chief Complaint:   Consult    Maria G is a 58 y.o. female who presents to the podiatry clinic  with complaint of  right foot pain. Onset of the symptoms was several days ago. Precipitating event: inversion injury to foot. Current symptoms include: inability to bear weight. Aggravating factors: any weight bearing. Symptoms have gradually worsened. Patient has had no prior foot problems. Evaluation to date: plain films: abnormal Lisfranc dislocation right foot . Treatment to date:  Rest icing and immobilization . Patients rates pain 7/10 on pain scale.    Review of Systems   Constitutional: Negative for chills, decreased appetite, fever and malaise/fatigue.   HENT:  Negative for congestion, hearing loss, nosebleeds and tinnitus.    Eyes:  Negative for double vision, pain, photophobia and visual disturbance.   Cardiovascular:  Negative for chest pain, claudication, cyanosis and leg swelling.   Respiratory:  Negative for cough, hemoptysis, shortness of breath and wheezing.    Endocrine: Negative for cold intolerance and heat intolerance.   Hematologic/Lymphatic: Negative for adenopathy and bleeding problem.   Skin:  Negative for color change, dry skin, itching, nail changes and suspicious lesions.   Musculoskeletal:  Positive for arthritis, joint pain, myalgias and stiffness.   Gastrointestinal:  Negative for abdominal pain, jaundice, nausea and vomiting.   Genitourinary:  Negative for dysuria, frequency and hematuria.   Neurological:  Negative for difficulty with concentration, loss of balance, numbness, paresthesias and sensory change.   Psychiatric/Behavioral:  Negative for altered mental status, hallucinations and suicidal ideas. The patient is not nervous/anxious.    Allergic/Immunologic: Negative for environmental allergies and persistent infections.           Objective:      Physical Exam  Vitals reviewed.   Constitutional:       Appearance: She  is well-developed.   HENT:      Head: Normocephalic and atraumatic.   Cardiovascular:      Pulses:           Dorsalis pedis pulses are 2+ on the right side and 2+ on the left side.        Posterior tibial pulses are 2+ on the right side and 2+ on the left side.   Pulmonary:      Effort: Pulmonary effort is normal.   Musculoskeletal:         General: Normal range of motion.      Comments: Inspection and palpation of the muscles joints and bones of both lower extremities reveal that muscle strength for the anterior lateral and posterior muscle groups and intrinsic muscle groups of the foot are all 5 over 5 symmetrical.      Tenderness to the right 1st and 2nd metatarsal cuneiform joints.  Tenderness noted with   Skin:     General: Skin is warm and dry.      Capillary Refill: Capillary refill takes 2 to 3 seconds.      Comments: Skin turgor is normal bilaterally.  Skin texture is well hydrated to both lower extremities.  No lesions or rashes or wounds appreciated bilaterally.  Nail plates 1 through 5 bilaterally are within normal limits for length and thickness.  No nail clubbing or incurvation noted.   Neurological:      Mental Status: She is alert and oriented to person, place, and time.      Comments: Sharp dull light touch vibratory proprioceptive sensation are intact bilaterally.  Deep tendon reflexes to patellar and Achilles tendon are symmetrical 2 over 4 bilaterally.  No ankle clonus or Babinski reflexes noted bilaterally.  Coordination is normal to both feet and lower extremities.   Psychiatric:         Behavior: Behavior normal.               Assessment:       Encounter Diagnoses   Name Primary?    Lisfranc dislocation, right, initial encounter Yes    Right foot pain     Lower extremity edema     Vitamin D deficiency      Independent visualization of imaging was performed.  Results were reviewed in detail with patient.       Plan:       Maria G was seen today for consult.    Diagnoses and all orders for this  visit:    Lisfranc dislocation, right, initial encounter  -     Case Request Operating Room: ORIF, LISFRANC INJURY, FOOT  -     HME - OTHER  -     HME - OTHER    Right foot pain  -     Case Request Operating Room: ORIF, LISFRANC INJURY, FOOT    Lower extremity edema    Vitamin D deficiency    Other orders  -     diclofenac sodium (VOLTAREN) 1 % Gel; Apply 2 g topically 4 (four) times daily.      I counseled the patient on her conditions, their implications and medical management.    The nature of the condition, options for management, as well as potential risks and complications were discussed in detail with patient. Patient was amenable to my recommendations and left my office fully informed and will follow up as instructed or sooner if necessary.      The patient has decided to forego any further conservative treatment and opted for surgical intervention.  Alternative treatments, and benefits of surgery were discussed with the patient.  Risks and complications, including but not limited to: pain, swelling, numbness, infection, failure to achieve the stated goals, recurrence of problem, nerve and blood vessel damage, scar tissue formation, further need of surgery, loss of limb or life, was discussed in detail with the patient.  All questions asked were answered, and written informed consent was sign and received. The patient will undergo  Lisfranc ORIF right foot.

## 2023-10-10 PROBLEM — Z01.818 PRE-OP EVALUATION: Status: ACTIVE | Noted: 2023-10-10

## 2023-10-10 NOTE — ASSESSMENT & PLAN NOTE
3 cig/d decreased to 1cig/d, 5 pack year history  · Patient reports quitting recently  · Advised to avoid all tobacco products, especially in the perioperative window

## 2023-10-10 NOTE — ASSESSMENT & PLAN NOTE
Patient with plan for ORIF for ankle fracture with Dr Miguel  · No clear indication for labs and EKG given patient's stable medical history, and recent studies that were normal  · Advised to avoid tobacco in the perioperative   · Patient should hold all NSAIDs (voltaren cream) and laxatives/stool softeners on day before and day of surgery, and hold vitamin D for one week before. No additional recommendations at this time prior to surgery.

## 2023-10-11 DIAGNOSIS — F41.1 GENERALIZED ANXIETY DISORDER: ICD-10-CM

## 2023-10-11 RX ORDER — CLONAZEPAM 0.5 MG/1
0.5 TABLET ORAL 2 TIMES DAILY
Qty: 10 TABLET | Refills: 0 | OUTPATIENT
Start: 2023-10-11

## 2023-10-11 RX ORDER — CLONAZEPAM 0.5 MG/1
0.5 TABLET ORAL 2 TIMES DAILY
Qty: 10 TABLET | Refills: 0 | Status: SHIPPED | OUTPATIENT
Start: 2023-10-11 | End: 2023-12-07 | Stop reason: SDUPTHER

## 2023-10-12 ENCOUNTER — PATIENT MESSAGE (OUTPATIENT)
Dept: PODIATRY | Facility: CLINIC | Age: 58
End: 2023-10-12
Payer: COMMERCIAL

## 2023-10-12 ENCOUNTER — TELEPHONE (OUTPATIENT)
Dept: PODIATRY | Facility: CLINIC | Age: 58
End: 2023-10-12
Payer: COMMERCIAL

## 2023-10-12 DIAGNOSIS — M25.572 ANKLE PAIN, LEFT: Primary | ICD-10-CM

## 2023-10-12 DIAGNOSIS — M25.673 DECREASED ROM OF ANKLE: ICD-10-CM

## 2023-10-12 NOTE — PRE-PROCEDURE INSTRUCTIONS
The following was discussed with pt via phone and sent to pt portal. Pt verbalized understanding.    Dear Maria G ,    You are scheduled for a procedure with Dr. Miguel on 10/13/2023. Your scheduled arrival time is 5:45am.  This arrival time is roughly 2 hours before your anticipated procedure time to allow sufficient time for pre-op.  Please wear comfortable clothes.  This procedure will take place at the [facility] at the corner of [facility].  It is in the [facility] next to [facility].  The address is:    [address] [place] [address].  [place] [id number]    After entering the building, you will proceed to the second floor where you can check in with registration. You should take any medications that you routinely take for blood pressure (other than those listed below), heart medications, thyroid, cholesterol, etc.     If you wear contact lenses, please wear glasses to your procedure.    Your fasting instructions are as follow:  Nothing to eat after midnight tonight. You may drink clear liquids (water, apple juice, Gatorade) up until 2 hours prior to your arrival time. You MUST have a responsible adult to bring you home.      This evening (10/12/2023) and tomorrow morning, please hold the following medications:  -Aspirin and Aspirin-containing products (Goody's powder, Excedrin)  -NSAIDs (Advil, Ibuprofen, Aleve, Diclofenac)  -Vitamins/Supplements  -Herbal remedies/Teas  -Stimulants (Adderall, Vyvanse, Adipex)  -Diabetic medication (Please bring with you day of procedure)  -ENALAPRIL  -HYDROCHLOROTHIAZIDE    -May take Tylenol      This evening (10/12/2023) and tomorrow morning, take a shower using antibacterial soap (ex: Hibiclens or Dial antibacterial soap). DO NOT apply deodorant, lotion, cologne, or anything else to the skin. Wear loose, comfortable fitting clothing. Do not wear jewelry or bring any valuables with you. If you wear dentures or contacts, please  bring your case with you or leave them at home. Use and bring any inhalers that you may have.    If you have any procedure-specific questions, please call your surgeon's office. Any other questions, don't hesitate to call at [phone].    Thanks,  RAND Lopez  Pre-Admit Dept [facility]

## 2023-10-12 NOTE — TELEPHONE ENCOUNTER
Spoke to pt and relayed their 5:45 am  arrival time for surgery. Also informed pt to not eat or drink after midnight including gum, hard candy or mints. Also that the pt must have a ride home from surgery, they will not be allowed to drive after anesthesia. Pt verbalized understanding and call ended.

## 2023-10-13 ENCOUNTER — ANESTHESIA (OUTPATIENT)
Dept: SURGERY | Facility: HOSPITAL | Age: 58
End: 2023-10-13
Payer: COMMERCIAL

## 2023-10-13 ENCOUNTER — ANESTHESIA EVENT (OUTPATIENT)
Dept: SURGERY | Facility: HOSPITAL | Age: 58
End: 2023-10-13
Payer: COMMERCIAL

## 2023-10-13 ENCOUNTER — HOSPITAL ENCOUNTER (OUTPATIENT)
Facility: HOSPITAL | Age: 58
Discharge: HOME | End: 2023-10-13
Attending: PODIATRIST | Admitting: PODIATRIST
Payer: COMMERCIAL

## 2023-10-13 VITALS
SYSTOLIC BLOOD PRESSURE: 129 MMHG | HEART RATE: 78 BPM | OXYGEN SATURATION: 96 % | TEMPERATURE: 98 F | DIASTOLIC BLOOD PRESSURE: 76 MMHG | RESPIRATION RATE: 20 BRPM | HEIGHT: 68 IN | BODY MASS INDEX: 37.89 KG/M2 | WEIGHT: 250 LBS

## 2023-10-13 DIAGNOSIS — S93.325D LISFRANC'S DISLOCATION, LEFT, SUBSEQUENT ENCOUNTER: Primary | ICD-10-CM

## 2023-10-13 DIAGNOSIS — M20.5X2 HALLUX LIMITUS OF LEFT FOOT: ICD-10-CM

## 2023-10-13 PROCEDURE — D9220A PRA ANESTHESIA: ICD-10-PCS | Mod: ,,, | Performed by: NURSE ANESTHETIST, CERTIFIED REGISTERED

## 2023-10-13 PROCEDURE — D9220A PRA ANESTHESIA: Mod: ,,, | Performed by: NURSE ANESTHETIST, CERTIFIED REGISTERED

## 2023-10-13 PROCEDURE — 28615 REPAIR FOOT DISLOCATION: CPT | Mod: RT,,, | Performed by: PODIATRIST

## 2023-10-13 PROCEDURE — 76942 ECHO GUIDE FOR BIOPSY: CPT | Performed by: UROLOGY

## 2023-10-13 PROCEDURE — 36000709 HC OR TIME LEV III EA ADD 15 MIN: Performed by: PODIATRIST

## 2023-10-13 PROCEDURE — 37000009 HC ANESTHESIA EA ADD 15 MINS: Performed by: PODIATRIST

## 2023-10-13 PROCEDURE — 63600175 PHARM REV CODE 636 W HCPCS: Performed by: NURSE ANESTHETIST, CERTIFIED REGISTERED

## 2023-10-13 PROCEDURE — 36000708 HC OR TIME LEV III 1ST 15 MIN: Performed by: PODIATRIST

## 2023-10-13 PROCEDURE — 27201423 OPTIME MED/SURG SUP & DEVICES STERILE SUPPLY: Performed by: PODIATRIST

## 2023-10-13 PROCEDURE — 63600175 PHARM REV CODE 636 W HCPCS: Performed by: UROLOGY

## 2023-10-13 PROCEDURE — 25000003 PHARM REV CODE 250: Performed by: NURSE ANESTHETIST, CERTIFIED REGISTERED

## 2023-10-13 PROCEDURE — 37000008 HC ANESTHESIA 1ST 15 MINUTES: Performed by: PODIATRIST

## 2023-10-13 PROCEDURE — C1713 ANCHOR/SCREW BN/BN,TIS/BN: HCPCS | Performed by: PODIATRIST

## 2023-10-13 PROCEDURE — 71000015 HC POSTOP RECOV 1ST HR: Performed by: PODIATRIST

## 2023-10-13 PROCEDURE — 28615 PR OPEN TREATMENT TARSOMETATARSAL JOINT DISLOCATION: ICD-10-PCS | Mod: RT,,, | Performed by: PODIATRIST

## 2023-10-13 PROCEDURE — 71000033 HC RECOVERY, INTIAL HOUR: Performed by: PODIATRIST

## 2023-10-13 PROCEDURE — 25000003 PHARM REV CODE 250: Performed by: PODIATRIST

## 2023-10-13 DEVICE — IMPLANTABLE DEVICE: Type: IMPLANTABLE DEVICE | Site: FOOT | Status: FUNCTIONAL

## 2023-10-13 RX ORDER — ONDANSETRON 2 MG/ML
4 INJECTION INTRAMUSCULAR; INTRAVENOUS DAILY PRN
Status: DISCONTINUED | OUTPATIENT
Start: 2023-10-13 | End: 2023-10-13 | Stop reason: HOSPADM

## 2023-10-13 RX ORDER — DEXMEDETOMIDINE HYDROCHLORIDE 100 UG/ML
INJECTION, SOLUTION INTRAVENOUS
Status: DISCONTINUED | OUTPATIENT
Start: 2023-10-13 | End: 2023-10-13

## 2023-10-13 RX ORDER — SODIUM CHLORIDE 0.9 % (FLUSH) 0.9 %
10 SYRINGE (ML) INJECTION
Status: DISCONTINUED | OUTPATIENT
Start: 2023-10-13 | End: 2023-10-13 | Stop reason: HOSPADM

## 2023-10-13 RX ORDER — BUPIVACAINE HYDROCHLORIDE 5 MG/ML
INJECTION, SOLUTION EPIDURAL; INTRACAUDAL
Status: COMPLETED | OUTPATIENT
Start: 2023-10-13 | End: 2023-10-13

## 2023-10-13 RX ORDER — MIDAZOLAM HYDROCHLORIDE 1 MG/ML
INJECTION, SOLUTION INTRAMUSCULAR; INTRAVENOUS
Status: DISCONTINUED | OUTPATIENT
Start: 2023-10-13 | End: 2023-10-13

## 2023-10-13 RX ORDER — OXYCODONE AND ACETAMINOPHEN 7.5; 325 MG/1; MG/1
1 TABLET ORAL EVERY 6 HOURS PRN
Qty: 27 TABLET | Refills: 0 | Status: SHIPPED | OUTPATIENT
Start: 2023-10-13 | End: 2023-10-19 | Stop reason: SDUPTHER

## 2023-10-13 RX ORDER — OXYCODONE AND ACETAMINOPHEN 7.5; 325 MG/1; MG/1
1 TABLET ORAL EVERY 6 HOURS PRN
Qty: 30 TABLET | Refills: 0 | OUTPATIENT
Start: 2023-10-13 | End: 2023-10-13

## 2023-10-13 RX ORDER — DEXAMETHASONE SODIUM PHOSPHATE 4 MG/ML
INJECTION, SOLUTION INTRA-ARTICULAR; INTRALESIONAL; INTRAMUSCULAR; INTRAVENOUS; SOFT TISSUE
Status: DISCONTINUED | OUTPATIENT
Start: 2023-10-13 | End: 2023-10-13

## 2023-10-13 RX ORDER — FAMOTIDINE 10 MG/ML
INJECTION INTRAVENOUS
Status: DISCONTINUED | OUTPATIENT
Start: 2023-10-13 | End: 2023-10-13

## 2023-10-13 RX ORDER — MIDAZOLAM HYDROCHLORIDE 1 MG/ML
2 INJECTION INTRAMUSCULAR; INTRAVENOUS
Status: DISCONTINUED | OUTPATIENT
Start: 2023-10-13 | End: 2023-10-13 | Stop reason: HOSPADM

## 2023-10-13 RX ORDER — MEPERIDINE HYDROCHLORIDE 50 MG/ML
12.5 INJECTION INTRAMUSCULAR; INTRAVENOUS; SUBCUTANEOUS ONCE AS NEEDED
Status: DISCONTINUED | OUTPATIENT
Start: 2023-10-13 | End: 2023-10-13 | Stop reason: HOSPADM

## 2023-10-13 RX ORDER — FENTANYL CITRATE 50 UG/ML
25 INJECTION, SOLUTION INTRAMUSCULAR; INTRAVENOUS EVERY 5 MIN PRN
Status: DISCONTINUED | OUTPATIENT
Start: 2023-10-13 | End: 2023-10-13 | Stop reason: HOSPADM

## 2023-10-13 RX ORDER — HYDROCODONE BITARTRATE AND ACETAMINOPHEN 5; 325 MG/1; MG/1
1 TABLET ORAL EVERY 4 HOURS PRN
Status: DISCONTINUED | OUTPATIENT
Start: 2023-10-13 | End: 2023-10-13 | Stop reason: HOSPADM

## 2023-10-13 RX ORDER — CEFAZOLIN SODIUM 1 G/3ML
INJECTION, POWDER, FOR SOLUTION INTRAMUSCULAR; INTRAVENOUS
Status: DISCONTINUED | OUTPATIENT
Start: 2023-10-13 | End: 2023-10-13

## 2023-10-13 RX ORDER — LIDOCAINE HYDROCHLORIDE 10 MG/ML
1 INJECTION, SOLUTION EPIDURAL; INFILTRATION; INTRACAUDAL; PERINEURAL ONCE AS NEEDED
Status: DISCONTINUED | OUTPATIENT
Start: 2023-10-13 | End: 2023-10-13 | Stop reason: HOSPADM

## 2023-10-13 RX ORDER — SODIUM CHLORIDE 9 MG/ML
INJECTION, SOLUTION INTRAVENOUS CONTINUOUS
Status: DISCONTINUED | OUTPATIENT
Start: 2023-10-13 | End: 2023-10-13 | Stop reason: HOSPADM

## 2023-10-13 RX ORDER — PROCHLORPERAZINE EDISYLATE 5 MG/ML
5 INJECTION INTRAMUSCULAR; INTRAVENOUS EVERY 30 MIN PRN
Status: DISCONTINUED | OUTPATIENT
Start: 2023-10-13 | End: 2023-10-13 | Stop reason: HOSPADM

## 2023-10-13 RX ORDER — ONDANSETRON 2 MG/ML
INJECTION INTRAMUSCULAR; INTRAVENOUS
Status: DISCONTINUED | OUTPATIENT
Start: 2023-10-13 | End: 2023-10-13

## 2023-10-13 RX ORDER — BUPIVACAINE HYDROCHLORIDE 2.5 MG/ML
INJECTION, SOLUTION EPIDURAL; INFILTRATION; INTRACAUDAL
Status: COMPLETED | OUTPATIENT
Start: 2023-10-13 | End: 2023-10-13

## 2023-10-13 RX ORDER — PROPOFOL 10 MG/ML
VIAL (ML) INTRAVENOUS CONTINUOUS PRN
Status: DISCONTINUED | OUTPATIENT
Start: 2023-10-13 | End: 2023-10-13

## 2023-10-13 RX ORDER — LIDOCAINE HYDROCHLORIDE 20 MG/ML
INJECTION INTRAVENOUS
Status: DISCONTINUED | OUTPATIENT
Start: 2023-10-13 | End: 2023-10-13

## 2023-10-13 RX ADMIN — DEXMEDETOMIDINE HYDROCHLORIDE 8 MCG: 100 INJECTION, SOLUTION INTRAVENOUS at 09:10

## 2023-10-13 RX ADMIN — DEXAMETHASONE SODIUM PHOSPHATE 4 MG: 4 INJECTION INTRA-ARTICULAR; INTRALESIONAL; INTRAMUSCULAR; INTRAVENOUS; SOFT TISSUE at 09:10

## 2023-10-13 RX ADMIN — SODIUM CHLORIDE: 9 INJECTION, SOLUTION INTRAVENOUS at 07:10

## 2023-10-13 RX ADMIN — MIDAZOLAM HYDROCHLORIDE 2 MG: 1 INJECTION, SOLUTION INTRAMUSCULAR; INTRAVENOUS at 09:10

## 2023-10-13 RX ADMIN — CEFAZOLIN 2 G: 330 INJECTION, POWDER, FOR SOLUTION INTRAMUSCULAR; INTRAVENOUS at 10:10

## 2023-10-13 RX ADMIN — LIDOCAINE HYDROCHLORIDE 100 MG: 20 INJECTION INTRAVENOUS at 09:10

## 2023-10-13 RX ADMIN — DEXMEDETOMIDINE HYDROCHLORIDE 4 MCG: 100 INJECTION, SOLUTION INTRAVENOUS at 09:10

## 2023-10-13 RX ADMIN — SODIUM CHLORIDE: 0.9 INJECTION, SOLUTION INTRAVENOUS at 09:10

## 2023-10-13 RX ADMIN — GLYCOPYRROLATE 0.2 MG: 0.2 INJECTION, SOLUTION INTRAMUSCULAR; INTRAVENOUS at 09:10

## 2023-10-13 RX ADMIN — FAMOTIDINE 20 MG: 10 INJECTION, SOLUTION INTRAVENOUS at 09:10

## 2023-10-13 RX ADMIN — PROPOFOL 100 MCG/KG/MIN: 10 INJECTION, EMULSION INTRAVENOUS at 09:10

## 2023-10-13 RX ADMIN — BUPIVACAINE HYDROCHLORIDE 20 ML: 2.5 INJECTION, SOLUTION EPIDURAL; INFILTRATION; INTRACAUDAL; PERINEURAL at 08:10

## 2023-10-13 RX ADMIN — BUPIVACAINE HYDROCHLORIDE 20 ML: 5 INJECTION, SOLUTION EPIDURAL; INTRACAUDAL; PERINEURAL at 08:10

## 2023-10-13 RX ADMIN — ONDANSETRON 4 MG: 2 INJECTION INTRAMUSCULAR; INTRAVENOUS at 09:10

## 2023-10-13 NOTE — ANESTHESIA PROCEDURE NOTES
Right Popliteal Block Single Shot    Patient location during procedure: pre-op    Reason for block: primary anesthetic    Diagnosis: RIght Foot Lisfranc Fracture   Start time: 10/13/2023 8:15 AM  Timeout: 10/13/2023 8:12 AM   End time: 10/13/2023 8:15 AM    Staffing  Authorizing Provider: Ross Barrios MD  Performing Provider: Ross Barrios MD    Staffing  Performed by: Ross Barrios MD  Authorized by: Ross Barrios MD    Preanesthetic Checklist  Completed: patient identified, IV checked, site marked, risks and benefits discussed, surgical consent, monitors and equipment checked, pre-op evaluation and timeout performed  Peripheral Block  Patient position: supine  Prep: ChloraPrep  Patient monitoring: heart rate, cardiac monitor, continuous pulse ox, continuous capnometry and frequent blood pressure checks  Block type: popliteal  Laterality: right  Injection technique: single shot  Needle  Needle type: Stimuplex   Needle gauge: 21 G  Needle length: 4 in  Needle localization: anatomical landmarks and ultrasound guidance   -ultrasound image captured on disc.  Assessment  Injection assessment: negative aspiration, negative parasthesia and local visualized surrounding nerve  Paresthesia pain: none  Heart rate change: no  Slow fractionated injection: yes  Pain Tolerance: comfortable throughout block and no complaints  Medications:    Medications: bupivacaine (pf) (MARCAINE) injection 0.5% - Perineural   20 mL - 10/13/2023 8:25:00 AM    Additional Notes  VSS.  [facility] RN monitoring vitals throughout procedure.  Patient tolerated procedure well.

## 2023-10-13 NOTE — BRIEF OP NOTE
[facility]  Brief Operative Note    Surgery Date: 10/13/2023     Surgeon(s) and Role:     * Hector Miguel, JAKEM - Primary    Assisting Surgeon:   Anselmo Sims DPM PGY 3   Dilan JOSEPHM PGY 2     Pre-op Diagnosis:  Lisfranc dislocation, right, initial encounter [S93.324A]  Right foot pain [M79.671]    Post-op Diagnosis:  Post-Op Diagnosis Codes:     * Lisfranc dislocation, right, initial encounter [S93.324A]     * Right foot pain [M79.671]    Procedure(s) (LRB):  ORIF, LISFRANC INJURY, FOOT (Right)    Anesthesia: General    Operative Findings: Right foot Lisfranc repair, 1 (38 mm)  homerun screw, 1 (34mm) intercuneiform screw.     Estimated Blood Loss: 1 mL         Specimens:   Specimen (24h ago, onward)      None              Discharge Note    OUTCOME: Patient tolerated treatment/procedure well without complication and is now ready for discharge.    DISPOSITION: Home or Self Care    FINAL DIAGNOSIS:  Lisfranc complex disruption     FOLLOWUP: In clinic    DISCHARGE INSTRUCTIONS:    Discharge Procedure Orders   Diet general

## 2023-10-13 NOTE — PLAN OF CARE
Discharge instructions given and explained to patient and friend with verbalization of understanding all instructions. Patient is AAOx3,v/s stable, denies n/v and tolerating po, rates pain level tolerable, IV removed, and family at bedside. Patient discharged to home. Friend picked up medication. Patient transported off unit via wheelchair to private vehicle with friend.

## 2023-10-13 NOTE — DISCHARGE SUMMARY
[facility]  Discharge Note  Short Stay    Procedure(s) (LRB):  ORIF, LISFRANC INJURY, FOOT (Right)      OUTCOME: Condition has improved and patient is now ready for discharge.    DISPOSITION: Home or Self Care    FINAL DIAGNOSIS:  <principal problem not specified>    FOLLOWUP: In clinic    DISCHARGE INSTRUCTIONS:  No discharge procedures on file.     TIME SPENT ON DISCHARGE: 15 minutes

## 2023-10-13 NOTE — DISCHARGE SUMMARY
[facility]  Discharge Note  Short Stay    Procedure(s) (LRB):  ORIF, LISFRANC INJURY, FOOT (Right)      OUTCOME: Patient tolerated treatment/procedure well without complication and is now ready for discharge.    DISPOSITION: Home or Self Care    FINAL DIAGNOSIS:  Lisfranc ligament disruption     FOLLOWUP: In clinic    DISCHARGE INSTRUCTIONS:    Discharge Procedure Orders   Diet general        TIME SPENT ON DISCHARGE: 30 minutes

## 2023-10-13 NOTE — DISCHARGE INSTRUCTIONS
POST-OPERATIVE INSTRUCTIONS FOR PODIATRY PATIENTS     ** Keep your dressing dry. Do not remove or change the bandage.     ** Keep your foot elevated to the level of your heart. You should recline as far as possible (When your toes are at eye level you're in the right position).     ** You may apply an ice pack to the top of your foot or back of your knee. Replace as needed. Make sure this does not get the dressing wet.     ** Take your pain medication as instructed for the first 24 hours after surgery, then progress to using them only when you need it.     ** Stay off your foot as much as possible. You may get up to eat, use the bathroom, etc.  No long standing or walking on your  foot. Do not sit with your feet dangling.       ** When you do walk make sure you:     Wear your surgical shoe/boot as provided     Use Walker    ** Eat your regular diet and drink plenty of fluids.     If any of the following conditions are present, call the Podiatry   Clinic [phone] immediately, or the Advice Nurse at night or on the weekend.      a. fever 101° or higher      b. pain that is not controlled by pain medication      c. red, warm, swollen feet with red streaks going up your legs      d. cold, blue, numb toes, especially if you are wearing a cast       Make sure to follow up with your post-op appointment as scheduled.

## 2023-10-13 NOTE — ANESTHESIA POSTPROCEDURE EVALUATION
Anesthesia Post Evaluation    Patient: Maria G Cameron    Procedure(s) Performed: Procedure(s) (LRB):  ORIF, LISFRANC INJURY, FOOT (Right)    Final Anesthesia Type: regional      Patient location during evaluation: PACU  Patient participation: Yes- Able to Participate  Level of consciousness: awake and alert and oriented  Post-procedure vital signs: reviewed and stable  Pain management: adequate  Airway patency: patent    PONV status at discharge: No PONV  Anesthetic complications: no      Cardiovascular status: hemodynamically stable  Respiratory status: unassisted  Hydration status: euvolemic  Follow-up not needed.          Vitals Value Taken Time   /76 10/13/23 1148   Temp 36.8 °C (98.2 °F) 10/13/23 1150   Pulse 71 10/13/23 1156   Resp 18 10/13/23 1156   SpO2 96 % 10/13/23 1156   Vitals shown include unvalidated device data.      No case tracking events are documented in the log.      Pain/Stephan Score: Stephan Score: 10 (10/13/2023 11:30 AM)

## 2023-10-13 NOTE — TRANSFER OF CARE
"Anesthesia Transfer of Care Note    Patient: Maria G Cameron    Procedure(s) Performed: Procedure(s) (LRB):  ORIF, LISFRANC INJURY, FOOT (Right)    Patient location: PACU    Anesthesia Type: regional    Transport from OR: Transported from OR on 6-10 L/min O2 by face mask with adequate spontaneous ventilation    Post pain: adequate analgesia    Post assessment: no apparent anesthetic complications    Post vital signs: stable    Level of consciousness: awake    Nausea/Vomiting: no nausea/vomiting    Complications: none    Transfer of care protocol was followed      Last vitals:   Visit Vitals  /66 (BP Location: Right arm, Patient Position: Lying)   Pulse 71   Temp 36.7 °C (98 °F) (Temporal)   Resp 16   Ht 5' 8" (1.727 m)   Wt 113.4 kg (250 lb)   LMP 12/16/2015 (Approximate)   SpO2 97%   Breastfeeding No   BMI 38.01 kg/m²     "

## 2023-10-13 NOTE — ANESTHESIA PREPROCEDURE EVALUATION
10/13/2023  Maria G Cameron is a 58 y.o., female for right foot Lisfranc fracture repair under regional anesthesia    Hx of anesthetics in past without complications  NPO>8 hours  Multiple drug allergies  Amenable to proceed with scheduled procedure       Patient Active Problem List   Diagnosis    Hypertension    Hypothyroidism    Anxiety    Chronic intractable headache    Benign essential tremor    Breast mass    Tobacco abuse    Mixed hyperlipidemia    Morbid obesity due to excess calories    Migraine without aura and with status migrainosus, not intractable    Vitamin D deficiency    Hyperandrogenism    Breast cancer screening    Chest discomfort    Smokers' cough    Pap smear for cervical cancer screening    Adjustment disorder with mixed anxiety and depressed mood    Ankle pain, left    Decreased ROM of ankle    Ankle weakness    Pre-op evaluation       Past Medical History:   Diagnosis Date    Adrenal adenoma     Allergic rhinitis     Allergy     Allergic to trees, weeds, mold and standardized mite    Anxiety     Eustachian tube dysfunction     Fibrocystic changes of left breast     12 o'clock L breast mass bx 3/7/11    Hypertension     Hypothyroidism     Migraine headache     Recurrent otitis media        ECHO: No results found for this or any previous visit.      Body mass index is 38.01 kg/m².    Tobacco Use: Medium Risk (10/13/2023)    Patient History     Smoking Tobacco Use: Former     Smokeless Tobacco Use: Never     Passive Exposure: Not on file       Social History     Substance and Sexual Activity   Drug Use No        Alcohol Use: Not on file       Review of patient's allergies indicates:   Allergen Reactions    Adhesive Blisters    Imitrex [sumatriptan] Shortness Of Breath    Codeine Nausea And Vomiting         Airway:  No value filed.         Pre-op  Assessment    I have reviewed the Patient Summary Reports.     I have reviewed the Nursing Notes. I have reviewed the NPO Status.   I have reviewed the Medications.     Review of Systems  Anesthesia Hx:  No problems with previous Anesthesia  History of prior surgery of interest to airway management or planning: Denies Family Hx of Anesthesia complications.   Denies Personal Hx of Anesthesia complications.          Anesthesia Plan  Type of Anesthesia, risks & benefits discussed:    Anesthesia Type: Regional  Intra-op Monitoring Plan: Standard ASA Monitors  Post Op Pain Control Plan: multimodal analgesia, IV/PO Opioids PRN and peripheral nerve block  Induction:  IV  Informed Consent: Informed consent signed with the Patient and all parties understand the risks and agree with anesthesia plan.  All questions answered. Patient consented to blood products? No  ASA Score: 3  Day of Surgery Review of History & Physical: H&P completed by Anesthesiologist.    Ready For Surgery From Anesthesia Perspective.     .

## 2023-10-13 NOTE — ANESTHESIA PROCEDURE NOTES
RIght Adductor Canal Block SS    Patient location during procedure: pre-op    Reason for block: primary anesthetic    Diagnosis: Right Foot Lisfranc Fracture   Start time: 10/13/2023 8:15 AM  Timeout: 10/13/2023 8:12 AM   End time: 10/13/2023 8:22 AM    Staffing  Authorizing Provider: Ross Barrios MD  Performing Provider: Ross Barrios MD    Staffing  Performed by: Ross Barrios MD  Authorized by: Ross Barrios MD    Preanesthetic Checklist  Completed: patient identified, IV checked, site marked, risks and benefits discussed, surgical consent, monitors and equipment checked, pre-op evaluation and timeout performed  Peripheral Block  Patient position: supine  Prep: ChloraPrep  Patient monitoring: heart rate, cardiac monitor, continuous pulse ox, continuous capnometry and frequent blood pressure checks  Block type: adductor canal  Laterality: right  Injection technique: single shot  Needle  Needle type: Stimuplex   Needle gauge: 21 G  Needle length: 4 in  Needle localization: anatomical landmarks and ultrasound guidance   -ultrasound image captured on disc.  Assessment  Injection assessment: negative aspiration, negative parasthesia and local visualized surrounding nerve  Paresthesia pain: none  Heart rate change: no  Slow fractionated injection: yes  Pain Tolerance: comfortable throughout block and no complaints  Medications:    Medications: bupivacaine (pf) (MARCAINE) injection 0.25% - Perineural   20 mL - 10/13/2023 8:20:00 AM    Additional Notes  VSS.  [facility] RN monitoring vitals throughout procedure.  Patient tolerated procedure well.

## 2023-10-14 NOTE — OP NOTE
[facility] ([facility])  Operative Note      Date of Procedure: 10/13/2023     Procedure: Procedure(s) (LRB):  ORIF, LISFRANC INJURY, FOOT (Right)     Surgeon(s) and Role:     * Hector Miguel DPM - Primary    Assisting Surgeon:   Anselmo Sims DPM PGY 3 (1st)  Dilan Paulino DPM PGY 2 (2nd)     Pre-Operative Diagnosis: Lisfranc dislocation, right, initial encounter [S93.324A]  Right foot pain [M79.671]    Post-Operative Diagnosis: Post-Op Diagnosis Codes:     * Lisfranc dislocation, right, initial encounter [S93.324A]     * Right foot pain [M79.671]    Anesthesia: General    Description of Technical Procedures:     The patient was transported to the operating room on a stretcher and was transferred to the OR table in supine position. Anesthesia was induced.  A tourniquet was applied to the right calf. 20 mL of a 1:1 mixture of 0.50% bupivacaine plain and 0.50% lidocaine plain was locally infiltrated. The right lower limb was prepped and draped in a sterile manner. Tourniquet(s) inflated to 250 mmHg.     Attention was directed to the right foot.  An incision was planned dorsal to the 2nd metatarsal base/cuneiform joint.  Using 15 blade a controlled depth incision was performed down to the subcutaneous layer, and was further carried down bluntly and sharply to the subperiosteal level.  Care was taken to avoid and retract any neurovascular structures. The 2nd metatarsal base, lateral aspect, and space between the intermediate and medial cuneiforms was identified and prepped with a small rongeur.  Using a Kiggit reduction clamp system involving a clamp extending from the 2nd metatarsal lateral aspect flare to the medial aspect of the medial cuneiform reduction of the Lisfranc injury was performed and confirmed under fluoroscopy.  Incorporating standard AO principles a K-wire was driven through the clamp reduction device, a measurement was taken from the K-wire, and a 34 mm Lisfranc screw was  driven from medial to lateral through the medial cuneiform into the 2nd metatarsal base.  At this point it was decided there was some instability between the new and intermediate cuneiforms, a K-wire was driven percutaneously from medial to lateral in an inner cuneiform fashion through the medial and intermediate cuneiforms.  A measurement was taken from the wire and incorporated extended AO techniques a cannulated 38 mm screw was driven across the medial and intermediate cuneiforms from medial to lateral.  Confirmation of reduction stability was once again confirmed fluoroscopically.  After copious irrigation meticulous layered closure followed incorporating 3-0 4-0 Vicryl and 3-0 nylon suture.    The surgical site was irrigated with normal saline solution via bulb syringe.  The surgical site was dressed with xeroform, 4x4 gauze, cast padding, and ACE wrap. Tourniquet(s) deflated.     The patient was transferred to the recovery room with vital signs stable. Following a period of post op monitoring, the patient will be discharged home with the following postop instructions:   1. Keep dressing clean, dry, and intact until next seen by podiatry.   2. Weightbearing status as discussed in clinic with patient and post op area to family   3. All necessary prescriptions were ordered and medical management will continue.   4. Contact podiatry with any postop questions or concerns.       Estimated Blood Loss (EBL): 1 mL           Implants:   Implant Name Type Inv. Item Serial No.  Lot No. LRB No. Used Action   SCREW BONE LISFRANC 3.7 X38MM - -[id number]  SCREW BONE LISFRANC 3.7 [id number] [employer] [facility]  Right 1 Implanted   [place] LISFRANC 3.7x34 screw    [employer]  Right 1 Implanted       Specimens:   Specimen (24h ago, onward)      None                    Condition: Good    Disposition: PACU - hemodynamically stable.    Attestation: I was present and scrubbed for the entire procedure.

## 2023-10-17 ENCOUNTER — TELEPHONE (OUTPATIENT)
Dept: PODIATRY | Facility: CLINIC | Age: 58
End: 2023-10-17
Payer: COMMERCIAL

## 2023-10-19 ENCOUNTER — OFFICE VISIT (OUTPATIENT)
Dept: PODIATRY | Facility: CLINIC | Age: 58
End: 2023-10-19
Payer: COMMERCIAL

## 2023-10-19 ENCOUNTER — HOSPITAL ENCOUNTER (OUTPATIENT)
Dept: RADIOLOGY | Facility: HOSPITAL | Age: 58
Discharge: HOME | End: 2023-10-19
Attending: PODIATRIST
Payer: COMMERCIAL

## 2023-10-19 VITALS
SYSTOLIC BLOOD PRESSURE: 129 MMHG | WEIGHT: 250 LBS | DIASTOLIC BLOOD PRESSURE: 76 MMHG | BODY MASS INDEX: 37.89 KG/M2 | HEIGHT: 68 IN | HEART RATE: 78 BPM

## 2023-10-19 DIAGNOSIS — S93.324A LISFRANC DISLOCATION, RIGHT, INITIAL ENCOUNTER: Primary | ICD-10-CM

## 2023-10-19 DIAGNOSIS — M79.671 RIGHT FOOT PAIN: ICD-10-CM

## 2023-10-19 DIAGNOSIS — S93.324A LISFRANC DISLOCATION, RIGHT, INITIAL ENCOUNTER: ICD-10-CM

## 2023-10-19 PROCEDURE — 99024 POSTOP FOLLOW-UP VISIT: CPT | Mod: S$GLB,,, | Performed by: PODIATRIST

## 2023-10-19 PROCEDURE — 1159F PR MEDICATION LIST DOCUMENTED IN MEDICAL RECORD: ICD-10-PCS | Mod: CPTII,S$GLB,, | Performed by: PODIATRIST

## 2023-10-19 PROCEDURE — 3078F PR MOST RECENT DIASTOLIC BLOOD PRESSURE < 80 MM HG: ICD-10-PCS | Mod: CPTII,S$GLB,, | Performed by: PODIATRIST

## 2023-10-19 PROCEDURE — 4010F PR ACE/ARB THEARPY RXD/TAKEN: ICD-10-PCS | Mod: CPTII,S$GLB,, | Performed by: PODIATRIST

## 2023-10-19 PROCEDURE — 73630 X-RAY EXAM OF FOOT: CPT | Mod: TC,RT

## 2023-10-19 PROCEDURE — 99999 PR PBB SHADOW E&M-EST. PATIENT-LVL III: ICD-10-PCS | Mod: PBBFAC,,, | Performed by: PODIATRIST

## 2023-10-19 PROCEDURE — 1159F MED LIST DOCD IN RCRD: CPT | Mod: CPTII,S$GLB,, | Performed by: PODIATRIST

## 2023-10-19 PROCEDURE — 73630 XR FOOT COMPLETE 3 VIEW RIGHT: ICD-10-PCS | Mod: 26,RT,, | Performed by: RADIOLOGY

## 2023-10-19 PROCEDURE — 3074F PR MOST RECENT SYSTOLIC BLOOD PRESSURE < 130 MM HG: ICD-10-PCS | Mod: CPTII,S$GLB,, | Performed by: PODIATRIST

## 2023-10-19 PROCEDURE — 73630 X-RAY EXAM OF FOOT: CPT | Mod: 26,RT,, | Performed by: RADIOLOGY

## 2023-10-19 PROCEDURE — 4010F ACE/ARB THERAPY RXD/TAKEN: CPT | Mod: CPTII,S$GLB,, | Performed by: PODIATRIST

## 2023-10-19 PROCEDURE — 99024 PR POST-OP FOLLOW-UP VISIT: ICD-10-PCS | Mod: S$GLB,,, | Performed by: PODIATRIST

## 2023-10-19 PROCEDURE — 99999 PR PBB SHADOW E&M-EST. PATIENT-LVL III: CPT | Mod: PBBFAC,,, | Performed by: PODIATRIST

## 2023-10-19 PROCEDURE — 3078F DIAST BP <80 MM HG: CPT | Mod: CPTII,S$GLB,, | Performed by: PODIATRIST

## 2023-10-19 PROCEDURE — 3074F SYST BP LT 130 MM HG: CPT | Mod: CPTII,S$GLB,, | Performed by: PODIATRIST

## 2023-10-19 RX ORDER — SULFAMETHOXAZOLE AND TRIMETHOPRIM 400; 80 MG/1; MG/1
1 TABLET ORAL 2 TIMES DAILY
Qty: 20 TABLET | Refills: 0 | Status: SHIPPED | OUTPATIENT
Start: 2023-10-19 | End: 2023-12-08

## 2023-10-19 RX ORDER — OXYCODONE AND ACETAMINOPHEN 7.5; 325 MG/1; MG/1
1 TABLET ORAL EVERY 6 HOURS PRN
Qty: 27 TABLET | Refills: 0 | Status: SHIPPED | OUTPATIENT
Start: 2023-10-19 | End: 2023-12-08

## 2023-10-23 NOTE — PROGRESS NOTES
Patient is here for follow-up status post 1 week ORIF right Lisfranc fracture.  She is doing well.  Neurovascular status intact.  Pain is controlled.  Incisions healing well no signs of infection or dehiscence.  Continue postoperative instructions follow-up in 1 week.

## 2023-10-25 ENCOUNTER — OFFICE VISIT (OUTPATIENT)
Dept: PODIATRY | Facility: CLINIC | Age: 58
End: 2023-10-25
Payer: COMMERCIAL

## 2023-10-25 VITALS
WEIGHT: 250 LBS | HEART RATE: 84 BPM | DIASTOLIC BLOOD PRESSURE: 79 MMHG | BODY MASS INDEX: 37.89 KG/M2 | SYSTOLIC BLOOD PRESSURE: 144 MMHG | HEIGHT: 68 IN

## 2023-10-25 DIAGNOSIS — S93.324A LISFRANC DISLOCATION, RIGHT, INITIAL ENCOUNTER: Primary | ICD-10-CM

## 2023-10-25 PROCEDURE — 1160F RVW MEDS BY RX/DR IN RCRD: CPT | Mod: CPTII,S$GLB,, | Performed by: PODIATRIST

## 2023-10-25 PROCEDURE — 4010F PR ACE/ARB THEARPY RXD/TAKEN: ICD-10-PCS | Mod: CPTII,S$GLB,, | Performed by: PODIATRIST

## 2023-10-25 PROCEDURE — 1159F MED LIST DOCD IN RCRD: CPT | Mod: CPTII,S$GLB,, | Performed by: PODIATRIST

## 2023-10-25 PROCEDURE — 99999 PR PBB SHADOW E&M-EST. PATIENT-LVL IV: CPT | Mod: PBBFAC,,, | Performed by: PODIATRIST

## 2023-10-25 PROCEDURE — 3077F PR MOST RECENT SYSTOLIC BLOOD PRESSURE >= 140 MM HG: ICD-10-PCS | Mod: CPTII,S$GLB,, | Performed by: PODIATRIST

## 2023-10-25 PROCEDURE — 1159F PR MEDICATION LIST DOCUMENTED IN MEDICAL RECORD: ICD-10-PCS | Mod: CPTII,S$GLB,, | Performed by: PODIATRIST

## 2023-10-25 PROCEDURE — 99024 POSTOP FOLLOW-UP VISIT: CPT | Mod: S$GLB,,, | Performed by: PODIATRIST

## 2023-10-25 PROCEDURE — 99024 PR POST-OP FOLLOW-UP VISIT: ICD-10-PCS | Mod: S$GLB,,, | Performed by: PODIATRIST

## 2023-10-25 PROCEDURE — 4010F ACE/ARB THERAPY RXD/TAKEN: CPT | Mod: CPTII,S$GLB,, | Performed by: PODIATRIST

## 2023-10-25 PROCEDURE — 3078F PR MOST RECENT DIASTOLIC BLOOD PRESSURE < 80 MM HG: ICD-10-PCS | Mod: CPTII,S$GLB,, | Performed by: PODIATRIST

## 2023-10-25 PROCEDURE — 3078F DIAST BP <80 MM HG: CPT | Mod: CPTII,S$GLB,, | Performed by: PODIATRIST

## 2023-10-25 PROCEDURE — 3077F SYST BP >= 140 MM HG: CPT | Mod: CPTII,S$GLB,, | Performed by: PODIATRIST

## 2023-10-25 PROCEDURE — 99999 PR PBB SHADOW E&M-EST. PATIENT-LVL IV: ICD-10-PCS | Mod: PBBFAC,,, | Performed by: PODIATRIST

## 2023-10-25 PROCEDURE — 1160F PR REVIEW ALL MEDS BY PRESCRIBER/CLIN PHARMACIST DOCUMENTED: ICD-10-PCS | Mod: CPTII,S$GLB,, | Performed by: PODIATRIST

## 2023-10-29 NOTE — PROGRESS NOTES
Patient is here for follow-up status post 2 weeks ORIF right Lisfranc fracture.  She is doing well.  Neurovascular status intact.  Pain is controlled.  Incisions healing well no signs of infection or dehiscence.  Continue postoperative instructions follow-up in 1 week.

## 2023-11-01 ENCOUNTER — OFFICE VISIT (OUTPATIENT)
Dept: PODIATRY | Facility: CLINIC | Age: 58
End: 2023-11-01
Payer: COMMERCIAL

## 2023-11-01 VITALS
HEIGHT: 68 IN | DIASTOLIC BLOOD PRESSURE: 76 MMHG | WEIGHT: 250 LBS | SYSTOLIC BLOOD PRESSURE: 138 MMHG | HEART RATE: 85 BPM | BODY MASS INDEX: 37.89 KG/M2

## 2023-11-01 DIAGNOSIS — M79.671 RIGHT FOOT PAIN: Primary | ICD-10-CM

## 2023-11-01 PROCEDURE — 4010F ACE/ARB THERAPY RXD/TAKEN: CPT | Mod: CPTII,S$GLB,, | Performed by: PODIATRIST

## 2023-11-01 PROCEDURE — 3075F PR MOST RECENT SYSTOLIC BLOOD PRESS GE 130-139MM HG: ICD-10-PCS | Mod: CPTII,S$GLB,, | Performed by: PODIATRIST

## 2023-11-01 PROCEDURE — 3078F PR MOST RECENT DIASTOLIC BLOOD PRESSURE < 80 MM HG: ICD-10-PCS | Mod: CPTII,S$GLB,, | Performed by: PODIATRIST

## 2023-11-01 PROCEDURE — 99999 PR PBB SHADOW E&M-EST. PATIENT-LVL IV: ICD-10-PCS | Mod: PBBFAC,,, | Performed by: PODIATRIST

## 2023-11-01 PROCEDURE — 3078F DIAST BP <80 MM HG: CPT | Mod: CPTII,S$GLB,, | Performed by: PODIATRIST

## 2023-11-01 PROCEDURE — 99024 PR POST-OP FOLLOW-UP VISIT: ICD-10-PCS | Mod: S$GLB,,, | Performed by: PODIATRIST

## 2023-11-01 PROCEDURE — 1159F MED LIST DOCD IN RCRD: CPT | Mod: CPTII,S$GLB,, | Performed by: PODIATRIST

## 2023-11-01 PROCEDURE — 3075F SYST BP GE 130 - 139MM HG: CPT | Mod: CPTII,S$GLB,, | Performed by: PODIATRIST

## 2023-11-01 PROCEDURE — 99999 PR PBB SHADOW E&M-EST. PATIENT-LVL IV: CPT | Mod: PBBFAC,,, | Performed by: PODIATRIST

## 2023-11-01 PROCEDURE — 4010F PR ACE/ARB THEARPY RXD/TAKEN: ICD-10-PCS | Mod: CPTII,S$GLB,, | Performed by: PODIATRIST

## 2023-11-01 PROCEDURE — 99024 POSTOP FOLLOW-UP VISIT: CPT | Mod: S$GLB,,, | Performed by: PODIATRIST

## 2023-11-01 PROCEDURE — 1159F PR MEDICATION LIST DOCUMENTED IN MEDICAL RECORD: ICD-10-PCS | Mod: CPTII,S$GLB,, | Performed by: PODIATRIST

## 2023-11-01 RX ORDER — DICLOFENAC SODIUM 75 MG/1
75 TABLET, DELAYED RELEASE ORAL 2 TIMES DAILY
Qty: 30 TABLET | Refills: 2 | Status: SHIPPED | OUTPATIENT
Start: 2023-11-01 | End: 2023-12-08

## 2023-11-07 ENCOUNTER — PATIENT MESSAGE (OUTPATIENT)
Dept: PODIATRY | Facility: CLINIC | Age: 58
End: 2023-11-07
Payer: COMMERCIAL

## 2023-11-07 ENCOUNTER — TELEPHONE (OUTPATIENT)
Dept: PODIATRY | Facility: CLINIC | Age: 58
End: 2023-11-07
Payer: COMMERCIAL

## 2023-11-07 NOTE — TELEPHONE ENCOUNTER
Spoke with patient to help with work note. Patient voiced she would like letter faxed to [phone]. Staff will fax note.

## 2023-11-10 ENCOUNTER — HOSPITAL ENCOUNTER (OUTPATIENT)
Dept: RADIOLOGY | Facility: HOSPITAL | Age: 58
Discharge: HOME | End: 2023-11-10
Attending: PODIATRIST
Payer: COMMERCIAL

## 2023-11-10 DIAGNOSIS — M79.671 RIGHT FOOT PAIN: ICD-10-CM

## 2023-11-10 PROCEDURE — 73630 X-RAY EXAM OF FOOT: CPT | Mod: 26,RT,, | Performed by: RADIOLOGY

## 2023-11-10 PROCEDURE — 73630 XR FOOT COMPLETE 3 VIEW RIGHT: ICD-10-PCS | Mod: 26,RT,, | Performed by: RADIOLOGY

## 2023-11-10 PROCEDURE — 73630 X-RAY EXAM OF FOOT: CPT | Mod: TC,RT

## 2023-11-11 NOTE — PROGRESS NOTES
Patient is here for follow-up status post 3 weeks ORIF right Lisfranc fracture.  She is doing well.  Neurovascular status intact.  Pain is controlled.  Incisions healing well no signs of infection or dehiscence.  Sutures removed under asceptic conditions, dry sterile bandage applied. Patient is cleared to begin light bathing of operative site.  No oils, lotions, or ointments to incision for two weeks

## 2023-11-22 ENCOUNTER — OFFICE VISIT (OUTPATIENT)
Dept: PODIATRY | Facility: CLINIC | Age: 58
End: 2023-11-22
Payer: COMMERCIAL

## 2023-11-22 VITALS
SYSTOLIC BLOOD PRESSURE: 122 MMHG | DIASTOLIC BLOOD PRESSURE: 80 MMHG | RESPIRATION RATE: 18 BRPM | HEIGHT: 68 IN | WEIGHT: 250 LBS | BODY MASS INDEX: 37.89 KG/M2 | HEART RATE: 70 BPM

## 2023-11-22 DIAGNOSIS — M79.671 RIGHT FOOT PAIN: Primary | ICD-10-CM

## 2023-11-22 PROCEDURE — 3074F SYST BP LT 130 MM HG: CPT | Mod: CPTII,S$GLB,, | Performed by: PODIATRIST

## 2023-11-22 PROCEDURE — 99999 PR PBB SHADOW E&M-EST. PATIENT-LVL IV: CPT | Mod: PBBFAC,,, | Performed by: PODIATRIST

## 2023-11-22 PROCEDURE — 3079F DIAST BP 80-89 MM HG: CPT | Mod: CPTII,S$GLB,, | Performed by: PODIATRIST

## 2023-11-22 PROCEDURE — 4010F ACE/ARB THERAPY RXD/TAKEN: CPT | Mod: CPTII,S$GLB,, | Performed by: PODIATRIST

## 2023-11-22 PROCEDURE — 4010F PR ACE/ARB THEARPY RXD/TAKEN: ICD-10-PCS | Mod: CPTII,S$GLB,, | Performed by: PODIATRIST

## 2023-11-22 PROCEDURE — 3074F PR MOST RECENT SYSTOLIC BLOOD PRESSURE < 130 MM HG: ICD-10-PCS | Mod: CPTII,S$GLB,, | Performed by: PODIATRIST

## 2023-11-22 PROCEDURE — 3079F PR MOST RECENT DIASTOLIC BLOOD PRESSURE 80-89 MM HG: ICD-10-PCS | Mod: CPTII,S$GLB,, | Performed by: PODIATRIST

## 2023-11-22 PROCEDURE — 99024 PR POST-OP FOLLOW-UP VISIT: ICD-10-PCS | Mod: S$GLB,,, | Performed by: PODIATRIST

## 2023-11-22 PROCEDURE — 99024 POSTOP FOLLOW-UP VISIT: CPT | Mod: S$GLB,,, | Performed by: PODIATRIST

## 2023-11-22 PROCEDURE — 99999 PR PBB SHADOW E&M-EST. PATIENT-LVL IV: ICD-10-PCS | Mod: PBBFAC,,, | Performed by: PODIATRIST

## 2023-11-22 PROCEDURE — 1159F MED LIST DOCD IN RCRD: CPT | Mod: CPTII,S$GLB,, | Performed by: PODIATRIST

## 2023-11-22 PROCEDURE — 1159F PR MEDICATION LIST DOCUMENTED IN MEDICAL RECORD: ICD-10-PCS | Mod: CPTII,S$GLB,, | Performed by: PODIATRIST

## 2023-11-26 NOTE — PROGRESS NOTES
Patient is here for follow-up status post 6 weeks ORIF right Lisfranc fracture.  She is doing well.  Neurovascular status intact.  Pain is controlled.  Incisions healing well no signs of infection or dehiscence.  Begin partial weight-bearing in walking boot to tolerance.  Follow-up in 2 weeks.

## 2023-12-07 DIAGNOSIS — F41.1 GENERALIZED ANXIETY DISORDER: ICD-10-CM

## 2023-12-07 NOTE — PROGRESS NOTES
Primary Care Provider Appointment - [facility]  SHARED NOTE: Meliza Loyd (MA), Dr Ellis (Attending)    Subjective:      Patient ID: Maria G Cameron is a 58 y.o. female with Lisfranc fx s/p ORIF 10/13/2023, chronic intractable headaches, HTN, HLD.       Chief Complaint: Hypertension    Prior to this visit, patient's last encounter with PCP was 10/4/2023.    Pt reports feeling ok, main complaint today is prescription refills.    Prescription Refills  Patient requesting the following medications to be refilled, requesting them to be sent to various pharmacies due to her looking for best prices.   Patient requesting that all medications, unless otherwise indicated, be refilled for 90 days.     [facility] would be refilling: Verapamil, Fioricet (90 tablets with 1 refill), Promethegan (1 box with 1 refill).   [facility] in [place] would be refilling: HCTZ 25 mg, Enalapril 20 mg, Levothyroxine 125 mcg.       Patient quit smoking on 10/1/21. Diet consists of mostly junk food, patient does not cook for herself. Patient does not do any late night snacking.     Podiatry  - ORIF R foot to correct Lisfranc fx on 10/13/2023.   - Last seen on 11/26/2023, by Dr. Hector Miguel, FU with them on 12/13/2023.  - Current treatment plan for this problem is begin partial weight-bearing in walking boot to tolerance.     Chronic headaches  - Currently taking Fioricet -40 mg QD, Verapamil 360 mg QD.   - Patient notes her symptoms are well controlled on this regimen.     HTN  - Currently taking: Enalapril 20 mg QD, HCTZ 25 mg QD  - How often taking BP at home: never.   - Today, BP is: 142/70    HLD  - Currently not taking anything.   - Last lipid panel was on 01/13/2022  The 10-year ASCVD risk score (Yonatan LAUGHLIN, et al., 2019) is: 6.2%    Values used to calculate the score:      Age: 58 years      Sex: Female      Is Non- : No      Diabetic: No      Tobacco smoker: No      Systolic Blood Pressure: 142  mmHg      Is BP treated: Yes      HDL Cholesterol: 58 mg/dL      Total Cholesterol: 249 mg/dL    Mental Health  - Currently takin.5 tablets of Sertraline 100 mg QD  - Sees psychiatrist Dr. Jessee Mirza, sees him every 3-4 months. Patient is thinking about getting the medication changed entirely as she feels the medication was not working as well as it has been in the past. She states her previous regimen of Wellbutrin worked great for her however the patient notes they no longer manufacture this medication.   - Social support system consists of friends, lives at home with her fish  - Hobbies include reading, watching TV    Care Gaps:  - Tetanus vaccine  - Lipid panel   - Mammogram    Medications: Does not have pill packs      4Ms for Medical Decision-Making in Older Adults    Last Completed EAWV: None    MOBILITY:  Get Up and Go:       No data to display              Activities of Daily Living:       No data to display              Whisper Test:       No data to display              Disability Status:       No data to display              Nutrition Screening:       No data to display             Screening Score: 0-7 Malnourished, 8-11 At Risk, 12-14 Normal    MENTATION:   Depression Patient Health Questionnaire:      2023     9:56 AM   Depression Patient Health Questionnaire   Over the last two weeks how often have you been bothered by little interest or pleasure in doing things Not at all   Over the last two weeks how often have you been bothered by feeling down, depressed or hopeless Not at all   PHQ-2 Total Score 0     Has Dementia Dx: No    Cognitive Function Screening:       No data to display              Cognitive Function Screening Total - Less than 4 = Abnormal,  Greater than or equal to 4 = Normal    MEDICATIONS:  High Risk Medications:  Total Active Medications: 2  clonazePAM - 0.5 MG  sertraline - 100 MG    WHAT MATTERS MOST:  Advance Care Planning   ACP Status:   Patient has had an ACP  conversation  Living Will: No  Power of : No  LaPOST: No    What is most important right now is to focus on curative/life-prolongation (regardless of treatment burdens)    Accordingly, we have decided that the best plan to meet the patient's goals includes continuing with treatment      What matters most to patient today is: getting med refills, attending meeting after this appt                 Social History     Socioeconomic History    Marital status: Single   Occupational History    Occupation:      Comment: clinical    Tobacco Use    Smoking status: Former     Current packs/day: 0.00     Average packs/day: 0.3 packs/day for 40.0 years (10.0 ttl pk-yrs)     Types: Cigarettes     Start date: 10/1/1981     Quit date: 10/1/2021     Years since quittin.1    Smokeless tobacco: Never   Substance and Sexual Activity    Alcohol use: No     Alcohol/week: 0.0 standard drinks of alcohol    Drug use: No    Sexual activity: Not Currently     Comment: single with no children   Social History Narrative    Lives alone and no assistance with ADLs.     Single with no children     Social Determinants of Health     Financial Resource Strain: Low Risk  (2019)    Overall Financial Resource Strain (CARDIA)     Difficulty of Paying Living Expenses: Not hard at all   Food Insecurity: No Food Insecurity (2019)    Hunger Vital Sign     Worried About Running Out of Food in the Last Year: Never true     Ran Out of Food in the Last Year: Never true   Transportation Needs: No Transportation Needs (2019)    PRAPARE - Transportation     Lack of Transportation (Medical): No     Lack of Transportation (Non-Medical): No       Review of Systems   Constitutional:  Negative for chills, fatigue and fever.   HENT:  Negative for sore throat.    Respiratory:  Negative for cough and shortness of breath.    Cardiovascular:  Negative for chest pain, palpitations and leg swelling.   Gastrointestinal:  Negative for  "nausea and vomiting.   Genitourinary:  Negative for dysuria and frequency.   Musculoskeletal:  Negative for back pain and neck pain.        Positive for R foot in weight-bearing boot.   Neurological:  Positive for headaches (unchanged from baseline). Negative for dizziness and light-headedness.       Objective:   BP (!) 142/70 (BP Location: Right arm, Patient Position: Sitting, BP Method: Medium (Automatic))   Pulse 88   Temp 98.2 °F (36.8 °C) (Oral)   Ht 5' 8" (1.727 m)   Wt 117.1 kg (258 lb 4.3 oz)   LMP 12/16/2015 (Approximate)   SpO2 95%   BMI 39.27 kg/m²       Physical Exam  Constitutional:       Appearance: She is obese.   Cardiovascular:      Rate and Rhythm: Normal rate.   Pulmonary:      Effort: Pulmonary effort is normal.   Musculoskeletal:         General: Deformity present.   Skin:     Findings: Bruising present.   Neurological:      Mental Status: She is alert.   Psychiatric:         Mood and Affect: Mood normal.         Lab Results   Component Value Date    WBC 6.20 12/12/2022    HGB 13.9 12/12/2022    HCT 42.0 12/12/2022     12/12/2022    CHOL 249 (H) 01/13/2022    TRIG 121 01/13/2022    HDL 58 01/13/2022    ALT 21 12/12/2022    AST 17 12/12/2022     12/12/2022    K 3.8 12/12/2022     12/12/2022    CREATININE 0.8 12/12/2022    BUN 16 12/12/2022    CO2 25 12/12/2022    TSH 3.573 01/17/2023    HGBA1C 5.5 12/12/2022       Current Outpatient Medications on File Prior to Visit   Medication Sig Dispense Refill    clonazePAM (KLONOPIN) 0.5 MG tablet Take 1 tablet (0.5 mg total) by mouth 2 (two) times daily. as needed for anxiety. 10 tablet 0    levothyroxine (SYNTHROID) 125 MCG tablet Take 1 tablet (125 mcg total) by mouth once daily. 90 tablet 3    sertraline (ZOLOFT) 100 MG tablet Take 1½ tablets (150 mg total) by mouth once daily. 135 tablet 3    [DISCONTINUED] butalbital-acetaminophen-caffeine -40 mg (FIORICET, ESGIC) -40 mg per tablet Take 1 tablet by mouth every 4 " (four) hours as needed for Pain. 90 tablet 3    [DISCONTINUED] cetirizine (ZYRTEC) 10 MG tablet Take 1 tablet (10 mg total) by mouth once daily. 30 tablet 1    [DISCONTINUED] cholecalciferol, vitamin D3, (VITAMIN D3) 125 mcg (5,000 unit) Tab Take 1 tablet (5,000 Units total) by mouth once daily. 90 tablet 3    [DISCONTINUED] diclofenac (VOLTAREN) 75 MG EC tablet Take 1 tablet (75 mg total) by mouth 2 (two) times daily. 30 tablet 2    [DISCONTINUED] diclofenac sodium (VOLTAREN) 1 % Gel Apply 2 g topically 4 (four) times daily. 100 g 2    [DISCONTINUED] enalapril (VASOTEC) 20 MG tablet Take 1 tablet (20 mg total) by mouth once daily. 90 tablet 3    [DISCONTINUED] hydroCHLOROthiazide (HYDRODIURIL) 25 MG tablet Take 1 tablet (25 mg total) by mouth once daily. 90 tablet 3    [DISCONTINUED] oxyCODONE-acetaminophen (PERCOCET) 7.5-325 mg per tablet Take 1 tablet by mouth every 6 (six) hours as needed for Pain. 27 tablet 0    [DISCONTINUED] promethazine (PROMETHEGAN) 50 MG suppository Unwrap and insert 1 suppository (50 mg total) rectally every 6 (six) hours as needed for Nausea. 12 suppository 1    [DISCONTINUED] RSVPreF3 antigen-AS01E, PF, (AREXVY) 120 mcg/0.5 mL SusR vaccine Inject 0.5 mLs into the muscle once. for 1 dose 1 each 0    [DISCONTINUED] sars-cov-2, covid-19, (SPIKEVAX, MODERNA,, 12YRS AND UP 2023,) 50 mcg/0.5 mL injection Inject 0.5 mLs into the muscle once. Inject 0.5 mL into the muscle once. for 1 dose 0.5 mL 0    [DISCONTINUED] sulfamethoxazole-trimethoprim 400-80mg (BACTRIM,SEPTRA) 400-80 mg per tablet Take 1 tablet by mouth 2 (two) times daily. 20 tablet 0    [DISCONTINUED] verapamiL (VERELAN) 360 MG C24P Take 1 capsule (360 mg total) by mouth once daily. 90 capsule 3     Current Facility-Administered Medications on File Prior to Visit   Medication Dose Route Frequency Provider Last Rate Last Admin    0.9%  NaCl infusion   Intravenous Continuous Lubna Fan MD   Stopped at 11/05/21 0068  "        Assessment:   58 y.o. female with multiple co-morbid illnesses here to follow-up with PCP and continue work-up of chronic issues    Plan:     Problem List Items Addressed This Visit          Neuro    Chronic intractable headache    Relevant Medications    promethazine (PROMETHEGAN) 50 MG suppository    butalbital-acetaminophen-caffeine -40 mg (FIORICET, ESGIC) -40 mg per tablet    Benign essential tremor     Lifetime essential tremor, familial. Significant improvement with propranolol.  No longer taking propranolol 80mg TID         Migraine without aura and with status migrainosus, not intractable     No longer followed by Neuro  Self-managing with fioricet            Psychiatric    Moderate episode of recurrent major depressive disorder     Switched from wellbutrin to zoloft, wants to change to something else  She sill speak with psych about this            Pulmonary    Bronchiectasis without complication - Primary     Longterm smoker, quit within the last year. Bronchiectasis seen on imaging  Monitor with annual CT chest         Relevant Orders    CT Chest Lung Screening Low Dose    RESOLVED: Smokers' cough       Cardiac/Vascular    Hypertension     Controlled verapamil and ACE  CONTINUE  ACE 20mg  HCTZ to 25mg   Verapamil 360mg  TRIED BUT "DIDN'T WORK"  propranolol (previously on 80mg TID)  Acetazolamide 500mg BID  Amlodipine was transitioned to verapamil (due to prophylactic effect on migraines)         Relevant Medications    enalapril (VASOTEC) 20 MG tablet    hydroCHLOROthiazide (HYDRODIURIL) 25 MG tablet    verapamiL (VERELAN) 360 MG C24P    Mixed hyperlipidemia    Relevant Orders    Lipid Panel       Endocrine    Hypothyroidism    Relevant Orders    TSH    Morbid obesity due to excess calories     BMI >35, HTN, HLD  Will enroll in Weight Watchers          Other Visit Diagnoses       Essential hypertension        Relevant Medications    enalapril (VASOTEC) 20 MG tablet    " hydroCHLOROthiazide (HYDRODIURIL) 25 MG tablet    verapamiL (VERELAN) 360 MG C24P    Other Relevant Orders    Comprehensive Metabolic Panel    CBC Auto Differential    Abnormal finding of blood chemistry        Relevant Orders    Hemoglobin A1C    Migraine without status migrainosus, not intractable, unspecified migraine type        Relevant Medications    promethazine (PROMETHEGAN) 50 MG suppository    butalbital-acetaminophen-caffeine -40 mg (FIORICET, ESGIC) -40 mg per tablet            Health Maintenance         Date Due Completion Date    Sign Pain Contract Never done ---DOES NOT NEED    Complete Opioid Risk Tool Never done ---DOES NOT NEED    Urine Drug Screen Never done ---DOES NOT NEED    Naloxone Prescription Never done ---DOES NOT NEED    TETANUS VACCINE 01/02/2012 1/2/2002- TODAY    Lipid Panel 01/13/2023 1/13/2022- TODAY    Mammogram 09/28/2023 9/28/2022- TODAY    Override on 12/8/2011: Done    Override on 12/8/2011: Done    LDCT Lung Screen 09/15/2024 9/15/2023- TODAY    Hemoglobin A1c (Diabetic Prevention Screening) 12/12/2025 12/12/2022- TODAY    Cervical Cancer Screening 12/16/2025 12/16/2020    Override on 1/12/2017: Not Clinically Appropriate (last 3 PAPs normal)    Override on 5/24/2013: Done    Colorectal Cancer Screening 04/26/2026 4/26/2016            Future Appointments   Date Time Provider Department Center   12/13/2023  8:00 AM Hector Miguel DPM [facility]         Follow up in about 1 year (around 12/8/2024), or if symptoms worsen or fail to improve. Total clinical care time was 30 min      Pearl Ellis MD/MPH  [facility]  [phone] spectralink

## 2023-12-08 ENCOUNTER — OFFICE VISIT (OUTPATIENT)
Dept: PRIMARY CARE CLINIC | Facility: CLINIC | Age: 58
End: 2023-12-08
Payer: COMMERCIAL

## 2023-12-08 VITALS
OXYGEN SATURATION: 95 % | HEIGHT: 68 IN | WEIGHT: 258.25 LBS | DIASTOLIC BLOOD PRESSURE: 70 MMHG | SYSTOLIC BLOOD PRESSURE: 142 MMHG | TEMPERATURE: 98 F | HEART RATE: 88 BPM | BODY MASS INDEX: 39.14 KG/M2

## 2023-12-08 DIAGNOSIS — J47.9 BRONCHIECTASIS WITHOUT COMPLICATION: Primary | ICD-10-CM

## 2023-12-08 DIAGNOSIS — G43.001 MIGRAINE WITHOUT AURA AND WITH STATUS MIGRAINOSUS, NOT INTRACTABLE: ICD-10-CM

## 2023-12-08 DIAGNOSIS — E03.9 HYPOTHYROIDISM, UNSPECIFIED TYPE: ICD-10-CM

## 2023-12-08 DIAGNOSIS — F33.1 MODERATE EPISODE OF RECURRENT MAJOR DEPRESSIVE DISORDER: ICD-10-CM

## 2023-12-08 DIAGNOSIS — R79.9 ABNORMAL FINDING OF BLOOD CHEMISTRY: ICD-10-CM

## 2023-12-08 DIAGNOSIS — I10 PRIMARY HYPERTENSION: ICD-10-CM

## 2023-12-08 DIAGNOSIS — G89.29 CHRONIC INTRACTABLE HEADACHE, UNSPECIFIED HEADACHE TYPE: ICD-10-CM

## 2023-12-08 DIAGNOSIS — G25.0 BENIGN ESSENTIAL TREMOR: ICD-10-CM

## 2023-12-08 DIAGNOSIS — E78.2 MIXED HYPERLIPIDEMIA: ICD-10-CM

## 2023-12-08 DIAGNOSIS — R51.9 CHRONIC INTRACTABLE HEADACHE, UNSPECIFIED HEADACHE TYPE: ICD-10-CM

## 2023-12-08 DIAGNOSIS — G43.909 MIGRAINE WITHOUT STATUS MIGRAINOSUS, NOT INTRACTABLE, UNSPECIFIED MIGRAINE TYPE: ICD-10-CM

## 2023-12-08 DIAGNOSIS — E66.01 MORBID OBESITY DUE TO EXCESS CALORIES: ICD-10-CM

## 2023-12-08 DIAGNOSIS — I10 ESSENTIAL HYPERTENSION: ICD-10-CM

## 2023-12-08 DIAGNOSIS — J41.0 SMOKERS' COUGH: ICD-10-CM

## 2023-12-08 PROBLEM — Z72.0 TOBACCO ABUSE: Status: RESOLVED | Noted: 2017-07-06 | Resolved: 2023-12-08

## 2023-12-08 PROCEDURE — 3077F PR MOST RECENT SYSTOLIC BLOOD PRESSURE >= 140 MM HG: ICD-10-PCS | Mod: CPTII,S$GLB,, | Performed by: INTERNAL MEDICINE

## 2023-12-08 PROCEDURE — 99214 OFFICE O/P EST MOD 30 MIN: CPT | Mod: S$GLB,,, | Performed by: INTERNAL MEDICINE

## 2023-12-08 PROCEDURE — 4010F PR ACE/ARB THEARPY RXD/TAKEN: ICD-10-PCS | Mod: CPTII,S$GLB,, | Performed by: INTERNAL MEDICINE

## 2023-12-08 PROCEDURE — 3078F PR MOST RECENT DIASTOLIC BLOOD PRESSURE < 80 MM HG: ICD-10-PCS | Mod: CPTII,S$GLB,, | Performed by: INTERNAL MEDICINE

## 2023-12-08 PROCEDURE — 3008F BODY MASS INDEX DOCD: CPT | Mod: CPTII,S$GLB,, | Performed by: INTERNAL MEDICINE

## 2023-12-08 PROCEDURE — 3008F PR BODY MASS INDEX (BMI) DOCUMENTED: ICD-10-PCS | Mod: CPTII,S$GLB,, | Performed by: INTERNAL MEDICINE

## 2023-12-08 PROCEDURE — 3077F SYST BP >= 140 MM HG: CPT | Mod: CPTII,S$GLB,, | Performed by: INTERNAL MEDICINE

## 2023-12-08 PROCEDURE — 3078F DIAST BP <80 MM HG: CPT | Mod: CPTII,S$GLB,, | Performed by: INTERNAL MEDICINE

## 2023-12-08 PROCEDURE — 4010F ACE/ARB THERAPY RXD/TAKEN: CPT | Mod: CPTII,S$GLB,, | Performed by: INTERNAL MEDICINE

## 2023-12-08 PROCEDURE — 99214 PR OFFICE/OUTPT VISIT, EST, LEVL IV, 30-39 MIN: ICD-10-PCS | Mod: S$GLB,,, | Performed by: INTERNAL MEDICINE

## 2023-12-08 RX ORDER — ENALAPRIL MALEATE 20 MG/1
20 TABLET ORAL DAILY
Qty: 90 TABLET | Refills: 3 | Status: SHIPPED | OUTPATIENT
Start: 2023-12-08

## 2023-12-08 RX ORDER — VERAPAMIL HYDROCHLORIDE 360 MG/1
360 CAPSULE, DELAYED RELEASE PELLETS ORAL DAILY
Qty: 90 CAPSULE | Refills: 3 | Status: SHIPPED | OUTPATIENT
Start: 2023-12-08 | End: 2024-12-07

## 2023-12-08 RX ORDER — BUTALBITAL, ACETAMINOPHEN AND CAFFEINE 50; 325; 40 MG/1; MG/1; MG/1
1 TABLET ORAL EVERY 4 HOURS PRN
Qty: 90 TABLET | Refills: 3 | Status: SHIPPED | OUTPATIENT
Start: 2023-12-08

## 2023-12-08 RX ORDER — HYDROCHLOROTHIAZIDE 25 MG/1
25 TABLET ORAL DAILY
Qty: 90 TABLET | Refills: 3 | Status: SHIPPED | OUTPATIENT
Start: 2023-12-08

## 2023-12-08 RX ORDER — PROMETHAZINE HYDROCHLORIDE 50 MG/1
50 SUPPOSITORY RECTAL EVERY 6 HOURS PRN
Qty: 12 SUPPOSITORY | Refills: 1 | Status: SHIPPED | OUTPATIENT
Start: 2023-12-08

## 2023-12-08 NOTE — ASSESSMENT & PLAN NOTE
"Controlled verapamil and ACE  CONTINUE  ACE 20mg  HCTZ to 25mg   Verapamil 360mg  TRIED BUT "DIDN'T WORK"  propranolol (previously on 80mg TID)  Acetazolamide 500mg BID  Amlodipine was transitioned to verapamil (due to prophylactic effect on migraines)  "

## 2023-12-08 NOTE — PATIENT INSTRUCTIONS
TODAY:  - CT chest in March  - consider the discount we get for weight watchers  - consider any kind of meditation, the CALM john   - love your fish!  - labs: lipid, A1c, CBC, CMP, TSH  - think of THREE GOOD THINGS about your life every day  - after you're healed, go for a very short walk in the morning  - call if you need anything

## 2023-12-08 NOTE — ASSESSMENT & PLAN NOTE
Switched from wellbutrin to zoloft, wants to change to something else  She sill speak with psych about this

## 2023-12-08 NOTE — ASSESSMENT & PLAN NOTE
Lifetime essential tremor, familial. Significant improvement with propranolol.  No longer taking propranolol 80mg TID

## 2023-12-08 NOTE — ASSESSMENT & PLAN NOTE
Longterm smoker, quit within the last year. Bronchiectasis seen on imaging  Monitor with annual CT chest

## 2023-12-11 RX ORDER — CLONAZEPAM 0.5 MG/1
0.5 TABLET ORAL DAILY
Qty: 10 TABLET | Refills: 0 | Status: SHIPPED | OUTPATIENT
Start: 2023-12-11 | End: 2024-01-09 | Stop reason: SDUPTHER

## 2023-12-12 DIAGNOSIS — E03.9 ACQUIRED HYPOTHYROIDISM: ICD-10-CM

## 2023-12-12 RX ORDER — LEVOTHYROXINE SODIUM 125 UG/1
125 TABLET ORAL DAILY
Qty: 90 TABLET | Refills: 3 | Status: SHIPPED | OUTPATIENT
Start: 2023-12-12 | End: 2024-02-14

## 2023-12-12 NOTE — TELEPHONE ENCOUNTER
Spoke with pt stated she's out of her Synthroid medication want refill sent to [facility]

## 2023-12-12 NOTE — TELEPHONE ENCOUNTER
----- Message from Gurpreet Barbour sent at 12/12/2023  6:59 AM CST -----  Type:  RX Refill Request    Who Called: Patient  Refill or New Rx: Refill  RX Name and Strength:levothyroxine (SYNTHROID) 125 MCG tablet  How is the patient currently taking it? (ex. 1XDay)1 x day  Is this a 30 day or 90 day RX:90 day  Preferred Pharmacy with phone number:[facility] pharmacy [phone]  Local or Mail Order:local  Ordering Provider:Caleb  Would the patient rather a call back or a response via [facility]? Call back  Best Call Back Number:[phone]  Additional Information: Pt stated she is out of medication

## 2023-12-12 NOTE — TELEPHONE ENCOUNTER
----- Message from Gurpreet Barbour sent at 12/12/2023  6:59 AM CST -----  Type:  RX Refill Request    Who Called: Patient  Refill or New Rx: Refill  RX Name and Strength:levothyroxine (SYNTHROID) 125 MCG tablet  How is the patient currently taking it? (ex. 1XDay)1 x day  Is this a 30 day or 90 day RX:90 day  Preferred Pharmacy with phone number:[facility] pharmacy [phone]  Local or Mail Order:local  Ordering Provider:Caleb  Would the patient rather a call back or a response via [facility]? Call back  Best Call Back Number:[phone]  Additional Information: Pt stated she is out of medication

## 2023-12-14 NOTE — PROGRESS NOTES
Advance Directives:   Living Will: No    LaPOST: No    Medical Power of : No    Goals of Care: What is most important right now is to focus on curative/life-prolongation (regardless of treatment burdens). Accordingly, we have decided that the best plan to meet the patient's goals includes continuing with treatment.

## 2023-12-20 ENCOUNTER — HOSPITAL ENCOUNTER (OUTPATIENT)
Dept: RADIOLOGY | Facility: HOSPITAL | Age: 58
Discharge: HOME | End: 2023-12-20
Attending: PODIATRIST
Payer: COMMERCIAL

## 2023-12-20 ENCOUNTER — OFFICE VISIT (OUTPATIENT)
Dept: PODIATRY | Facility: CLINIC | Age: 58
End: 2023-12-20
Payer: COMMERCIAL

## 2023-12-20 VITALS
BODY MASS INDEX: 39.27 KG/M2 | HEART RATE: 80 BPM | HEIGHT: 68 IN | SYSTOLIC BLOOD PRESSURE: 147 MMHG | DIASTOLIC BLOOD PRESSURE: 90 MMHG

## 2023-12-20 DIAGNOSIS — M79.671 RIGHT FOOT PAIN: ICD-10-CM

## 2023-12-20 DIAGNOSIS — S93.324A LISFRANC DISLOCATION, RIGHT, INITIAL ENCOUNTER: Primary | ICD-10-CM

## 2023-12-20 DIAGNOSIS — S93.324A LISFRANC DISLOCATION, RIGHT, INITIAL ENCOUNTER: ICD-10-CM

## 2023-12-20 PROCEDURE — 3080F DIAST BP >= 90 MM HG: CPT | Mod: CPTII,S$GLB,, | Performed by: PODIATRIST

## 2023-12-20 PROCEDURE — 1159F PR MEDICATION LIST DOCUMENTED IN MEDICAL RECORD: ICD-10-PCS | Mod: CPTII,S$GLB,, | Performed by: PODIATRIST

## 2023-12-20 PROCEDURE — 3044F PR MOST RECENT HEMOGLOBIN A1C LEVEL <7.0%: ICD-10-PCS | Mod: CPTII,S$GLB,, | Performed by: PODIATRIST

## 2023-12-20 PROCEDURE — 99024 POSTOP FOLLOW-UP VISIT: CPT | Mod: S$GLB,,, | Performed by: PODIATRIST

## 2023-12-20 PROCEDURE — 73630 X-RAY EXAM OF FOOT: CPT | Mod: 26,RT,, | Performed by: RADIOLOGY

## 2023-12-20 PROCEDURE — 3080F PR MOST RECENT DIASTOLIC BLOOD PRESSURE >= 90 MM HG: ICD-10-PCS | Mod: CPTII,S$GLB,, | Performed by: PODIATRIST

## 2023-12-20 PROCEDURE — 3077F SYST BP >= 140 MM HG: CPT | Mod: CPTII,S$GLB,, | Performed by: PODIATRIST

## 2023-12-20 PROCEDURE — 4010F ACE/ARB THERAPY RXD/TAKEN: CPT | Mod: CPTII,S$GLB,, | Performed by: PODIATRIST

## 2023-12-20 PROCEDURE — 99999 PR PBB SHADOW E&M-EST. PATIENT-LVL III: CPT | Mod: PBBFAC,,, | Performed by: PODIATRIST

## 2023-12-20 PROCEDURE — 99999 PR PBB SHADOW E&M-EST. PATIENT-LVL III: ICD-10-PCS | Mod: PBBFAC,,, | Performed by: PODIATRIST

## 2023-12-20 PROCEDURE — 73630 X-RAY EXAM OF FOOT: CPT | Mod: TC,RT

## 2023-12-20 PROCEDURE — 99024 PR POST-OP FOLLOW-UP VISIT: ICD-10-PCS | Mod: S$GLB,,, | Performed by: PODIATRIST

## 2023-12-20 PROCEDURE — 1159F MED LIST DOCD IN RCRD: CPT | Mod: CPTII,S$GLB,, | Performed by: PODIATRIST

## 2023-12-20 PROCEDURE — 73630 XR FOOT COMPLETE 3 VIEW RIGHT: ICD-10-PCS | Mod: 26,RT,, | Performed by: RADIOLOGY

## 2023-12-20 PROCEDURE — 4010F PR ACE/ARB THEARPY RXD/TAKEN: ICD-10-PCS | Mod: CPTII,S$GLB,, | Performed by: PODIATRIST

## 2023-12-20 PROCEDURE — 3044F HG A1C LEVEL LT 7.0%: CPT | Mod: CPTII,S$GLB,, | Performed by: PODIATRIST

## 2023-12-20 PROCEDURE — 3077F PR MOST RECENT SYSTOLIC BLOOD PRESSURE >= 140 MM HG: ICD-10-PCS | Mod: CPTII,S$GLB,, | Performed by: PODIATRIST

## 2023-12-21 ENCOUNTER — LAB VISIT (OUTPATIENT)
Dept: LAB | Facility: HOSPITAL | Age: 58
End: 2023-12-21
Attending: INTERNAL MEDICINE
Payer: COMMERCIAL

## 2023-12-21 DIAGNOSIS — I10 ESSENTIAL HYPERTENSION: ICD-10-CM

## 2023-12-21 DIAGNOSIS — E78.2 MIXED HYPERLIPIDEMIA: ICD-10-CM

## 2023-12-21 DIAGNOSIS — R79.9 ABNORMAL FINDING OF BLOOD CHEMISTRY: ICD-10-CM

## 2023-12-21 DIAGNOSIS — E03.9 HYPOTHYROIDISM, UNSPECIFIED TYPE: ICD-10-CM

## 2023-12-21 LAB
ALBUMIN SERPL BCP-MCNC: 3.9 G/DL (ref 3.5–5.2)
ALP SERPL-CCNC: 64 U/L (ref 55–135)
ALT SERPL W/O P-5'-P-CCNC: 19 U/L (ref 10–44)
ANION GAP SERPL CALC-SCNC: 6 MMOL/L (ref 8–16)
AST SERPL-CCNC: 17 U/L (ref 10–40)
BASOPHILS # BLD AUTO: 0.03 K/UL (ref 0–0.2)
BASOPHILS NFR BLD: 0.6 % (ref 0–1.9)
BILIRUB SERPL-MCNC: 0.4 MG/DL (ref 0.1–1)
BUN SERPL-MCNC: 20 MG/DL (ref 6–20)
CALCIUM SERPL-MCNC: 9.4 MG/DL (ref 8.7–10.5)
CHLORIDE SERPL-SCNC: 106 MMOL/L (ref 95–110)
CHOLEST SERPL-MCNC: 260 MG/DL (ref 120–199)
CHOLEST/HDLC SERPL: 4.4 {RATIO} (ref 2–5)
CO2 SERPL-SCNC: 30 MMOL/L (ref 23–29)
CREAT SERPL-MCNC: 0.8 MG/DL (ref 0.5–1.4)
DIFFERENTIAL METHOD: ABNORMAL
EOSINOPHIL # BLD AUTO: 0.1 K/UL (ref 0–0.5)
EOSINOPHIL NFR BLD: 2.3 % (ref 0–8)
ERYTHROCYTE [DISTWIDTH] IN BLOOD BY AUTOMATED COUNT: 13.6 % (ref 11.5–14.5)
EST. GFR  (NO RACE VARIABLE): >60 ML/MIN/1.73 M^2
ESTIMATED AVG GLUCOSE: 111 MG/DL (ref 68–131)
GLUCOSE SERPL-MCNC: 98 MG/DL (ref 70–110)
HBA1C MFR BLD: 5.5 % (ref 4–5.6)
HCT VFR BLD AUTO: 41.4 % (ref 37–48.5)
HDLC SERPL-MCNC: 59 MG/DL (ref 40–75)
HDLC SERPL: 22.7 % (ref 20–50)
HGB BLD-MCNC: 14 G/DL (ref 12–16)
IMM GRANULOCYTES # BLD AUTO: 0.01 K/UL (ref 0–0.04)
IMM GRANULOCYTES NFR BLD AUTO: 0.2 % (ref 0–0.5)
LDLC SERPL CALC-MCNC: 183.4 MG/DL (ref 63–159)
LYMPHOCYTES # BLD AUTO: 1.9 K/UL (ref 1–4.8)
LYMPHOCYTES NFR BLD: 38 % (ref 18–48)
MCH RBC QN AUTO: 33.8 PG (ref 27–31)
MCHC RBC AUTO-ENTMCNC: 33.8 G/DL (ref 32–36)
MCV RBC AUTO: 100 FL (ref 82–98)
MONOCYTES # BLD AUTO: 0.5 K/UL (ref 0.3–1)
MONOCYTES NFR BLD: 10.3 % (ref 4–15)
NEUTROPHILS # BLD AUTO: 2.4 K/UL (ref 1.8–7.7)
NEUTROPHILS NFR BLD: 48.6 % (ref 38–73)
NONHDLC SERPL-MCNC: 201 MG/DL
NRBC BLD-RTO: 0 /100 WBC
PLATELET # BLD AUTO: 323 K/UL (ref 150–450)
PMV BLD AUTO: 9.8 FL (ref 9.2–12.9)
POTASSIUM SERPL-SCNC: 4.1 MMOL/L (ref 3.5–5.1)
PROT SERPL-MCNC: 7.2 G/DL (ref 6–8.4)
RBC # BLD AUTO: 4.14 M/UL (ref 4–5.4)
SODIUM SERPL-SCNC: 142 MMOL/L (ref 136–145)
TRIGL SERPL-MCNC: 88 MG/DL (ref 30–150)
TSH SERPL DL<=0.005 MIU/L-ACNC: 3.67 UIU/ML (ref 0.4–4)
WBC # BLD AUTO: 4.87 K/UL (ref 3.9–12.7)

## 2023-12-21 PROCEDURE — 85025 COMPLETE CBC W/AUTO DIFF WBC: CPT | Performed by: INTERNAL MEDICINE

## 2023-12-21 PROCEDURE — 80053 COMPREHEN METABOLIC PANEL: CPT | Performed by: INTERNAL MEDICINE

## 2023-12-21 PROCEDURE — 36415 COLL VENOUS BLD VENIPUNCTURE: CPT | Performed by: INTERNAL MEDICINE

## 2023-12-21 PROCEDURE — 83036 HEMOGLOBIN GLYCOSYLATED A1C: CPT | Performed by: INTERNAL MEDICINE

## 2023-12-21 PROCEDURE — 84443 ASSAY THYROID STIM HORMONE: CPT | Performed by: INTERNAL MEDICINE

## 2023-12-21 PROCEDURE — 80061 LIPID PANEL: CPT | Performed by: INTERNAL MEDICINE

## 2023-12-22 ENCOUNTER — TELEPHONE (OUTPATIENT)
Dept: PRIMARY CARE CLINIC | Facility: CLINIC | Age: 58
End: 2023-12-22
Payer: COMMERCIAL

## 2023-12-22 NOTE — TELEPHONE ENCOUNTER
Please let patient know that although her cholesterol numbers were high, her overall risk of stroke in the next 19 years is 6.8%. Would she be willing to start a low dose statin? Or start exercising?    The 10-year ASCVD risk score (Yonatan LAUGHLIN, et al., 2019) is: 6.8%    Values used to calculate the score:      Age: 58 years      Sex: Female      Is Non- : No      Diabetic: No      Tobacco smoker: No      Systolic Blood Pressure: 147 mmHg      Is BP treated: Yes      HDL Cholesterol: 59 mg/dL      Total Cholesterol: 260 mg/dL    Other labs were normal.     Does she have a home BP reading? She has been variably controlled at clinic appts.    Thanks!  Dr CROCKETT

## 2023-12-22 NOTE — TELEPHONE ENCOUNTER
Called and spoke to patient, she is wanting to try exercising and dietary changes first before staarting a statin. Patient's recent BP measures at other office visits also noted to be increased, scheduled her for nurse visit to come for another blood pressure check.

## 2023-12-26 NOTE — PROGRESS NOTES
Patient is here for follow-up status post 8 weeks ORIF right Lisfranc fracture.  She is doing well.  Neurovascular status intact.  Pain is controlled.  Incisions healing well no signs of infection or dehiscence.  Independent visualization of imaging was performed.  Results were reviewed in detail with patient.  Begin weight-bearing in walking boot to tolerance.  Follow-up in 2 weeks.

## 2023-12-29 ENCOUNTER — TELEPHONE (OUTPATIENT)
Dept: PRIMARY CARE CLINIC | Facility: CLINIC | Age: 58
End: 2023-12-29
Payer: COMMERCIAL

## 2023-12-29 VITALS — SYSTOLIC BLOOD PRESSURE: 119 MMHG | DIASTOLIC BLOOD PRESSURE: 79 MMHG

## 2023-12-29 NOTE — TELEPHONE ENCOUNTER
"Patient communicated with clinic today via [facility] communication to relay recent home BP measurements. She states her most recent measurement s are as follows:     Thursday, 12/28/2023: 136/79    Friday, 12/29/2023 05:30: 150/88, HR 73  Friday, 12/29/2023 07:49: 141/80, HR 88  Friday, 12/29/2023 10:00: 119/79, HR 81       These BP readings will be recorded under the "Remote BP Reading" section of this encounter.       "

## 2024-01-02 ENCOUNTER — TELEPHONE (OUTPATIENT)
Dept: PRIMARY CARE CLINIC | Facility: CLINIC | Age: 59
End: 2024-01-02
Payer: COMMERCIAL

## 2024-01-02 NOTE — TELEPHONE ENCOUNTER
----- Message from Jefry Awad sent at 12/29/2023 10:20 AM CST -----  Regarding: pt call back  Name of Who is Calling:Pt         What is the request in detail: Pt wants doctor to know blood pressure readings   12-28 136/79  12-29>> 5:30 150/88 pulse 73 7:49 141/80 pulse 88 10 am 119/79 pulse 81           Can the clinic reply by [facility]: no         What Number to Call Back if not [facility]:Telephone Information:  Mobile          [phone]

## 2024-01-09 DIAGNOSIS — F41.1 GENERALIZED ANXIETY DISORDER: ICD-10-CM

## 2024-01-09 RX ORDER — CLONAZEPAM 0.5 MG/1
0.5 TABLET ORAL DAILY
Qty: 10 TABLET | Refills: 0 | Status: SHIPPED | OUTPATIENT
Start: 2024-01-09 | End: 2024-02-02 | Stop reason: SDUPTHER

## 2024-02-02 ENCOUNTER — OFFICE VISIT (OUTPATIENT)
Dept: PSYCHIATRY | Facility: CLINIC | Age: 59
End: 2024-02-02
Payer: COMMERCIAL

## 2024-02-02 VITALS
WEIGHT: 266.63 LBS | HEART RATE: 77 BPM | SYSTOLIC BLOOD PRESSURE: 188 MMHG | BODY MASS INDEX: 40.54 KG/M2 | DIASTOLIC BLOOD PRESSURE: 85 MMHG

## 2024-02-02 DIAGNOSIS — F41.1 GENERALIZED ANXIETY DISORDER: ICD-10-CM

## 2024-02-02 DIAGNOSIS — G47.00 INSOMNIA, UNSPECIFIED TYPE: ICD-10-CM

## 2024-02-02 DIAGNOSIS — F32.9 MAJOR DEPRESSIVE DISORDER WITHOUT PSYCHOTIC FEATURES: Primary | ICD-10-CM

## 2024-02-02 PROCEDURE — 99999 PR PBB SHADOW E&M-EST. PATIENT-LVL III: CPT | Mod: PBBFAC,,, | Performed by: STUDENT IN AN ORGANIZED HEALTH CARE EDUCATION/TRAINING PROGRAM

## 2024-02-02 PROCEDURE — 1160F RVW MEDS BY RX/DR IN RCRD: CPT | Mod: CPTII,S$GLB,, | Performed by: STUDENT IN AN ORGANIZED HEALTH CARE EDUCATION/TRAINING PROGRAM

## 2024-02-02 PROCEDURE — 3008F BODY MASS INDEX DOCD: CPT | Mod: CPTII,S$GLB,, | Performed by: STUDENT IN AN ORGANIZED HEALTH CARE EDUCATION/TRAINING PROGRAM

## 2024-02-02 PROCEDURE — 3079F DIAST BP 80-89 MM HG: CPT | Mod: CPTII,S$GLB,, | Performed by: STUDENT IN AN ORGANIZED HEALTH CARE EDUCATION/TRAINING PROGRAM

## 2024-02-02 PROCEDURE — 1159F MED LIST DOCD IN RCRD: CPT | Mod: CPTII,S$GLB,, | Performed by: STUDENT IN AN ORGANIZED HEALTH CARE EDUCATION/TRAINING PROGRAM

## 2024-02-02 PROCEDURE — 99213 OFFICE O/P EST LOW 20 MIN: CPT | Mod: S$GLB,,, | Performed by: STUDENT IN AN ORGANIZED HEALTH CARE EDUCATION/TRAINING PROGRAM

## 2024-02-02 PROCEDURE — 3077F SYST BP >= 140 MM HG: CPT | Mod: CPTII,S$GLB,, | Performed by: STUDENT IN AN ORGANIZED HEALTH CARE EDUCATION/TRAINING PROGRAM

## 2024-02-02 RX ORDER — CLONAZEPAM 0.5 MG/1
0.5 TABLET ORAL DAILY
Qty: 10 TABLET | Refills: 0 | Status: SHIPPED | OUTPATIENT
Start: 2024-02-02 | End: 2024-03-08 | Stop reason: SDUPTHER

## 2024-02-02 NOTE — PROGRESS NOTES
"OUTPATIENT PSYCHIATRY FOLLOW UP VISIT    ENCOUNTER DATE:  2/2/2024  SITE:  [facility], [facility]  LENGTH OF SESSION:  30 minutes      CHIEF COMPLAINT:  Follow up for anxiety and depression    HISTORY OF PRESENTING ILLNESS:  Maria G Cameron is a 58 y.o. female with history of major depressive disorder without psychotic features and Generalized anxiety disorder who presents for follow up appointment.     Current psychotropic medication regimen: Zoloft 150mg daily, PRN Klonopin 0.5mg      Interval history as told by Patient - & or family/friend/spouse/caregiver with pts permission    Reports daily compliance with Zoloft 150mg daily. Reports taking half a Klonopin for breakthrough anxiety. Reports a prescription of 10 pills typically lasts her a month. Endorses using PRN Klonopin more frequently lately due to stress from her foot pain and family issues. Reports sister and nephew who live in [place] are dealing with their own mental health issues, and often get the patient involved. She said this was the main reason why she moved from [place] to [place].     Reports she was at home and out of work for 2 months due to her foot injury. Has been back at work for about 1 month now.   Sleeping ~8 hours nightly with PRN melatonin. Endorses sleep onset difficulty without melatonin. Sometimes wakes up but can easily fall back asleep. Appetite is good, reports overeating (junk food) more than normal due to stress lately. Endorses low energy. Concentration is improved with Zoloft and PRN Klonopin. [place] or be as active due to foot injury. Has been reading for fun. Reports amotivation for past 2 years. Denies SI, HI, AVH.     Pt requests to try another anxiety medication, as she feels like Zoloft "does not work as well as it used to." We have tried increasing to 200mg but she had side effects and went back down to 150mg. Pt reports in the past she did better on " "Wellbutrin SR BID. Tried Wellbutrin XL but taking it in the morning it wore off too quickly and BID dosing caused her to have insomnia. Discussed decreasing Zoloft to 100mg and starting Wellbutrin SR 100mg, with possibility to taper Zoloft once Wellbutrin effective. Pt agreeable.     Suggested psychotherapy but pt reports "been there done that" and is not interested at this time.      PSYCHIATRIC REVIEW OF SYSTEMS:(none/ yes- better/worse/stable/& what symptoms)    Symptoms of Depression: sleep-onset difficulty (improved with PRN melatonin), over-eating, low energy     Symptoms of Anxiety/ panic attacks: increased anxiety due to family issues and recent foot injury/out of work    Symptoms of Britni/Hypomania: denies    Symptoms of psychosis: denies    Alcohol: minimal     Illicit Drugs: denies    Psychosocial stressors: work, family, recovering from recent foot injury    Risk Parameters:  Patient reports no suicidal ideation  Patient reports no homicidal ideation  Patient reports no self-injurious behavior  Patient reports no violent behavior    PSYCHIATRIC MED REVIEW    Current psych meds: Zoloft 150mg daily, PRN Klonopin 0.5mg daily   Medication side effects: denies  Medication compliance:  endorses daily compliance with Zoloft    Previous psych meds trials  Wellbutrin    PAST PSYCHIATRIC, MEDICAL, AND SOCIAL HISTORY REVIEWED  The patient's past medical, family and social history have been reviewed and updated as appropriate within the electronic medical record - see encounter notes.    MEDICAL REVIEW OF SYSTEMS:  Complete review of systems performed covering Constitutional, Musculoskeletal, Neurologic.  All systems negative/ except R foot pain    ALL MEDICATIONS:    Current Outpatient Medications:     butalbital-acetaminophen-caffeine -40 mg (FIORICET, ESGIC) -40 mg per tablet, Take 1 tablet by mouth every 4 (four) hours as needed for Pain., Disp: 90 tablet, Rfl: 3    clonazePAM (KLONOPIN) 0.5 MG " tablet, Take 1 tablet (0.5 mg total) by mouth once daily. as needed for anxiety., Disp: 10 tablet, Rfl: 0    enalapril (VASOTEC) 20 MG tablet, Take 1 tablet (20 mg total) by mouth once daily., Disp: 90 tablet, Rfl: 3    hydroCHLOROthiazide (HYDRODIURIL) 25 MG tablet, Take 1 tablet (25 mg total) by mouth once daily., Disp: 90 tablet, Rfl: 3    levothyroxine (SYNTHROID) 125 MCG tablet, Take 1 tablet (125 mcg total) by mouth once daily., Disp: 90 tablet, Rfl: 3    promethazine (PROMETHEGAN) 50 MG suppository, Unwrap and insert 1 suppository (50 mg total) rectally every 6 (six) hours as needed for Nausea., Disp: 12 suppository, Rfl: 1    sertraline (ZOLOFT) 100 MG tablet, Take 1½ tablets (150 mg total) by mouth once daily., Disp: 135 tablet, Rfl: 3    verapamiL (VERELAN) 360 MG C24P, Take 1 capsule (360 mg total) by mouth once daily., Disp: 90 capsule, Rfl: 3  No current facility-administered medications for this visit.    Facility-Administered Medications Ordered in Other Visits:     0.9%  NaCl infusion, , Intravenous, Continuous, Lubna Fan MD, Stopped at 11/05/21 1605    ALLERGIES:  Review of patient's allergies indicates:   Allergen Reactions    Adhesive Blisters    Imitrex [sumatriptan] Shortness Of Breath    Codeine Nausea And Vomiting       RELEVANT LABS/STUDIES:    Lab Results   Component Value Date    WBC 4.87 12/21/2023    HGB 14.0 12/21/2023    HCT 41.4 12/21/2023     (H) 12/21/2023     12/21/2023     BMP  Lab Results   Component Value Date     12/21/2023    K 4.1 12/21/2023     12/21/2023    CO2 30 (H) 12/21/2023    BUN 20 12/21/2023    CREATININE 0.8 12/21/2023    CALCIUM 9.4 12/21/2023    ANIONGAP 6 (L) 12/21/2023    ESTGFRAFRICA >60.0 10/20/2021    EGFRNONAA >60.0 10/20/2021     Lab Results   Component Value Date    ALT 19 12/21/2023    AST 17 12/21/2023    ALKPHOS 64 12/21/2023    BILITOT 0.4 12/21/2023     Lab Results   Component Value Date    TSH 3.670 12/21/2023  "    Lab Results   Component Value Date    HGBA1C 5.5 12/21/2023       VITALS  Vitals:    02/02/24 1554   BP: (!) 188/85   Pulse: 77   Weight: 120.9 kg (266 lb 10.3 oz)       PHYSICAL EXAM  General: well developed, well nourished, appears stated age, no distress  Neurologic:   Gait: able to ambulate unassisted though with limp due to R foot pain  Psychomotor signs:  No involuntary movements or tremor  AIMS: none    PSYCHIATRIC EXAM:     Mental Status Exam:  Appearance: unremarkable, age appropriate, casually dressed  Behavior/Cooperation: normal, friendly and cooperative, eye contact normal  Speech: normal tone, normal rate, normal pitch, normal volume  Language: uses words appropriately; NO aphasia or dysarthria  Mood: "anxious"  Affect: appropriate, reactive   Thought Process: normal and logical  Thought Content: normal, no suicidality, no homicidality, delusions, or paranoia  Level of Consciousness: Alert and Oriented x3  Memory:  Intact   Recent: Intact, able to report recent events   Remote: Intact to conversation  Attention/concentration: appropriate for age/education.   Fund of Knowledge: appears adequate  Insight:  Intact - has awareness of illness  Judgment: Intact - behavior appropriate to circumstances      IMPRESSION:    Maria G Cameron is a 58 y.o. female with history of MDD and ROCIO who presents for follow up appointment for her anxious and depressive symptoms. She reports mood has been ok but anxiety has been heightened lately due to recent stressors. Reports trying increased dose of Zoloft 200mg but had side effects, so self-decreased back to 150mg daily with no issues. Using PRN Klonopin sparingly. She is amenable to making a medication change today as she feels like the Zoloft is not as effective as it used to be. She is resistant to engaging in therapy at this time. Will plan to decrease Zoloft to 100mg and start Wellbutrin 100mg SR. Will plan for close follow-up to determine further medication " adjustments as necessary.     Status/Progress:  Based on the examination today, the patient's problem(s) is/are adequately but not ideally controlled.  New problems have not been presented today.     DIAGNOSES:  Major depressive disorder without psychotic features  Generalized anxiety disorder  Hx adjustment disorder  Hx insomnia       PLAN:  Psych Med:  Decrease Zoloft to 100mg daily  Start Wellbutrin 100mg SR daily   PDMP reviewed with no irregularities or inconsistencies noted. Continue PRN Klonopin 0.5mg #10 per month for breakthrough anxiety.  Discussed diagnosis, risks and benefits of proposed treatment vs alternative treatments vs no treatment, inherent unpredictability of medications and the potential side effects of these treatments specifically for: SSRIs, including but not limited to SI,Serotonin syndrome, MI,CVA, birth defects, Coma, Death, withdrawls) Tx vs no Tx Alt Tx, discussed, expressed understanding.   Discussed diagnosis, risks and benefits of proposed treatment vs alternative treatments vs no treatment, potential side effects of these treatments specifically for Xanax, including but not limited to addictive properties, sleepiness, dizziness, dry mouth, change in appetite, nausea, constipation, sexual dysfunction, feeling tired and change in weight. Counseled patient to use medication sparingly and cautiously as an abortive for anxiety that does not steve. Counseled patient on not driving heavy equipment while taking this medication and not to take in conjunction with an opioid medication.   Discussed with patient informed consent, risks vs. benefits, alternative treatments, side effect profile and the inherent unpredictability of individual responses to these treatments. Answered any questions patient may have had. The patient expresses understanding of the above and displays the capacity to agree with this current plan       RETURN TO CLINIC:  1 month      Jessee Mirza MD  [facility]  Psychiatry PGY-3  02/02/2024

## 2024-02-05 RX ORDER — BUPROPION HYDROCHLORIDE 100 MG/1
100 TABLET, EXTENDED RELEASE ORAL DAILY
Qty: 30 TABLET | Refills: 0 | Status: SHIPPED | OUTPATIENT
Start: 2024-02-05 | End: 2024-03-10 | Stop reason: ALTCHOICE

## 2024-02-05 RX ORDER — SERTRALINE HYDROCHLORIDE 100 MG/1
100 TABLET, FILM COATED ORAL DAILY
Qty: 30 TABLET | Refills: 0 | Status: SHIPPED | OUTPATIENT
Start: 2024-02-05 | End: 2024-03-10 | Stop reason: DRUGHIGH

## 2024-03-08 ENCOUNTER — OFFICE VISIT (OUTPATIENT)
Dept: PSYCHIATRY | Facility: CLINIC | Age: 59
End: 2024-03-08
Payer: COMMERCIAL

## 2024-03-08 VITALS
HEART RATE: 94 BPM | SYSTOLIC BLOOD PRESSURE: 127 MMHG | WEIGHT: 221 LBS | DIASTOLIC BLOOD PRESSURE: 67 MMHG | BODY MASS INDEX: 33.6 KG/M2

## 2024-03-08 DIAGNOSIS — G47.00 INSOMNIA, UNSPECIFIED TYPE: ICD-10-CM

## 2024-03-08 DIAGNOSIS — F32.9 MAJOR DEPRESSIVE DISORDER WITHOUT PSYCHOTIC FEATURES: Primary | ICD-10-CM

## 2024-03-08 DIAGNOSIS — F41.1 GENERALIZED ANXIETY DISORDER: ICD-10-CM

## 2024-03-08 PROCEDURE — 3008F BODY MASS INDEX DOCD: CPT | Mod: CPTII,S$GLB,, | Performed by: STUDENT IN AN ORGANIZED HEALTH CARE EDUCATION/TRAINING PROGRAM

## 2024-03-08 PROCEDURE — 99213 OFFICE O/P EST LOW 20 MIN: CPT | Mod: S$GLB,,, | Performed by: STUDENT IN AN ORGANIZED HEALTH CARE EDUCATION/TRAINING PROGRAM

## 2024-03-08 PROCEDURE — 3078F DIAST BP <80 MM HG: CPT | Mod: CPTII,S$GLB,, | Performed by: STUDENT IN AN ORGANIZED HEALTH CARE EDUCATION/TRAINING PROGRAM

## 2024-03-08 PROCEDURE — 1159F MED LIST DOCD IN RCRD: CPT | Mod: CPTII,S$GLB,, | Performed by: STUDENT IN AN ORGANIZED HEALTH CARE EDUCATION/TRAINING PROGRAM

## 2024-03-08 PROCEDURE — 3074F SYST BP LT 130 MM HG: CPT | Mod: CPTII,S$GLB,, | Performed by: STUDENT IN AN ORGANIZED HEALTH CARE EDUCATION/TRAINING PROGRAM

## 2024-03-08 PROCEDURE — 99999 PR PBB SHADOW E&M-EST. PATIENT-LVL III: CPT | Mod: PBBFAC,,, | Performed by: STUDENT IN AN ORGANIZED HEALTH CARE EDUCATION/TRAINING PROGRAM

## 2024-03-08 PROCEDURE — 4010F ACE/ARB THERAPY RXD/TAKEN: CPT | Mod: CPTII,S$GLB,, | Performed by: STUDENT IN AN ORGANIZED HEALTH CARE EDUCATION/TRAINING PROGRAM

## 2024-03-08 PROCEDURE — 1160F RVW MEDS BY RX/DR IN RCRD: CPT | Mod: CPTII,S$GLB,, | Performed by: STUDENT IN AN ORGANIZED HEALTH CARE EDUCATION/TRAINING PROGRAM

## 2024-03-08 RX ORDER — SERTRALINE HYDROCHLORIDE 100 MG/1
200 TABLET, FILM COATED ORAL DAILY
Qty: 60 TABLET | Refills: 3 | Status: SHIPPED | OUTPATIENT
Start: 2024-03-08 | End: 2024-06-14 | Stop reason: SDUPTHER

## 2024-03-08 RX ORDER — SERTRALINE HYDROCHLORIDE 100 MG/1
200 TABLET, FILM COATED ORAL DAILY
COMMUNITY
End: 2024-03-08 | Stop reason: SDUPTHER

## 2024-03-08 RX ORDER — CLONAZEPAM 0.5 MG/1
0.5 TABLET ORAL DAILY
Qty: 10 TABLET | Refills: 0 | Status: SHIPPED | OUTPATIENT
Start: 2024-03-08 | End: 2024-04-17 | Stop reason: SDUPTHER

## 2024-03-08 NOTE — PROGRESS NOTES
"OUTPATIENT PSYCHIATRY FOLLOW UP VISIT    ENCOUNTER DATE:  3/8/2024  SITE:  [facility], [facility]  LENGTH OF SESSION:  30 minutes      CHIEF COMPLAINT:  Follow up for anxiety and depression    HISTORY OF PRESENTING ILLNESS:  Maria G Cameron is a 58 y.o. female with history of major depressive disorder without psychotic features and Generalized anxiety disorder who presents for follow up appointment.     Current psychotropic medication regimen: Zoloft 200mg daily, PRN Klonopin 0.5mg    Interval history as told by Patient - & or family/friend/spouse/caregiver with pts permission    Patient reports she took Wellbutrin for a couple of weeks and had worsening irritability, hot flashes at night, and "angry dreams." Says she self-discontinued the Wellbutrin and has had resolution of these symptoms. She has been taking Zoloft 200mg daily for a couple of weeks and reports mood has "good." Taking PRN Klonopin 0.5mg sparingly - about half a pill once a week. No recent panic attacks.     Says things with her family have been better. Talking to sister and nephew who live in [place] and lately no major issues. Brother may come visit later this month.   Working 5 days per week at [employer].     Sleeping about 8 hours nightly. Uses PRN Melatonin which is helpful. Sometimes wakes up but can easily fall back asleep. Says her motivation has been improved - got hair done today, made appointment with bariatrics later this month to explore possible medication management options. Endorses chronic low energy, which has been somewhat better lately. Concentration is intact. Denies SI, HI, AVH.       PSYCHIATRIC REVIEW OF SYSTEMS:(none/ yes- better/worse/stable/& what symptoms)    Symptoms of Depression: sleep-onset difficulty (improved with PRN melatonin), over-eating, low energy     Symptoms of Anxiety/ panic attacks: stable, no recent panic attacks      Symptoms of Britni/Hypomania: denies    Symptoms of psychosis: " denies    Alcohol: denies recent use     Illicit Drugs: denies    Psychosocial stressors: work, family, recovering from recent foot injury    Risk Parameters:  Patient reports no suicidal ideation  Patient reports no homicidal ideation  Patient reports no self-injurious behavior  Patient reports no violent behavior    PSYCHIATRIC MED REVIEW    Current psych meds: Zoloft 200mg daily, PRN Klonopin 0.5mg daily   Medication side effects: denies  Medication compliance: endorses daily compliance with Zoloft    Previous psych meds trials  Wellbutrin    PAST PSYCHIATRIC, MEDICAL, AND SOCIAL HISTORY REVIEWED  The patient's past medical, family and social history have been reviewed and updated as appropriate within the electronic medical record - see encounter notes.    MEDICAL REVIEW OF SYSTEMS:  Complete review of systems performed covering Constitutional, Musculoskeletal, Neurologic.  All systems negative/ except R foot pain    ALL MEDICATIONS:    Current Outpatient Medications:     buPROPion (WELLBUTRIN SR) 100 MG TBSR 12 hr tablet, Take 1 tablet (100 mg total) by mouth once daily., Disp: 30 tablet, Rfl: 0    butalbital-acetaminophen-caffeine -40 mg (FIORICET, ESGIC) -40 mg per tablet, Take 1 tablet by mouth every 4 (four) hours as needed for Pain., Disp: 90 tablet, Rfl: 3    clonazePAM (KLONOPIN) 0.5 MG tablet, Take 1 tablet (0.5 mg total) by mouth once daily. as needed for anxiety., Disp: 10 tablet, Rfl: 0    enalapril (VASOTEC) 20 MG tablet, Take 1 tablet (20 mg total) by mouth once daily., Disp: 90 tablet, Rfl: 3    hydroCHLOROthiazide (HYDRODIURIL) 25 MG tablet, Take 1 tablet (25 mg total) by mouth once daily., Disp: 90 tablet, Rfl: 3    levothyroxine (SYNTHROID) 125 MCG tablet, Take 1 tablet by mouth once daily, Disp: 90 tablet, Rfl: 3    promethazine (PROMETHEGAN) 50 MG suppository, Unwrap and insert 1 suppository (50 mg total) rectally every 6 (six) hours as needed for Nausea., Disp: 12 suppository,  Rfl: 1    sertraline (ZOLOFT) 100 MG tablet, Take 1 tablet (100 mg total) by mouth once daily., Disp: 30 tablet, Rfl: 0    verapamiL (VERELAN) 360 MG C24P, Take 1 capsule (360 mg total) by mouth once daily., Disp: 90 capsule, Rfl: 3  No current facility-administered medications for this visit.    Facility-Administered Medications Ordered in Other Visits:     0.9%  NaCl infusion, , Intravenous, Continuous, Lubna Fan MD, Stopped at 11/05/21 1605    ALLERGIES:  Review of patient's allergies indicates:   Allergen Reactions    Adhesive Blisters    Imitrex [sumatriptan] Shortness Of Breath    Codeine Nausea And Vomiting     RELEVANT LABS/STUDIES:    Lab Results   Component Value Date    WBC 4.87 12/21/2023    HGB 14.0 12/21/2023    HCT 41.4 12/21/2023     (H) 12/21/2023     12/21/2023     BMP  Lab Results   Component Value Date     12/21/2023    K 4.1 12/21/2023     12/21/2023    CO2 30 (H) 12/21/2023    BUN 20 12/21/2023    CREATININE 0.8 12/21/2023    CALCIUM 9.4 12/21/2023    ANIONGAP 6 (L) 12/21/2023    ESTGFRAFRICA >60.0 10/20/2021    EGFRNONAA >60.0 10/20/2021     Lab Results   Component Value Date    ALT 19 12/21/2023    AST 17 12/21/2023    ALKPHOS 64 12/21/2023    BILITOT 0.4 12/21/2023     Lab Results   Component Value Date    TSH 3.670 12/21/2023     Lab Results   Component Value Date    HGBA1C 5.5 12/21/2023       VITALS  Vitals:    03/08/24 1550   BP: 127/67   Pulse: 94   Weight: 100.3 kg (221 lb 0.2 oz)       PHYSICAL EXAM  General: well developed, well nourished, appears stated age, no distress  Neurologic:   Gait: able to ambulate unassisted though with limp due to R foot pain  Psychomotor signs:  No involuntary movements or tremor  AIMS: none    PSYCHIATRIC EXAM:     Mental Status Exam:  Appearance: unremarkable, age appropriate, casually dressed  Behavior/Cooperation: normal, friendly and cooperative, eye contact normal  Speech: normal tone, normal rate, normal pitch,  "normal volume  Language: uses words appropriately; NO aphasia or dysarthria  Mood: "good"  Affect: appropriate, reactive   Thought Process: normal and logical  Thought Content: normal, no suicidality, no homicidality, delusions, or paranoia  Level of Consciousness: Alert and Oriented x3  Memory:  Intact   Recent: Intact, able to report recent events   Remote: Intact to conversation  Attention/concentration: appropriate for age/education.   Fund of Knowledge: appears adequate  Insight:  Intact - has awareness of illness  Judgment: Intact - behavior appropriate to circumstances      IMPRESSION:    Maria G Cameron is a 58 y.o. female with history of MDD and ROCIO who presents for follow up appointment for her anxious and depressive symptoms. She restarted Wellbutrin for several weeks and noted worsening irritability, hot flashes at night, and "angry dreams." She self-discontinued Wellbutrin and increased Zoloft to 200mg daily. Says that mood and overall anxiety has been improved since increasing Zoloft and denies any side effects. Using PRN Klonopin sparingly. Risks of benzos were again reviewed. She is resistant to engaging in therapy at this time. Will continue with medication management, follow up in ~3 months.     Status/Progress:  Based on the examination today, the patient's problem(s) is/are adequately but not ideally controlled.  New problems have not been presented today.     DIAGNOSES:  Major depressive disorder without psychotic features  Generalized anxiety disorder  Hx adjustment disorder  Hx insomnia       PLAN:  Psych Med:  Discontinue Wellbutrin due to side effects   Continue Zoloft 200mg daily for depression and anxiety  PDMP reviewed with no irregularities or inconsistencies noted. Continue PRN Klonopin 0.5mg #10 per month for breakthrough anxiety  Discussed diagnosis, risks and benefits of proposed treatment vs alternative treatments vs no treatment, potential side effects of these treatments " specifically for benzodiazepines, including but not limited to addictive properties, sleepiness, dizziness, dry mouth, change in appetite, nausea, constipation, sexual dysfunction, feeling tired and change in weight. Counseled patient to use medication sparingly and cautiously as an abortive for anxiety that does not steve. Counseled patient on not driving heavy equipment while taking this medication and not to take in conjunction with an opioid medication.   Discussed with patient informed consent, risks vs. benefits, alternative treatments, side effect profile and the inherent unpredictability of individual responses to these treatments. Answered any questions patient may have had. The patient expresses understanding of the above and displays the capacity to agree with this current plan       RETURN TO CLINIC:  3 months, sooner if needed      Jessee Mirza MD  [facility] Psychiatry PGY-3  03/08/2024

## 2024-04-02 ENCOUNTER — TELEPHONE (OUTPATIENT)
Dept: BARIATRICS | Facility: CLINIC | Age: 59
End: 2024-04-02
Payer: COMMERCIAL

## 2024-04-17 DIAGNOSIS — F41.1 GENERALIZED ANXIETY DISORDER: ICD-10-CM

## 2024-04-17 RX ORDER — CLONAZEPAM 0.5 MG/1
0.5 TABLET ORAL DAILY
Qty: 10 TABLET | Refills: 0 | Status: SHIPPED | OUTPATIENT
Start: 2024-04-17 | End: 2024-06-14 | Stop reason: SDUPTHER

## 2024-04-24 ENCOUNTER — TELEPHONE (OUTPATIENT)
Dept: BARIATRICS | Facility: CLINIC | Age: 59
End: 2024-04-24
Payer: COMMERCIAL

## 2024-04-24 NOTE — TELEPHONE ENCOUNTER
----- Message from Sunita Ackerman sent at 4/24/2024  1:47 PM CDT -----  Regarding: Missed Call  Contact: [phone]  Pt calling to speak with someone in provider office regarding missed call. Please call pt back at  [phone]

## 2024-04-24 NOTE — TELEPHONE ENCOUNTER
----- Message from Elodia Edouard MD sent at 4/24/2024 12:25 PM CDT -----  Please let pt know that I have reviewed her chart, and with the current limitations of what is covered by insurance, her previous SE with topiramate and wellbutrin and as to not have interactions with her current meds, I do not have medical options to offer her.     Thank you,  Dr. Edouard

## 2024-06-14 ENCOUNTER — OFFICE VISIT (OUTPATIENT)
Dept: PSYCHIATRY | Facility: CLINIC | Age: 59
End: 2024-06-14
Payer: COMMERCIAL

## 2024-06-14 VITALS
BODY MASS INDEX: 40.45 KG/M2 | SYSTOLIC BLOOD PRESSURE: 164 MMHG | HEART RATE: 73 BPM | WEIGHT: 266 LBS | DIASTOLIC BLOOD PRESSURE: 74 MMHG

## 2024-06-14 DIAGNOSIS — G47.00 INSOMNIA, UNSPECIFIED TYPE: ICD-10-CM

## 2024-06-14 DIAGNOSIS — F41.1 GENERALIZED ANXIETY DISORDER: Primary | ICD-10-CM

## 2024-06-14 DIAGNOSIS — F32.9 MAJOR DEPRESSIVE DISORDER WITHOUT PSYCHOTIC FEATURES: ICD-10-CM

## 2024-06-14 PROCEDURE — 3008F BODY MASS INDEX DOCD: CPT | Mod: CPTII,S$GLB,, | Performed by: STUDENT IN AN ORGANIZED HEALTH CARE EDUCATION/TRAINING PROGRAM

## 2024-06-14 PROCEDURE — 1160F RVW MEDS BY RX/DR IN RCRD: CPT | Mod: CPTII,S$GLB,, | Performed by: STUDENT IN AN ORGANIZED HEALTH CARE EDUCATION/TRAINING PROGRAM

## 2024-06-14 PROCEDURE — 3077F SYST BP >= 140 MM HG: CPT | Mod: CPTII,S$GLB,, | Performed by: STUDENT IN AN ORGANIZED HEALTH CARE EDUCATION/TRAINING PROGRAM

## 2024-06-14 PROCEDURE — 99213 OFFICE O/P EST LOW 20 MIN: CPT | Mod: S$GLB,,, | Performed by: STUDENT IN AN ORGANIZED HEALTH CARE EDUCATION/TRAINING PROGRAM

## 2024-06-14 PROCEDURE — 4010F ACE/ARB THERAPY RXD/TAKEN: CPT | Mod: CPTII,S$GLB,, | Performed by: STUDENT IN AN ORGANIZED HEALTH CARE EDUCATION/TRAINING PROGRAM

## 2024-06-14 PROCEDURE — 99999 PR PBB SHADOW E&M-EST. PATIENT-LVL III: CPT | Mod: PBBFAC,,, | Performed by: STUDENT IN AN ORGANIZED HEALTH CARE EDUCATION/TRAINING PROGRAM

## 2024-06-14 PROCEDURE — 3078F DIAST BP <80 MM HG: CPT | Mod: CPTII,S$GLB,, | Performed by: STUDENT IN AN ORGANIZED HEALTH CARE EDUCATION/TRAINING PROGRAM

## 2024-06-14 PROCEDURE — 1159F MED LIST DOCD IN RCRD: CPT | Mod: CPTII,S$GLB,, | Performed by: STUDENT IN AN ORGANIZED HEALTH CARE EDUCATION/TRAINING PROGRAM

## 2024-06-14 RX ORDER — SERTRALINE HYDROCHLORIDE 100 MG/1
200 TABLET, FILM COATED ORAL DAILY
Qty: 60 TABLET | Refills: 3 | Status: SHIPPED | OUTPATIENT
Start: 2024-06-14 | End: 2024-06-21

## 2024-06-14 RX ORDER — CLONAZEPAM 0.5 MG/1
0.5 TABLET ORAL 2 TIMES DAILY PRN
COMMUNITY
End: 2024-06-14 | Stop reason: SDUPTHER

## 2024-06-14 RX ORDER — CLONAZEPAM 0.5 MG/1
0.5 TABLET ORAL DAILY PRN
Qty: 10 TABLET | Refills: 0 | Status: SHIPPED | OUTPATIENT
Start: 2024-07-12

## 2024-06-14 RX ORDER — CLONAZEPAM 0.5 MG/1
0.5 TABLET ORAL DAILY
Qty: 10 TABLET | Refills: 0 | Status: SHIPPED | OUTPATIENT
Start: 2024-06-14

## 2024-06-14 NOTE — PROGRESS NOTES
"OUTPATIENT PSYCHIATRY FOLLOW UP VISIT    ENCOUNTER DATE:  6/14/2024  SITE:  [facility], [facility]  LENGTH OF SESSION:  30 minutes      CHIEF COMPLAINT:  Follow up for anxiety and depression      HISTORY OF PRESENTING ILLNESS:  Maria G Cameron is a 58 y.o. female with history of major depressive disorder without psychotic features and Generalized anxiety disorder who presents for follow up appointment.     Current psychotropic medication regimen: Zoloft 200mg daily, PRN Klonopin 0.5mg      Interval history as told by Patient - & or family/friend/spouse/caregiver with pts permission    Pt reports she has been taking Zoloft 200mg daily and mood has been "good." Taking PRN Klonopin 0.5mg sparingly - about half a pill once a week. No recent panic attacks.     Says her biggest stressor lately is her weight. Reports since her foot injury her mobility has been limited, she has been gaining weight, and she gets short of breath with exertion. She wanted to meet with bariatrics to discuss weight loss options but was told she was "not a candidate."    Says things with her family have been better. Talking to sister and nephew who live in [place] and lately no major issues.   Working 5 days per week at [employer].     Sleeping about 8 hours nightly. Uses PRN Melatonin which is helpful. Sometimes wakes up but can easily fall back asleep. Eating 2 meals per day. Endorses chronic low energy, which has been somewhat better lately. Reports low motivation - attributes to weight gain. Concentration is intact. Denies hopelessness, SI, HI, AVH. Reading for leisure. Planning to get new glasses this weekend.       PSYCHIATRIC REVIEW OF SYSTEMS:(none/ yes- better/worse/stable/& what symptoms)    Symptoms of Depression: sleep-onset difficulty (improved with PRN melatonin), over-eating, low energy     Symptoms of Anxiety/ panic attacks: stable, no recent panic attacks      Symptoms of Britni/Hypomania: denies    Symptoms of " psychosis: denies    Alcohol: denies recent use     Illicit Drugs: denies    Psychosocial stressors: work, weight gain    Risk Parameters:  Patient reports no suicidal ideation  Patient reports no homicidal ideation  Patient reports no self-injurious behavior  Patient reports no violent behavior    PSYCHIATRIC MED REVIEW    Current psych meds: Zoloft 200mg daily, PRN Klonopin 0.5mg daily   Medication side effects: weight gain  Medication compliance: endorses daily compliance with Zoloft    Previous psych meds trials  Wellbutrin    PAST PSYCHIATRIC, MEDICAL, AND SOCIAL HISTORY REVIEWED  The patient's past medical, family and social history have been reviewed and updated as appropriate within the electronic medical record - see encounter notes.    MEDICAL REVIEW OF SYSTEMS:  Complete review of systems performed covering Constitutional, Musculoskeletal, Neurologic.  All systems negative/ except dyspnea on exertion     ALL MEDICATIONS:    Current Outpatient Medications:     butalbital-acetaminophen-caffeine -40 mg (FIORICET, ESGIC) -40 mg per tablet, Take 1 tablet by mouth every 4 (four) hours as needed for Pain., Disp: 90 tablet, Rfl: 3    clonazePAM (KLONOPIN) 0.5 MG tablet, Take 1 tablet (0.5 mg total) by mouth once daily. as needed for anxiety., Disp: 10 tablet, Rfl: 0    enalapril (VASOTEC) 20 MG tablet, Take 1 tablet (20 mg total) by mouth once daily., Disp: 90 tablet, Rfl: 3    hydroCHLOROthiazide (HYDRODIURIL) 25 MG tablet, Take 1 tablet (25 mg total) by mouth once daily., Disp: 90 tablet, Rfl: 3    levothyroxine (SYNTHROID) 125 MCG tablet, Take 1 tablet by mouth once daily, Disp: 90 tablet, Rfl: 3    promethazine (PROMETHEGAN) 50 MG suppository, Unwrap and insert 1 suppository (50 mg total) rectally every 6 (six) hours as needed for Nausea., Disp: 12 suppository, Rfl: 1    sertraline (ZOLOFT) 100 MG tablet, Take 2 tablets (200 mg total) by mouth once daily., Disp: 60 tablet, Rfl: 3    verapamiL  "(VERELAN) 360 MG C24P, Take 1 capsule (360 mg total) by mouth once daily., Disp: 90 capsule, Rfl: 3  No current facility-administered medications for this visit.    Facility-Administered Medications Ordered in Other Visits:     0.9%  NaCl infusion, , Intravenous, Continuous, Lubna Fan MD, Stopped at 11/05/21 1605    ALLERGIES:  Review of patient's allergies indicates:   Allergen Reactions    Adhesive Blisters    Imitrex [sumatriptan] Shortness Of Breath    Codeine Nausea And Vomiting     RELEVANT LABS/STUDIES:    Lab Results   Component Value Date    WBC 4.87 12/21/2023    HGB 14.0 12/21/2023    HCT 41.4 12/21/2023     (H) 12/21/2023     12/21/2023     BMP  Lab Results   Component Value Date     12/21/2023    K 4.1 12/21/2023     12/21/2023    CO2 30 (H) 12/21/2023    BUN 20 12/21/2023    CREATININE 0.8 12/21/2023    CALCIUM 9.4 12/21/2023    ANIONGAP 6 (L) 12/21/2023    ESTGFRAFRICA >60.0 10/20/2021    EGFRNONAA >60.0 10/20/2021     Lab Results   Component Value Date    ALT 19 12/21/2023    AST 17 12/21/2023    ALKPHOS 64 12/21/2023    BILITOT 0.4 12/21/2023     Lab Results   Component Value Date    TSH 3.670 12/21/2023     Lab Results   Component Value Date    HGBA1C 5.5 12/21/2023       VITALS  Vitals:    06/14/24 1549   BP: (!) 164/74   Pulse: 73   Weight: 120.7 kg (266 lb)       PHYSICAL EXAM  General: well developed, well nourished, appears stated age, no distress  Neurologic:   Gait: able to ambulate unassisted   Psychomotor signs:  No involuntary movements or tremor  AIMS: none    PSYCHIATRIC EXAM:     Mental Status Exam:  Appearance: unremarkable, age appropriate, casually dressed  Behavior/Cooperation: normal, friendly and cooperative, eye contact normal  Speech: normal tone, normal rate, normal pitch, normal volume  Language: uses words appropriately; NO aphasia or dysarthria  Mood: "good"  Affect: appropriate, reactive   Thought Process: normal and logical  Thought " Content: normal, no suicidality, no homicidality, delusions, or paranoia  Level of Consciousness: Alert and Oriented x3  Memory:  Intact   Recent: Intact, able to report recent events   Remote: Intact to conversation  Attention/concentration: appropriate for age/education.   Fund of Knowledge: appears adequate  Insight:  Intact - has awareness of illness  Judgment: Intact - behavior appropriate to circumstances      IMPRESSION:    Maria G Cameron is a 58 y.o. female with history of MDD and ROCIO who presents for follow up appointment for her anxious and depressive symptoms. She has been adherent with Zoloft 200mg daily and says that mood and overall anxiety have been improved. Denies any medication side effects. Using PRN Klonopin sparingly. Risks of benzos were again reviewed. She is resistant to engaging in therapy at this time. Will continue with medication management, follow up in ~3 months.     Status/Progress:  Based on the examination today, the patient's problem(s) is/are adequately but not ideally controlled.  New problems have not been presented today.     DIAGNOSES:  Major depressive disorder without psychotic features  Generalized anxiety disorder  Hx adjustment disorder  Hx insomnia       PLAN:  Psych Med:  Continue Zoloft 200mg daily for depression and anxiety  PDMP reviewed with no irregularities or inconsistencies noted. Continue PRN Klonopin 0.5mg #10 per month for breakthrough anxiety  Recommended for pt to meet with dietician regarding weight gain. Will reach out to bariatrics to suggest pt should be seen in-person for evaluation  Discussed resident transition 7/2024  Discussed diagnosis, risks and benefits of proposed treatment vs alternative treatments vs no treatment, potential side effects of these treatments specifically for benzodiazepines, including but not limited to addictive properties, sleepiness, dizziness, dry mouth, change in appetite, nausea, constipation, sexual dysfunction, feeling  tired and change in weight. Counseled patient to use medication sparingly and cautiously as an abortive for anxiety that does not steve. Counseled patient on not driving heavy equipment while taking this medication and not to take in conjunction with an opioid medication.   Discussed with patient informed consent, risks vs. benefits, alternative treatments, side effect profile and the inherent unpredictability of individual responses to these treatments. Answered any questions patient may have had. The patient expresses understanding of the above and displays the capacity to agree with this current plan       RETURN TO CLINIC:  3 months, sooner if needed      Jessee Mirza MD  [facility] PGY-3  06/14/2024

## 2024-06-21 RX ORDER — SERTRALINE HYDROCHLORIDE 100 MG/1
200 TABLET, FILM COATED ORAL DAILY
Qty: 180 TABLET | Refills: 1 | Status: SHIPPED | OUTPATIENT
Start: 2024-06-21

## 2024-07-17 NOTE — PROGRESS NOTES
Primary Care Provider Appointment    Subjective:      Patient ID: Maria G Cameron is a 51 y.o. female.    Chief Complaint: Follow-up; B/P Check; Results (Would like to discuss lab results); and Migraine (Pt thinks its due to new medication)  Headaches worsened after diuretic discontinuation. With addition of propranolol patient has had minimal control of headaches. She self-titrated her wellbutrin dose to 100mg once daily, with no improvement. She has had 40 pound weight gain in 2 years since moving here, does not sleep well. [place] score of 14. Feels fatigued during the day, continues to have AM headaches daily, and other headaches at unexpected times. Se[facility].    She was found to have an elevated DHEA-S with normal low-dose dexa suppression test. Has history of adrenal adenoma with last imaging 10 years ago. In 2/2016 her urine metanephrine and catecholamines were normal.      Past Surgical History   Procedure Laterality Date    Breast core biopsy  3/7/11     fibrocystic changes    Colonoscopy N/A 4/15/2016     Procedure: COLONOSCOPY;  Surgeon: Coleman Moss MD;  Location: [address]);  Service: Endoscopy;  Laterality: N/A;       Past Medical History   Diagnosis Date    Adrenal adenoma     Allergic rhinitis     Allergy      Allergic to trees, weeds, mold and standardized mite    Anxiety     Eustachian tube dysfunction     Fibrocystic changes of left breast      12 o'clock L breast mass bx 3/7/11    Hypertension     Hypothyroidism     Migraine headache     Recurrent otitis media        Review of Systems   Constitutional: Positive for activity change, fatigue and unexpected weight change.   HENT: Negative for ear discharge.    Eyes: Negative for photophobia and discharge.   Cardiovascular: Negative for chest pain, palpitations and leg swelling.   Gastrointestinal: Negative for constipation and diarrhea.   Endocrine: Negative for polydipsia and polyphagia.  Room 556: Focused wound assess    Sacrum, stage 2 pressure injury, 0.5x0.2x0.1cm,   Periwound hyperpigmentation & leathery skin changes with wrinkles,  Base tissue 100% pink   No drainage.   No wound odor.  Edges attached & defined.   Periwound hyperpigmentation as well as stage 2 ulcerations on left & right side.   Silicone heart dressing in use.   POA.   Encouraged to turn & shift hips in bed to relieve pressure, which pt can do on his own.   Both heels are intact, but pt states they feel sore.  Pillows lengthwise applied under each each to float heels.  Will turn over care to unit nursing staff at this time & sign off.   Katya BOYLE, RN, CWOCN, CFCN   "  Genitourinary: Negative for frequency and menstrual problem.   Musculoskeletal: Negative for back pain and gait problem.   Skin: Negative for color change.   Neurological: Positive for tremors and headaches. Negative for light-headedness.   Psychiatric/Behavioral: Negative for confusion.       Objective:     Visit Vitals    BP (!) 148/98 (BP Location: Right arm, Patient Position: Sitting, BP Method: Manual)    Pulse 71    Resp 18    Ht 5' 8" (1.727 m)    Wt 108.5 kg (239 lb 3.2 oz)    SpO2 96%    BMI 36.37 kg/m2       Physical Exam   Constitutional: She is oriented to person, place, and time. She appears well-developed.   obese   HENT:   Head: Normocephalic and atraumatic.   Eyes:   Mild eye swelling   Neck: Normal range of motion.   Musculoskeletal: She exhibits edema.   Facial edema   Neurological: She is alert and oriented to person, place, and time.   Tremor improved since last exam   Psychiatric: She has a normal mood and affect. Judgment and thought content normal.   Anxious demeanor improved since last exam   Vitals reviewed.      Lab Results   Component Value Date    WBC 4.81 01/13/2017    HGB 14.6 01/13/2017    HCT 43.1 01/13/2017     01/13/2017    CHOL 264 (H) 01/13/2017    TRIG 102 01/13/2017    HDL 61 01/13/2017    ALT 30 01/13/2017    AST 27 01/13/2017     01/13/2017    K 4.1 01/13/2017     01/13/2017    CREATININE 0.9 01/13/2017    BUN 13 01/13/2017    CO2 29 01/13/2017    TSH 5.190 (H) 01/13/2017    HGBA1C 5.7 01/13/2017         Assessment:   51 y.o. female with multiple co-morbid illnesses here to establish care with new PCP and continue work-up of chronic issues notably headache, HTN, anxiety.     Plan:     Problem List Items Addressed This Visit        Neuro    Anxiety     Patient with generalized anxiety, taking buproprion for unknown years  · Self-titrated wellbutrin to 100mg  · In future will switch to SSRI  · Continue propranolol 40mg TID         Relevant Medications "    acetaZOLAMIDE (DIAMOX) 500 mg CpSR    Other Relevant Orders    CT Abdomen Pelvis With Contrast    Polysomnogram (CPAP will be added if patient meets diagnostic criteria.)    Chronic intractable headache     Differential includes pseudotumor cerebri, sleep apnea, migraine. Uncontrolled with phenergran and fioricet abortive therapies with propranolol preventive therapy. Did not tolerate imitrex, topiramate.   · Continue propranolol 40mg TID for migraine prophylaxis  · Referral for second opinion to Dr Elizabeth at [facility] ([facility])  · Continue abortive therapy scripts for migraine  · Start acetazolomide for possible pseudotumor cerebri  · Sleep study for sleep apnea eval ([place] score 14)  · Advised weight loss  · Barak  at next appt         Benign essential tremor     Lifetime essential tremor, familial. Significant improvement with propranolol.  · Continue propranolol 40mg TID            Cardiac    Hypertension     BP uncontrolled on ACE and propranolol (HCTZ discontinued at last appt)  · Start diuretic acetazolamide (also for psudotumor cerebri)  · 500mg BID  · Advised to start this weekend, and call office on monday  · Continue ACE 20mg  · Continue propranolol 40mg TID            Hem/Onc    Adrenal adenoma - Primary     Patient with adrenal adenoma diagnosed 15 Y ago, with MRI. Last labs 2016. Chronic complaints of headache, weight chages, anxiety.   · Endocrinology referral set-up today  · low-dose cortisol suppression test normal  · DHEA-S elevated  · Plasma aldosterone normal  · Renin activity normal  · CT abdomen/pelvis with contrast ordered         Relevant Orders    CT Abdomen Pelvis With Contrast       Endocrine    Hypothyroidism     Levo 75mcg, TSH elevated in 1/2017  · Needs increase in levo dosing  · Continue current medications until next appt  · BP meds changed today, concern for side-effects with multi med changes at one appt               Health Maintenance       Date Due  Completion Date    TETANUS VACCINE 1/2/2012 1/2/2002    Mammogram 1/12/2018 1/12/2017 (Not Clinical)    Override on 1/12/2017: Not Clinically Appropriate (no longer recommended every 10 years)    Override on 12/8/2011: Done    Override on 12/8/2011: Done    Lipid Panel 1/13/2018 1/13/2017    Pap Smear 1/12/2020 1/12/2017 (Not Clinical)    Override on 1/12/2017: Not Clinically Appropriate (last 3 PAPs normal)    Override on 5/24/2013: Done    Colonoscopy 4/15/2026 4/15/2016    Pneumococcal PPSV23 (Medium Risk) (2) 9/18/2028 1/1/2013          Return in about 1 week (around 2/2/2017) for established patient follow-up. . One hour spent with this patient today, half of that in counseling.    Pearl Ellis MD/MPH  Internal Medicine  [facility]  [phone]

## 2024-08-02 ENCOUNTER — TELEPHONE (OUTPATIENT)
Dept: PRIMARY CARE CLINIC | Facility: CLINIC | Age: 59
End: 2024-08-02
Payer: COMMERCIAL

## 2024-08-05 DIAGNOSIS — F41.1 GENERALIZED ANXIETY DISORDER: ICD-10-CM

## 2024-08-05 RX ORDER — CLONAZEPAM 0.5 MG/1
0.5 TABLET ORAL DAILY
Qty: 10 TABLET | Refills: 0 | Status: SHIPPED | OUTPATIENT
Start: 2024-08-05

## 2024-08-09 ENCOUNTER — HOSPITAL ENCOUNTER (OUTPATIENT)
Dept: RADIOLOGY | Facility: HOSPITAL | Age: 59
Discharge: HOME | End: 2024-08-09
Attending: INTERNAL MEDICINE
Payer: COMMERCIAL

## 2024-08-09 DIAGNOSIS — J47.9 BRONCHIECTASIS WITHOUT COMPLICATION: ICD-10-CM

## 2024-08-09 PROCEDURE — 71271 CT THORAX LUNG CANCER SCR C-: CPT | Mod: 26,,, | Performed by: RADIOLOGY

## 2024-08-09 PROCEDURE — 71271 CT THORAX LUNG CANCER SCR C-: CPT | Mod: TC

## 2024-08-16 ENCOUNTER — TELEPHONE (OUTPATIENT)
Dept: PRIMARY CARE CLINIC | Facility: CLINIC | Age: 59
End: 2024-08-16
Payer: COMMERCIAL

## 2024-08-16 NOTE — TELEPHONE ENCOUNTER
RN spoke to pt and explained information.  Pt expressed understanding.    ----- Message from Jairo Bowens MD sent at 8/15/2024  4:41 PM CDT -----  Notify patient CT appears normal.  Need to schedule repeat in one year

## 2024-08-21 ENCOUNTER — TELEPHONE (OUTPATIENT)
Dept: PRIMARY CARE CLINIC | Facility: CLINIC | Age: 59
End: 2024-08-21
Payer: COMMERCIAL

## 2024-08-21 DIAGNOSIS — Z87.891 HISTORY OF SMOKING: Primary | ICD-10-CM

## 2024-08-21 DIAGNOSIS — Z12.2 ENCOUNTER FOR SCREENING FOR LUNG CANCER: ICD-10-CM

## 2024-08-21 NOTE — TELEPHONE ENCOUNTER
Reordered CT of lungs and postdated for a yr from now.  Lung cancer screening, >= 20 pk-yr smoking history, risk factor(s) (Age >= 50y); Bronchiectasis, uncomplicated

## 2024-09-04 ENCOUNTER — TELEPHONE (OUTPATIENT)
Dept: PRIMARY CARE CLINIC | Facility: CLINIC | Age: 59
End: 2024-09-04
Payer: COMMERCIAL

## 2024-09-04 NOTE — TELEPHONE ENCOUNTER
RN spoke to the pt and explained that Dr. Ellis is out until next year and Dr. Vaughn is seeing her patients in the meantime but she will go back on Dr. Ellis's scheduled when she returns.    ----- Message from Bianka Snyder sent at 9/4/2024  6:46 AM CDT -----  Regarding: Consult/Advisrory  Contact: MASSIEL MUÑOZ [[id number]]  CONSULT/ADVISORY    Name of Caller:  MASSIEL MUÑOZ [[id number]]    Contact Preference:  [phone] (home) [phone] (work)      Nature of Call:  Pt is requesting to be scheduled with Dr Ellis only.  Pt states she doesn't want to see the provider she's scheduled with on 12/02/2024.  Please call.

## 2024-09-13 DIAGNOSIS — F41.1 GENERALIZED ANXIETY DISORDER: ICD-10-CM

## 2024-09-13 RX ORDER — CLONAZEPAM 0.5 MG/1
0.5 TABLET ORAL DAILY
Qty: 10 TABLET | Refills: 0 | Status: SHIPPED | OUTPATIENT
Start: 2024-09-13

## 2024-09-13 RX ORDER — CLONAZEPAM 0.5 MG/1
0.5 TABLET ORAL DAILY
Qty: 10 TABLET | Refills: 0 | Status: CANCELLED | OUTPATIENT
Start: 2024-09-13

## 2024-09-19 ENCOUNTER — OFFICE VISIT (OUTPATIENT)
Dept: PSYCHIATRY | Facility: CLINIC | Age: 59
End: 2024-09-19
Payer: COMMERCIAL

## 2024-09-19 VITALS
SYSTOLIC BLOOD PRESSURE: 139 MMHG | WEIGHT: 279.63 LBS | BODY MASS INDEX: 42.52 KG/M2 | HEART RATE: 92 BPM | DIASTOLIC BLOOD PRESSURE: 69 MMHG

## 2024-09-19 DIAGNOSIS — F43.23 ADJUSTMENT DISORDER WITH MIXED ANXIETY AND DEPRESSED MOOD: Primary | ICD-10-CM

## 2024-09-19 DIAGNOSIS — F41.9 ANXIETY: ICD-10-CM

## 2024-09-19 PROCEDURE — 99999 PR PBB SHADOW E&M-EST. PATIENT-LVL II: CPT | Mod: PBBFAC,,, | Performed by: FAMILY MEDICINE

## 2024-09-19 RX ORDER — CLONAZEPAM 0.5 MG/1
0.5 TABLET ORAL DAILY PRN
Qty: 10 TABLET | Refills: 0 | Status: SHIPPED | OUTPATIENT
Start: 2024-10-19

## 2024-09-19 RX ORDER — SERTRALINE HYDROCHLORIDE 100 MG/1
200 TABLET, FILM COATED ORAL DAILY
Qty: 180 TABLET | Refills: 1 | Status: SHIPPED | OUTPATIENT
Start: 2024-09-19

## 2024-09-27 ENCOUNTER — OFFICE VISIT (OUTPATIENT)
Dept: INTERNAL MEDICINE | Facility: CLINIC | Age: 59
End: 2024-09-27
Payer: COMMERCIAL

## 2024-09-27 ENCOUNTER — PATIENT MESSAGE (OUTPATIENT)
Dept: INTERNAL MEDICINE | Facility: CLINIC | Age: 59
End: 2024-09-27

## 2024-09-27 DIAGNOSIS — E66.01 MORBID OBESITY DUE TO EXCESS CALORIES: Primary | ICD-10-CM

## 2024-09-27 RX ORDER — TIRZEPATIDE 2.5 MG/.5ML
2.5 INJECTION, SOLUTION SUBCUTANEOUS
Qty: 2 ML | Refills: 0 | Status: SHIPPED | OUTPATIENT
Start: 2024-09-27

## 2024-09-27 RX ORDER — TIRZEPATIDE 5 MG/.5ML
5 INJECTION, SOLUTION SUBCUTANEOUS
Qty: 2 ML | Refills: 1 | Status: SHIPPED | OUTPATIENT
Start: 2024-10-27

## 2024-09-27 NOTE — PATIENT INSTRUCTIONS
Zepbound Patient Education  Savings Card  Follow this link to sign up for your Zepbound savings card. You will need this information when the [facility] pharmacy contacts you to help pay for your prescription.  Zepbound Savings Card    Dosing Schedule      Choosing an Injection Site and Using your Pen       - Pens are stored in the refrigerator and can be removed 20-30 minutes before the injection to allow the medication to come to room temperature to avoid irritation, burning, or bruising with injection.     What to Expect      Rare, but Serious Side Effects  - Zepbound may cause pancreatitis or gallstones. If you experience severe abdominal pain that may radiate to the back and may or may not be accompanied by vomiting, you are encouraged to seek medical attention to assess your symptoms.     Reproduction, Pregnancy, and Lactation Considerations  - Zepbound is not recommended for use in patients who are currently pregnant or breastfeeding.   - If you plan to become pregnant, the use of this medication is not recommended at the time of conception. It is recommended that this medication should be discontinued at least 2 months prior to conception.     Patient's using Oral Contraceptives  - Use of Zepbound may decrease the effectiveness of your oral hormonal contraceptives. You may consider switching to an alternative therapy.  - Otherwise, while using Zepbound alongside your oral hormonal contraceptive, you should add a barrier method of contraception for 4 weeks after initiation and for 4 weeks after each dose titration of Zepbound.     Missed or Changing Your Dosing Schedule  - If you miss a dose of Zepbound, take it as soon as possible, within 4 days of your scheduled dose. If more than 4 days have passed, skip the missed dose and take your next dose on your regularly scheduled day.  - If you would like to change the day of the week you take your Zepbound dose, make sure there are at least 3 days  between doses.

## 2024-09-27 NOTE — PROGRESS NOTES
Patient ID: Maria G Cameron is a 59 y.o. White female    Subjective  Chief Complaint: patient presents for medical weight loss management.    Contraindications to GLP-1 receptor agonist therapy:   Denies personal or family history of MTC, personal history of MEN2, history of allergic reaction while taking a GLP-1 receptor agonist, and history of pancreatitis while taking a GLP-1 receptor agonist     Pregnancy Status:   - Pt denies current pregnancy, breastfeeding, or plans to become pregnant.  - Pt denies current use of oral hormonal contraception.   - Pt has been informed about hormonal changes and potential to affect fertility while taking this medication.     Co-morbidities: HTN, DLD    History of weight loss therapy:  None    Weight loss history:  Starting weight:    9/27/2024   Recent Readings    Weight (lbs) 277 lb    BMI 42.11 BMI      Objective  Lab Results   Component Value Date     12/21/2023     12/12/2022     10/20/2021     Lab Results   Component Value Date    K 4.1 12/21/2023    K 3.8 12/12/2022    K 3.9 10/20/2021     Lab Results   Component Value Date     12/21/2023     12/12/2022     10/20/2021     Lab Results   Component Value Date    CO2 30 (H) 12/21/2023    CO2 25 12/12/2022    CO2 25 10/20/2021     Lab Results   Component Value Date    BUN 20 12/21/2023    BUN 16 12/12/2022    BUN 19 10/20/2021     Lab Results   Component Value Date    GLU 98 12/21/2023     12/12/2022    GLU 92 10/20/2021     Lab Results   Component Value Date    CALCIUM 9.4 12/21/2023    CALCIUM 9.5 12/12/2022    CALCIUM 9.7 10/20/2021     Lab Results   Component Value Date    PROT 7.2 12/21/2023    PROT 7.3 12/12/2022    PROT 7.4 10/20/2021     Lab Results   Component Value Date    ALBUMIN 3.9 12/21/2023    ALBUMIN 3.9 12/12/2022    ALBUMIN 4.1 10/20/2021     Lab Results   Component Value Date    BILITOT 0.4 12/21/2023    BILITOT 0.4 12/12/2022    BILITOT 0.3 10/20/2021     Lab Results    Component Value Date    AST 17 12/21/2023    AST 17 12/12/2022    AST 21 10/20/2021     Lab Results   Component Value Date    ALT 19 12/21/2023    ALT 21 12/12/2022    ALT 24 10/20/2021     Lab Results   Component Value Date    ANIONGAP 6 (L) 12/21/2023    ANIONGAP 12 12/12/2022    ANIONGAP 12 10/20/2021     Lab Results   Component Value Date    CREATININE 0.8 12/21/2023    CREATININE 0.8 12/12/2022    CREATININE 0.8 10/20/2021     Lab Results   Component Value Date    EGFRNORACEVR >60.0 12/21/2023    EGFRNORACEVR >60.0 12/12/2022     Assessment/Plan  -Pt qualifies for GLP-1 RA therapy based on BMI greater than or equal to 30 kg/m2  -Initiate Zepbound 2.5 mg once weekly for 1 month  -Then increase to Zepbound 5 mg once weekly  -RTC in 3 months     Patient consented to pharmacist management via collaborative practice.

## 2024-10-16 DIAGNOSIS — F41.1 GENERALIZED ANXIETY DISORDER: ICD-10-CM

## 2024-10-16 RX ORDER — CLONAZEPAM 0.5 MG/1
0.5 TABLET ORAL DAILY
Qty: 10 TABLET | Refills: 0 | OUTPATIENT
Start: 2024-10-16

## 2024-10-18 DIAGNOSIS — F41.1 GENERALIZED ANXIETY DISORDER: ICD-10-CM

## 2024-10-18 RX ORDER — CLONAZEPAM 0.5 MG/1
0.5 TABLET ORAL DAILY
Qty: 10 TABLET | Refills: 0 | OUTPATIENT
Start: 2024-10-18

## 2024-11-11 DIAGNOSIS — I10 ESSENTIAL HYPERTENSION: ICD-10-CM

## 2024-11-11 RX ORDER — HYDROCHLOROTHIAZIDE 25 MG/1
25 TABLET ORAL
Qty: 90 TABLET | Refills: 0 | Status: SHIPPED | OUTPATIENT
Start: 2024-11-11

## 2024-12-02 ENCOUNTER — OFFICE VISIT (OUTPATIENT)
Dept: PSYCHIATRY | Facility: CLINIC | Age: 59
End: 2024-12-02
Payer: COMMERCIAL

## 2024-12-02 ENCOUNTER — HOSPITAL ENCOUNTER (OUTPATIENT)
Dept: RADIOLOGY | Facility: HOSPITAL | Age: 59
Discharge: HOME | End: 2024-12-02
Attending: INTERNAL MEDICINE
Payer: COMMERCIAL

## 2024-12-02 ENCOUNTER — OFFICE VISIT (OUTPATIENT)
Dept: PRIMARY CARE CLINIC | Facility: CLINIC | Age: 59
End: 2024-12-02
Payer: COMMERCIAL

## 2024-12-02 VITALS
HEIGHT: 68 IN | WEIGHT: 266.56 LBS | OXYGEN SATURATION: 95 % | BODY MASS INDEX: 40.4 KG/M2 | DIASTOLIC BLOOD PRESSURE: 78 MMHG | SYSTOLIC BLOOD PRESSURE: 134 MMHG | HEART RATE: 77 BPM | RESPIRATION RATE: 18 BRPM | TEMPERATURE: 98 F

## 2024-12-02 DIAGNOSIS — F33.1 MODERATE EPISODE OF RECURRENT MAJOR DEPRESSIVE DISORDER: ICD-10-CM

## 2024-12-02 DIAGNOSIS — G43.909 MIGRAINE WITHOUT STATUS MIGRAINOSUS, NOT INTRACTABLE, UNSPECIFIED MIGRAINE TYPE: ICD-10-CM

## 2024-12-02 DIAGNOSIS — F43.23 ADJUSTMENT DISORDER WITH MIXED ANXIETY AND DEPRESSED MOOD: Primary | ICD-10-CM

## 2024-12-02 DIAGNOSIS — I10 ESSENTIAL HYPERTENSION: Primary | ICD-10-CM

## 2024-12-02 DIAGNOSIS — J47.9 BRONCHIECTASIS WITHOUT COMPLICATION: ICD-10-CM

## 2024-12-02 DIAGNOSIS — G89.29 CHRONIC INTRACTABLE HEADACHE, UNSPECIFIED HEADACHE TYPE: ICD-10-CM

## 2024-12-02 DIAGNOSIS — E78.2 MIXED HYPERLIPIDEMIA: ICD-10-CM

## 2024-12-02 DIAGNOSIS — R51.9 CHRONIC INTRACTABLE HEADACHE, UNSPECIFIED HEADACHE TYPE: ICD-10-CM

## 2024-12-02 DIAGNOSIS — E03.9 ACQUIRED HYPOTHYROIDISM: ICD-10-CM

## 2024-12-02 DIAGNOSIS — Z12.31 ENCOUNTER FOR SCREENING MAMMOGRAM FOR BREAST CANCER: ICD-10-CM

## 2024-12-02 DIAGNOSIS — E66.01 CLASS 3 SEVERE OBESITY DUE TO EXCESS CALORIES WITHOUT SERIOUS COMORBIDITY WITH BODY MASS INDEX (BMI) OF 40.0 TO 44.9 IN ADULT: ICD-10-CM

## 2024-12-02 DIAGNOSIS — E66.813 CLASS 3 SEVERE OBESITY DUE TO EXCESS CALORIES WITHOUT SERIOUS COMORBIDITY WITH BODY MASS INDEX (BMI) OF 40.0 TO 44.9 IN ADULT: ICD-10-CM

## 2024-12-02 DIAGNOSIS — I10 PRIMARY HYPERTENSION: ICD-10-CM

## 2024-12-02 PROBLEM — Z12.39 BREAST CANCER SCREENING: Status: RESOLVED | Noted: 2019-01-30 | Resolved: 2024-12-02

## 2024-12-02 PROBLEM — R07.89 CHEST DISCOMFORT: Status: RESOLVED | Noted: 2019-01-31 | Resolved: 2024-12-02

## 2024-12-02 PROBLEM — Z01.818 PRE-OP EVALUATION: Status: RESOLVED | Noted: 2023-10-10 | Resolved: 2024-12-02

## 2024-12-02 PROBLEM — Z12.4 PAP SMEAR FOR CERVICAL CANCER SCREENING: Status: RESOLVED | Noted: 2019-12-16 | Resolved: 2024-12-02

## 2024-12-02 PROCEDURE — 4010F ACE/ARB THERAPY RXD/TAKEN: CPT | Mod: CPTII,S$GLB,, | Performed by: HOSPITALIST

## 2024-12-02 PROCEDURE — 99999 PR PBB SHADOW E&M-EST. PATIENT-LVL II: CPT | Mod: PBBFAC,,, | Performed by: FAMILY MEDICINE

## 2024-12-02 PROCEDURE — 99215 OFFICE O/P EST HI 40 MIN: CPT | Mod: S$GLB,,, | Performed by: HOSPITALIST

## 2024-12-02 PROCEDURE — 77067 SCR MAMMO BI INCL CAD: CPT | Mod: 26,,, | Performed by: RADIOLOGY

## 2024-12-02 PROCEDURE — 3078F DIAST BP <80 MM HG: CPT | Mod: CPTII,S$GLB,, | Performed by: HOSPITALIST

## 2024-12-02 PROCEDURE — 3075F SYST BP GE 130 - 139MM HG: CPT | Mod: CPTII,S$GLB,, | Performed by: HOSPITALIST

## 2024-12-02 PROCEDURE — 3008F BODY MASS INDEX DOCD: CPT | Mod: CPTII,S$GLB,, | Performed by: HOSPITALIST

## 2024-12-02 PROCEDURE — 77067 SCR MAMMO BI INCL CAD: CPT | Mod: TC

## 2024-12-02 PROCEDURE — 77063 BREAST TOMOSYNTHESIS BI: CPT | Mod: 26,,, | Performed by: RADIOLOGY

## 2024-12-02 RX ORDER — SERTRALINE HYDROCHLORIDE 100 MG/1
200 TABLET, FILM COATED ORAL DAILY
Qty: 180 TABLET | Refills: 0 | Status: SHIPPED | OUTPATIENT
Start: 2024-12-02 | End: 2025-03-04

## 2024-12-02 RX ORDER — CLONAZEPAM 0.5 MG/1
0.5 TABLET ORAL DAILY PRN
Qty: 10 TABLET | Refills: 0 | Status: SHIPPED | OUTPATIENT
Start: 2024-12-02 | End: 2025-12-02

## 2024-12-02 RX ORDER — PROMETHAZINE HYDROCHLORIDE 50 MG/1
50 SUPPOSITORY RECTAL EVERY 6 HOURS PRN
Qty: 12 SUPPOSITORY | Refills: 2 | Status: SHIPPED | OUTPATIENT
Start: 2024-12-02

## 2024-12-02 RX ORDER — BUTALBITAL, ACETAMINOPHEN AND CAFFEINE 50; 325; 40 MG/1; MG/1; MG/1
1 TABLET ORAL EVERY 4 HOURS PRN
Qty: 90 TABLET | Refills: 3 | Status: SHIPPED | OUTPATIENT
Start: 2024-12-02

## 2024-12-02 RX ORDER — LEVOTHYROXINE SODIUM 125 UG/1
125 TABLET ORAL
Qty: 90 TABLET | Refills: 3 | Status: SHIPPED | OUTPATIENT
Start: 2024-12-02

## 2024-12-02 RX ORDER — HYDROCHLOROTHIAZIDE 25 MG/1
25 TABLET ORAL DAILY
Qty: 90 TABLET | Refills: 0 | Status: SHIPPED | OUTPATIENT
Start: 2024-12-02

## 2024-12-02 RX ORDER — ENALAPRIL MALEATE 20 MG/1
20 TABLET ORAL DAILY
Qty: 90 TABLET | Refills: 3 | Status: SHIPPED | OUTPATIENT
Start: 2024-12-02 | End: 2024-12-03 | Stop reason: SDUPTHER

## 2024-12-02 RX ORDER — VERAPAMIL HYDROCHLORIDE 360 MG/1
360 CAPSULE, DELAYED RELEASE PELLETS ORAL DAILY
Qty: 90 CAPSULE | Refills: 3 | Status: SHIPPED | OUTPATIENT
Start: 2024-12-02 | End: 2025-12-02

## 2024-12-02 NOTE — ASSESSMENT & PLAN NOTE
BP at goal on verapamil 360, enalapril 20mg, HCTZ 25mg.  Advised that as she loses weight her BP medication requirement will likely decrease, and if she starts experiencing orthostatic symptoms stop taking the HCTZ and let us know.

## 2024-12-02 NOTE — ASSESSMENT & PLAN NOTE
Known from prior imaging.  Pt claims she has had a pneumonia shot before but it is not showing up on our records.  If she has had one she would not need another until age 63.  Need to verify if all her prior immunizations are showing up in our system.

## 2024-12-02 NOTE — PROGRESS NOTES
"Outpatient Psychiatry Follow-Up Visit (MD/RADHA)    2024    Clinical Status of Patient:  Outpatient (Ambulatory)    Chief Complaint:  Maria G Cameron is a 59 y.o. female who presents today for follow-up of anxiety.  Met with patient.      Interval History and Content of Current Session 2024:    Pt reports today: "Up and down but I am good"    Mood overall is "good states a little anxiety"    Rates depression as 0/10 and anxiety as 5/10 over the past 2 weeks.    Patients mood is anxious, affect appears anxious. Linear and logical, friendly and cooperative, good eye contact.    Denies SI/HI/AVH. Pt reports sleeping well and normal appetite. Denies side effects of medications.    Pt reports taking medications as prescribed and behaving appropriately during interview today. States she is fine on the Zoloft 200 mg states it takes the edge off but she still has spikes of anxiety. States she would like to discuss other medication options for anxiety after the holidays. States work is always a stressor in her life. States she moved to get away from her family and now they pop up to stay with her uninvited. States she invited her niece to stay with her but it's hard because her apartment is very small. Patient states her sister is coming to visit this week for 2 weeks and she didn't invite her to come. Patient states when her mom was sick and after she  her sister did some mean things and now the family is not talking to her. Patient states she doesn't know how the visit will go.   States she is more anxious at times and she is still taking Klonopin 0.5 mg but she cuts the tab in half. States she takes it as needed once a day. States when she takes it she notices it helps.   States she has herself to live for.     Psychotherapy:  Target symptoms: anxiety   Why chosen therapy is appropriate versus another modality: relevant to diagnosis  Outcome monitoring methods: self-report, observation  Therapeutic intervention " type: insight oriented psychotherapy  Topics discussed/themes:  family issues, difficulty managing affect in interpersonal relationships, building skills sets for symptom management  The patient's response to the intervention is accepting. The patient's progress toward treatment goals is fair.   Duration of intervention: 17 minutes.      Prior visit Pt reports taking medications as prescribed and behaving appropriately during interview today.  States her job and her family are stressors. States her family popped up and visited her and patient states she didn't like that. Patient states she has a smaller apartment so they wouldn't come. Patient states she can't tell her family no. States that is why she moved to [place] by herself to get away from her family. States she moved here to get away from them because she is always the one that they call. States she reads and watches TV for fun. :            Review of Systems     ROS    Psychiatric Review Of Systems - Is patient experiencing or having changes in:  sleep: yes  appetite: states she started on diet medication and states appetite is steady.   weight: no  energy/anergy: yes  interest/pleasure/anhedonia: yes  somatic symptoms: no  libido: no  anxiety/panic: yes states she is anxious and states she had little moments where she felt she may have a panic attack but didn't   guilty/hopelessness: no  concentration: yes  S.I.B.s/risky behavior: no  Irritability: yes  Racing thoughts: yes  Impulsive behaviors: no  Paranoia: no  AVH: no      Past Medical, Family and Social History: The patient's past medical, family and social history have been reviewed and updated as appropriate within the electronic medical record - see encounter notes.      Current Medications:   Medication List with Changes/Refills   Current Medications    BUTALBITAL-ACETAMINOPHEN-CAFFEINE -40 MG (FIORICET, ESGIC) -40 MG PER TABLET    Take 1 tablet by mouth every 4 (four) hours as  "needed for Pain.    CLONAZEPAM (KLONOPIN) 0.5 MG TABLET    Take 1 tablet (0.5 mg total) by mouth daily as needed for Anxiety.    ENALAPRIL (VASOTEC) 20 MG TABLET    Take 1 tablet (20 mg total) by mouth once daily.    HYDROCHLOROTHIAZIDE (HYDRODIURIL) 25 MG TABLET    Take 1 tablet (25 mg total) by mouth once daily.    LEVOTHYROXINE (SYNTHROID) 125 MCG TABLET    Take 1 tablet (125 mcg total) by mouth before breakfast.    PROMETHAZINE (PROMETHEGAN) 50 MG SUPPOSITORY    Unwrap and insert 1 suppository (50 mg total) rectally every 6 (six) hours as needed for Nausea.    SERTRALINE (ZOLOFT) 100 MG TABLET    Take 2 tablets (200 mg total) by mouth once daily.    TIRZEPATIDE, WEIGHT LOSS, (ZEPBOUND) 5 MG/0.5 ML PNIJ    Inject 5 mg into the skin every 7 days.    VERAPAMIL (VERELAN) 360 MG C24P    Take 1 capsule (360 mg total) by mouth once daily.         Allergies:   Review of patient's allergies indicates:   Allergen Reactions    Adhesive Blisters    Imitrex [sumatriptan] Shortness Of Breath    Codeine Nausea And Vomiting         Vitals   There were no vitals filed for this visit.       Labs/Imaging/Studies:   No results found for this or any previous visit (from the past 48 hours).   No results found for: "PHENYTOIN", "PHENOBARB", "VALPROATE", "CBMZ"    Compliance: yes    Side effects: None    Risk Parameters:  Patient reports no suicidal ideation  Patient reports no homicidal ideation  Patient reports no self-injurious behavior  Patient reports no violent behavior    Exam (detailed: at least 9 elements; comprehensive: all 15 elements)   Constitutional  Vitals:  Most recent vital signs, dated less than 90 days prior to this appointment, were reviewed.   There were no vitals filed for this visit.     General:  unremarkable, age appropriate, casually dressed, well dressed     Musculoskeletal  Muscle Strength/Tone:  not examined   Gait & Station:  non-ataxic     Psychiatric  Speech:  no latency; no press   Mood & Affect:  " anxious  congruent and appropriate   Thought Process:  normal and logical   Associations:  intact   Thought Content:  normal, no suicidality, no homicidality, delusions, or paranoia   Insight:  limited awareness of illness   Judgement: behavior is adequate to circumstances, age appropriate   Orientation:  grossly intact, person, place, situation, time/date, day of week, month of year, year   Memory: intact for content of interview, grossly intact   Language: grossly intact   Attention Span & Concentration:  able to focus   Fund of Knowledge:  intact and appropriate to age and level of education     Assessment and Diagnosis   Status/Progress: Based on the examination today, the patient's problem(s) is/are resolving.  New problems have not been presented today.   Co-morbidities are not complicating management of the primary condition.  There are no active rule-out diagnoses for this patient at this time.     General Impression:    Adjustment disorder with mixed anxiety and depressed mood, Moderate episode of recurrent major depressive disorder.     Intervention/Counseling/Treatment Plan   Medication Management: Continue current medications. The risks and benefits of medication were discussed with the patient.    Continue Zoloft 200 mg daily  -Continue Klonopin 0.5 mg as needed for anxiety 10 tabs given  Will discuss other options for anxiety medication such as Prozac, and Effexor.   Discussed with patient about healthy boundaries with her family.       The risks and benefits of medication were discussed with the patient.  Discussed diagnosis, risks and benefits of proposed treatment above vs alternative treatments vs no treatment, and potential side effects of these treatments. The patient expresses understanding of the above and displays the capacity to agree with this treatment given said understanding. Patient also agrees that, currently, the benefits outweigh the risks and would like to pursue treatment at this  time.  Discussed inherent unpredictability of medications in each individual.   Encouraged Patient to keep future appointments.   Take medications as prescribed and abstain from substance abuse.   In the event of an emergency patient was advised to go to the emergency room      Return to Clinic: 3 months        PALLAVI Montaño      Total face to face time: 56 min        *This note has been prepared using a combination of a dictation device and typing.  It has been checked for errors but some errors may still exist within the note as a result of speech recognition errors and/or typographical errors.

## 2024-12-02 NOTE — PROGRESS NOTES
[facility]      Subjective:      Patient ID: Maria G Cameron is a 59 y.o. female with   Past Medical History:   Diagnosis Date    Adrenal adenoma     Allergic rhinitis     Allergy     Allergic to trees, weeds, mold and standardized mite    Anxiety     Eustachian tube dysfunction     Fibrocystic changes of left breast     12 o'clock L breast mass bx 3/7/11    Hypertension     Hypothyroidism     Migraine headache     Recurrent otitis media            Chief Complaint: Follow-up and Hypertension    Prior to this visit, patient's last encounter with PCP was Visit date not found.    Pt reports no complaints.  Needs refill on all medications.  Takes Fioricet about 3x/mo for migraines; has had since age 13.  Saw Neurology in past but not any longer.  Not on any prophylaxis due to intolerance of prior tx such as topiramate or botox.  Recently started Zepbound; doing well w/o s/e.  Has lost a little weight so far.  Doing the "[employer] weight management program for employees.  Walks for exercise; b/t  minutes about 5d/wk on average.  Tracking activity on phone.  Has also changed eating habits since starting program.  Increasing vegetables, eating smaller portions.                Medications: Does not have pill packs              4Ms for Medical Decision-Making in Older Adults    Last Completed EAWV: None    MOBILITY:  Get Up and Go:       No data to display              Activities of Daily Living:       No data to display              Whisper Test:       No data to display              Disability Status:       No data to display              Nutrition Screening:       No data to display             Screening Score: 0-7 Malnourished, 8-11 At Risk, 12-14 Normal  Fall Risk:      12/2/2024     8:00 AM 12/8/2023    10:00 AM 11/22/2023     8:00 AM   Fall Risk Assessment - Outpatient   Mobility Status Ambulatory Ambulatory w/ assistance Ambulatory   Number of  falls 0 1 with injury 0   Identified as fall risk False True False           MENTATION:   Depression Patient Health Questionnaire:      12/2/2024     8:21 AM   Depression Patient Health Questionnaire   Over the last two weeks how often have you been bothered by little interest or pleasure in doing things Not at all   Over the last two weeks how often have you been bothered by feeling down, depressed or hopeless Not at all   PHQ-2 Total Score 0     Has Dementia Dx: No  Has Anxiety Dx: Yes    Cognitive Function Screening:       No data to display              Cognitive Function Screening Total - Less than 4 = Abnormal,  Greater than or equal to 4 = Normal        MEDICATIONS:  High Risk Medications:  Total Active Medications: 2  clonazePAM - 0.5 MG, 0.5 MG  sertraline - 100 MG    WHAT MATTERS MOST:  Advance Care Planning   ACP Status:   Patient has had an ACP conversation  Living Will: No  Power of : No  La[place]: No    What is most important right now is to focus on curative/life-prolongation (regardless of treatment burdens)    Accordingly, we have decided that the best plan to meet the patient's goals includes continuing with treatment      What matters most to patient today is: refilling meds                 Social History     Socioeconomic History    Marital status: Single   Occupational History    Occupation:      Comment: clinical    Tobacco Use    Smoking status: Former     Current packs/day: 0.00     Average packs/day: 0.3 packs/day for 40.0 years (10.0 ttl pk-yrs)     Types: Cigarettes     Start date: 10/1/1981     Quit date: 10/1/2021     Years since quitting: 3.1    Smokeless tobacco: Never   Substance and Sexual Activity    Alcohol use: No     Alcohol/week: 0.0 standard drinks of alcohol    Drug use: No    Sexual activity: Not Currently     Comment: single with no children   Social History Narrative    Lives alone and no assistance with ADLs.     Single with no children     Social  "Drivers of Health     Financial Resource Strain: Low Risk  (3/19/2024)    Overall Financial Resource Strain (CARDIA)     Difficulty of Paying Living Expenses: Not very hard   Food Insecurity: No Food Insecurity (3/19/2024)    Hunger Vital Sign     Worried About Running Out of Food in the Last Year: Never true     Ran Out of Food in the Last Year: Never true   Transportation Needs: No Transportation Needs (3/19/2024)    PRAPARE - Transportation     Lack of Transportation (Medical): No     Lack of Transportation (Non-Medical): No   Physical Activity: Insufficiently Active (3/19/2024)    Exercise Vital Sign     Days of Exercise per Week: 2 days     Minutes of Exercise per Session: 60 min   Stress: Stress Concern Present (3/19/2024)    [employer] [place] [employer] - Occupational Stress Questionnaire     Feeling of Stress : Very much   Housing Stability: Low Risk  (3/19/2024)    Housing Stability Vital Sign     Unable to Pay for Housing in the Last Year: No     Number of Places Lived in the Last Year: 1     Unstable Housing in the Last Year: No       Review of Systems   Constitutional:  Positive for activity change and appetite change.   Respiratory:  Negative for cough and shortness of breath.    Cardiovascular:  Negative for leg swelling.   Gastrointestinal:  Negative for change in bowel habit.   Neurological:  Positive for tremors and headaches.        Objective:   /78 (BP Location: Left arm, Patient Position: Sitting)   Pulse 77   Temp 97.9 °F (36.6 °C) (Oral)   Resp 18   Ht 5' 8" (1.727 m)   Wt 120.9 kg (266 lb 8.6 oz)   LMP 12/16/2015 (Approximate)   SpO2 95%   BMI 40.53 kg/m²     Physical Exam  Vitals reviewed.   Constitutional:       General: She is not in acute distress.     Appearance: Normal appearance. She is obese.   HENT:      Head: Normocephalic and atraumatic.      Right Ear: Ear canal and external ear normal.      Left Ear: Ear canal and external ear normal.      Nose: Nose " normal.      Mouth/Throat:      Mouth: Mucous membranes are moist.      Pharynx: Oropharynx is clear.   Eyes:      General: No scleral icterus.     Conjunctiva/sclera: Conjunctivae normal.   Cardiovascular:      Rate and Rhythm: Normal rate and regular rhythm.      Heart sounds: Normal heart sounds.   Pulmonary:      Effort: Pulmonary effort is normal. No respiratory distress.   Abdominal:      General: There is no distension.      Palpations: Abdomen is soft.      Tenderness: There is no abdominal tenderness. There is no guarding.   Musculoskeletal:         General: Normal range of motion.      Right lower leg: No edema.      Left lower leg: No edema.   Skin:     General: Skin is warm and dry.      Findings: No rash.   Neurological:      General: No focal deficit present.      Mental Status: She is alert and oriented to person, place, and time.              Lab Results   Component Value Date    WBC 4.87 12/21/2023    HGB 14.0 12/21/2023    HCT 41.4 12/21/2023     12/21/2023    CHOL 260 (H) 12/21/2023    TRIG 88 12/21/2023    HDL 59 12/21/2023    ALT 19 12/21/2023    AST 17 12/21/2023     12/21/2023    K 4.1 12/21/2023     12/21/2023    CREATININE 0.8 12/21/2023    BUN 20 12/21/2023    CO2 30 (H) 12/21/2023    TSH 3.670 12/21/2023    HGBA1C 5.5 12/21/2023       Current Outpatient Medications on File Prior to Visit   Medication Sig Dispense Refill    tirzepatide, weight loss, (ZEPBOUND) 5 mg/0.5 mL PnIj Inject 5 mg into the skin every 7 days. 2 mL 1    [DISCONTINUED] butalbital-acetaminophen-caffeine -40 mg (FIORICET, ESGIC) -40 mg per tablet Take 1 tablet by mouth every 4 (four) hours as needed for Pain. 90 tablet 3    [DISCONTINUED] clonazePAM (KLONOPIN) 0.5 MG tablet Take 1 tablet (0.5 mg total) by mouth daily as needed for Anxiety. 10 tablet 0    [DISCONTINUED] enalapril (VASOTEC) 20 MG tablet Take 1 tablet (20 mg total) by mouth once daily. 90 tablet 3    [DISCONTINUED]  hydroCHLOROthiazide (HYDRODIURIL) 25 MG tablet Take 1 tablet by mouth once daily 90 tablet 0    [DISCONTINUED] levothyroxine (SYNTHROID) 125 MCG tablet Take 1 tablet by mouth once daily 90 tablet 3    [DISCONTINUED] promethazine (PROMETHEGAN) 50 MG suppository Unwrap and insert 1 suppository (50 mg total) rectally every 6 (six) hours as needed for Nausea. 12 suppository 1    [DISCONTINUED] sertraline (ZOLOFT) 100 MG tablet Take 2 tablets (200 mg total) by mouth once daily. 180 tablet 1    [DISCONTINUED] verapamiL (VERELAN) 360 MG C24P Take 1 capsule (360 mg total) by mouth once daily. 90 capsule 3    [DISCONTINUED] clonazePAM (KLONOPIN) 0.5 MG tablet Take 1 tablet (0.5 mg total) by mouth once daily. as needed for anxiety. 10 tablet 0     Current Facility-Administered Medications on File Prior to Visit   Medication Dose Route Frequency Provider Last Rate Last Admin    [DISCONTINUED] 0.9%  NaCl infusion   Intravenous Continuous Lubna Fan MD   Stopped at 11/05/21 1605         Assessment:   59 y.o. female with multiple co-morbid illnesses here to follow-up for ongoing chronic disease management and preventive health maintenance.    Plan:     Problem List Items Addressed This Visit       Essential hypertension - Primary     BP at goal on verapamil 360, enalapril 20mg, HCTZ 25mg.  Advised that as she loses weight her BP medication requirement will likely decrease, and if she starts experiencing orthostatic symptoms stop taking the HCTZ and let us know.         Relevant Medications    hydroCHLOROthiazide (HYDRODIURIL) 25 MG tablet    enalapril (VASOTEC) 20 MG tablet    verapamiL (VERELAN) 360 MG C24P    Other Relevant Orders    Comprehensive Metabolic Panel    CBC Auto Differential    Acquired hypothyroidism     Levothyroxine 125mcg refilled; due to update TSH.         Relevant Medications    levothyroxine (SYNTHROID) 125 MCG tablet    Chronic intractable headache    Relevant Medications     butalbital-acetaminophen-caffeine -40 mg (FIORICET, ESGIC) -40 mg per tablet    promethazine (PROMETHEGAN) 50 MG suppository    Mixed hyperlipidemia     Not on statin.  Due to update lipids.         Relevant Orders    Lipid Panel    Hemoglobin A1C    TSH    Class 3 severe obesity due to excess calories without serious comorbidity with body mass index (BMI) of 40.0 to 44.9 in adult     Joined the [employer] weight loss program for [employer] employees and on Zepbound.  Walking daily for exercise and changing eating habits.         Bronchiectasis without complication     Known from prior imaging.  Pt claims she has had a pneumonia shot before but it is not showing up on our records.  If she has had one she would not need another until age 63.  Need to verify if all her prior immunizations are showing up in our system.         Moderate episode of recurrent major depressive disorder     Takes sertraline and clonazepam Rx'd by psychiatrist, who she is also seeing today.         Migraine without status migrainosus, not intractable     No longer seeing a neurologist; takes approximately 3 Fioricet monthly.  Does not warrant prophylactic therapy at this time and has not tolerated a few therapies already.         Relevant Medications    butalbital-acetaminophen-caffeine -40 mg (FIORICET, ESGIC) -40 mg per tablet    promethazine (PROMETHEGAN) 50 MG suppository       Health Maintenance         Date Due Completion Date    TETANUS VACCINE 01/02/2012 1/2/2002    Mammogram 09/28/2023 9/28/2022    Override on 12/8/2011: Done    Override on 12/8/2011: Done    COVID-19 Vaccine (6 - 2024-25 season) 09/01/2024 11/10/2023    Lipid Panel 12/21/2024 12/21/2023    LDCT Lung Screen 08/09/2025 8/9/2024    Cervical Cancer Screening 12/16/2025 12/16/2020    Override on 1/12/2017: Not Clinically Appropriate (last 3 PAPs normal)    Override on 5/24/2013: Done    Colorectal Cancer Screening 04/26/2026 4/26/2016     Hemoglobin A1c (Diabetic Prevention Screening) 12/21/2026 12/21/2023        Tdap today.    Future Appointments   Date Time Provider Department Center   1/21/2025  4:30 PM Severo Lopez, PharmD [facility]         Follow up in about 1 year (around 12/2/2025). Total clinical care time was 40 min.    Vijay Vaughn MD  [facility]  [facility]

## 2024-12-02 NOTE — ASSESSMENT & PLAN NOTE
Joined the [employer] weight loss program for [employer] employees and on [place].  Walking daily for exercise and changing eating habits.

## 2024-12-02 NOTE — ASSESSMENT & PLAN NOTE
No longer seeing a neurologist; takes approximately 3 Fioricet monthly.  Does not warrant prophylactic therapy at this time and has not tolerated a few therapies already.

## 2024-12-03 DIAGNOSIS — I10 ESSENTIAL HYPERTENSION: ICD-10-CM

## 2024-12-03 RX ORDER — ENALAPRIL MALEATE 20 MG/1
20 TABLET ORAL DAILY
Qty: 90 TABLET | Refills: 3 | Status: SHIPPED | OUTPATIENT
Start: 2024-12-03

## 2024-12-03 NOTE — TELEPHONE ENCOUNTER
----- Message from Tsering sent at 12/3/2024  6:40 AM CST -----  Regarding: Pt called to request that her Rx Refill be sent to a different pharmacy and she would like a call back this morning asap due to only having 1 pill left  Type:  RX Refill Request    Who Called: Patient   Refill or New Rx: Refill  RX Name and Strength: enalapril (VASOTEC) 20 MG tablet  How is the patient currently taking it? (ex. 1XDay): 1XDay  Is this a 30 day or 90 day RX: 90  Preferred Pharmacy with phone number: [id number] - [facility] - [address] phone: [phone]  Local or Mail Order:Local  Ordering Provider:Vijay Vaughn  Would the patient rather a call back or a response via [facility]? Call Back  Best Call Back Number: [phone]  Additional Information: Pt states that the medication was sent to the wrong pharmacy because she price checks. Pt also states that she needs the refill done today due to only having 1 pill left.

## 2024-12-16 DIAGNOSIS — E66.01 MORBID OBESITY DUE TO EXCESS CALORIES: ICD-10-CM

## 2024-12-16 RX ORDER — TIRZEPATIDE 5 MG/.5ML
5 INJECTION, SOLUTION SUBCUTANEOUS
Qty: 2 ML | Refills: 1 | OUTPATIENT
Start: 2024-12-16

## 2024-12-17 DIAGNOSIS — E66.01 MORBID OBESITY DUE TO EXCESS CALORIES: ICD-10-CM

## 2024-12-17 RX ORDER — TIRZEPATIDE 5 MG/.5ML
5 INJECTION, SOLUTION SUBCUTANEOUS
Qty: 2 ML | Refills: 1 | Status: ACTIVE | OUTPATIENT
Start: 2024-12-17

## 2025-01-14 DIAGNOSIS — F43.23 ADJUSTMENT DISORDER WITH MIXED ANXIETY AND DEPRESSED MOOD: ICD-10-CM

## 2025-01-15 RX ORDER — CLONAZEPAM 0.5 MG/1
0.5 TABLET ORAL DAILY PRN
Qty: 10 TABLET | Refills: 0 | Status: SHIPPED | OUTPATIENT
Start: 2025-01-15 | End: 2026-01-15

## 2025-01-21 ENCOUNTER — PATIENT MESSAGE (OUTPATIENT)
Dept: INTERNAL MEDICINE | Facility: CLINIC | Age: 60
End: 2025-01-21

## 2025-01-21 ENCOUNTER — OFFICE VISIT (OUTPATIENT)
Dept: INTERNAL MEDICINE | Facility: CLINIC | Age: 60
End: 2025-01-21
Payer: COMMERCIAL

## 2025-01-21 DIAGNOSIS — E66.812 OBESITY, CLASS II, BMI 35-39.9: Primary | ICD-10-CM

## 2025-01-21 PROCEDURE — 99499 UNLISTED E&M SERVICE: CPT | Mod: 95,,,

## 2025-01-21 RX ORDER — TIRZEPATIDE 7.5 MG/.5ML
7.5 INJECTION, SOLUTION SUBCUTANEOUS
Qty: 2 ML | Refills: 3 | Status: ACTIVE | OUTPATIENT
Start: 2025-01-21

## 2025-01-21 NOTE — PROGRESS NOTES
Patient ID: Maria G Cameron is a 59 y.o. White female    Subjective  Chief Complaint: patient presents for medical weight loss management.    Co-morbidities: HTN, DLD    HPI: Patient [facility] with [facility] in September 2024 and is currently managed on Zepbound 5 mg.     Tolerance to current therapy:  Denies nausea, vomiting, diarrhea, constipation, abdominal pain    Weight loss history:  Starting weight:    9/27/2024   Recent Readings    Weight (lbs) 277 lb    BMI 42.11 BMI    Current weight:    1/17/2025   Recent Readings    Weight (lbs) 253 lb    BMI 38.46 BMI    % weight loss since GLP-1 initiation: 8.7 %    Objective  Lab Results   Component Value Date     12/21/2023     12/12/2022     10/20/2021     Lab Results   Component Value Date    K 4.1 12/21/2023    K 3.8 12/12/2022    K 3.9 10/20/2021     Lab Results   Component Value Date     12/21/2023     12/12/2022     10/20/2021     Lab Results   Component Value Date    CO2 30 (H) 12/21/2023    CO2 25 12/12/2022    CO2 25 10/20/2021     Lab Results   Component Value Date    BUN 20 12/21/2023    BUN 16 12/12/2022    BUN 19 10/20/2021     Lab Results   Component Value Date    GLU 98 12/21/2023     12/12/2022    GLU 92 10/20/2021     Lab Results   Component Value Date    CALCIUM 9.4 12/21/2023    CALCIUM 9.5 12/12/2022    CALCIUM 9.7 10/20/2021     Lab Results   Component Value Date    PROT 7.2 12/21/2023    PROT 7.3 12/12/2022    PROT 7.4 10/20/2021     Lab Results   Component Value Date    ALBUMIN 3.9 12/21/2023    ALBUMIN 3.9 12/12/2022    ALBUMIN 4.1 10/20/2021     Lab Results   Component Value Date    BILITOT 0.4 12/21/2023    BILITOT 0.4 12/12/2022    BILITOT 0.3 10/20/2021     Lab Results   Component Value Date    AST 17 12/21/2023    AST 17 12/12/2022    AST 21 10/20/2021     Lab Results   Component Value Date    ALT 19 12/21/2023    ALT 21 12/12/2022    ALT 24 10/20/2021     Lab Results    Component Value Date    ANIONGAP 6 (L) 12/21/2023    ANIONGAP 12 12/12/2022    ANIONGAP 12 10/20/2021     Lab Results   Component Value Date    CREATININE 0.8 12/21/2023    CREATININE 0.8 12/12/2022    CREATININE 0.8 10/20/2021     Lab Results   Component Value Date    EGFRNORACEVR >60.0 12/21/2023    EGFRNORACEVR >60.0 12/12/2022     Assessment/Plan  -Increase to Zepbound 7.5 mg once weekly  - RTC in 3 months for follow-up evaluation    Patient consented to pharmacist management via collaborative practice.

## 2025-02-18 ENCOUNTER — OFFICE VISIT (OUTPATIENT)
Dept: PSYCHIATRY | Facility: CLINIC | Age: 60
End: 2025-02-18
Payer: COMMERCIAL

## 2025-02-18 VITALS
DIASTOLIC BLOOD PRESSURE: 71 MMHG | WEIGHT: 247.81 LBS | HEART RATE: 88 BPM | BODY MASS INDEX: 37.68 KG/M2 | SYSTOLIC BLOOD PRESSURE: 123 MMHG

## 2025-02-18 DIAGNOSIS — F43.23 ADJUSTMENT DISORDER WITH MIXED ANXIETY AND DEPRESSED MOOD: ICD-10-CM

## 2025-02-18 PROCEDURE — 4010F ACE/ARB THERAPY RXD/TAKEN: CPT | Mod: CPTII,S$GLB,, | Performed by: FAMILY MEDICINE

## 2025-02-18 PROCEDURE — 3074F SYST BP LT 130 MM HG: CPT | Mod: CPTII,S$GLB,, | Performed by: FAMILY MEDICINE

## 2025-02-18 PROCEDURE — 1160F RVW MEDS BY RX/DR IN RCRD: CPT | Mod: CPTII,S$GLB,, | Performed by: FAMILY MEDICINE

## 2025-02-18 PROCEDURE — 3078F DIAST BP <80 MM HG: CPT | Mod: CPTII,S$GLB,, | Performed by: FAMILY MEDICINE

## 2025-02-18 PROCEDURE — 1159F MED LIST DOCD IN RCRD: CPT | Mod: CPTII,S$GLB,, | Performed by: FAMILY MEDICINE

## 2025-02-18 PROCEDURE — 3008F BODY MASS INDEX DOCD: CPT | Mod: CPTII,S$GLB,, | Performed by: FAMILY MEDICINE

## 2025-02-18 PROCEDURE — 99215 OFFICE O/P EST HI 40 MIN: CPT | Mod: S$GLB,,, | Performed by: FAMILY MEDICINE

## 2025-02-18 PROCEDURE — 99999 PR PBB SHADOW E&M-EST. PATIENT-LVL III: CPT | Mod: PBBFAC,,, | Performed by: FAMILY MEDICINE

## 2025-02-18 RX ORDER — CLONAZEPAM 0.5 MG/1
0.5 TABLET ORAL DAILY PRN
Qty: 10 TABLET | Refills: 0 | Status: SHIPPED | OUTPATIENT
Start: 2025-02-18 | End: 2026-02-18

## 2025-02-18 RX ORDER — FLUOXETINE 10 MG/1
20 CAPSULE ORAL DAILY
Qty: 60 CAPSULE | Refills: 11 | Status: SHIPPED | OUTPATIENT
Start: 2025-02-18 | End: 2026-02-18
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
## 2025-03-06 ENCOUNTER — TELEPHONE (OUTPATIENT)
Dept: PSYCHIATRY | Facility: CLINIC | Age: 60
End: 2025-03-06
Payer: COMMERCIAL

## 2025-03-06 NOTE — TELEPHONE ENCOUNTER
----- Message from Blanca sent at 3/6/2025 11:11 AM CST -----  Regarding: Medication  No longer taking prozac, all over the place and resumed taking original medication (her words).Thank you.

## 2025-03-29 ENCOUNTER — OFFICE VISIT (OUTPATIENT)
Dept: URGENT CARE | Facility: CLINIC | Age: 60
End: 2025-03-29
Payer: COMMERCIAL

## 2025-03-29 ENCOUNTER — TELEPHONE (OUTPATIENT)
Dept: URGENT CARE | Facility: CLINIC | Age: 60
End: 2025-03-29
Payer: COMMERCIAL

## 2025-03-29 VITALS
HEIGHT: 68 IN | HEART RATE: 102 BPM | BODY MASS INDEX: 37.44 KG/M2 | DIASTOLIC BLOOD PRESSURE: 78 MMHG | RESPIRATION RATE: 17 BRPM | TEMPERATURE: 98 F | WEIGHT: 247 LBS | SYSTOLIC BLOOD PRESSURE: 126 MMHG | OXYGEN SATURATION: 99 %

## 2025-03-29 DIAGNOSIS — J40 BRONCHITIS: Primary | ICD-10-CM

## 2025-03-29 DIAGNOSIS — J34.89 TENDERNESS OVER MAXILLARY SINUS: ICD-10-CM

## 2025-03-29 DIAGNOSIS — R09.89 ABNORMAL LUNG SOUNDS: ICD-10-CM

## 2025-03-29 DIAGNOSIS — R09.81 NASAL CONGESTION: ICD-10-CM

## 2025-03-29 DIAGNOSIS — R05.9 COUGH, UNSPECIFIED TYPE: ICD-10-CM

## 2025-03-29 PROCEDURE — 99214 OFFICE O/P EST MOD 30 MIN: CPT | Mod: 25,S$GLB,, | Performed by: NURSE PRACTITIONER

## 2025-03-29 PROCEDURE — 96372 THER/PROPH/DIAG INJ SC/IM: CPT | Mod: S$GLB,,, | Performed by: NURSE PRACTITIONER

## 2025-03-29 PROCEDURE — 94640 AIRWAY INHALATION TREATMENT: CPT | Mod: S$GLB,,, | Performed by: NURSE PRACTITIONER

## 2025-03-29 PROCEDURE — 71046 X-RAY EXAM CHEST 2 VIEWS: CPT | Mod: S$GLB,,, | Performed by: RADIOLOGY

## 2025-03-29 RX ORDER — PREDNISONE 20 MG/1
20 TABLET ORAL DAILY
Qty: 3 TABLET | Refills: 0 | Status: SHIPPED | OUTPATIENT
Start: 2025-03-30 | End: 2025-04-02

## 2025-03-29 RX ORDER — DEXAMETHASONE SODIUM PHOSPHATE 10 MG/ML
10 INJECTION INTRAMUSCULAR; INTRAVENOUS
Status: COMPLETED | OUTPATIENT
Start: 2025-03-29 | End: 2025-03-29

## 2025-03-29 RX ORDER — FLUTICASONE PROPIONATE 50 MCG
2 SPRAY, SUSPENSION (ML) NASAL DAILY PRN
Qty: 16 G | Refills: 0 | Status: SHIPPED | OUTPATIENT
Start: 2025-03-29

## 2025-03-29 RX ORDER — PROMETHAZINE HYDROCHLORIDE AND DEXTROMETHORPHAN HYDROBROMIDE 6.25; 15 MG/5ML; MG/5ML
5 SYRUP ORAL NIGHTLY PRN
Qty: 118 ML | Refills: 0 | Status: SHIPPED | OUTPATIENT
Start: 2025-03-29 | End: 2025-03-31 | Stop reason: SDUPTHER

## 2025-03-29 RX ORDER — BENZONATATE 100 MG/1
100 CAPSULE ORAL 3 TIMES DAILY PRN
Qty: 30 CAPSULE | Refills: 0 | Status: SHIPPED | OUTPATIENT
Start: 2025-03-29 | End: 2025-04-08

## 2025-03-29 RX ORDER — IPRATROPIUM BROMIDE 0.5 MG/2.5ML
0.5 SOLUTION RESPIRATORY (INHALATION)
Status: COMPLETED | OUTPATIENT
Start: 2025-03-29 | End: 2025-03-29

## 2025-03-29 RX ADMIN — IPRATROPIUM BROMIDE 0.5 MG: 0.5 SOLUTION RESPIRATORY (INHALATION) at 09:03

## 2025-03-29 RX ADMIN — DEXAMETHASONE SODIUM PHOSPHATE 10 MG: 10 INJECTION INTRAMUSCULAR; INTRAVENOUS at 09:03

## 2025-03-29 NOTE — TELEPHONE ENCOUNTER
patient called, notified of xray reports, questions encouraged and answered     XR CHEST PA AND LATERAL  Result Date: 3/29/2025  EXAMINATION: XR CHEST PA AND LATERAL CLINICAL HISTORY: Cough, unspecified TECHNIQUE: PA and lateral views of the chest were performed. COMPARISON: 02/20/2023, CT chest 08/09/2024 FINDINGS: The cardiomediastinal silhouette is not enlarged.  There is no pleural effusion.  The trachea is midline.  The lungs are symmetrically expanded bilaterally with mildly coarse interstitial attenuation and bilateral basilar subsegmental atelectasis..  No large focal consolidation seen.  There is no pneumothorax.  The osseous structures are remarkable for degenerative change..     1. Chronic appearing interstitial findings, no large focal consolidation.  Pulmonary nodules identified on previous CT are not readily apparent by plain radiography, please see that rib Electronically signed by: Trae Beltran MD Date:    03/29/2025 Time:    11:54

## 2025-03-29 NOTE — PROGRESS NOTES
"Subjective:      Patient ID: Maria G Cameron is a 59 y.o. female.    Vitals:  height is 5' 8" (1.727 m) and weight is 112 kg (247 lb). Her oral temperature is 98.4 °F (36.9 °C). Her blood pressure is 126/78 and her pulse is 102. Her respiration is 17 and oxygen saturation is 99%.     Chief Complaint: Sinus Problem    Patient presents with c.o sinus congestion and cough for 1 month. Patient reports being sick for the entire 30 days. No chills or fever. Has tried otc mucinex which has not helped. No fever.       59-year-old female presents to clinic with complaints of nasal congestion, cough, facial tenderness, chest congestion, fatigue, post nasal drip x 1 month treating with Mucinex. History of bronchitis, former smoker       Sinus Problem  This is a new problem. Episode onset: 1 month. The problem is unchanged. There has been no fever. Associated symptoms include congestion, coughing, headaches, sinus pressure and a sore throat. Pertinent negatives include no chills, diaphoresis or shortness of breath. Treatments tried: mucinex dm, mucinex cough drops, coricidon. The treatment provided no relief.       Constitution: Positive for fatigue. Negative for chills, sweating and fever.   HENT:  Positive for congestion, postnasal drip, sinus pressure, sore throat and voice change. Negative for trouble swallowing.    Respiratory:  Positive for chest tightness and cough. Negative for shortness of breath, wheezing and asthma.    Gastrointestinal:  Negative for abdominal pain, nausea and vomiting.   Allergic/Immunologic: Negative for asthma.   Neurological:  Positive for headaches.      Objective:     Physical Exam   Constitutional: She is oriented to person, place, and time. She appears well-developed. She is cooperative.  Non-toxic appearance. She does not appear ill. No distress.   HENT:   Head: Normocephalic and atraumatic.   Ears:   Right Ear: Hearing, tympanic membrane, external ear and ear canal normal.   Left Ear: " Hearing, tympanic membrane, external ear and ear canal normal.   Nose: Mucosal edema present. No rhinorrhea or nasal deformity. No epistaxis. Right sinus exhibits maxillary sinus tenderness. Right sinus exhibits no frontal sinus tenderness. Left sinus exhibits maxillary sinus tenderness. Left sinus exhibits no frontal sinus tenderness.   Mouth/Throat: Uvula is midline, oropharynx is clear and moist and mucous membranes are normal. No trismus in the jaw. Normal dentition. No uvula swelling. No oropharyngeal exudate, posterior oropharyngeal edema or posterior oropharyngeal erythema.   Eyes: Conjunctivae and lids are normal. No scleral icterus.   Neck: Trachea normal and phonation normal. Neck supple. No edema present. No erythema present. No neck rigidity present.   Cardiovascular: Normal rate, regular rhythm and normal heart sounds.   Pulmonary/Chest: Effort normal. No respiratory distress. She has no decreased breath sounds. She has rhonchi.   Scattered crackles via bronchial region with cough         Comments: Scattered crackles via bronchial region with cough    Abdominal: Normal appearance.   Musculoskeletal: Normal range of motion.         General: No deformity. Normal range of motion.   Neurological: She is alert and oriented to person, place, and time. She exhibits normal muscle tone. Coordination normal.   Skin: Skin is warm, dry, intact, not diaphoretic and not pale.   Psychiatric: Her speech is normal and behavior is normal. Judgment and thought content normal.   Nursing note and vitals reviewed.      Assessment:     1. Bronchitis    2. Nasal congestion    3. Cough, unspecified type    4. Abnormal lung sounds    5. Tenderness over maxillary sinus        Plan:       Bronchitis  -     dexAMETHasone injection 10 mg  -     predniSONE (DELTASONE) 20 MG tablet; Take 1 tablet (20 mg total) by mouth once daily. for 3 days  Dispense: 3 tablet; Refill: 0    Nasal congestion  -     fluticasone propionate (FLONASE) 50  mcg/actuation nasal spray; 2 sprays (100 mcg total) by Each Nostril route daily as needed (nasal congestion).  Dispense: 16 g; Refill: 0    Cough, unspecified type  -     XR CHEST PA AND LATERAL; Future; Expected date: 03/29/2025  -     ipratropium 0.02 % nebulizer solution 0.5 mg  -     benzonatate (TESSALON) 100 MG capsule; Take 1 capsule (100 mg total) by mouth 3 (three) times daily as needed for Cough.  Dispense: 30 capsule; Refill: 0  -     promethazine-dextromethorphan (PROMETHAZINE-DM) 6.25-15 mg/5 mL Syrp; Take 5 mLs by mouth nightly as needed (cough).  Dispense: 118 mL; Refill: 0    Abnormal lung sounds  -     XR CHEST PA AND LATERAL; Future; Expected date: 03/29/2025  -     ipratropium 0.02 % nebulizer solution 0.5 mg    Tenderness over maxillary sinus      XR CHEST PA AND LATERAL  Result Date: 3/29/2025  EXAMINATION: XR CHEST PA AND LATERAL CLINICAL HISTORY: Cough, unspecified TECHNIQUE: PA and lateral views of the chest were performed. COMPARISON: 02/20/2023, CT chest 08/09/2024 FINDINGS: The cardiomediastinal silhouette is not enlarged.  There is no pleural effusion.  The trachea is midline.  The lungs are symmetrically expanded bilaterally with mildly coarse interstitial attenuation and bilateral basilar subsegmental atelectasis..  No large focal consolidation seen.  There is no pneumothorax.  The osseous structures are remarkable for degenerative change..     1. Chronic appearing interstitial findings, no large focal consolidation.  Pulmonary nodules identified on previous CT are not readily apparent by plain radiography, please see that rib Electronically signed by: Trae Beltran MD Date:    03/29/2025 Time:    11:54        Please drink plenty of fluids.  Please get plenty of rest.  Please return here or go to the Emergency Department for any concerns or worsening of condition.  You were given a steroid shot in the clinic and have also been given a prescription for a steroid such as Prednisone or a  Medrol Dose Pack, please begin taking them tomorrow.  It is ok to take over the counter plain Allegra or Claritin or Zyrtec.   If you do have Hypertension or palpitations, it is safe to take Coricidin HBP for relief of sinus symptoms.  We recommend you take Flonase (Fluticasone) or another nasally inhaled steroid unless you are already taking one.  Nasal irrigation with a saline spray or Netti Pot like device per their directions is also recommended.  If not allergic, please take over the counter Tylenol (Acetaminophen) and/or Motrin (Ibuprofen) as directed for control of pain and/or fever.  DO NOT USE phenergan suppository when taking phenergan DM syrup  Please follow up with your primary care doctor or specialist as needed.    If you  smoke, please stop smoking.

## 2025-03-29 NOTE — PATIENT INSTRUCTIONS
Please drink plenty of fluids.  Please get plenty of rest.  Please return here or go to the Emergency Department for any concerns or worsening of condition.  You were given a steroid shot in the clinic and have also been given a prescription for a steroid such as Prednisone or a Medrol Dose Pack, please begin taking them tomorrow.  It is ok to take over the counter plain Allegra or Claritin or Zyrtec.   If you do have Hypertension or palpitations, it is safe to take Coricidin HBP for relief of sinus symptoms.  We recommend you take Flonase (Fluticasone) or another nasally inhaled steroid unless you are already taking one.  Nasal irrigation with a saline spray or Netti Pot like device per their directions is also recommended.  If not allergic, please take over the counter Tylenol (Acetaminophen) and/or Motrin (Ibuprofen) as directed for control of pain and/or fever.  DO NOT USE phenergan suppository when taking phenergan DM syrup  Please follow up with your primary care doctor or specialist as needed.    If you  smoke, please stop smoking.

## 2025-03-31 DIAGNOSIS — R05.9 COUGH, UNSPECIFIED TYPE: ICD-10-CM

## 2025-03-31 RX ORDER — PROMETHAZINE HYDROCHLORIDE AND DEXTROMETHORPHAN HYDROBROMIDE 6.25; 15 MG/5ML; MG/5ML
5 SYRUP ORAL NIGHTLY PRN
Qty: 118 ML | Refills: 0 | Status: SHIPPED | OUTPATIENT
Start: 2025-03-31

## 2025-03-31 NOTE — TELEPHONE ENCOUNTER
RN spoke to pt and she is feeling somewhat better but still coughing.  Pt requesting promethazine-DM refill as it helps her to not cough as much at night and therefore able to sleep.  Routed to Dr. Vaughn

## 2025-04-21 ENCOUNTER — OFFICE VISIT (OUTPATIENT)
Dept: PSYCHIATRY | Facility: CLINIC | Age: 60
End: 2025-04-21
Payer: COMMERCIAL

## 2025-04-21 VITALS
HEART RATE: 91 BPM | DIASTOLIC BLOOD PRESSURE: 76 MMHG | BODY MASS INDEX: 35.7 KG/M2 | WEIGHT: 234.81 LBS | SYSTOLIC BLOOD PRESSURE: 118 MMHG

## 2025-04-21 DIAGNOSIS — F33.1 MODERATE EPISODE OF RECURRENT MAJOR DEPRESSIVE DISORDER: ICD-10-CM

## 2025-04-21 DIAGNOSIS — F41.9 ANXIETY: Primary | ICD-10-CM

## 2025-04-21 PROCEDURE — 3074F SYST BP LT 130 MM HG: CPT | Mod: CPTII,S$GLB,, | Performed by: FAMILY MEDICINE

## 2025-04-21 PROCEDURE — 99214 OFFICE O/P EST MOD 30 MIN: CPT | Mod: S$GLB,,, | Performed by: FAMILY MEDICINE

## 2025-04-21 PROCEDURE — 1160F RVW MEDS BY RX/DR IN RCRD: CPT | Mod: CPTII,S$GLB,, | Performed by: FAMILY MEDICINE

## 2025-04-21 PROCEDURE — 4010F ACE/ARB THERAPY RXD/TAKEN: CPT | Mod: CPTII,S$GLB,, | Performed by: FAMILY MEDICINE

## 2025-04-21 PROCEDURE — 3078F DIAST BP <80 MM HG: CPT | Mod: CPTII,S$GLB,, | Performed by: FAMILY MEDICINE

## 2025-04-21 PROCEDURE — 99999 PR PBB SHADOW E&M-EST. PATIENT-LVL III: CPT | Mod: PBBFAC,,, | Performed by: FAMILY MEDICINE

## 2025-04-21 PROCEDURE — 3008F BODY MASS INDEX DOCD: CPT | Mod: CPTII,S$GLB,, | Performed by: FAMILY MEDICINE

## 2025-04-21 PROCEDURE — 1159F MED LIST DOCD IN RCRD: CPT | Mod: CPTII,S$GLB,, | Performed by: FAMILY MEDICINE

## 2025-04-21 RX ORDER — CLONAZEPAM 0.5 MG/1
0.5 TABLET ORAL DAILY PRN
Qty: 10 TABLET | Refills: 3 | Status: SHIPPED | OUTPATIENT
Start: 2025-04-21 | End: 2026-04-21

## 2025-04-21 RX ORDER — SERTRALINE HYDROCHLORIDE 100 MG/1
200 TABLET, FILM COATED ORAL DAILY
Qty: 180 TABLET | Refills: 3 | Status: SHIPPED | OUTPATIENT
Start: 2025-04-21 | End: 2026-04-16

## 2025-05-27 DIAGNOSIS — I10 ESSENTIAL HYPERTENSION: ICD-10-CM

## 2025-05-27 RX ORDER — HYDROCHLOROTHIAZIDE 25 MG/1
25 TABLET ORAL DAILY
Qty: 90 TABLET | Refills: 0 | Status: CANCELLED | OUTPATIENT
Start: 2025-05-27

## 2025-05-29 DIAGNOSIS — I10 ESSENTIAL HYPERTENSION: ICD-10-CM

## 2025-05-29 RX ORDER — HYDROCHLOROTHIAZIDE 25 MG/1
25 TABLET ORAL DAILY
Qty: 90 TABLET | Refills: 0 | Status: CANCELLED | OUTPATIENT
Start: 2025-05-29

## 2025-05-30 DIAGNOSIS — I10 ESSENTIAL HYPERTENSION: ICD-10-CM

## 2025-05-30 RX ORDER — HYDROCHLOROTHIAZIDE 25 MG/1
25 TABLET ORAL DAILY
Qty: 90 TABLET | Refills: 3 | Status: SHIPPED | OUTPATIENT
Start: 2025-05-30

## 2025-05-30 RX ORDER — HYDROCHLOROTHIAZIDE 25 MG/1
25 TABLET ORAL DAILY
Qty: 90 TABLET | Refills: 2 | Status: CANCELLED | OUTPATIENT
Start: 2025-05-30

## 2025-06-03 ENCOUNTER — OFFICE VISIT (OUTPATIENT)
Dept: INTERNAL MEDICINE | Facility: CLINIC | Age: 60
End: 2025-06-03
Payer: COMMERCIAL

## 2025-06-03 ENCOUNTER — PATIENT MESSAGE (OUTPATIENT)
Dept: INTERNAL MEDICINE | Facility: CLINIC | Age: 60
End: 2025-06-03

## 2025-06-03 DIAGNOSIS — E66.811 OBESITY, CLASS I, BMI 30-34.9: Primary | ICD-10-CM

## 2025-06-03 PROCEDURE — 99499 UNLISTED E&M SERVICE: CPT | Mod: 95,,,

## 2025-06-03 RX ORDER — TIRZEPATIDE 10 MG/.5ML
10 INJECTION, SOLUTION SUBCUTANEOUS
Qty: 2 ML | Refills: 2 | Status: ACTIVE | OUTPATIENT
Start: 2025-06-03

## 2025-06-16 ENCOUNTER — OFFICE VISIT (OUTPATIENT)
Dept: PSYCHIATRY | Facility: CLINIC | Age: 60
End: 2025-06-16
Payer: COMMERCIAL

## 2025-06-16 DIAGNOSIS — F41.9 ANXIETY: ICD-10-CM

## 2025-06-16 DIAGNOSIS — F33.1 MODERATE EPISODE OF RECURRENT MAJOR DEPRESSIVE DISORDER: ICD-10-CM

## 2025-06-16 PROCEDURE — 4010F ACE/ARB THERAPY RXD/TAKEN: CPT | Mod: CPTII,95,, | Performed by: FAMILY MEDICINE

## 2025-06-16 PROCEDURE — 1159F MED LIST DOCD IN RCRD: CPT | Mod: CPTII,95,, | Performed by: FAMILY MEDICINE

## 2025-06-16 PROCEDURE — 1160F RVW MEDS BY RX/DR IN RCRD: CPT | Mod: CPTII,95,, | Performed by: FAMILY MEDICINE

## 2025-06-16 PROCEDURE — 98006 SYNCH AUDIO-VIDEO EST MOD 30: CPT | Mod: 95,,, | Performed by: FAMILY MEDICINE

## 2025-08-11 ENCOUNTER — PATIENT MESSAGE (OUTPATIENT)
Dept: INTERNAL MEDICINE | Facility: CLINIC | Age: 60
End: 2025-08-11
Payer: COMMERCIAL

## 2025-08-14 DIAGNOSIS — F41.9 ANXIETY: ICD-10-CM

## 2025-08-14 RX ORDER — CLONAZEPAM 0.5 MG/1
0.5 TABLET ORAL DAILY PRN
Qty: 10 TABLET | Refills: 3 | Status: SHIPPED | OUTPATIENT
Start: 2025-08-14 | End: 2026-08-14

## 2025-08-19 DIAGNOSIS — E66.811 OBESITY, CLASS I, BMI 30-34.9: ICD-10-CM

## 2025-08-19 RX ORDER — TIRZEPATIDE 10 MG/.5ML
10 INJECTION, SOLUTION SUBCUTANEOUS
Qty: 2 ML | Refills: 0 | Status: ACTIVE | OUTPATIENT
Start: 2025-08-19

## 2025-08-20 ENCOUNTER — HOSPITAL ENCOUNTER (OUTPATIENT)
Dept: RADIOLOGY | Facility: HOSPITAL | Age: 60
Discharge: HOME | End: 2025-08-20
Attending: INTERNAL MEDICINE
Payer: COMMERCIAL

## 2025-08-20 DIAGNOSIS — Z87.891 HISTORY OF SMOKING: ICD-10-CM

## 2025-08-20 DIAGNOSIS — Z12.2 ENCOUNTER FOR SCREENING FOR LUNG CANCER: ICD-10-CM

## 2025-08-20 PROCEDURE — 71271 CT THORAX LUNG CANCER SCR C-: CPT | Mod: TC

## 2025-08-20 PROCEDURE — 71271 CT THORAX LUNG CANCER SCR C-: CPT | Mod: 26,,, | Performed by: STUDENT IN AN ORGANIZED HEALTH CARE EDUCATION/TRAINING PROGRAM

## (undated) DEVICE — SPONGE LAP 18X18 PREWASHED

## (undated) DEVICE — DRAPE T EXTRM SURG 121X128X90

## (undated) DEVICE — Device

## (undated) DEVICE — ELECTRODE BLADE INSULATED 1 IN

## (undated) DEVICE — CONTAINER SPECIMEN STRL 4OZ

## (undated) DEVICE — DRESSING XEROFORM NONADH 1X8IN

## (undated) DEVICE — SUT 3-0 MONOCRYL PLUS PS-2

## (undated) DEVICE — SEE MEDLINE ITEM 146417

## (undated) DEVICE — BANDAGE MATRIX HK LOOP 4IN 5YD

## (undated) DEVICE — SUT SILK 3-0 SH 18IN BLACK

## (undated) DEVICE — SUT ETHILON 3-0 PS2 18 BLK

## (undated) DEVICE — ADHESIVE DERMABOND ADVANCED

## (undated) DEVICE — APPLICATOR CHLORAPREP ORN 26ML

## (undated) DEVICE — GAUZE CNFRM STRL 4INX4.1YD

## (undated) DEVICE — BANDAGE ESMARK ELASTIC ST 4X9

## (undated) DEVICE — GAUZE FLUFF XXLG 36X36 2 PLY

## (undated) DEVICE — ELECTRODE REM PLYHSV RETURN 9

## (undated) DEVICE — TRAY MINOR GEN SURG

## (undated) DEVICE — PACK UNIVERSAL SPLIT II

## (undated) DEVICE — BANDAGE ROLL COTTN 4.5INX4.1YD

## (undated) DEVICE — SPONGE DERMACEA GAUZE 4X4

## (undated) DEVICE — CUFF TOURNIQUET DL PRT

## (undated) DEVICE — SOL 0.9% NACL IRRI.IN STERIL

## (undated) DEVICE — BRA SURGI XX LARGE

## (undated) DEVICE — SYS LABEL CORRECT MED

## (undated) DEVICE — GAUZE AVANT SPNG 4PLY STRL 4X4

## (undated) DEVICE — DRAPE STERI INSTRUMENT 1018

## (undated) DEVICE — NDL 18GA X1 1/2 REG BEVEL

## (undated) DEVICE — DRAPE THREE-QTR REINF 53X77IN

## (undated) DEVICE — SOL NACL STRL BOTTLE 1000ML

## (undated) DEVICE — SUT VICRYL 3-0 27 SH

## (undated) DEVICE — SUT CTD VICRYL 4-0 BR PS-2

## (undated) DEVICE — BLADE ELECTRODE 2.75 PTFE COAT

## (undated) DEVICE — COVER PROBE NL STRL 3.6X96IN

## (undated) DEVICE — TRAY MINOR ORTHO OMC